# Patient Record
Sex: FEMALE | Race: WHITE | NOT HISPANIC OR LATINO | URBAN - METROPOLITAN AREA
[De-identification: names, ages, dates, MRNs, and addresses within clinical notes are randomized per-mention and may not be internally consistent; named-entity substitution may affect disease eponyms.]

---

## 2018-10-22 ENCOUNTER — NEW PATIENT (OUTPATIENT)
Dept: URBAN - METROPOLITAN AREA CLINIC 44 | Facility: CLINIC | Age: 73
End: 2018-10-22
Payer: MEDICARE

## 2018-10-22 DIAGNOSIS — E11.9 TYPE 2 DIABETES MELLITUS WITHOUT COMPLICATIONS: Primary | ICD-10-CM

## 2018-10-22 DIAGNOSIS — H35.3131 NONEXUDATIVE MACULAR DEGENERATION, EARLY DRY STAGE, BILATERAL: ICD-10-CM

## 2018-10-22 DIAGNOSIS — Z79.4 LONG TERM (CURRENT) USE OF INSULIN: ICD-10-CM

## 2018-10-22 PROCEDURE — 92004 COMPRE OPH EXAM NEW PT 1/>: CPT | Performed by: OPTOMETRIST

## 2018-10-22 PROCEDURE — 92134 CPTRZ OPH DX IMG PST SGM RTA: CPT | Performed by: OPTOMETRIST

## 2018-10-22 ASSESSMENT — KERATOMETRY
OS: 43.00
OD: 43.00

## 2019-11-11 ENCOUNTER — FOLLOW UP ESTABLISHED (OUTPATIENT)
Dept: URBAN - METROPOLITAN AREA CLINIC 44 | Facility: CLINIC | Age: 74
End: 2019-11-11
Payer: MEDICARE

## 2019-11-11 DIAGNOSIS — H02.201 LAGOPHTHALMOS OF RIGHT UPPER LID: ICD-10-CM

## 2019-11-11 DIAGNOSIS — H25.813 COMBINED FORMS OF AGE-RELATED CATARACT, BILATERAL: ICD-10-CM

## 2019-11-11 DIAGNOSIS — H02.205 LAGOPHTHALMOS OF LEFT UPPER LID: ICD-10-CM

## 2019-11-11 PROCEDURE — 92014 COMPRE OPH EXAM EST PT 1/>: CPT | Performed by: OPTOMETRIST

## 2019-11-11 PROCEDURE — 92134 CPTRZ OPH DX IMG PST SGM RTA: CPT | Performed by: OPTOMETRIST

## 2019-11-11 ASSESSMENT — KERATOMETRY
OS: 43.13
OD: 43.00

## 2019-11-11 ASSESSMENT — INTRAOCULAR PRESSURE
OS: 15
OD: 15

## 2019-11-11 ASSESSMENT — VISUAL ACUITY
OS: 20/30
OD: 20/40

## 2020-10-19 ENCOUNTER — HOSPITAL ENCOUNTER (EMERGENCY)
Age: 75
Discharge: ANOTHER HOSPITAL | End: 2020-10-19
Payer: MEDICARE

## 2020-10-19 ENCOUNTER — APPOINTMENT (OUTPATIENT)
Dept: CT IMAGING | Age: 75
End: 2020-10-19
Payer: MEDICARE

## 2020-10-19 VITALS
RESPIRATION RATE: 16 BRPM | HEART RATE: 70 BPM | DIASTOLIC BLOOD PRESSURE: 76 MMHG | HEIGHT: 59 IN | WEIGHT: 162 LBS | TEMPERATURE: 98.2 F | SYSTOLIC BLOOD PRESSURE: 165 MMHG | OXYGEN SATURATION: 94 % | BODY MASS INDEX: 32.66 KG/M2

## 2020-10-19 LAB
ALBUMIN SERPL-MCNC: 3.8 G/DL (ref 3.5–4.6)
ALBUMIN SERPL-MCNC: 3.9 G/DL (ref 3.5–4.6)
ALP BLD-CCNC: 92 U/L (ref 40–130)
ALP BLD-CCNC: 96 U/L (ref 40–130)
ALT SERPL-CCNC: 42 U/L (ref 0–33)
ALT SERPL-CCNC: 46 U/L (ref 0–33)
ANION GAP SERPL CALCULATED.3IONS-SCNC: 10 MEQ/L (ref 9–15)
ANION GAP SERPL CALCULATED.3IONS-SCNC: 9 MEQ/L (ref 9–15)
AST SERPL-CCNC: 42 U/L (ref 0–35)
AST SERPL-CCNC: 59 U/L (ref 0–35)
BASOPHILS ABSOLUTE: 0.1 K/UL (ref 0–0.2)
BASOPHILS RELATIVE PERCENT: 0.8 %
BILIRUB SERPL-MCNC: <0.2 MG/DL (ref 0.2–0.7)
BILIRUB SERPL-MCNC: <0.2 MG/DL (ref 0.2–0.7)
BUN BLDV-MCNC: 14 MG/DL (ref 8–23)
BUN BLDV-MCNC: 15 MG/DL (ref 8–23)
CALCIUM SERPL-MCNC: 9 MG/DL (ref 8.5–9.9)
CALCIUM SERPL-MCNC: 9.4 MG/DL (ref 8.5–9.9)
CHLORIDE BLD-SCNC: 101 MEQ/L (ref 95–107)
CHLORIDE BLD-SCNC: 107 MEQ/L (ref 95–107)
CO2: 25 MEQ/L (ref 20–31)
CO2: 25 MEQ/L (ref 20–31)
CREAT SERPL-MCNC: 0.62 MG/DL (ref 0.5–0.9)
CREAT SERPL-MCNC: 0.63 MG/DL (ref 0.5–0.9)
EOSINOPHILS ABSOLUTE: 0.4 K/UL (ref 0–0.7)
EOSINOPHILS RELATIVE PERCENT: 5.2 %
GFR AFRICAN AMERICAN: >60
GFR AFRICAN AMERICAN: >60
GFR NON-AFRICAN AMERICAN: >60
GFR NON-AFRICAN AMERICAN: >60
GLOBULIN: 3.3 G/DL (ref 2.3–3.5)
GLOBULIN: 4.1 G/DL (ref 2.3–3.5)
GLUCOSE BLD-MCNC: 196 MG/DL (ref 70–99)
GLUCOSE BLD-MCNC: 209 MG/DL (ref 70–99)
HCT VFR BLD CALC: 45.9 % (ref 37–47)
HEMOGLOBIN: 15.3 G/DL (ref 12–16)
LACTIC ACID: 0.9 MMOL/L (ref 0.5–2.2)
LYMPHOCYTES ABSOLUTE: 2.4 K/UL (ref 1–4.8)
LYMPHOCYTES RELATIVE PERCENT: 30.9 %
MCH RBC QN AUTO: 32.1 PG (ref 27–31.3)
MCHC RBC AUTO-ENTMCNC: 33.2 % (ref 33–37)
MCV RBC AUTO: 96.6 FL (ref 82–100)
MONOCYTES ABSOLUTE: 0.7 K/UL (ref 0.2–0.8)
MONOCYTES RELATIVE PERCENT: 8.7 %
NEUTROPHILS ABSOLUTE: 4.3 K/UL (ref 1.4–6.5)
NEUTROPHILS RELATIVE PERCENT: 54.4 %
PDW BLD-RTO: 13 % (ref 11.5–14.5)
PLATELET # BLD: 150 K/UL (ref 130–400)
POC CREATININE WHOLE BLOOD: 0.7
POTASSIUM SERPL-SCNC: 5.2 MEQ/L (ref 3.4–4.9)
POTASSIUM SERPL-SCNC: 6 MEQ/L (ref 3.4–4.9)
RBC # BLD: 4.76 M/UL (ref 4.2–5.4)
REASON FOR REJECTION: NORMAL
REJECTED TEST: NORMAL
SODIUM BLD-SCNC: 136 MEQ/L (ref 135–144)
SODIUM BLD-SCNC: 141 MEQ/L (ref 135–144)
TOTAL PROTEIN: 7.1 G/DL (ref 6.3–8)
TOTAL PROTEIN: 8 G/DL (ref 6.3–8)
WBC # BLD: 7.9 K/UL (ref 4.8–10.8)

## 2020-10-19 PROCEDURE — 70487 CT MAXILLOFACIAL W/DYE: CPT

## 2020-10-19 PROCEDURE — 2580000003 HC RX 258: Performed by: PHYSICIAN ASSISTANT

## 2020-10-19 PROCEDURE — 80053 COMPREHEN METABOLIC PANEL: CPT

## 2020-10-19 PROCEDURE — 36415 COLL VENOUS BLD VENIPUNCTURE: CPT

## 2020-10-19 PROCEDURE — 96365 THER/PROPH/DIAG IV INF INIT: CPT

## 2020-10-19 PROCEDURE — 96376 TX/PRO/DX INJ SAME DRUG ADON: CPT

## 2020-10-19 PROCEDURE — 2500000003 HC RX 250 WO HCPCS: Performed by: PHYSICIAN ASSISTANT

## 2020-10-19 PROCEDURE — 6360000004 HC RX CONTRAST MEDICATION: Performed by: PHYSICIAN ASSISTANT

## 2020-10-19 PROCEDURE — 85025 COMPLETE CBC W/AUTO DIFF WBC: CPT

## 2020-10-19 PROCEDURE — 6360000002 HC RX W HCPCS: Performed by: PHYSICIAN ASSISTANT

## 2020-10-19 PROCEDURE — 99285 EMERGENCY DEPT VISIT HI MDM: CPT

## 2020-10-19 PROCEDURE — 83605 ASSAY OF LACTIC ACID: CPT

## 2020-10-19 PROCEDURE — 87040 BLOOD CULTURE FOR BACTERIA: CPT

## 2020-10-19 PROCEDURE — 96375 TX/PRO/DX INJ NEW DRUG ADDON: CPT

## 2020-10-19 RX ORDER — SODIUM POLYSTYRENE SULFONATE 15 G/60ML
15 SUSPENSION ORAL; RECTAL ONCE
Status: DISCONTINUED | OUTPATIENT
Start: 2020-10-19 | End: 2020-10-19 | Stop reason: HOSPADM

## 2020-10-19 RX ORDER — GABAPENTIN 300 MG/1
300 CAPSULE ORAL 2 TIMES DAILY
COMMUNITY
End: 2021-12-13 | Stop reason: SDUPTHER

## 2020-10-19 RX ORDER — INSULIN ASPART 100 [IU]/ML
18 INJECTION, SOLUTION INTRAVENOUS; SUBCUTANEOUS
COMMUNITY
End: 2021-06-14

## 2020-10-19 RX ORDER — ATORVASTATIN CALCIUM 40 MG/1
40 TABLET, FILM COATED ORAL DAILY
COMMUNITY
End: 2021-01-11 | Stop reason: SDUPTHER

## 2020-10-19 RX ORDER — PRIMIDONE 50 MG/1
50 TABLET ORAL 2 TIMES DAILY
COMMUNITY
End: 2021-02-03 | Stop reason: SDUPTHER

## 2020-10-19 RX ORDER — INSULIN GLARGINE 100 [IU]/ML
15 INJECTION, SOLUTION SUBCUTANEOUS NIGHTLY
COMMUNITY
End: 2021-01-25

## 2020-10-19 RX ORDER — CLINDAMYCIN PHOSPHATE 600 MG/50ML
600 INJECTION INTRAVENOUS ONCE
Status: COMPLETED | OUTPATIENT
Start: 2020-10-19 | End: 2020-10-19

## 2020-10-19 RX ORDER — IBUPROFEN 600 MG/1
600 TABLET ORAL 2 TIMES DAILY PRN
COMMUNITY
End: 2021-03-25 | Stop reason: SDUPTHER

## 2020-10-19 RX ORDER — LISINOPRIL 10 MG/1
10 TABLET ORAL DAILY
COMMUNITY
End: 2021-05-04 | Stop reason: SDUPTHER

## 2020-10-19 RX ORDER — FENTANYL CITRATE 50 UG/ML
50 INJECTION, SOLUTION INTRAMUSCULAR; INTRAVENOUS ONCE
Status: COMPLETED | OUTPATIENT
Start: 2020-10-19 | End: 2020-10-19

## 2020-10-19 RX ORDER — 0.9 % SODIUM CHLORIDE 0.9 %
1000 INTRAVENOUS SOLUTION INTRAVENOUS ONCE
Status: COMPLETED | OUTPATIENT
Start: 2020-10-19 | End: 2020-10-19

## 2020-10-19 RX ADMIN — SODIUM CHLORIDE 1000 ML: 9 INJECTION, SOLUTION INTRAVENOUS at 15:18

## 2020-10-19 RX ADMIN — FENTANYL CITRATE 50 MCG: 50 INJECTION INTRAMUSCULAR; INTRAVENOUS at 18:05

## 2020-10-19 RX ADMIN — IOPAMIDOL 100 ML: 612 INJECTION, SOLUTION INTRAVENOUS at 16:09

## 2020-10-19 RX ADMIN — CLINDAMYCIN IN 5 PERCENT DEXTROSE 600 MG: 12 INJECTION, SOLUTION INTRAVENOUS at 15:18

## 2020-10-19 RX ADMIN — FENTANYL CITRATE 50 MCG: 50 INJECTION INTRAMUSCULAR; INTRAVENOUS at 16:47

## 2020-10-19 ASSESSMENT — ENCOUNTER SYMPTOMS
DIARRHEA: 0
PHOTOPHOBIA: 0
SORE THROAT: 0
BACK PAIN: 0
SHORTNESS OF BREATH: 0
RHINORRHEA: 0
ABDOMINAL PAIN: 0
VOMITING: 0
NAUSEA: 0
COUGH: 0
EYE PAIN: 0

## 2020-10-19 ASSESSMENT — PAIN SCALES - GENERAL
PAINLEVEL_OUTOF10: 6
PAINLEVEL_OUTOF10: 10

## 2020-10-19 ASSESSMENT — PAIN DESCRIPTION - LOCATION
LOCATION: TEETH
LOCATION: JAW
LOCATION: JAW

## 2020-10-19 ASSESSMENT — PAIN DESCRIPTION - ORIENTATION
ORIENTATION: RIGHT
ORIENTATION: RIGHT

## 2020-10-19 ASSESSMENT — PAIN DESCRIPTION - DESCRIPTORS
DESCRIPTORS: PRESSURE
DESCRIPTORS: PRESSURE

## 2020-10-19 ASSESSMENT — PAIN DESCRIPTION - PROGRESSION
CLINICAL_PROGRESSION: GRADUALLY IMPROVING
CLINICAL_PROGRESSION: RESOLVED

## 2020-10-19 ASSESSMENT — PAIN DESCRIPTION - PAIN TYPE
TYPE: ACUTE PAIN

## 2020-10-19 ASSESSMENT — PAIN DESCRIPTION - FREQUENCY: FREQUENCY: CONTINUOUS

## 2020-10-19 ASSESSMENT — PAIN - FUNCTIONAL ASSESSMENT: PAIN_FUNCTIONAL_ASSESSMENT: 0-10

## 2020-10-19 NOTE — ED TRIAGE NOTES
Pt here with complaints of dental pain. She has seen her dentist, who put her on antibiotics. She finished the course of antibiotics with relief. Shortly after, the pain returned. Pt states the infection is in her right lower jaw. Her dentist advised her to come to ER for possible IV antibiotics. Pt was referred to multiple oral surgeons, but no one takes her insurance. She states the pain radiated to her ear, now it radiates across her mouth.

## 2020-10-19 NOTE — ED NOTES
Returned. States she feels ok right now. Call light within reach. Aware we are waiting on results.       Renzo Manzo RN  10/19/20 1455

## 2020-10-19 NOTE — ED NOTES
CT result back. PA Apple made aware. Pt wanting coffee. Waiting to confirm with hope to see if she can have some.      Madhav Mac RN  10/19/20 6245

## 2020-10-19 NOTE — ED NOTES
[facility] here for patient. Report given to squad member. Pt denies any further complaints. States her pain is still a 6/10 but better than it was. No acute distress noted.      Stevie Cortes RN  10/19/20 6053

## 2020-10-19 NOTE — ED PROVIDER NOTES
[address] [facility] ED  EMERGENCY DEPARTMENT ENCOUNTER      Pt Name: Mary Kate Irving  MRN: [id number]  [place] 1945  Date of evaluation: 10/19/2020  Provider: Magi Palacios PA-C      HISTORY OF PRESENT ILLNESS    Mary Kate Irving is a 67 y.o. female who presents to the Emergency Department with dental pain. Patient states that she has been dealing with right lower dental pain and swelling on collection for the last month. Patient recently finished an antibiotic about a week ago she is unsure which one. She was possibly seeing an oral surgeon but has had difficulty getting into 1. She has increased swelling pain and was informed to go to the emergency department due to age diabetes and the dentist did not want to prescribe another antibiotic. She denies any fevers at this time. REVIEW OF SYSTEMS       Review of Systems   Constitutional: Negative for chills, diaphoresis, fatigue and fever. HENT: Positive for dental problem. Negative for congestion, rhinorrhea and sore throat. Eyes: Negative for photophobia and pain. Respiratory: Negative for cough and shortness of breath. Cardiovascular: Negative for chest pain and palpitations. Gastrointestinal: Negative for abdominal pain, diarrhea, nausea and vomiting. Genitourinary: Negative for dysuria and flank pain. Musculoskeletal: Negative for back pain. Skin: Negative for rash. Neurological: Negative for dizziness, light-headedness and headaches. Psychiatric/Behavioral: Negative. All other systems reviewed and are negative.         PAST MEDICAL HISTORY     Past Medical History:   Diagnosis Date    Cancer ([facility] [address]     Breast    Diabetes mellitus ([facility] [address] [facility]     Hyperlipidemia     Hypertension          SURGICAL HISTORY       Past Surgical History:   Procedure Laterality Date    APPENDECTOMY      BREAST LUMPECTOMY Right     CARPAL TUNNEL RELEASE Left     HERNIA REPAIR      Umbilical    HYSTERECTOMY      TONSILLECTOMY None     Forced sexual activity: None   Other Topics Concern    None   Social History Narrative    None       SCREENINGS             PHYSICAL EXAM    (up to 7 for level 4, 8 or more for level 5)     ED Triage Vitals [10/19/20 1350]   BP Temp Temp Source Pulse Resp SpO2 Height Weight   (!) 131/94 98.2 °F (36.8 °C) Oral 80 16 97 % 4' 10.5\" (1.486 m) 162 lb (73.5 kg)       Physical Exam  Vitals signs and nursing note reviewed. Constitutional:       General: She is not in acute distress. Appearance: Normal appearance. She is well-developed. She is not diaphoretic. HENT:      Head: Normocephalic and atraumatic. Comments: Usual swelling noted. Mild redness submandibular. Mouth/Throat:      Lips: Pink. Mouth: Mucous membranes are moist.      Dentition: Abnormal dentition. Dental tenderness, gingival swelling and dental caries present. Pharynx: Oropharynx is clear. Uvula midline. Comments: Erythema to right lower gums without palpable abscess. Tenderness to palpation. Eyes:      General: Lids are normal.      Conjunctiva/sclera: Conjunctivae normal.   Neck:      Musculoskeletal: Normal range of motion and neck supple. Cardiovascular:      Rate and Rhythm: Normal rate and regular rhythm. Pulses: Normal pulses. Heart sounds: Normal heart sounds. Pulmonary:      Effort: Pulmonary effort is normal.      Breath sounds: Normal breath sounds. Abdominal:      General: Bowel sounds are normal.      Palpations: Abdomen is soft. Tenderness: There is no abdominal tenderness. Lymphadenopathy:      Cervical: No cervical adenopathy. Skin:     General: Skin is warm and dry. Capillary Refill: Capillary refill takes less than 2 seconds. Findings: No rash. Neurological:      Mental Status: She is alert and oriented to person, place, and time. Psychiatric:         Thought Content:  Thought content normal.         Judgment: Judgment normal.           All other labs were within normal range or not returned as of this dictation. EMERGENCY DEPARTMENT COURSE and DIFFERENTIALDIAGNOSIS/MDM:   Vitals:    Vitals:    10/19/20 1610 10/19/20 1630 10/19/20 1700 10/19/20 1800   BP: (!) 174/75 (!) 176/70 (!) 178/72 (!) 187/81   Pulse: 72 70 72 71   Resp: 16 16 16 16   Temp:       TempSrc:       SpO2: 98% 95% 95% 94%   Weight:       Height:            Patient presents with dental pain. She is afebrile and hemodynamically stable. Labs are grossly unremarkable at this time. CT results are as follows    CELLULITIS/PHLEGMON IN THE RIGHT SUBMANDIBULAR SOFT TISSUE. NO DRAINABLE ABSCESS FLUID COLLECTION. 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             Patient was given IV clindamycin while in the emergency department IV fluids and medicated for pain. We do not have oral surgery or dental at [facility] so patient will need to be transferred per hospitalist.  Patient was accepted at [facility] by Dr. Treasure Fox, oral surgeon. Pt is stable improved and ready for transfer   PROCEDURES:  Unless otherwise noted below, none     Procedures      FINAL IMPRESSION      1. Odontalgia    2. Cellulitis of face    3. Dental abscess          DISPOSITION/PLAN   DISPOSITION Decision To Transfer 10/19/2020 05:20:03 PM          Apple Hutchison (electronically signed)  Attending Emergency Physician       Catracho Torres PA-C  10/19/20 6392    Attending Supervisory Note/Shared Visit   I have personally performed a face to face diagnostic evaluation on this patient. I have reviewed the mid-levels findings and agree.   History and Exam by me shows dental abscess      Savage Cowart DO  Attending Emergency Physician         Savage Cowart DO  10/19/20 8234

## 2020-10-19 NOTE — ED NOTES
Per Oksana London in lab, CMP hemolyzed she will send phleb to [facility]raw.       Doreen Resendez, RN  10/19/20 1255

## 2020-10-20 LAB
GFR AFRICAN AMERICAN: >60
GFR NON-AFRICAN AMERICAN: >60
PERFORMED ON: NORMAL
POC CREATININE: 0.7 MG/DL (ref 0.6–1.2)
POC SAMPLE TYPE: NORMAL

## 2020-10-24 LAB
BLOOD CULTURE, ROUTINE: NORMAL
CULTURE, BLOOD 2: NORMAL

## 2021-01-04 ENCOUNTER — OFFICE VISIT (OUTPATIENT)
Dept: FAMILY MEDICINE CLINIC | Age: 76
End: 2021-01-04
Payer: MEDICARE

## 2021-01-04 VITALS
RESPIRATION RATE: 14 BRPM | HEART RATE: 86 BPM | TEMPERATURE: 97 F | BODY MASS INDEX: 33.26 KG/M2 | SYSTOLIC BLOOD PRESSURE: 120 MMHG | HEIGHT: 59 IN | OXYGEN SATURATION: 98 % | DIASTOLIC BLOOD PRESSURE: 70 MMHG | WEIGHT: 165 LBS

## 2021-01-04 DIAGNOSIS — E11.9 TYPE 2 DIABETES MELLITUS WITHOUT COMPLICATION, WITH LONG-TERM CURRENT USE OF INSULIN (HCC): ICD-10-CM

## 2021-01-04 DIAGNOSIS — Z79.4 TYPE 2 DIABETES MELLITUS WITHOUT COMPLICATION, WITH LONG-TERM CURRENT USE OF INSULIN (HCC): ICD-10-CM

## 2021-01-04 DIAGNOSIS — Z78.0 POST-MENOPAUSAL: ICD-10-CM

## 2021-01-04 DIAGNOSIS — E78.5 HYPERLIPIDEMIA, UNSPECIFIED HYPERLIPIDEMIA TYPE: ICD-10-CM

## 2021-01-04 DIAGNOSIS — G62.9 NEUROPATHY: ICD-10-CM

## 2021-01-04 DIAGNOSIS — I10 ESSENTIAL HYPERTENSION: Primary | ICD-10-CM

## 2021-01-04 DIAGNOSIS — R10.9 ABDOMINAL PAIN, UNSPECIFIED ABDOMINAL LOCATION: ICD-10-CM

## 2021-01-04 LAB
ALBUMIN SERPL-MCNC: 3.9 G/DL (ref 3.5–4.6)
ALP BLD-CCNC: 103 U/L (ref 40–130)
ALT SERPL-CCNC: 40 U/L (ref 0–33)
ANION GAP SERPL CALCULATED.3IONS-SCNC: 11 MEQ/L (ref 9–15)
AST SERPL-CCNC: 49 U/L (ref 0–35)
BILIRUB SERPL-MCNC: <0.2 MG/DL (ref 0.2–0.7)
BUN BLDV-MCNC: 16 MG/DL (ref 8–23)
CALCIUM SERPL-MCNC: 9.4 MG/DL (ref 8.5–9.9)
CHLORIDE BLD-SCNC: 106 MEQ/L (ref 95–107)
CHOLESTEROL, TOTAL: 147 MG/DL (ref 0–199)
CO2: 26 MEQ/L (ref 20–31)
CREAT SERPL-MCNC: 1.35 MG/DL (ref 0.5–0.9)
GFR AFRICAN AMERICAN: 46
GFR NON-AFRICAN AMERICAN: 38
GLOBULIN: 3.9 G/DL (ref 2.3–3.5)
GLUCOSE BLD-MCNC: 200 MG/DL (ref 70–99)
HBA1C MFR BLD: 9 % (ref 4.8–5.9)
HDLC SERPL-MCNC: 57 MG/DL (ref 40–59)
LDL CHOLESTEROL CALCULATED: 68 MG/DL (ref 0–129)
POTASSIUM SERPL-SCNC: 4.8 MEQ/L (ref 3.4–4.9)
SODIUM BLD-SCNC: 143 MEQ/L (ref 135–144)
TOTAL PROTEIN: 7.8 G/DL (ref 6.3–8)
TRIGL SERPL-MCNC: 112 MG/DL (ref 0–150)

## 2021-01-04 PROCEDURE — 3052F HG A1C>EQUAL 8.0%<EQUAL 9.0%: CPT | Performed by: INTERNAL MEDICINE

## 2021-01-04 PROCEDURE — 99214 OFFICE O/P EST MOD 30 MIN: CPT | Performed by: INTERNAL MEDICINE

## 2021-01-04 RX ORDER — INSULIN DETEMIR 100 [IU]/ML
10 INJECTION, SOLUTION SUBCUTANEOUS NIGHTLY
Qty: 5 PEN | Refills: 3 | Status: SHIPPED | OUTPATIENT
Start: 2021-01-04 | End: 2021-05-12

## 2021-01-04 SDOH — ECONOMIC STABILITY: TRANSPORTATION INSECURITY
IN THE PAST 12 MONTHS, HAS THE LACK OF TRANSPORTATION KEPT YOU FROM MEDICAL APPOINTMENTS OR FROM GETTING MEDICATIONS?: NO

## 2021-01-04 SDOH — ECONOMIC STABILITY: TRANSPORTATION INSECURITY
IN THE PAST 12 MONTHS, HAS LACK OF TRANSPORTATION KEPT YOU FROM MEETINGS, WORK, OR FROM GETTING THINGS NEEDED FOR DAILY LIVING?: NO

## 2021-01-04 ASSESSMENT — ENCOUNTER SYMPTOMS
WHEEZING: 0
COLOR CHANGE: 0
COUGH: 0
SHORTNESS OF BREATH: 0
APNEA: 0
SINUS PAIN: 0
FACIAL SWELLING: 0
CONSTIPATION: 0
EYE PAIN: 0
BACK PAIN: 0
EYE REDNESS: 0
SORE THROAT: 0
ABDOMINAL PAIN: 0
VOICE CHANGE: 0
DIARRHEA: 0
EYE DISCHARGE: 0
NAUSEA: 0
EYE ITCHING: 0
CHEST TIGHTNESS: 0
TROUBLE SWALLOWING: 0
RHINORRHEA: 0
SINUS PRESSURE: 0
VOMITING: 0
BLOOD IN STOOL: 0
ABDOMINAL DISTENTION: 0
PHOTOPHOBIA: 0
RECTAL PAIN: 0

## 2021-01-04 ASSESSMENT — PATIENT HEALTH QUESTIONNAIRE - PHQ9
SUM OF ALL RESPONSES TO PHQ QUESTIONS 1-9: 0
SUM OF ALL RESPONSES TO PHQ9 QUESTIONS 1 & 2: 0
1. LITTLE INTEREST OR PLEASURE IN DOING THINGS: 0

## 2021-01-04 NOTE — PROGRESS NOTES
2021    Ellen Varma (:  1945) is a 67 y.o. female     76-year-old diabetic female with associated neuropathy hypertensive hyperlipidemic female with hx of a resting tremor. The patient reports experiencing midepigastric, nonradiating, intermittent abdominal pain. She denies odontophagia dysphagia but reports intermittent nausea when this occurs. These episodes resolve spontaneously. At present she denies polyuria,  Polydipsia, constitutional, sinus, visual, cardiopulmonary, urologic, additional gastrointestinal, immunologic/hematologic, musculoskeletal, neurologic,dermatologic, or psychiatric complaints. There is no problem list on file for this patient. Review of Systems   Constitutional: Negative for chills, diaphoresis, fatigue and fever. HENT: Negative for congestion, dental problem, drooling, ear discharge, ear pain, facial swelling, hearing loss, mouth sores, nosebleeds, postnasal drip, rhinorrhea, sinus pressure, sinus pain, sneezing, sore throat, tinnitus, trouble swallowing and voice change. Eyes: Negative for photophobia, pain, discharge, redness, itching and visual disturbance. Respiratory: Negative for apnea, cough, chest tightness, shortness of breath and wheezing. Cardiovascular: Negative for chest pain, palpitations and leg swelling. Gastrointestinal: Negative for abdominal distention, abdominal pain, blood in stool, constipation, diarrhea, nausea, rectal pain and vomiting. Endocrine: Negative for cold intolerance, heat intolerance, polydipsia, polyphagia and polyuria. Genitourinary: Negative for decreased urine volume, difficulty urinating, dysuria, flank pain, frequency, genital sores, hematuria and urgency. Musculoskeletal: Negative for arthralgias, back pain, gait problem, joint swelling, myalgias, neck pain and neck stiffness. Skin: Negative for color change, rash and wound.    Allergic/Immunologic: Negative for environmental allergies and food allergies. Neurological: Negative for dizziness, tremors, seizures, syncope, facial asymmetry, speech difficulty, weakness, light-headedness, numbness and headaches. Hematological: Negative for adenopathy. Does not bruise/bleed easily. Psychiatric/Behavioral: Negative for agitation, confusion, decreased concentration, hallucinations, self-injury, sleep disturbance and suicidal ideas. The patient is not nervous/anxious. Prior to Visit Medications    Medication Sig Taking? Authorizing Provider   insulin detemir (LEVEMIR FLEXTOUCH) 100 UNIT/ML injection pen Inject 10 Units into the skin nightly Yes Hudson Caller, MD   insulin aspart (NOVOLOG FLEXPEN) 100 UNIT/ML injection pen Inject 18 Units into the skin 3 times daily (before meals) Yes Historical Provider, MD   insulin glargine (LANTUS) 100 UNIT/ML injection vial Inject 15 Units into the skin nightly Yes Historical Provider, MD   lisinopril (PRINIVIL;ZESTRIL) 10 MG tablet Take 10 mg by mouth daily Yes Historical Provider, MD   primidone (MYSOLINE) 50 MG tablet Take 50 mg by mouth 2 times daily Yes Historical Provider, MD   atorvastatin (LIPITOR) 40 MG tablet Take 40 mg by mouth daily Yes Historical Provider, MD   gabapentin (NEURONTIN) 300 MG capsule Take 300 mg by mouth 2 times daily.  Yes Historical Provider, MD   ibuprofen (ADVIL;MOTRIN) 600 MG tablet Take 600 mg by mouth 2 times daily as needed for Pain Yes Historical Provider, MD        No Known Allergies    Past Medical History:   Diagnosis Date    Cancer ([facility] [address]     Breast    Diabetes mellitus ([facility] [address]     Hyperlipidemia     Hypertension        Past Surgical History:   Procedure Laterality Date    APPENDECTOMY      BREAST LUMPECTOMY Right     CARPAL TUNNEL RELEASE Left     HERNIA REPAIR      Umbilical    HYSTERECTOMY      TONSILLECTOMY         Social History     Socioeconomic History    Marital status:      Spouse name: Not on file    Number of children: Not on Conjunctivae normal.   Neck:      Musculoskeletal: Neck supple. Vascular: No JVD. Trachea: No tracheal deviation. Cardiovascular:      Rate and Rhythm: Normal rate and regular rhythm. Heart sounds: Normal heart sounds. Pulmonary:      Effort: Pulmonary effort is normal. No respiratory distress. Breath sounds: Normal breath sounds. No wheezing or rales. Chest:      Chest wall: No tenderness. Abdominal:      General: Bowel sounds are normal. There is no distension. Palpations: Abdomen is soft. There is no mass. Tenderness: There is no abdominal tenderness. There is no guarding or rebound. Musculoskeletal:         General: No tenderness or deformity. Skin:     General: Skin is warm and dry. Coloration: Skin is not pale. Findings: No erythema or rash. Neurological:      Mental Status: She is alert and oriented to person, place, and time. Motor: No abnormal muscle tone. Psychiatric:         Thought Content: Thought content normal.         Judgment: Judgment normal.         No flowsheet data found.     Lab Results   Component Value Date    CHOL 147 01/04/2021    TRIG 112 01/04/2021    HDL 57 01/04/2021    LDLCALC 68 01/04/2021    GLUCOSE 200 01/04/2021    LABA1C 9.0 01/04/2021       The 10-year ASCVD risk score (Ami Nuno, et al., 2013) is: 21.7%    Values used to calculate the score:      Age: 67 years      Sex: Female      Is Non- : No      Diabetic: No      Tobacco smoker: Yes      Systolic Blood Pressure: 337 mmHg      Is BP treated: No      HDL Cholesterol: 57 mg/dL      Total Cholesterol: 147 mg/dL    Immunization History   Administered Date(s) Administered    Influenza Virus Vaccine 11/07/2006, 11/15/2007    Influenza, Quadv, adjuvanted, 65 yrs +, IM, PF (Fluad) 09/14/2020    Pneumococcal Polysaccharide ([id number]) 09/14/2020    Tdap (Boostrix, Adacel) 10/15/2004       Health Maintenance   Topic Date Due    Hepatitis C screen  1945    Shingles Vaccine (1 of 2) 02/02/1994    DEXA (modify frequency per FRAX score)  02/02/1999    DTaP/Tdap/Td vaccine (2 - Td) 10/15/2014    Annual Wellness Visit (AWV)  10/19/2020    Lipid screen  01/04/2022    Potassium monitoring  01/04/2022    Creatinine monitoring  01/04/2022    Flu vaccine  Completed    Pneumococcal 65+ years Vaccine  Completed    Hepatitis A vaccine  Aged Out    Hepatitis B vaccine  Aged Out    Hib vaccine  Aged Out    Meningococcal (ACWY) vaccine  Aged Out       ASSESSMENT/PLAN:  1. Essential hypertension  2. Type 2 diabetes mellitus without complication, with long-term current use of insulin (HCC)  -     Hemoglobin A1C; Future  -     Comprehensive Metabolic Panel; Future  3. Post-menopausal  -     DEXA BONE DENSITY AXIAL SKELETON; Future  4. Hyperlipidemia, unspecified hyperlipidemia type  -     Lipid Panel; Future  5. Neuropathy  6. Abdominal pain, unspecified abdominal location  -     US ABDOMEN LIMITED RUQ; Future      Return in about 2 months (around 3/4/2021). An electronic signature was used to authenticate this note.     --Jovan Vanessa MD on 1/4/2021 at 10:08 PM

## 2021-01-12 RX ORDER — ATORVASTATIN CALCIUM 40 MG/1
40 TABLET, FILM COATED ORAL DAILY
Qty: 90 TABLET | Refills: 3 | Status: SHIPPED | OUTPATIENT
Start: 2021-01-12 | End: 2022-02-28

## 2021-01-13 ENCOUNTER — TELEPHONE (OUTPATIENT)
Dept: FAMILY MEDICINE CLINIC | Age: 76
End: 2021-01-13

## 2021-01-13 NOTE — TELEPHONE ENCOUNTER
Rima Carcamo is very confused with the insulin ordered for her. Pharmacy calls for clarification of which insulins she is to be taking. We should call Rima Carlos Alberto and clarify with her. There is no novolog at the pharmacy for her right now. If she is to be on that they will need a RX sent.

## 2021-01-14 RX ORDER — INSULIN ASPART 100 [IU]/ML
18 INJECTION, SOLUTION INTRAVENOUS; SUBCUTANEOUS
Qty: 5 PEN | Refills: 3 | Status: SHIPPED | OUTPATIENT
Start: 2021-01-14 | End: 2021-01-14

## 2021-01-14 RX ORDER — INSULIN ASPART 100 [IU]/ML
18 INJECTION, SOLUTION INTRAVENOUS; SUBCUTANEOUS
Qty: 5 PEN | Refills: 3 | Status: SHIPPED | OUTPATIENT
Start: 2021-01-14 | End: 2021-05-08 | Stop reason: SDUPTHER

## 2021-01-25 ENCOUNTER — OFFICE VISIT (OUTPATIENT)
Dept: FAMILY MEDICINE CLINIC | Age: 76
End: 2021-01-25
Payer: MEDICARE

## 2021-01-25 VITALS
OXYGEN SATURATION: 94 % | WEIGHT: 164 LBS | SYSTOLIC BLOOD PRESSURE: 130 MMHG | DIASTOLIC BLOOD PRESSURE: 76 MMHG | HEART RATE: 87 BPM | HEIGHT: 59 IN | BODY MASS INDEX: 33.06 KG/M2 | TEMPERATURE: 97 F | RESPIRATION RATE: 14 BRPM

## 2021-01-25 DIAGNOSIS — N18.31 STAGE 3A CHRONIC KIDNEY DISEASE (HCC): ICD-10-CM

## 2021-01-25 DIAGNOSIS — R25.1 TREMOR: ICD-10-CM

## 2021-01-25 DIAGNOSIS — Z79.4 TYPE 2 DIABETES MELLITUS WITHOUT COMPLICATION, WITH LONG-TERM CURRENT USE OF INSULIN (HCC): Primary | ICD-10-CM

## 2021-01-25 DIAGNOSIS — E11.9 TYPE 2 DIABETES MELLITUS WITHOUT COMPLICATION, WITH LONG-TERM CURRENT USE OF INSULIN (HCC): Primary | ICD-10-CM

## 2021-01-25 DIAGNOSIS — G62.9 NEUROPATHY: ICD-10-CM

## 2021-01-25 DIAGNOSIS — I10 ESSENTIAL HYPERTENSION: ICD-10-CM

## 2021-01-25 DIAGNOSIS — E78.5 HYPERLIPIDEMIA, UNSPECIFIED HYPERLIPIDEMIA TYPE: ICD-10-CM

## 2021-01-25 LAB — HBA1C MFR BLD: 8.9 %

## 2021-01-25 PROCEDURE — 3052F HG A1C>EQUAL 8.0%<EQUAL 9.0%: CPT | Performed by: INTERNAL MEDICINE

## 2021-01-25 PROCEDURE — 99214 OFFICE O/P EST MOD 30 MIN: CPT | Performed by: INTERNAL MEDICINE

## 2021-01-25 PROCEDURE — 83036 HEMOGLOBIN GLYCOSYLATED A1C: CPT | Performed by: INTERNAL MEDICINE

## 2021-01-25 ASSESSMENT — ENCOUNTER SYMPTOMS
DIARRHEA: 0
VOICE CHANGE: 0
ABDOMINAL DISTENTION: 0
VOMITING: 0
SORE THROAT: 0
EYE PAIN: 0
BLOOD IN STOOL: 0
RECTAL PAIN: 0
APNEA: 0
EYE DISCHARGE: 0
CONSTIPATION: 0
CHEST TIGHTNESS: 0
SHORTNESS OF BREATH: 0
SINUS PAIN: 0
EYE REDNESS: 0
FACIAL SWELLING: 0
WHEEZING: 0
RHINORRHEA: 0
COLOR CHANGE: 0
ABDOMINAL PAIN: 0
EYE ITCHING: 0
NAUSEA: 0
BACK PAIN: 0
PHOTOPHOBIA: 0
TROUBLE SWALLOWING: 0
COUGH: 0
SINUS PRESSURE: 0

## 2021-01-25 NOTE — PROGRESS NOTES
intolerance, polydipsia, polyphagia and polyuria. Genitourinary: Negative for decreased urine volume, difficulty urinating, dysuria, flank pain, frequency, genital sores, hematuria and urgency. Musculoskeletal: Negative for arthralgias, back pain, gait problem, joint swelling, myalgias, neck pain and neck stiffness. Skin: Negative for color change, rash and wound. Allergic/Immunologic: Negative for environmental allergies and food allergies. Neurological: Negative for dizziness, tremors, seizures, syncope, facial asymmetry, speech difficulty, weakness, light-headedness, numbness and headaches. Hematological: Negative for adenopathy. Does not bruise/bleed easily. Psychiatric/Behavioral: Negative for agitation, confusion, decreased concentration, hallucinations, self-injury, sleep disturbance and suicidal ideas. The patient is not nervous/anxious. Prior to Visit Medications    Medication Sig Taking? Authorizing Provider   insulin aspart (NOVOLOG FLEXPEN) 100 UNIT/ML injection pen Inject 18 Units into the skin 3 times daily (before meals)  Jarret Dubois MD   atorvastatin (LIPITOR) 40 MG tablet Take 1 tablet by mouth daily  Jarret Dubois MD   insulin detemir (LEVEMIR FLEXTOUCH) 100 UNIT/ML injection pen Inject 10 Units into the skin nightly  Jarret Dubois MD   insulin aspart (NOVOLOG FLEXPEN) 100 UNIT/ML injection pen Inject 18 Units into the skin 3 times daily (before meals)  Historical Provider, MD   lisinopril (PRINIVIL;ZESTRIL) 10 MG tablet Take 10 mg by mouth daily  Historical Provider, MD   primidone (MYSOLINE) 50 MG tablet Take 50 mg by mouth 2 times daily  Historical Provider, MD   gabapentin (NEURONTIN) 300 MG capsule Take 300 mg by mouth 2 times daily.   Historical Provider, MD   ibuprofen (ADVIL;MOTRIN) 600 MG tablet Take 600 mg by mouth 2 times daily as needed for Pain  Historical Provider, MD        No Known Allergies    Past Medical History:   Diagnosis Date    Cancer ([facility] [address] Breast    Diabetes mellitus ([facility] [address]     Hyperlipidemia     Hypertension        Past Surgical History:   Procedure Laterality Date    APPENDECTOMY      BREAST LUMPECTOMY Right     CARPAL TUNNEL RELEASE Left     HERNIA REPAIR      Umbilical    HYSTERECTOMY      TONSILLECTOMY         Social History     Socioeconomic History    Marital status:      Spouse name: Not on file    Number of children: Not on file    Years of education: Not on file    Highest education level: Not on file   Occupational History    Not on file   Social Needs    Financial resource strain: Not hard at all   [place] insecurity     Worry: Never true     Inability: Never true   [employer] needs     Medical: No     Non-medical: No   Tobacco Use    Smoking status: Current Every Day Smoker     Packs/day: 0.50     Types: Cigarettes    Smokeless tobacco: Never Used   Substance and Sexual Activity    Alcohol use: Yes     Comment: Once a month    Drug use: Never    Sexual activity: Not on file   Lifestyle    Physical activity     Days per week: Not on file     Minutes per session: Not on file    Stress: Not on file   Relationships    Social connections     Talks on phone: Not on file     Gets together: Not on file     Attends [employer] service: Not on file     Active member of club or organization: Not on file     Attends meetings of clubs or organizations: Not on file     Relationship status: Not on file    Intimate partner violence     Fear of current or ex partner: Not on file     Emotionally abused: Not on file     Physically abused: Not on file     Forced sexual activity: Not on file   Other Topics Concern    Not on file   Social History Narrative    Not on file        No family history on file.     ADVANCE DIRECTIVE: N, <no information>    Vitals:    01/25/21 1517   BP: 130/76   Pulse: 87   Resp: 14   Temp: 97 °F (36.1 °C)   SpO2: 94%   Weight: 164 lb (74.4 kg)   Height: 4' 11\" (1.499 m)     Estimated body mass American      >60   46.0 (L)    Calcium      8.5 - 9.9 mg/dL   9.4    Total Protein      6.3 - 8.0 g/dL   7.8    Albumin      3.5 - 4.6 g/dL   3.9    Bilirubin      0.2 - 0.7 mg/dL   <0.2    Alk Phos      40 - 130 U/L   103    ALT      0 - 33 U/L   40 (H)    AST      0 - 35 U/L   49 (H)    Globulin      2.3 - 3.5 g/dL   3.9 (H)    Cholesterol, Total      0 - 199 mg/dL  147     Triglycerides      0 - 150 mg/dL  112     HDL Cholesterol      40 - 59 mg/dL  57     LDL Calculated      0 - 129 mg/dL  68     Hemoglobin A1C      % 8.9   9.0 (H)     No flowsheet data found.     Lab Results   Component Value Date    CHOL 147 01/04/2021    TRIG 112 01/04/2021    HDL 57 01/04/2021    LDLCALC 68 01/04/2021    GLUCOSE 200 01/04/2021    LABA1C 8.9 01/25/2021    LABA1C 9.0 01/04/2021       The 10-year ASCVD risk score (Vannesa Millard, et al., 2013) is: 42.6%    Values used to calculate the score:      Age: 67 years      Sex: Female      Is Non- : No      Diabetic: Yes      Tobacco smoker: Yes      Systolic Blood Pressure: 302 mmHg      Is BP treated: No      HDL Cholesterol: 57 mg/dL      Total Cholesterol: 147 mg/dL    Immunization History   Administered Date(s) Administered    Influenza Virus Vaccine 11/07/2006, 11/15/2007    Influenza, Quadv, adjuvanted, 65 yrs +, IM, PF (Fluad) 09/14/2020    Pneumococcal Polysaccharide ([id number]) 09/14/2020    Tdap (Boostrix, Adacel) 10/15/2004       Health Maintenance   Topic Date Due    COVID-19 Vaccine (1 of 2) 02/02/1960    Shingles Vaccine (1 of 2) 02/02/1994    DEXA (modify frequency per FRAX score)  02/02/1999    DTaP/Tdap/Td vaccine (2 - Td) 10/15/2014    Annual Wellness Visit (AWV)  10/19/2020    Lipid screen  01/04/2022    Potassium monitoring  01/04/2022    Creatinine monitoring  01/04/2022    Flu vaccine  Completed    Pneumococcal 65+ years Vaccine  Completed    Hepatitis A vaccine  Aged Out    Hib vaccine  Aged Out    Meningococcal (ACWY) vaccine  Aged Out    Hepatitis C screen  Discontinued       ASSESSMENT/PLAN:  1. Type 2 diabetes mellitus without complication, with long-term current use of insulin (HCC)  -     POCT glycosylated hemoglobin (Hb A1C)  -Continue Levemir 10 units nightly and Novolin 18 units 3 times daily with meals      2. Hyperlipidemia, unspecified hyperlipidemia typecontinue Lipitor 40 mg orally daily      3. Essential hypertensioncontinue lisinopril 10 mg orally daily      4. Tremorcontinue primidone 50 mg twice daily      5. Neuropathycontinue Neurontin 20 mg twice daily      6. CKD stage III: Avoid the use of NSAIDs. Monitor renal function closely. Continue lisinopril 10 mg orally daily. Return in about 2 months (around 3/25/2021). An electronic signature was used to authenticate this note.     --Bella Caraballo MD on 1/25/2021 at 7:32 PM

## 2021-02-03 RX ORDER — PRIMIDONE 50 MG/1
50 TABLET ORAL 2 TIMES DAILY
Qty: 90 TABLET | Refills: 1 | Status: SHIPPED | OUTPATIENT
Start: 2021-02-03 | End: 2021-05-12 | Stop reason: SDUPTHER

## 2021-02-03 NOTE — TELEPHONE ENCOUNTER
Patient requesting medication refill.  Please approve or deny this request.    Rx requested:  Requested Prescriptions     Pending Prescriptions Disp Refills    primidone (MYSOLINE) 50 MG tablet 90 tablet      Sig: Take 1 tablet by mouth 2 times daily         Last Office Visit:   1/25/2021      Next Visit Date:  Future Appointments   Date Time Provider Katia Mcneal   3/25/2021  2:30 PM Jarret Dubois  [place]

## 2021-03-25 ENCOUNTER — OFFICE VISIT (OUTPATIENT)
Dept: FAMILY MEDICINE CLINIC | Age: 76
End: 2021-03-25
Payer: MEDICARE

## 2021-03-25 VITALS
HEART RATE: 84 BPM | DIASTOLIC BLOOD PRESSURE: 70 MMHG | RESPIRATION RATE: 14 BRPM | TEMPERATURE: 97 F | WEIGHT: 167 LBS | BODY MASS INDEX: 33.67 KG/M2 | OXYGEN SATURATION: 96 % | SYSTOLIC BLOOD PRESSURE: 112 MMHG | HEIGHT: 59 IN

## 2021-03-25 DIAGNOSIS — Z00.00 ROUTINE GENERAL MEDICAL EXAMINATION AT A HEALTH CARE FACILITY: Primary | ICD-10-CM

## 2021-03-25 PROCEDURE — G0402 INITIAL PREVENTIVE EXAM: HCPCS | Performed by: INTERNAL MEDICINE

## 2021-03-25 RX ORDER — IBUPROFEN 600 MG/1
600 TABLET ORAL 2 TIMES DAILY PRN
Qty: 120 TABLET | Refills: 3 | Status: SHIPPED | OUTPATIENT
Start: 2021-03-25 | End: 2021-09-29

## 2021-03-25 ASSESSMENT — PATIENT HEALTH QUESTIONNAIRE - PHQ9
SUM OF ALL RESPONSES TO PHQ QUESTIONS 1-9: 0
SUM OF ALL RESPONSES TO PHQ QUESTIONS 1-9: 0
1. LITTLE INTEREST OR PLEASURE IN DOING THINGS: 0
SUM OF ALL RESPONSES TO PHQ9 QUESTIONS 1 & 2: 0
2. FEELING DOWN, DEPRESSED OR HOPELESS: 0

## 2021-03-25 ASSESSMENT — LIFESTYLE VARIABLES
HOW OFTEN DURING THE LAST YEAR HAVE YOU NEEDED AN ALCOHOLIC DRINK FIRST THING IN THE MORNING TO GET YOURSELF GOING AFTER A NIGHT OF HEAVY DRINKING: NEVER
AUDIT TOTAL SCORE: 0
HAVE YOU OR SOMEONE ELSE BEEN INJURED AS A RESULT OF YOUR DRINKING: 0
HOW OFTEN DO YOU HAVE A DRINK CONTAINING ALCOHOL: 1
AUDIT-C TOTAL SCORE: 0
HAS A RELATIVE, FRIEND, DOCTOR, OR ANOTHER HEALTH PROFESSIONAL EXPRESSED CONCERN ABOUT YOUR DRINKING OR SUGGESTED YOU CUT DOWN: 0
HAVE YOU OR SOMEONE ELSE BEEN INJURED AS A RESULT OF YOUR DRINKING: NO
HOW OFTEN DURING THE LAST YEAR HAVE YOU BEEN UNABLE TO REMEMBER WHAT HAPPENED THE NIGHT BEFORE BECAUSE YOU HAD BEEN DRINKING: 0
HOW MANY STANDARD DRINKS CONTAINING ALCOHOL DO YOU HAVE ON A TYPICAL DAY: ONE OR TWO
HOW OFTEN DURING THE LAST YEAR HAVE YOU FOUND THAT YOU WERE NOT ABLE TO STOP DRINKING ONCE YOU HAD STARTED: 0
HOW OFTEN DO YOU HAVE SIX OR MORE DRINKS ON ONE OCCASION: NEVER
HOW OFTEN DO YOU HAVE SIX OR MORE DRINKS ON ONE OCCASION: 0
HAS A RELATIVE, FRIEND, DOCTOR, OR ANOTHER HEALTH PROFESSIONAL EXPRESSED CONCERN ABOUT YOUR DRINKING OR SUGGESTED YOU CUT DOWN: NO
HOW OFTEN DO YOU HAVE A DRINK CONTAINING ALCOHOL: MONTHLY OR LESS
HOW OFTEN DURING THE LAST YEAR HAVE YOU FAILED TO DO WHAT WAS NORMALLY EXPECTED FROM YOU BECAUSE OF DRINKING: NEVER
AUDIT TOTAL SCORE: 1
HOW OFTEN DURING THE LAST YEAR HAVE YOU BEEN UNABLE TO REMEMBER WHAT HAPPENED THE NIGHT BEFORE BECAUSE YOU HAD BEEN DRINKING: NEVER
HOW OFTEN DURING THE LAST YEAR HAVE YOU HAD A FEELING OF GUILT OR REMORSE AFTER DRINKING: 0
HOW OFTEN DURING THE LAST YEAR HAVE YOU FOUND THAT YOU WERE NOT ABLE TO STOP DRINKING ONCE YOU HAD STARTED: NEVER
HOW OFTEN DURING THE LAST YEAR HAVE YOU FAILED TO DO WHAT WAS NORMALLY EXPECTED FROM YOU BECAUSE OF DRINKING: 0
HOW OFTEN DURING THE LAST YEAR HAVE YOU HAD A FEELING OF GUILT OR REMORSE AFTER DRINKING: NEVER

## 2021-03-25 NOTE — PATIENT INSTRUCTIONS
Personalized Preventive Plan for Tony Howell  3/25/2021  Medicare offers a range of preventive health benefits. Some of the tests and screenings are paid in full while other may be subject to a deductible, co-insurance, and/or copay. Some of these benefits include a comprehensive review of your medical history including lifestyle, illnesses that may run in your family, and various assessments and screenings as appropriate. After reviewing your medical record and screening and assessments performed today your provider may have ordered immunizations, labs, imaging, and/or referrals for you. A list of these orders (if applicable) as well as your Preventive Care list are included within your After Visit Summary for your review. Other Preventive Recommendations:    · A preventive eye exam performed by an eye specialist is recommended every 1-2 years to screen for glaucoma; cataracts, macular degeneration, and other eye disorders. · A preventive dental visit is recommended every 6 months. · Try to get at least 150 minutes of exercise per week or 10,000 steps per day on a pedometer . · Order or download the FREE \"Exercise & Physical Activity: Your Everyday Guide\" from [employer]. Call 5-814.138.6302 or search [employer] online. · You need 9373-5510 mg of calcium and 2578-0412 IU of vitamin D per day. It is possible to meet your calcium requirement with diet alone, but a vitamin D supplement is usually necessary to meet this goal.  · When exposed to the sun, use a sunscreen that protects against both UVA and UVB radiation with an SPF of 30 or greater. Reapply every 2 to 3 hours or after sweating, drying off with a towel, or swimming. · Always wear a seat belt when traveling in a car. Always wear a helmet when riding a bicycle or motorcycle. Personalized Preventive Plan for Tony Howell - 3/25/2021  Medicare offers a range of preventive health benefits. Some of the tests and screenings are paid in full while other may be subject to a deductible, co-insurance, and/or copay. Some of these benefits include a comprehensive review of your medical history including lifestyle, illnesses that may run in your family, and various assessments and screenings as appropriate. After reviewing your medical record and screening and assessments performed today your provider may have ordered immunizations, labs, imaging, and/or referrals for you. A list of these orders (if applicable) as well as your Preventive Care list are included within your After Visit Summary for your review. Other Preventive Recommendations:    A preventive eye exam performed by an eye specialist is recommended every 1-2 years to screen for glaucoma; cataracts, macular degeneration, and other eye disorders. A preventive dental visit is recommended every 6 months. Try to get at least 150 minutes of exercise per week or 10,000 steps per day on a pedometer . Order or download the FREE \"Exercise & Physical Activity: Your Everyday Guide\" from [employer]. Call 8-268.618.1634 or search [employer] online. You need 7648-3681 mg of calcium and 1690-5594 IU of vitamin D per day. It is possible to meet your calcium requirement with diet alone, but a vitamin D supplement is usually necessary to meet this goal.  When exposed to the sun, use a sunscreen that protects against both UVA and UVB radiation with an SPF of 30 or greater. Reapply every 2 to 3 hours or after sweating, drying off with a towel, or swimming. Always wear a seat belt when traveling in a car. Always wear a helmet when riding a bicycle or motorcycle. Personalized Preventive Plan for Rose Marie Salvador - 3/25/2021  Medicare offers a range of preventive health benefits. Some of the tests and screenings are paid in full while other may be subject to a deductible, co-insurance, and/or copay.     Some of these benefits include a comprehensive review of your medical history including lifestyle, illnesses that may run in your family, and various assessments and screenings as appropriate. After reviewing your medical record and screening and assessments performed today your provider may have ordered immunizations, labs, imaging, and/or referrals for you. A list of these orders (if applicable) as well as your Preventive Care list are included within your After Visit Summary for your review. Other Preventive Recommendations:    A preventive eye exam performed by an eye specialist is recommended every 1-2 years to screen for glaucoma; cataracts, macular degeneration, and other eye disorders. A preventive dental visit is recommended every 6 months. Try to get at least 150 minutes of exercise per week or 10,000 steps per day on a pedometer . Order or download the FREE \"Exercise & Physical Activity: Your Everyday Guide\" from [employer]. Call 4-815.939.4363 or search [employer] online. You need 1634-8013 mg of calcium and 1426-2382 IU of vitamin D per day. It is possible to meet your calcium requirement with diet alone, but a vitamin D supplement is usually necessary to meet this goal.  When exposed to the sun, use a sunscreen that protects against both UVA and UVB radiation with an SPF of 30 or greater. Reapply every 2 to 3 hours or after sweating, drying off with a towel, or swimming. Always wear a seat belt when traveling in a car. Always wear a helmet when riding a bicycle or motorcycle.

## 2021-03-25 NOTE — PROGRESS NOTES
Medicare Annual Wellness Visit  Are Name: Swati Sensor Date: 3/25/2021   MRN: [id number] Sex: Female   Age: 67 y.o. Ethnicity: Non-/Non    : 1945 Race: Dom Martines is here for Medicare AWV    Screenings for behavioral, psychosocial and functional/safety risks, and cognitive dysfunction are all negative except as indicated below. These results, as well as other patient data from the [address] [facility] [address] form, are documented in Flowsheets linked to this Encounter. No Known Allergies    Prior to Visit Medications    Medication Sig Taking? Authorizing Provider   ibuprofen (ADVIL;MOTRIN) 600 MG tablet Take 1 tablet by mouth 2 times daily as needed for Pain Yes Angelica Castillo MD   primidone (MYSOLINE) 50 MG tablet Take 1 tablet by mouth 2 times daily  Angelica Castillo MD   insulin aspart (NOVOLOG FLEXPEN) 100 UNIT/ML injection pen Inject 18 Units into the skin 3 times daily (before meals)  Angelica Castillo MD   atorvastatin (LIPITOR) 40 MG tablet Take 1 tablet by mouth daily  Angelica Castillo MD   insulin detemir (LEVEMIR FLEXTOUCH) 100 UNIT/ML injection pen Inject 10 Units into the skin nightly  Angelica Castillo MD   insulin aspart (NOVOLOG FLEXPEN) 100 UNIT/ML injection pen Inject 18 Units into the skin 3 times daily (before meals)  Historical Provider, MD   lisinopril (PRINIVIL;ZESTRIL) 10 MG tablet Take 10 mg by mouth daily  Historical Provider, MD   gabapentin (NEURONTIN) 300 MG capsule Take 300 mg by mouth 2 times daily. Historical Provider, MD       Past Medical History:   Diagnosis Date    Cancer ([facility] [address]     Breast    Diabetes mellitus ([facility] [address]     Hyperlipidemia     Hypertension        Past Surgical History:   Procedure Laterality Date    APPENDECTOMY      BREAST LUMPECTOMY Right     CARPAL TUNNEL RELEASE Left     HERNIA REPAIR      Umbilical    HYSTERECTOMY      TONSILLECTOMY         No family history on file.     CareTeam (Including outside providers/suppliers regularly involved in providing care):   Patient Care Team:  Rosalee Hogan MD as PCP - General (Internal Medicine)  Rosalee Hogan MD as PCP - [facility]    Wt Readings from Last 3 Encounters:   03/25/21 167 lb (75.8 kg)   01/25/21 164 lb (74.4 kg)   01/04/21 165 lb (74.8 kg)      No flowsheet data found. Body mass index is 33.73 kg/m². Based upon direct observation of the patient, evaluation of cognition reveals recent and remote memory intact. General Appearance: alert and oriented to person, place and time, well developed and well- nourished, in no acute distress  Skin: warm and dry, no rash or erythema  Head: normocephalic and atraumatic  Eyes: pupils equal, round, and reactive to light, extraocular eye movements intact, conjunctivae normal  ENT: tympanic membrane, external ear and ear canal normal bilaterally, nose without deformity, nasal mucosa and turbinates normal without polyps  Neck: supple and non-tender without mass, no thyromegaly or thyroid nodules, no cervical lymphadenopathy  Pulmonary/Chest: clear to auscultation bilaterally- no wheezes, rales or rhonchi, normal air movement, no respiratory distress  Cardiovascular: normal rate, regular rhythm, normal S1 and S2, no murmurs, rubs, clicks, or gallops, distal pulses intact, no carotid bruits  Abdomen: soft, non-tender, non-distended, normal bowel sounds, no masses or organomegaly  Extremities: no cyanosis, clubbing or edema  Musculoskeletal: normal range of motion, no joint swelling, deformity or tenderness  Neurologic: reflexes normal and symmetric, no cranial nerve deficit, gait, coordination and speech normal    Patient's complete Health Risk Assessment and screening values have been reviewed and are found in Flowsheets. The following problems were reviewed today and where indicated follow up appointments were made and/or referrals ordered.     Positive Risk Factor Screenings with Interventions: Substance History:  Social History     Tobacco History     Smoking Status  Current Every Day Smoker Smoking Frequency  0.5 packs/day for 50 years (25 pk yrs) Smoking Tobacco Type  Cigarettes    Smokeless Tobacco Use  Never Used          Alcohol History     Alcohol Use Status  Yes Comment  Once a month          Drug Use     Drug Use Status  Never          Sexual Activity     Sexually Active  Not Asked               Alcohol Screening: Audit-C Score: 1  Total Score: 1    A score of 8 or more is associated with harmful or hazardous drinking. A score of 13 or more in women, and 15 or more in men, is likely to indicate alcohol dependence. Substance Abuse Interventions:  · none    General Health and ACP:  General  In general, how would you say your health is?: Fair  In the past 7 days, have you experienced any of the following?  New or Increased Pain, New or Increased Fatigue, Loneliness, Social Isolation, Stress or Anger?: None of These  Do you get the social and emotional support that you need?: Yes  Do you have a Living Will?: (!) No  Advance Directives     Power of [address] [place] [address] Will ACP-Advance Directive ACP-Power of     Not on File Not on File Not on File Not on File      General Health Risk Interventions:  · none    Health Habits/Nutrition:  Health Habits/Nutrition  Do you exercise for at least 20 minutes 2-3 times per week?: Yes  Have you lost any weight without trying in the past 3 months?: No  Do you eat only one meal per day?: No  Have you seen the dentist within the past year?: Yes  Body mass index: (!) 33.73  Health Habits/Nutrition Interventions:  · none    Hearing/Vision:  No exam data present  Hearing/Vision  Do you or your family notice any trouble with your hearing that hasn't been managed with hearing aids?: No  Do you have difficulty driving, watching TV, or doing any of your daily activities because of your eyesight?: No  Have you had an eye exam within the past year?: (!) No  Hearing/Vision Interventions:  · none      Personalized Preventive Plan   Current Health Maintenance Status  Immunization History   Administered Date(s) Administered    COVID-19, Papa Munson, PF, 30mcg/0.3mL 02/01/2021, 03/01/2021    Influenza Virus Vaccine 11/07/2006, 11/15/2007    Influenza, Quadv, adjuvanted, 65 yrs +, IM, PF (Fluad) 09/14/2020    Pneumococcal Polysaccharide ([id number]) 09/14/2020    Tdap (Boostrix, Adacel) 10/15/2004        Health Maintenance   Topic Date Due    Shingles Vaccine (1 of 2) Never done    DEXA (modify frequency per FRAX score)  Never done    DTaP/Tdap/Td vaccine (2 - Td) 10/15/2014    Annual Wellness Visit (AWV)  Never done    Lipid screen  01/04/2022    Potassium monitoring  01/04/2022    Creatinine monitoring  01/04/2022    Flu vaccine  Completed    Pneumococcal 65+ years Vaccine  Completed    COVID-19 Vaccine  Completed    Hepatitis A vaccine  Aged Out    Hib vaccine  Aged Out    Meningococcal (ACWY) vaccine  Aged Out    Hepatitis C screen  Discontinued     Recommendations for Preventive Services Due: see orders and patient instructions/AVS.  . Recommended screening schedule for the next 5-10 years is provided to the patient in written form: see Patient Instructions/AVS.    Gerald Diamond was seen today for medicare aw. Diagnoses and all orders for this visit:    Routine general medical examination at a health care facility  -     ibuprofen (ADVIL;MOTRIN) 600 MG tablet; Take 1 tablet by mouth 2 times daily as needed for Pain               Sudeep Giles is a 67 y.o. female being evaluated by a Virtual Visit (phone) encounter to address concerns as mentioned above. A caregiver was present when appropriate. Due to this being a TeleHealth encounter (During [facility] public health emergency), evaluation of the following organ systems was limited: Vitals/Constitutional/EENT/Resp/CV/GI//MS/Neuro/Skin/Heme-Lymph-Imm.   Pursuant to the emergency declaration under the [address] waiver authority and the [employer] Act, this Virtual Visit was conducted with patient's (and/or legal guardian's) consent, to reduce the patient's risk of exposure to COVID-19 and provide necessary medical care. The patient (and/or legal guardian) has also been advised to contact this office for worsening conditions or problems, and seek emergency medical treatment and/or call 911 if deemed necessary. Patient identification was verified at the start of the visit: Yes    Services were provided through phone to substitute for in-person clinic visit. Patient and provider were located at their individual homes. --Jonathon Iyer MD on 3/25/2021 at 2:59 PM    An electronic signature was used to authenticate this note.

## 2021-04-23 PROBLEM — I10 HYPERTENSION, ESSENTIAL: Status: ACTIVE | Noted: 2020-09-09

## 2021-04-23 PROBLEM — E11.9 CONTROLLED TYPE 2 DIABETES MELLITUS WITHOUT COMPLICATION (HCC): Status: ACTIVE | Noted: 2020-09-09

## 2021-04-23 PROBLEM — D32.0 MENINGIOMA, CEREBRAL (HCC): Status: ACTIVE | Noted: 2020-09-09

## 2021-04-26 ENCOUNTER — OFFICE VISIT (OUTPATIENT)
Dept: FAMILY MEDICINE CLINIC | Age: 76
End: 2021-04-26
Payer: MEDICARE

## 2021-04-26 VITALS
HEIGHT: 59 IN | TEMPERATURE: 97 F | HEART RATE: 68 BPM | RESPIRATION RATE: 14 BRPM | DIASTOLIC BLOOD PRESSURE: 80 MMHG | BODY MASS INDEX: 34.68 KG/M2 | SYSTOLIC BLOOD PRESSURE: 136 MMHG | WEIGHT: 172 LBS | OXYGEN SATURATION: 94 %

## 2021-04-26 DIAGNOSIS — Z78.0 POST-MENOPAUSAL: ICD-10-CM

## 2021-04-26 DIAGNOSIS — N18.31 STAGE 3A CHRONIC KIDNEY DISEASE (HCC): ICD-10-CM

## 2021-04-26 DIAGNOSIS — I10 ESSENTIAL HYPERTENSION: ICD-10-CM

## 2021-04-26 DIAGNOSIS — R07.81 RIB PAIN ON RIGHT SIDE: ICD-10-CM

## 2021-04-26 DIAGNOSIS — E11.9 TYPE 2 DIABETES MELLITUS WITHOUT COMPLICATION, WITH LONG-TERM CURRENT USE OF INSULIN (HCC): Primary | ICD-10-CM

## 2021-04-26 DIAGNOSIS — E78.5 HYPERLIPIDEMIA, UNSPECIFIED HYPERLIPIDEMIA TYPE: ICD-10-CM

## 2021-04-26 DIAGNOSIS — Z79.4 TYPE 2 DIABETES MELLITUS WITHOUT COMPLICATION, WITH LONG-TERM CURRENT USE OF INSULIN (HCC): Primary | ICD-10-CM

## 2021-04-26 PROCEDURE — 99213 OFFICE O/P EST LOW 20 MIN: CPT | Performed by: INTERNAL MEDICINE

## 2021-04-26 PROCEDURE — 3052F HG A1C>EQUAL 8.0%<EQUAL 9.0%: CPT | Performed by: INTERNAL MEDICINE

## 2021-04-26 ASSESSMENT — ENCOUNTER SYMPTOMS
EYE ITCHING: 0
VOMITING: 0
EYE PAIN: 0
BLOOD IN STOOL: 0
SHORTNESS OF BREATH: 0
PHOTOPHOBIA: 0
EYE REDNESS: 0
EYE DISCHARGE: 0
CHEST TIGHTNESS: 0
VOICE CHANGE: 0
NAUSEA: 0
ABDOMINAL DISTENTION: 0
ABDOMINAL PAIN: 0
SORE THROAT: 0
RHINORRHEA: 0
SINUS PAIN: 0
BACK PAIN: 0
CONSTIPATION: 0
COUGH: 0
RECTAL PAIN: 0
COLOR CHANGE: 0
APNEA: 0
FACIAL SWELLING: 0
SINUS PRESSURE: 0
TROUBLE SWALLOWING: 0
DIARRHEA: 0
WHEEZING: 0

## 2021-04-26 NOTE — PROGRESS NOTES
2021    Km Benitez (:  1945) is a 67 y.o. female     53-year-old diabetic female with associated neuropathy hypertensive hyperlipidemic female with hx of a resting tremor presents for follow-up visit. The patient reports that the epigastric pain that she previously reported has resolved. She denies odontophagia dysphagia but reports intermittent nausea when this occurs. Right  RIB PAIN 8/10 LOCALIZE To RIGHt RIB:  Type 2 diabetes: The patient states that her blood sugars are improving. Very few readings have been greater than 200. She is compliant with Levemir 10 units nightly and Novolin 20 units 3 times daily with meals she is also compliant with Lipitor 40 mg orally daily    Hypertension: The patient is compliant with lisinopril 10 mg orally daily      Resting tremor: The patient is compliant with primidone 50 mg twice daily     At present she denies polyuria,  Polydipsia, constitutional, sinus, visual, cardiopulmonary, urologic, additional gastrointestinal, immunologic/hematologic, musculoskeletal, neurologic,dermatologic, or psychiatric complaints. Patient Active Problem List   Diagnosis    Hypertension, essential    Malignant neoplasm of female breast ([address]    Controlled type 2 diabetes mellitus without complication (HCC)    Meningioma, cerebral ([address]       Review of Systems   Constitutional: Negative for chills, diaphoresis, fatigue and fever. HENT: Negative for congestion, dental problem, drooling, ear discharge, ear pain, facial swelling, hearing loss, mouth sores, nosebleeds, postnasal drip, rhinorrhea, sinus pressure, sinus pain, sneezing, sore throat, tinnitus, trouble swallowing and voice change. Eyes: Negative for photophobia, pain, discharge, redness, itching and visual disturbance. Respiratory: Negative for apnea, cough, chest tightness, shortness of breath and wheezing.     Cardiovascular: Negative for chest pain, palpitations and leg swelling. Gastrointestinal: Negative for abdominal distention, abdominal pain, blood in stool, constipation, diarrhea, nausea, rectal pain and vomiting. Endocrine: Negative for cold intolerance, heat intolerance, polydipsia, polyphagia and polyuria. Genitourinary: Negative for decreased urine volume, difficulty urinating, dysuria, flank pain, frequency, genital sores, hematuria and urgency. Musculoskeletal: Negative for arthralgias, back pain, gait problem, joint swelling, myalgias, neck pain and neck stiffness. Skin: Negative for color change, rash and wound. Allergic/Immunologic: Negative for environmental allergies and food allergies. Neurological: Negative for dizziness, tremors, seizures, syncope, facial asymmetry, speech difficulty, weakness, light-headedness, numbness and headaches. Hematological: Negative for adenopathy. Does not bruise/bleed easily. Psychiatric/Behavioral: Negative for agitation, confusion, decreased concentration, hallucinations, self-injury, sleep disturbance and suicidal ideas. The patient is not nervous/anxious. Prior to Visit Medications    Medication Sig Taking?  Authorizing Provider   ibuprofen (ADVIL;MOTRIN) 600 MG tablet Take 1 tablet by mouth 2 times daily as needed for Pain  Constantine Smith MD   primidone (MYSOLINE) 50 MG tablet Take 1 tablet by mouth 2 times daily  Constantine Smith MD   insulin aspart (NOVOLOG FLEXPEN) 100 UNIT/ML injection pen Inject 18 Units into the skin 3 times daily (before meals)  Constantine Smith MD   atorvastatin (LIPITOR) 40 MG tablet Take 1 tablet by mouth daily  Constantine Smith MD   insulin detemir (LEVEMIR FLEXTOUCH) 100 UNIT/ML injection pen Inject 10 Units into the skin nightly  Constantine Smith MD   insulin aspart (NOVOLOG FLEXPEN) 100 UNIT/ML injection pen Inject 18 Units into the skin 3 times daily (before meals)  Historical Provider, MD   lisinopril (PRINIVIL;ZESTRIL) 10 MG tablet Take 10 mg by mouth daily  Historical Provider, MD   gabapentin (NEURONTIN) 300 MG capsule Take 300 mg by mouth 2 times daily.   Historical Provider, MD        No Known Allergies    Past Medical History:   Diagnosis Date    Cancer ([facility]     Breast    Diabetes mellitus ([facility]     Hyperlipidemia     Hypertension        Past Surgical History:   Procedure Laterality Date    APPENDECTOMY      BREAST LUMPECTOMY Right     CARPAL TUNNEL RELEASE Left     HERNIA REPAIR      Umbilical    HYSTERECTOMY      TONSILLECTOMY         Social History     Socioeconomic History    Marital status:      Spouse name: Not on file    Number of children: Not on file    Years of education: Not on file    Highest education level: Not on file   Occupational History    Not on file   Social Needs    Financial resource strain: Not hard at all   [employer] insecurity     Worry: Never true     Inability: Never true   [employer] needs     Medical: No     Non-medical: No   Tobacco Use    Smoking status: Current Every Day Smoker     Packs/day: 0.50     Years: 50.00     Pack years: 25.00     Types: Cigarettes    Smokeless tobacco: Never Used   Substance and Sexual Activity    Alcohol use: Yes     Comment: Once a month    Drug use: Never    Sexual activity: Not on file   Lifestyle    Physical activity     Days per week: Not on file     Minutes per session: Not on file    Stress: Not on file   Relationships    Social connections     Talks on phone: Not on file     Gets together: Not on file     Attends Yarsanism service: Not on file     Active member of club or organization: Not on file     Attends meetings of clubs or organizations: Not on file     Relationship status: Not on file    Intimate partner violence     Fear of current or ex partner: Not on file     Emotionally abused: Not on file     Physically abused: Not on file     Forced sexual activity: Not on file   Other Topics Concern    Not on file   Social History Narrative    Not on file        No family history on file. ADVANCE DIRECTIVE: N, <no information>    Vitals:    04/26/21 1437   BP: 136/80   Pulse: 68   Resp: 14   Temp: 97 °F (36.1 °C)   SpO2: 94%   Weight: 172 lb (78 kg)   Height: 4' 11\" (1.499 m)     Estimated body mass index is 34.74 kg/m² as calculated from the following:    Height as of this encounter: 4' 11\" (1.499 m). Weight as of this encounter: 172 lb (78 kg). Physical Exam  Constitutional:       General: She is not in acute distress. Appearance: She is well-developed. HENT:      Head: Normocephalic. Right Ear: External ear normal.      Left Ear: External ear normal.   Eyes:      Conjunctiva/sclera: Conjunctivae normal.   Neck:      Musculoskeletal: Neck supple. Vascular: No JVD. Trachea: No tracheal deviation. Cardiovascular:      Rate and Rhythm: Normal rate and regular rhythm. Heart sounds: Normal heart sounds. Pulmonary:      Effort: Pulmonary effort is normal. No respiratory distress. Breath sounds: Normal breath sounds. No wheezing or rales. Chest:      Chest wall: No tenderness. Abdominal:      General: Bowel sounds are normal. There is no distension. Palpations: Abdomen is soft. There is no mass. Tenderness: There is no abdominal tenderness. There is no guarding or rebound. Musculoskeletal:         General: No tenderness or deformity. Skin:     General: Skin is warm and dry. Coloration: Skin is not pale. Findings: No erythema or rash. Neurological:      Mental Status: She is alert and oriented to person, place, and time. Motor: No abnormal muscle tone. Psychiatric:         Thought Content:  Thought content normal.         Judgment: Judgment normal.       Labs:  Component      Latest Ref Rng & Units 1/25/2021 1/4/2021 1/4/2021 1/4/2021           4:16 PM  4:18 PM  4:18 PM  4:18 PM   Sodium      135 - 144 mEq/L   143    Potassium      3.4 - 4.9 mEq/L   4.8    Chloride      95 - 107 mEq/L   106    CO2      20 - 31 mEq/L   26    Anion Gap      9 - 15 mEq/L   11    Glucose      70 - 99 mg/dL   200 (H)    BUN      8 - 23 mg/dL   16    Creatinine      0.50 - 0.90 mg/dL   1.35 (H)    GFR Non-      >60   38.0 (L)    GFR       >60   46.0 (L)    Calcium      8.5 - 9.9 mg/dL   9.4    Total Protein      6.3 - 8.0 g/dL   7.8    Albumin      3.5 - 4.6 g/dL   3.9    Bilirubin      0.2 - 0.7 mg/dL   <0.2    Alk Phos      40 - 130 U/L   103    ALT      0 - 33 U/L   40 (H)    AST      0 - 35 U/L   49 (H)    Globulin      2.3 - 3.5 g/dL   3.9 (H)    Cholesterol, Total      0 - 199 mg/dL  147     Triglycerides      0 - 150 mg/dL  112     HDL Cholesterol      40 - 59 mg/dL  57     LDL Calculated      0 - 129 mg/dL  68     Hemoglobin A1C      % 8.9   9.0 (H)     No flowsheet data found.     Lab Results   Component Value Date    CHOL 147 01/04/2021    TRIG 112 01/04/2021    HDL 57 01/04/2021    LDLCALC 68 01/04/2021    GLUCOSE 200 01/04/2021    LABA1C 8.9 01/25/2021    LABA1C 9.0 01/04/2021       The 10-year ASCVD risk score (Jen Mckinley et al., 2013) is: 59.9%    Values used to calculate the score:      Age: 67 years      Sex: Female      Is Non- : No      Diabetic: Yes      Tobacco smoker: Yes      Systolic Blood Pressure: 533 mmHg      Is BP treated: Yes      HDL Cholesterol: 57 mg/dL      Total Cholesterol: 147 mg/dL    Immunization History   Administered Date(s) Administered    COVID-19, Pfizer, PF, 30mcg/0.3mL 02/01/2021, 03/01/2021    Influenza Virus Vaccine 11/07/2006, 11/15/2007    Influenza, Quadv, adjuvanted, 65 yrs +, IM, PF (Fluad) 09/14/2020    Pneumococcal Polysaccharide ([id number]) 09/14/2020    Tdap (Boostrix, Adacel) 10/15/2004       Health Maintenance   Topic Date Due    Shingles Vaccine (1 of 2) Never done    DEXA (modify frequency per FRAX score)  Never done    DTaP/Tdap/Td vaccine (2 - Td) 10/15/2014    Lipid screen  01/04/2022    Potassium monitoring 01/04/2022    Creatinine monitoring  01/04/2022    Annual Wellness Visit (AWV)  03/26/2022    Flu vaccine  Completed    Pneumococcal 65+ years Vaccine  Completed    COVID-19 Vaccine  Completed    Hepatitis A vaccine  Aged Out    Hib vaccine  Aged Out    Meningococcal (ACWY) vaccine  Aged Out    Hepatitis C screen  Discontinued       ASSESSMENT/PLAN:      Right Rib Pain-cxr chest         Type 2 diabetes mellitus without complication, with long-term current use of insulin (HCC)  -     POCT glycosylated hemoglobin (Hb A1C)  -Continue Lantus 15 units nightly and Novolin 18 units 3 times daily with meals          Hyperlipidemia, unspecified hyperlipidemia typecontinue Lipitor 40 mg orally daily          Essential hypertensioncontinue lisinopril 10 mg orally daily          Tremorcontinue primidone 50 mg twice daily          Neuropathycontinue Neurontin 20 mg twice daily      CKD stage III: Avoid the use of NSAIDs. Monitor renal function closely. Continue lisinopril 10 mg orally daily. No follow-ups on file. An electronic signature was used to authenticate this note.     --Andrei Irwin MD on 4/26/2021 at 3:22 PM

## 2021-04-27 DIAGNOSIS — S22.49XA CLOSED FRACTURE OF MULTIPLE RIBS, UNSPECIFIED LATERALITY, INITIAL ENCOUNTER: Primary | ICD-10-CM

## 2021-04-27 RX ORDER — DOCUSATE SODIUM 100 MG/1
100 CAPSULE, LIQUID FILLED ORAL 2 TIMES DAILY
Qty: 14 CAPSULE | Refills: 0 | Status: SHIPPED | OUTPATIENT
Start: 2021-04-27

## 2021-04-27 RX ORDER — HYDROCODONE BITARTRATE AND ACETAMINOPHEN 5; 325 MG/1; MG/1
1 TABLET ORAL EVERY 6 HOURS PRN
Qty: 28 TABLET | Refills: 0 | Status: SHIPPED | OUTPATIENT
Start: 2021-04-27 | End: 2021-05-04

## 2021-05-04 ENCOUNTER — TELEPHONE (OUTPATIENT)
Dept: DIABETES SERVICES | Age: 76
End: 2021-05-04

## 2021-05-04 RX ORDER — LISINOPRIL 10 MG/1
10 TABLET ORAL DAILY
Qty: 90 TABLET | Refills: 3 | Status: SHIPPED | OUTPATIENT
Start: 2021-05-04 | End: 2022-08-22 | Stop reason: SDUPTHER

## 2021-05-04 NOTE — PROGRESS NOTES
Called patient today to schedule the diabetes education that has been ordered. Patient would like to wait a couple weeks until she is healed from the 4 broken ribs she stated. However, she did state that she is out of her Lisinopril and asked if we could contact Dr Yves Quintanilla to reorder it for her. Patient uses [address]      Thank you,     Roslyn Raza RN, diabetic educator

## 2021-05-08 ENCOUNTER — TELEPHONE (OUTPATIENT)
Dept: FAMILY MEDICINE CLINIC | Age: 76
End: 2021-05-08

## 2021-05-08 RX ORDER — INSULIN ASPART 100 [IU]/ML
18 INJECTION, SOLUTION INTRAVENOUS; SUBCUTANEOUS
Qty: 5 PEN | Refills: 3 | Status: SHIPPED | OUTPATIENT
Start: 2021-05-08 | End: 2021-07-29

## 2021-05-11 RX ORDER — BLOOD SUGAR DIAGNOSTIC
STRIP MISCELLANEOUS
COMMUNITY
Start: 2021-04-30

## 2021-05-11 RX ORDER — BLOOD-GLUCOSE METER
EACH MISCELLANEOUS
COMMUNITY
Start: 2021-04-30

## 2021-05-11 RX ORDER — BLOOD-GLUCOSE CONTROL, NORMAL
EACH MISCELLANEOUS
COMMUNITY
Start: 2021-04-30

## 2021-05-12 ENCOUNTER — OFFICE VISIT (OUTPATIENT)
Dept: FAMILY MEDICINE CLINIC | Age: 76
End: 2021-05-12
Payer: MEDICARE

## 2021-05-12 VITALS
SYSTOLIC BLOOD PRESSURE: 140 MMHG | HEART RATE: 66 BPM | WEIGHT: 171 LBS | HEIGHT: 59 IN | DIASTOLIC BLOOD PRESSURE: 80 MMHG | RESPIRATION RATE: 14 BRPM | BODY MASS INDEX: 34.47 KG/M2 | OXYGEN SATURATION: 97 % | TEMPERATURE: 97 F

## 2021-05-12 DIAGNOSIS — Z79.4 TYPE 2 DIABETES MELLITUS WITHOUT COMPLICATION, WITH LONG-TERM CURRENT USE OF INSULIN (HCC): Primary | ICD-10-CM

## 2021-05-12 DIAGNOSIS — E11.9 TYPE 2 DIABETES MELLITUS WITHOUT COMPLICATION, WITH LONG-TERM CURRENT USE OF INSULIN (HCC): Primary | ICD-10-CM

## 2021-05-12 DIAGNOSIS — S22.49XA CLOSED FRACTURE OF MULTIPLE RIBS, UNSPECIFIED LATERALITY, INITIAL ENCOUNTER: ICD-10-CM

## 2021-05-12 DIAGNOSIS — E78.5 HYPERLIPIDEMIA, UNSPECIFIED HYPERLIPIDEMIA TYPE: ICD-10-CM

## 2021-05-12 DIAGNOSIS — I10 ESSENTIAL HYPERTENSION: ICD-10-CM

## 2021-05-12 PROCEDURE — 99213 OFFICE O/P EST LOW 20 MIN: CPT | Performed by: INTERNAL MEDICINE

## 2021-05-12 PROCEDURE — 3052F HG A1C>EQUAL 8.0%<EQUAL 9.0%: CPT | Performed by: INTERNAL MEDICINE

## 2021-05-12 RX ORDER — PRIMIDONE 50 MG/1
50 TABLET ORAL 2 TIMES DAILY
Qty: 90 TABLET | Refills: 1 | Status: SHIPPED | OUTPATIENT
Start: 2021-05-12 | End: 2022-01-05

## 2021-05-12 ASSESSMENT — ENCOUNTER SYMPTOMS
COUGH: 0
EYE REDNESS: 0
SINUS PAIN: 0
COLOR CHANGE: 0
EYE ITCHING: 0
SINUS PRESSURE: 0
RHINORRHEA: 0
RECTAL PAIN: 0
VOMITING: 0
NAUSEA: 0
APNEA: 0
CHEST TIGHTNESS: 0
SHORTNESS OF BREATH: 0
EYE PAIN: 0
TROUBLE SWALLOWING: 0
DIARRHEA: 0
VOICE CHANGE: 0
BLOOD IN STOOL: 0
CONSTIPATION: 0
PHOTOPHOBIA: 0
SORE THROAT: 0
BACK PAIN: 0
ABDOMINAL PAIN: 0
ABDOMINAL DISTENTION: 0
FACIAL SWELLING: 0
WHEEZING: 0
EYE DISCHARGE: 0

## 2021-05-12 NOTE — PROGRESS NOTES
2021    Soham Bullock (:  1945) is a 67 y.o. female     50-year-old diabetic female with associated neuropathy hypertensive hyperlipidemic female with hx of a resting tremor presents for follow-up visit. The patient reports that the epigastric pain that she previously reported has resolved. She denies odontophagia dysphagia but reports intermittent nausea when this occurs. Right  RIB PAIN 8/10 LOCALIZE To RIGHt RIB:  Type 2 diabetes: The patient states that her blood sugars are improving. Very few readings have been greater than 200. She is compliant with Levemir 10 units nightly and Novolin 20 units 3 times daily with meals she is also compliant with Lipitor 40 mg orally daily    Hypertension: The patient is compliant with lisinopril 10 mg orally daily      Resting tremor: The patient is compliant with primidone 50 mg twice daily     At present she denies polyuria,  Polydipsia, constitutional, sinus, visual, cardiopulmonary, urologic, additional gastrointestinal, immunologic/hematologic, musculoskeletal, neurologic,dermatologic, or psychiatric complaints. Patient Active Problem List   Diagnosis    Hypertension, essential    Malignant neoplasm of female breast ([address]    Controlled type 2 diabetes mellitus without complication (HCC)    Meningioma, cerebral ([address]       Review of Systems   Constitutional: Negative for chills, diaphoresis, fatigue and fever. HENT: Negative for congestion, dental problem, drooling, ear discharge, ear pain, facial swelling, hearing loss, mouth sores, nosebleeds, postnasal drip, rhinorrhea, sinus pressure, sinus pain, sneezing, sore throat, tinnitus, trouble swallowing and voice change. Eyes: Negative for photophobia, pain, discharge, redness, itching and visual disturbance. Respiratory: Negative for apnea, cough, chest tightness, shortness of breath and wheezing.     Cardiovascular: Negative for chest pain, palpitations and leg directed, three times a day to test blood sugar  Historical Provider, MD   insulin aspart (NOVOLOG FLEXPEN) 100 UNIT/ML injection pen Inject 18 Units into the skin 3 times daily (before meals)  Renny Colbert MD   lisinopril (PRINIVIL;ZESTRIL) 10 MG tablet Take 1 tablet by mouth daily  Renny Colbert MD   docusate sodium (COLACE) 100 MG capsule Take 1 capsule by mouth 2 times daily  Renny Colbert MD   ibuprofen (ADVIL;MOTRIN) 600 MG tablet Take 1 tablet by mouth 2 times daily as needed for Pain  Renny Colbert MD   primidone (MYSOLINE) 50 MG tablet Take 1 tablet by mouth 2 times daily  Renny Colbert MD   atorvastatin (LIPITOR) 40 MG tablet Take 1 tablet by mouth daily  Renny Colbert MD   insulin detemir (LEVEMIR FLEXTOUCH) 100 UNIT/ML injection pen Inject 10 Units into the skin nightly  Renny Colbert MD   insulin aspart (NOVOLOG FLEXPEN) 100 UNIT/ML injection pen Inject 18 Units into the skin 3 times daily (before meals)  Historical Provider, MD   gabapentin (NEURONTIN) 300 MG capsule Take 300 mg by mouth 2 times daily.   Historical Provider, MD        No Known Allergies    Past Medical History:   Diagnosis Date    [facility] ([facility]     Breast    Diabetes mellitus ([facility]     Hyperlipidemia     Hypertension        Past Surgical History:   Procedure Laterality Date    APPENDECTOMY      BREAST LUMPECTOMY Right     CARPAL TUNNEL RELEASE Left     HERNIA REPAIR      Umbilical    HYSTERECTOMY      TONSILLECTOMY         Social History     Socioeconomic History    Marital status:      Spouse name: Not on file    Number of children: Not on file    Years of education: Not on file    Highest education level: Not on file   Occupational History    Not on file   Social Needs    Financial resource strain: Not hard at all   [employer] insecurity     Worry: Never true     Inability: Never true   [employer] needs     Medical: No     Non-medical: No   Tobacco Use    Smoking status: Current Every Day Smoker     Packs/day: 0.50     Years: 50.00     Pack years: 25.00     Types: Cigarettes    Smokeless tobacco: Never Used   Substance and Sexual Activity    Alcohol use: Yes     Comment: Once a month    Drug use: Never    Sexual activity: Not on file   Lifestyle    Physical activity     Days per week: Not on file     Minutes per session: Not on file    Stress: Not on file   Relationships    Social connections     Talks on phone: Not on file     Gets together: Not on file     Attends Mandaeism service: Not on file     Active member of club or organization: Not on file     Attends meetings of clubs or organizations: Not on file     Relationship status: Not on file    Intimate partner violence     Fear of current or ex partner: Not on file     Emotionally abused: Not on file     Physically abused: Not on file     Forced sexual activity: Not on file   Other Topics Concern    Not on file   Social History Narrative    Not on file        No family history on file. ADVANCE DIRECTIVE: N, <no information>    Vitals:    05/12/21 1546   BP: (!) 140/80   Pulse: 66   Resp: 14   Temp: 97 °F (36.1 °C)   SpO2: 97%   Weight: 171 lb (77.6 kg)   Height: 4' 11\" (1.499 m)     Estimated body mass index is 34.54 kg/m² as calculated from the following:    Height as of this encounter: 4' 11\" (1.499 m). Weight as of this encounter: 171 lb (77.6 kg). Physical Exam  Constitutional:       General: She is not in acute distress. Appearance: She is well-developed. HENT:      Head: Normocephalic. Right Ear: External ear normal.      Left Ear: External ear normal.   Eyes:      Conjunctiva/sclera: Conjunctivae normal.   Neck:      Musculoskeletal: Neck supple. Vascular: No JVD. Trachea: No tracheal deviation. Cardiovascular:      Rate and Rhythm: Normal rate and regular rhythm. Heart sounds: Normal heart sounds. Pulmonary:      Effort: Pulmonary effort is normal. No respiratory distress.       Breath sounds: Normal breath sounds. No wheezing or rales. Chest:      Chest wall: No tenderness. Abdominal:      General: Bowel sounds are normal. There is no distension. Palpations: Abdomen is soft. There is no mass. Tenderness: There is no abdominal tenderness. There is no guarding or rebound. Musculoskeletal:         General: No tenderness or deformity. Skin:     General: Skin is warm and dry. Coloration: Skin is not pale. Findings: No erythema or rash. Neurological:      Mental Status: She is alert and oriented to person, place, and time. Motor: No abnormal muscle tone. Psychiatric:         Thought Content: Thought content normal.         Judgment: Judgment normal.       Labs:  Component      Latest Ref Rng & Units 1/25/2021 1/4/2021 1/4/2021 1/4/2021           4:16 PM  4:18 PM  4:18 PM  4:18 PM   Sodium      135 - 144 mEq/L   143    Potassium      3.4 - 4.9 mEq/L   4.8    Chloride      95 - 107 mEq/L   106    CO2      20 - 31 mEq/L   26    Anion Gap      9 - 15 mEq/L   11    Glucose      70 - 99 mg/dL   200 (H)    BUN      8 - 23 mg/dL   16    Creatinine      0.50 - 0.90 mg/dL   1.35 (H)    GFR Non-      >60   38.0 (L)    GFR       >60   46.0 (L)    Calcium      8.5 - 9.9 mg/dL   9.4    Total Protein      6.3 - 8.0 g/dL   7.8    Albumin      3.5 - 4.6 g/dL   3.9    Bilirubin      0.2 - 0.7 mg/dL   <0.2    Alk Phos      40 - 130 U/L   103    ALT      0 - 33 U/L   40 (H)    AST      0 - 35 U/L   49 (H)    Globulin      2.3 - 3.5 g/dL   3.9 (H)    Cholesterol, Total      0 - 199 mg/dL  147     Triglycerides      0 - 150 mg/dL  112     HDL Cholesterol      40 - 59 mg/dL  57     LDL Calculated      0 - 129 mg/dL  68     Hemoglobin A1C      % 8.9   9.0 (H)     No flowsheet data found.     Lab Results   Component Value Date    CHOL 147 01/04/2021    TRIG 112 01/04/2021    HDL 57 01/04/2021    LDLCALC 68 01/04/2021    GLUCOSE 200 01/04/2021    LABA1C 8.9 01/25/2021    LABA1C 9.0 01/04/2021       The 10-year ASCVD risk score (Jen Mckinley et al., 2013) is: 62%    Values used to calculate the score:      Age: 67 years      Sex: Female      Is Non- : No      Diabetic: Yes      Tobacco smoker: Yes      Systolic Blood Pressure: 666 mmHg      Is BP treated: Yes      HDL Cholesterol: 57 mg/dL      Total Cholesterol: 147 mg/dL    Immunization History   Administered Date(s) Administered    COVID-19, Pfizer, PF, 30mcg/0.3mL 02/01/2021, 03/01/2021    Influenza Virus Vaccine 11/07/2006, 11/15/2007    Influenza, Quadv, adjuvanted, 65 yrs +, IM, PF (Fluad) 09/14/2020    Pneumococcal Polysaccharide ([id number]) 09/14/2020    Tdap (Boostrix, Adacel) 10/15/2004       Health Maintenance   Topic Date Due    Shingles Vaccine (1 of 2) Never done    DEXA (modify frequency per FRAX score)  Never done    DTaP/Tdap/Td vaccine (2 - Td) 10/15/2014    Lipid screen  01/04/2022    Potassium monitoring  01/04/2022    Creatinine monitoring  01/04/2022    Annual Wellness Visit (AWV)  03/26/2022    Flu vaccine  Completed    Pneumococcal 65+ years Vaccine  Completed    COVID-19 Vaccine  Completed    Hepatitis A vaccine  Aged Out    Hib vaccine  Aged Out    Meningococcal (ACWY) vaccine  Aged Out    Hepatitis C screen  Discontinued       ASSESSMENT/PLAN:      Right Rib Pain-cxr chest         Type 2 diabetes mellitus without complication, with long-term current use of insulin (HCC)  -     POCT glycosylated hemoglobin (Hb A1C)  -Continue Lantus 15 units nightly and Novolin 18 units 3 times daily with meals          Hyperlipidemia, unspecified hyperlipidemia typecontinue Lipitor 40 mg orally daily          Essential hypertensioncontinue lisinopril 10 mg orally daily          Tremorcontinue primidone 50 mg twice daily          Neuropathycontinue Neurontin 20 mg twice daily      CKD stage III: Avoid the use of NSAIDs. Monitor renal function closely.

## 2021-05-28 ENCOUNTER — TELEPHONE (OUTPATIENT)
Dept: PHARMACY | Facility: CLINIC | Age: 76
End: 2021-05-28

## 2021-05-28 NOTE — TELEPHONE ENCOUNTER
[facility] HEALTH CLINICAL PHARMACY REVIEW: [facility]  Identified care gap per [employer]; fills at [facility]: Statin adherence    Last Visit: 5/12/21    Patient not found in Outcomes MTM     [address] [facility]    Per Insurance Records through May  filled only once):    ATORVASTATIN TAB 40MG last filled on 1/12/21 for 90 day supply. Next refill due: 4/12/21    Per [address] [facility] [address]    last picked up on 5/27/21 for 30 day supply. 240 tabs remaining. Billed through [facility]   rx was written for #90 but pt requested #30    Lab Results   Component Value Date    CHOL 147 01/04/2021    TRIG 112 01/04/2021    HDL 57 01/04/2021    LDLCALC 68 01/04/2021     ALT   Date Value Ref Range Status   01/04/2021 40 (H) 0 - 33 U/L Final     AST   Date Value Ref Range Status   01/04/2021 49 (H) 0 - 35 U/L Final     The 10-year ASCVD risk score (Trent Martino, et al., 2013) is: 62%    Values used to calculate the score:      Age: 67 years      Sex: Female      Is Non- : No      Diabetic: Yes      Tobacco smoker: Yes      Systolic Blood Pressure: 034 mmHg      Is BP treated: Yes      HDL Cholesterol: 57 mg/dL      Total Cholesterol: 147 mg/dL     PLAN  The following are interventions that have been identified:   - Patient eligible for 90 day supply of statin but only requested #30    Attempting to reach patient to review.  Left message asking for return call. Future Appointments   Date Time Provider Katia Mcenal   6/14/2021  2:30 PM Manju Bullock MD [facility]     2nd Attempt Documentation:  2nd attempt to contact this patient regarding the previous message  CLINICAL PHARMACY: [facility]  Patient unavailable at the time of call. Left following message on home TAD: please call back at toll-free [phone] option 7 to retrieve previous message. Letter mailed to patient. Anca Stovall CPhT.    [facility] Clinical Pharmacy  Department, toll free: [phone], option 7

## 2021-06-14 ENCOUNTER — OFFICE VISIT (OUTPATIENT)
Dept: FAMILY MEDICINE CLINIC | Age: 76
End: 2021-06-14
Payer: MEDICARE

## 2021-06-14 VITALS
BODY MASS INDEX: 33.06 KG/M2 | RESPIRATION RATE: 14 BRPM | OXYGEN SATURATION: 95 % | WEIGHT: 164 LBS | HEIGHT: 59 IN | HEART RATE: 75 BPM | SYSTOLIC BLOOD PRESSURE: 118 MMHG | DIASTOLIC BLOOD PRESSURE: 70 MMHG

## 2021-06-14 DIAGNOSIS — D32.0 MENINGIOMA, CEREBRAL (HCC): ICD-10-CM

## 2021-06-14 DIAGNOSIS — Z79.4 TYPE 2 DIABETES MELLITUS WITH HYPERGLYCEMIA, WITH LONG-TERM CURRENT USE OF INSULIN (HCC): ICD-10-CM

## 2021-06-14 DIAGNOSIS — I10 ESSENTIAL HYPERTENSION: ICD-10-CM

## 2021-06-14 DIAGNOSIS — Z79.4 TYPE 2 DIABETES MELLITUS WITHOUT COMPLICATION, WITH LONG-TERM CURRENT USE OF INSULIN (HCC): Primary | ICD-10-CM

## 2021-06-14 DIAGNOSIS — E11.65 TYPE 2 DIABETES MELLITUS WITH HYPERGLYCEMIA, WITH LONG-TERM CURRENT USE OF INSULIN (HCC): ICD-10-CM

## 2021-06-14 DIAGNOSIS — E11.9 TYPE 2 DIABETES MELLITUS WITHOUT COMPLICATION, WITH LONG-TERM CURRENT USE OF INSULIN (HCC): Primary | ICD-10-CM

## 2021-06-14 PROCEDURE — 3052F HG A1C>EQUAL 8.0%<EQUAL 9.0%: CPT | Performed by: INTERNAL MEDICINE

## 2021-06-14 PROCEDURE — 99214 OFFICE O/P EST MOD 30 MIN: CPT | Performed by: INTERNAL MEDICINE

## 2021-06-14 RX ORDER — HUMAN INSULIN 100 [IU]/ML
10 INJECTION, SOLUTION SUBCUTANEOUS EVERY EVENING
Qty: 1 PEN | Refills: 5 | Status: SHIPPED | OUTPATIENT
Start: 2021-06-14 | End: 2021-10-11 | Stop reason: SDUPTHER

## 2021-06-14 ASSESSMENT — ENCOUNTER SYMPTOMS
DIARRHEA: 0
EYE ITCHING: 0
EYE DISCHARGE: 0
VOMITING: 0
FACIAL SWELLING: 0
PHOTOPHOBIA: 0
ABDOMINAL PAIN: 0
VOICE CHANGE: 0
SINUS PAIN: 0
BACK PAIN: 0
COUGH: 0
COLOR CHANGE: 0
SINUS PRESSURE: 0
SORE THROAT: 0
RHINORRHEA: 0
WHEEZING: 0
RECTAL PAIN: 0
TROUBLE SWALLOWING: 0
BLOOD IN STOOL: 0
NAUSEA: 0
SHORTNESS OF BREATH: 0
EYE REDNESS: 0
APNEA: 0
CONSTIPATION: 0
CHEST TIGHTNESS: 0
EYE PAIN: 0
ABDOMINAL DISTENTION: 0

## 2021-06-14 NOTE — PROGRESS NOTES
2021    Clive Weiner (:  1945) is a 67 y.o. female     65-year-old diabetic female with associated neuropathy hypertensive hyperlipidemic female with hx of a resting tremor presents for follow-up visit. The patient reports that the epigastric pain that she previously reported has resolved. She denies odontophagia dysphagia but reports intermittent nausea when this occurs. Type 2 diabetes: The patient states that her blood sugars are improving. Very few readings have been greater than 200. Novolin 20 units 3 times daily with meals she is also compliant with Lipitor 40 mg orally daily. Hypertension: The patient is compliant with lisinopril 10 mg orally daily      Resting tremor: The patient is compliant with primidone 50 mg twice daily               At present she denies polyuria,  Polydipsia, constitutional, sinus, visual, cardiopulmonary, urologic, additional gastrointestinal, immunologic/hematologic, musculoskeletal, neurologic,dermatologic, or psychiatric complaints. Patient Active Problem List   Diagnosis    Hypertension, essential    Malignant neoplasm of female breast ([address]    Controlled type 2 diabetes mellitus without complication ([address]    Meningioma, cerebral ([address]    Type 2 diabetes mellitus with hyperglycemia       Review of Systems   Constitutional: Negative for chills, diaphoresis, fatigue and fever. HENT: Negative for congestion, dental problem, drooling, ear discharge, ear pain, facial swelling, hearing loss, mouth sores, nosebleeds, postnasal drip, rhinorrhea, sinus pressure, sinus pain, sneezing, sore throat, tinnitus, trouble swallowing and voice change. Eyes: Negative for photophobia, pain, discharge, redness, itching and visual disturbance. Respiratory: Negative for apnea, cough, chest tightness, shortness of breath and wheezing. Cardiovascular: Negative for chest pain, palpitations and leg swelling.    Gastrointestinal: Negative for abdominal distention, abdominal pain, blood in stool, constipation, diarrhea, nausea, rectal pain and vomiting. Endocrine: Negative for cold intolerance, heat intolerance, polydipsia, polyphagia and polyuria. Genitourinary: Negative for decreased urine volume, difficulty urinating, dysuria, flank pain, frequency, genital sores, hematuria and urgency. Musculoskeletal: Negative for arthralgias, back pain, gait problem, joint swelling, myalgias, neck pain and neck stiffness. Skin: Negative for color change, rash and wound. Allergic/Immunologic: Negative for environmental allergies and food allergies. Neurological: Negative for dizziness, tremors, seizures, syncope, facial asymmetry, speech difficulty, weakness, light-headedness, numbness and headaches. Hematological: Negative for adenopathy. Does not bruise/bleed easily. Psychiatric/Behavioral: Negative for agitation, confusion, decreased concentration, hallucinations, self-injury, sleep disturbance and suicidal ideas. The patient is not nervous/anxious. Prior to Visit Medications    Medication Sig Taking?  Authorizing Provider   Handicap Placard MISC by Does not apply route Diagnosis: COPD  Duration 6/14/2021-6/14/2023 Yes Maribel Mariano MD   Insulin Regular Human (NOVOLIN R FLEXPEN) 100 UNIT/ML SOPN Inject 10 Units as directed every evening Yes Maribel Mariano MD   primidone (MYSOLINE) 50 MG tablet Take 1 tablet by mouth 2 times daily  Maribel Mariano MD   Alcohol Swabs (ALCOHOL PADS) 70 % PADS Use, as directed, three times a day to test blood sugar  Historical MD Abdelrahman   Blood Glucose Calibration (OT ULTRA/FASTTK CNTRL SOLN) SOLN USE TO CONFIRM ACCURACY OF GLUCOSE METER  Historical Provider, MD   Blood Glucose Monitoring Suppl (ONE TOUCH ULTRA 2) w/Device KIT Use, as directed, three times a day to test blood sugar  Historical Provider, MD   [facility] ULTRA strip Use, as directed, three times a day to test blood sugar Historical Provider, MD CHENEY COMFORT PEN NEEDLES 32G X 4 MM MISC Use to inject insulin 3 times a day as directed  Historical Provider, MD   Lancet Devices (EASY MINI EJECT LANCING DEVICE) MISC Use, as directed, three times a day to test blood sugar  Historical Provider, MD   Easy Comfort Lancets MISC Use, as directed, three times a day to test blood sugar  Historical Provider, MD   insulin aspart (NOVOLOG FLEXPEN) 100 UNIT/ML injection pen Inject 18 Units into the skin 3 times daily (before meals)  Wing Nithya MD   lisinopril (PRINIVIL;ZESTRIL) 10 MG tablet Take 1 tablet by mouth daily  Wing Barriga, MD   docusate sodium (COLACE) 100 MG capsule Take 1 capsule by mouth 2 times daily  Wing Barriga, MD   ibuprofen (ADVIL;MOTRIN) 600 MG tablet Take 1 tablet by mouth 2 times daily as needed for Pain  Wing Barriga, MD   atorvastatin (LIPITOR) 40 MG tablet Take 1 tablet by mouth daily  Wing Barriga, MD   gabapentin (NEURONTIN) 300 MG capsule Take 300 mg by mouth 2 times daily.   Historical Provider, MD        No Known Allergies    Past Medical History:   Diagnosis Date    Cancer [facility]     Breast    Diabetes mellitus ([facility]     Hyperlipidemia     Hypertension        Past Surgical History:   Procedure Laterality Date    APPENDECTOMY      BREAST LUMPECTOMY Right     CARPAL TUNNEL RELEASE Left     HERNIA REPAIR      Umbilical    HYSTERECTOMY      TONSILLECTOMY         Social History     Socioeconomic History    Marital status:      Spouse name: Not on file    Number of children: Not on file    Years of education: Not on file    Highest education level: Not on file   Occupational History    Not on file   Tobacco Use    Smoking status: Current Every Day Smoker     Packs/day: 0.50     Years: 50.00     Pack years: 25.00     Types: Cigarettes    Smokeless tobacco: Never Used   Vaping Use    Vaping Use: Never used   Substance and Sexual Activity    Alcohol use: Yes     Comment: Once a month  Drug use: Never    Sexual activity: Not on file   Other Topics Concern    Not on file   Social History Narrative    Not on file     Social Determinants of Health     Financial Resource Strain: Low Risk     Difficulty of Paying Living Expenses: Not hard at all   Food Insecurity: No Food Insecurity    Worried About Running Out of Food in the Last Year: Never true    [address] in the Last Year: Never true   Transportation Needs: No Transportation Needs    Lack of Transportation (Medical): No    Lack of Transportation (Non-Medical): No   Physical Activity:     Days of Exercise per Week:     Minutes of Exercise per Session:    Stress:     Feeling of Stress :    Social Connections:     Frequency of Communication with Friends and Family:     Frequency of Social Gatherings with Friends and Family:     Attends Hinduism Services:     Active Member of Clubs or Organizations:     Attends Club or Organization Meetings:     Marital Status:    Intimate Partner Violence:     Fear of Current or Ex-Partner:     Emotionally Abused:     Physically Abused:     Sexually Abused:         History reviewed. No pertinent family history. ADVANCE DIRECTIVE: N, <no information>    Vitals:    06/14/21 1429   BP: 118/70   Pulse: 75   Resp: 14   SpO2: 95%   Weight: 164 lb (74.4 kg)   Height: 4' 11\" (1.499 m)     Estimated body mass index is 33.12 kg/m² as calculated from the following:    Height as of this encounter: 4' 11\" (1.499 m). Weight as of this encounter: 164 lb (74.4 kg). Physical Exam  Constitutional:       General: She is not in acute distress. Appearance: She is well-developed. HENT:      Head: Normocephalic. Right Ear: External ear normal.      Left Ear: External ear normal.   Eyes:      Conjunctiva/sclera: Conjunctivae normal.   Neck:      Vascular: No JVD. Trachea: No tracheal deviation. Cardiovascular:      Rate and Rhythm: Normal rate and regular rhythm.       Heart sounds: Value Date    CHOL 147 01/04/2021    TRIG 112 01/04/2021    HDL 57 01/04/2021    LDLCALC 68 01/04/2021    GLUCOSE 200 01/04/2021    LABA1C 8.9 01/25/2021    LABA1C 9.0 01/04/2021       The 10-year ASCVD risk score (Tyler Romberg., et al., 2013) is: 49.6%    Values used to calculate the score:      Age: 67 years      Sex: Female      Is Non- : No      Diabetic: Yes      Tobacco smoker: Yes      Systolic Blood Pressure: 090 mmHg      Is BP treated: Yes      HDL Cholesterol: 57 mg/dL      Total Cholesterol: 147 mg/dL    Immunization History   Administered Date(s) Administered    COVID-19, Pfizer, PF, 30mcg/0.3mL 02/01/2021, 03/01/2021    Influenza Virus Vaccine 11/07/2006, 11/15/2007    Influenza, Quadv, adjuvanted, 65 yrs +, IM, PF (Fluad) 09/14/2020    Pneumococcal Polysaccharide ([id number]) 09/14/2020    Tdap (Boostrix, Adacel) 10/15/2004       Health Maintenance   Topic Date Due    Shingles Vaccine (1 of 2) Never done    DEXA (modify frequency per FRAX score)  Never done    DTaP/Tdap/Td vaccine (2 - Td or Tdap) 10/15/2014    Lipid screen  01/04/2022    Potassium monitoring  01/04/2022    Creatinine monitoring  01/04/2022    Annual Wellness Visit (AWV)  03/26/2022    Flu vaccine  Completed    Pneumococcal 65+ years Vaccine  Completed    COVID-19 Vaccine  Completed    Hepatitis A vaccine  Aged Out    Hib vaccine  Aged Out    Meningococcal (ACWY) vaccine  Aged Out    Hepatitis C screen  Discontinued       ASSESSMENT/PLAN:            Type 2 diabetes mellitus without complication, with long-term current use of insulin (HCC)    -start Novolin N 4 UNITS QHS , Novolin 18 units 3 times daily with meals          Hyperlipidemia, unspecified hyperlipidemia typecontinue Lipitor 40 mg orally daily          Essential hypertensioncontinue lisinopril 10 mg orally daily          Tremorcontinue primidone 50 mg twice daily          Neuropathycontinue Neurontin 20 mg twice daily      CKD

## 2021-07-16 ENCOUNTER — TELEPHONE (OUTPATIENT)
Dept: PHARMACY | Facility: CLINIC | Age: 76
End: 2021-07-16

## 2021-07-16 NOTE — TELEPHONE ENCOUNTER
[facility] CLINICAL PHARMACY REVIEW: ADHERENCE REVIEW  Identified care gap per [employer]; fills at [facility]: ACE/ARB and Statin adherence    Last Visit: 6/14/21    Patient also appears to be prescribed: Atorvastatin 40mg,  Lisinopril 10mg    Patient not found in Outcomes MTM    ASSESSMENT  ACE/ARB ADHERENCE    Per Insurance Records through [employer] (2020 [place] = 0%; YTD [place] = 100%; Potential Fail Date: 9/18/21):   Lisinopril last filled on 5/4/21 for 90 day supply. Next refill due: 8/2/21    Per Reconciled Dispense Report:  lisinopril last filled on 5/4/21 for 90 day supply. Per [facility]:   Lisinopril last picked up on 5/4/21 for 90 day supply. 3 refills remaining. Billed through [employer]     BP Readings from Last 3 Encounters:   06/14/21 118/70   05/12/21 (!) 140/80   04/26/21 136/80     CrCl cannot be calculated (Patient's most recent lab result is older than the maximum 120 days allowed. ). STATIN ADHERENCE    Per Insurance Records through [employer] (2020 [place] = 0%; YTD [place] = 72%; Potential Fail Date: 7/20/21): Atorvastatin last filled on 5/26/21 for 30 day supply. Next refill due: 6/25/21    Per Reconciled Dispense Report:  Atorvastatin last filled on 5/26/21 for 30 day supply. Per [facility]:   Atorvastatin last picked up on 7/12/21 for 90 day supply. 1 refills remaining.  Billed through [facility]   Component Value Date    CHOL 147 01/04/2021    TRIG 112 01/04/2021    HDL 57 01/04/2021    LDLCALC 68 01/04/2021     ALT   Date Value Ref Range Status   01/04/2021 40 (H) 0 - 33 U/L Final     AST   Date Value Ref Range Status   01/04/2021 49 (H) 0 - 35 U/L Final     The 10-year ASCVD risk score (Dionne Dueñas et al., 2013) is: 49.6%    Values used to calculate the score:      Age: 67 years      Sex: Female      Is Non- : No      Diabetic: Yes      Tobacco smoker: Yes      Systolic Blood Pressure: 348 mmHg      Is BP treated: Yes      HDL Cholesterol: 57 mg/dL Total Cholesterol: 147 mg/dL     PLAN  No patient out reach planned at this time.     Patient appears to be adherent and filling a 90ds     Future Appointments   Date Time Provider Katia Elizabet   7/29/2021  3:30 PM Sharon Lindsey MD [address]  Direct: [phone]  Department, toll free [phone], option [phone] in place:  No   Gap Closed?: Yes    Time Spent (min): 10

## 2021-07-29 ENCOUNTER — OFFICE VISIT (OUTPATIENT)
Dept: FAMILY MEDICINE CLINIC | Age: 76
End: 2021-07-29
Payer: MEDICARE

## 2021-07-29 VITALS
HEIGHT: 59 IN | DIASTOLIC BLOOD PRESSURE: 76 MMHG | BODY MASS INDEX: 33.1 KG/M2 | HEART RATE: 88 BPM | TEMPERATURE: 97.8 F | WEIGHT: 164.2 LBS | SYSTOLIC BLOOD PRESSURE: 120 MMHG | OXYGEN SATURATION: 97 %

## 2021-07-29 DIAGNOSIS — N18.31 TYPE 2 DIABETES MELLITUS WITH STAGE 3A CHRONIC KIDNEY DISEASE, WITH LONG-TERM CURRENT USE OF INSULIN (HCC): ICD-10-CM

## 2021-07-29 DIAGNOSIS — E11.9 CONTROLLED TYPE 2 DIABETES MELLITUS WITHOUT COMPLICATION, UNSPECIFIED WHETHER LONG TERM INSULIN USE (HCC): Primary | ICD-10-CM

## 2021-07-29 DIAGNOSIS — I10 ESSENTIAL HYPERTENSION: ICD-10-CM

## 2021-07-29 DIAGNOSIS — N18.31 STAGE 3A CHRONIC KIDNEY DISEASE (HCC): ICD-10-CM

## 2021-07-29 DIAGNOSIS — Z79.4 TYPE 2 DIABETES MELLITUS WITH STAGE 3A CHRONIC KIDNEY DISEASE, WITH LONG-TERM CURRENT USE OF INSULIN (HCC): ICD-10-CM

## 2021-07-29 DIAGNOSIS — Z12.31 SCREENING MAMMOGRAM, ENCOUNTER FOR: ICD-10-CM

## 2021-07-29 DIAGNOSIS — E78.5 HYPERLIPIDEMIA, UNSPECIFIED HYPERLIPIDEMIA TYPE: ICD-10-CM

## 2021-07-29 DIAGNOSIS — E11.22 TYPE 2 DIABETES MELLITUS WITH STAGE 3A CHRONIC KIDNEY DISEASE, WITH LONG-TERM CURRENT USE OF INSULIN (HCC): ICD-10-CM

## 2021-07-29 DIAGNOSIS — D32.0 MENINGIOMA, CEREBRAL (HCC): ICD-10-CM

## 2021-07-29 LAB
ALBUMIN SERPL-MCNC: 4.3 G/DL (ref 3.5–4.6)
ALP BLD-CCNC: 113 U/L (ref 40–130)
ALT SERPL-CCNC: 28 U/L (ref 0–33)
ANION GAP SERPL CALCULATED.3IONS-SCNC: 12 MEQ/L (ref 9–15)
AST SERPL-CCNC: 33 U/L (ref 0–35)
BILIRUB SERPL-MCNC: <0.2 MG/DL (ref 0.2–0.7)
BUN BLDV-MCNC: 11 MG/DL (ref 8–23)
CALCIUM SERPL-MCNC: 10.2 MG/DL (ref 8.5–9.9)
CHLORIDE BLD-SCNC: 102 MEQ/L (ref 95–107)
CO2: 27 MEQ/L (ref 20–31)
CREAT SERPL-MCNC: 0.75 MG/DL (ref 0.5–0.9)
GFR AFRICAN AMERICAN: >60
GFR NON-AFRICAN AMERICAN: >60
GLOBULIN: 3.9 G/DL (ref 2.3–3.5)
GLUCOSE BLD-MCNC: 166 MG/DL (ref 70–99)
HBA1C MFR BLD: 10.3 %
POTASSIUM SERPL-SCNC: 4.3 MEQ/L (ref 3.4–4.9)
SODIUM BLD-SCNC: 141 MEQ/L (ref 135–144)
TOTAL PROTEIN: 8.2 G/DL (ref 6.3–8)

## 2021-07-29 PROCEDURE — 99214 OFFICE O/P EST MOD 30 MIN: CPT | Performed by: INTERNAL MEDICINE

## 2021-07-29 PROCEDURE — 83036 HEMOGLOBIN GLYCOSYLATED A1C: CPT | Performed by: INTERNAL MEDICINE

## 2021-07-29 RX ORDER — GLIPIZIDE 5 MG/1
5 TABLET ORAL 2 TIMES DAILY
Qty: 60 TABLET | Refills: 3 | Status: SHIPPED | OUTPATIENT
Start: 2021-07-29 | End: 2022-04-25

## 2021-07-29 ASSESSMENT — ENCOUNTER SYMPTOMS
EYE REDNESS: 0
APNEA: 0
ABDOMINAL DISTENTION: 0
ABDOMINAL PAIN: 0
PHOTOPHOBIA: 0
SHORTNESS OF BREATH: 0
BLOOD IN STOOL: 0
FACIAL SWELLING: 0
CHEST TIGHTNESS: 0
CONSTIPATION: 0
COUGH: 0
EYE ITCHING: 0
DIARRHEA: 0
VOMITING: 0
EYE DISCHARGE: 0
VOICE CHANGE: 0
BACK PAIN: 0
TROUBLE SWALLOWING: 0
SORE THROAT: 0
WHEEZING: 0
SINUS PRESSURE: 0
NAUSEA: 0
COLOR CHANGE: 0
RECTAL PAIN: 0
EYE PAIN: 0
SINUS PAIN: 0
RHINORRHEA: 0

## 2021-07-29 NOTE — PROGRESS NOTES
2021    Madalyn Crowley (:  1945) is a 67 y.o. female     59-year-old diabetic female with associated neuropathy hypertensive hyperlipidemic female with hx of a resting tremor presents for follow-up visit. The patient reports that the epigastric pain that she previously reported has resolved. She denies odontophagia dysphagia but reports intermittent nausea when this occurs. Type 2 diabetes: The patient states that her blood sugars are improving. Very few readings have been greater than 200. [id number] units 3 times daily with meals. she is also compliant with Lipitor 40 mg orally daily. Hypertension: The patient is compliant with lisinopril 10 mg orally daily        Resting tremor: The patient is compliant with primidone 50 mg twice daily        At present she denies polyuria,  Polydipsia, constitutional, sinus, visual, cardiopulmonary, urologic, additional gastrointestinal, immunologic/hematologic, musculoskeletal, neurologic,dermatologic, or psychiatric complaints. Patient Active Problem List   Diagnosis    Hypertension, essential    Malignant neoplasm of female breast ([address]    Controlled type 2 diabetes mellitus without complication ([address]    Meningioma, cerebral ([address]    Type 2 diabetes mellitus with hyperglycemia       Review of Systems   Constitutional: Negative for chills, diaphoresis, fatigue and fever. HENT: Negative for congestion, dental problem, drooling, ear discharge, ear pain, facial swelling, hearing loss, mouth sores, nosebleeds, postnasal drip, rhinorrhea, sinus pressure, sinus pain, sneezing, sore throat, tinnitus, trouble swallowing and voice change. Eyes: Negative for photophobia, pain, discharge, redness, itching and visual disturbance. Respiratory: Negative for apnea, cough, chest tightness, shortness of breath and wheezing. Cardiovascular: Negative for chest pain, palpitations and leg swelling.    Gastrointestinal: Negative for abdominal distention, abdominal pain, blood in stool, constipation, diarrhea, nausea, rectal pain and vomiting. Endocrine: Negative for cold intolerance, heat intolerance, polydipsia, polyphagia and polyuria. Genitourinary: Negative for decreased urine volume, difficulty urinating, dysuria, flank pain, frequency, genital sores, hematuria and urgency. Musculoskeletal: Negative for arthralgias, back pain, gait problem, joint swelling, myalgias, neck pain and neck stiffness. Skin: Negative for color change, rash and wound. Allergic/Immunologic: Negative for environmental allergies and food allergies. Neurological: Negative for dizziness, tremors, seizures, syncope, facial asymmetry, speech difficulty, weakness, light-headedness, numbness and headaches. Hematological: Negative for adenopathy. Does not bruise/bleed easily. Psychiatric/Behavioral: Negative for agitation, confusion, decreased concentration, hallucinations, self-injury, sleep disturbance and suicidal ideas. The patient is not nervous/anxious. Prior to Visit Medications    Medication Sig Taking?  Authorizing Provider   Handicap Placard MISC by Does not apply route Diagnosis: COPD  Duration 6/14/2021-6/14/2023 Yes Yonny Grover MD   Insulin Regular Human (NOVOLIN R FLEXPEN) 100 UNIT/ML SOPN Inject 10 Units as directed every evening Yes Yonny Grover MD   primidone (MYSOLINE) 50 MG tablet Take 1 tablet by mouth 2 times daily Yes Yonny Grover MD   Alcohol Swabs (ALCOHOL PADS) 70 % PADS Use, as directed, three times a day to test blood sugar Yes Historical Provider, MD   Blood Glucose Calibration (OT ULTRA/FASTTK CNTRL SOLN) SOLN USE TO CONFIRM ACCURACY OF GLUCOSE METER Yes Historical Provider, MD   Blood Glucose Monitoring Suppl (ONE TOUCH ULTRA 2) w/Device KIT Use, as directed, three times a day to test blood sugar Yes Historical Provider, MD   [facility] ULTRA strip Use, as directed, three times a day to test blood sugar Yes Historical Provider, MD   EASY COMFORT PEN NEEDLES 32G X 4 MM MISC Use to inject insulin 3 times a day as directed Yes Historical Provider, MD   Lancet Devices (EASY MINI EJECT LANCING DEVICE) MISC Use, as directed, three times a day to test blood sugar Yes Historical Provider, MD   Easy Comfort Lancets MISC Use, as directed, three times a day to test blood sugar Yes Historical Provider, MD   insulin aspart (NOVOLOG FLEXPEN) 100 UNIT/ML injection pen Inject 18 Units into the skin 3 times daily (before meals) Yes Kendra Hunt MD   lisinopril (PRINIVIL;ZESTRIL) 10 MG tablet Take 1 tablet by mouth daily Yes Kendra Hunt MD   docusate sodium (COLACE) 100 MG capsule Take 1 capsule by mouth 2 times daily Yes Kendra Hunt MD   ibuprofen (ADVIL;MOTRIN) 600 MG tablet Take 1 tablet by mouth 2 times daily as needed for Pain Yes Kendra Hunt MD   atorvastatin (LIPITOR) 40 MG tablet Take 1 tablet by mouth daily Yes Kendra Hunt MD   gabapentin (NEURONTIN) 300 MG capsule Take 300 mg by mouth 2 times daily.  Yes Historical Provider, MD        No Known Allergies    Past Medical History:   Diagnosis Date    Cancer [address]     Breast    Diabetes mellitus ([facility]     Hyperlipidemia     Hypertension        Past Surgical History:   Procedure Laterality Date    APPENDECTOMY      BREAST LUMPECTOMY Right     CARPAL TUNNEL RELEASE Left     HERNIA REPAIR      Umbilical    HYSTERECTOMY      TONSILLECTOMY         Social History     Socioeconomic History    Marital status:      Spouse name: Not on file    Number of children: Not on file    Years of education: Not on file    Highest education level: Not on file   Occupational History    Not on file   Tobacco Use    Smoking status: Current Every Day Smoker     Packs/day: 0.50     Years: 50.00     Pack years: 25.00     Types: Cigarettes    Smokeless tobacco: Never Used   Vaping Use    Vaping Use: Never used   Substance and Sexual Activity    Alcohol use: Yes     Comment: Once a month    Drug use: Never    Sexual activity: Not on file   Other Topics Concern    Not on file   Social History Narrative    Not on file     Social Determinants of Health     Financial Resource Strain: Low Risk     Difficulty of Paying Living Expenses: Not hard at all   Food Insecurity: No Food Insecurity    Worried About Running Out of Food in the Last Year: Never true    [address] in the Last Year: Never true   Transportation Needs: No Transportation Needs    Lack of Transportation (Medical): No    Lack of Transportation (Non-Medical): No   Physical Activity:     Days of Exercise per Week:     Minutes of Exercise per Session:    Stress:     Feeling of Stress :    Social Connections:     Frequency of Communication with Friends and Family:     Frequency of Social Gatherings with Friends and Family:     Attends Confucianism Services:     Active Member of Clubs or Organizations:     Attends Club or Organization Meetings:     Marital Status:    Intimate Partner Violence:     Fear of Current or Ex-Partner:     Emotionally Abused:     Physically Abused:     Sexually Abused:         No family history on file. ADVANCE DIRECTIVE: N, <no information>    Vitals:    07/29/21 1532   BP: 120/76   Site: Left Upper Arm   Position: Sitting   Cuff Size: Large Adult   Pulse: 88   Temp: 97.8 °F (36.6 °C)   TempSrc: Temporal   SpO2: 97%   Weight: 164 lb 3.2 oz (74.5 kg)   Height: 4' 11\" (1.499 m)     Estimated body mass index is 33.16 kg/m² as calculated from the following:    Height as of this encounter: 4' 11\" (1.499 m). Weight as of this encounter: 164 lb 3.2 oz (74.5 kg). Physical Exam  Constitutional:       General: She is not in acute distress. Appearance: She is well-developed. HENT:      Head: Normocephalic. Right Ear: External ear normal.      Left Ear: External ear normal.   Eyes:      Conjunctiva/sclera: Conjunctivae normal.   Neck:      Vascular: No JVD. Calculated      0 - 129 mg/dL  68     Hemoglobin A1C      % 8.9   9.0 (H)     No flowsheet data found.     Lab Results   Component Value Date    CHOL 147 01/04/2021    TRIG 112 01/04/2021    HDL 57 01/04/2021    LDLCALC 68 01/04/2021    GLUCOSE 200 01/04/2021    LABA1C 8.9 01/25/2021    LABA1C 9.0 01/04/2021       The 10-year ASCVD risk score (Gisell De La Cruz, et al., 2013) is: 50.8%    Values used to calculate the score:      Age: 67 years      Sex: Female      Is Non- : No      Diabetic: Yes      Tobacco smoker: Yes      Systolic Blood Pressure: 980 mmHg      Is BP treated: Yes      HDL Cholesterol: 57 mg/dL      Total Cholesterol: 147 mg/dL    Immunization History   Administered Date(s) Administered    COVID-19, Pfizer, PF, 30mcg/0.3mL 02/01/2021, 03/01/2021    Influenza Virus Vaccine 11/07/2006, 11/15/2007    Influenza, Quadv, adjuvanted, 65 yrs +, IM, PF (Fluad) 09/14/2020    Pneumococcal Polysaccharide ([id number]) 09/14/2020    Td (Adult), 2 Lf Tetanus Toxoid, Pf (Td, Absorbed) 10/15/2004    Tdap (Boostrix, Adacel) 10/15/2004       Health Maintenance   Topic Date Due    Shingles Vaccine (1 of 2) Never done    DEXA (modify frequency per FRAX score)  Never done    DTaP/Tdap/Td vaccine (2 - Td or Tdap) 10/15/2014    Flu vaccine (1) 09/01/2021    Low dose CT lung screening  09/22/2021    Lipid screen  01/04/2022    Potassium monitoring  01/04/2022    Creatinine monitoring  01/04/2022    Annual Wellness Visit (AWV)  03/26/2022    Pneumococcal 65+ years Vaccine  Completed    COVID-19 Vaccine  Completed    Hepatitis A vaccine  Aged Out    Hib vaccine  Aged Out    Meningococcal (ACWY) vaccine  Aged Out    Hepatitis C screen  Discontinued       ASSESSMENT/PLAN:            Type 2 diabetes mellitus without complication, with long-term current use of insulin (HCC)    Novolin N 14-15 UNITS QHS   Novolog 15 units 3 times daily with meals   Add glipizide 5 mg twice daily          Hyperlipidemia, unspecified hyperlipidemia typecontinue Lipitor 40 mg orally daily          Essential hypertensioncontinue lisinopril 10 mg orally daily          Tremorcontinue primidone 50 mg twice daily          Neuropathycontinue Neurontin 20 mg twice daily      CKD stage III: Avoid the use of NSAIDs. Monitor renal function closely. Continue lisinopril 10 mg orally daily. No follow-ups on file. An electronic signature was used to authenticate this note.     --Fiorella Beltran MD on 7/29/2021 at 3:42 PM

## 2021-08-28 ENCOUNTER — HOSPITAL ENCOUNTER (OUTPATIENT)
Dept: WOMENS IMAGING | Age: 76
Discharge: HOME | End: 2021-08-30
Payer: MEDICARE

## 2021-08-28 VITALS — BODY MASS INDEX: 33.73 KG/M2 | HEIGHT: 59 IN

## 2021-08-28 DIAGNOSIS — Z12.31 SCREENING MAMMOGRAM, ENCOUNTER FOR: ICD-10-CM

## 2021-08-28 PROCEDURE — 77063 BREAST TOMOSYNTHESIS BI: CPT

## 2021-09-15 ENCOUNTER — TELEPHONE (OUTPATIENT)
Dept: PHARMACY | Facility: CLINIC | Age: 76
End: 2021-09-15

## 2021-09-15 NOTE — TELEPHONE ENCOUNTER
[facility] CLINICAL PHARMACY REVIEW: ADHERENCE REVIEW  Identified care gap per [employer]; fills at [facility]: ACE/ARB and Statin adherence    Last Visit: 7/29/21    Patient also appears to be prescribed: ATORVASTATIN TAB 40MG and  LISINOPRIL   TAB 10MG    Patient not found in Outcomes MTM    ASSESSMENT  ACE/ARB ADHERENCE    Per Insurance Records through [employer] (2020 [facility] [place] = 0%; YTD PDC = 88%; Potential Fail Date: 9/18/21):   LISINOPRIL   TAB 10MG last filled on 5/4/21 for 90 day supply. Next refill due: 8/2/21    Per Reconciled Dispense Report:  LISINOPRIL TAB 10MG last filled on 5/4/21 for 90 day supply. Per [address] [facility] [address]   LISINOPRIL TAB 10MG last picked up on 5/4/21 for 90 day supply. 3 refills remaining. Billed through [facility] $0 copay     BP Readings from Last 3 Encounters:   07/29/21 120/76   06/14/21 118/70   05/12/21 (!) 140/80     CrCl cannot be calculated (Unknown ideal weight.). STATIN ADHERENCE    Per Insurance Records through [employer] (2020 [facility] [place] = 0%; YTD [place] = 72%; Potential Fail Date: 10/18/21):   ATORVASTATIN TAB 40MG last filled on 7/12/21 for 90 day supply. Next refill due: 10/10/21    Per Reconciled Dispense Report:  ATORVASTATIN TAB 40MG last filled on 7/12/21 for 90 day supply. Per [address] [facility] [address]   ATORVASTATIN TAB 40MG last picked up on 7/15/21 for 90 day supply. 1 refills remaining.  Billed through [employer]   Component Value Date    CHOL 147 01/04/2021    TRIG 112 01/04/2021    HDL 57 01/04/2021    LDLCALC 68 01/04/2021     ALT   Date Value Ref Range Status   07/29/2021 28 0 - 33 U/L Final     AST   Date Value Ref Range Status   07/29/2021 33 0 - 35 U/L Final     The 10-year ASCVD risk score (Cristina Mejía, et al., 2013) is: 50.8%    Values used to calculate the score:      Age: 67 years      Sex: Female      Is Non- : No      Diabetic: Yes      Tobacco smoker: Yes      Systolic Blood Pressure: 772 mmHg      Is BP treated: Yes      HDL Cholesterol: 57 mg/dL      Total Cholesterol: 147 mg/dL     PLAN  The following are interventions that have been identified:   - Patient overdue refilling Lisinopril  and active on home medication list.     No patient out reach planned at this time.      I will f/u in 1 week to see if this medication was p/u     Future Appointments   Date Time Provider Katia Mcneal   9/29/2021  4:00 PM Ashlee Lopez MD [address] [place] [address]  // Department, toll free [phone], Option [address] [facility] in place:  No   Gap Closed?: Yes    Time Spent (min): 20

## 2021-09-29 ENCOUNTER — OFFICE VISIT (OUTPATIENT)
Dept: FAMILY MEDICINE CLINIC | Age: 76
End: 2021-09-29
Payer: MEDICARE

## 2021-09-29 VITALS
HEIGHT: 59 IN | OXYGEN SATURATION: 98 % | BODY MASS INDEX: 33.63 KG/M2 | DIASTOLIC BLOOD PRESSURE: 75 MMHG | WEIGHT: 166.8 LBS | TEMPERATURE: 98.2 F | HEART RATE: 76 BPM | SYSTOLIC BLOOD PRESSURE: 119 MMHG

## 2021-09-29 DIAGNOSIS — N18.31 STAGE 3A CHRONIC KIDNEY DISEASE (HCC): ICD-10-CM

## 2021-09-29 DIAGNOSIS — Z79.4 TYPE 2 DIABETES MELLITUS WITHOUT COMPLICATION, WITH LONG-TERM CURRENT USE OF INSULIN (HCC): Primary | ICD-10-CM

## 2021-09-29 DIAGNOSIS — E78.5 HYPERLIPIDEMIA, UNSPECIFIED HYPERLIPIDEMIA TYPE: ICD-10-CM

## 2021-09-29 DIAGNOSIS — M54.50 ACUTE LOW BACK PAIN WITHOUT SCIATICA, UNSPECIFIED BACK PAIN LATERALITY: ICD-10-CM

## 2021-09-29 DIAGNOSIS — I10 ESSENTIAL HYPERTENSION: ICD-10-CM

## 2021-09-29 DIAGNOSIS — E11.9 TYPE 2 DIABETES MELLITUS WITHOUT COMPLICATION, WITH LONG-TERM CURRENT USE OF INSULIN (HCC): Primary | ICD-10-CM

## 2021-09-29 DIAGNOSIS — D32.0 MENINGIOMA, CEREBRAL (HCC): ICD-10-CM

## 2021-09-29 LAB — HBA1C MFR BLD: 9.4 %

## 2021-09-29 PROCEDURE — 99214 OFFICE O/P EST MOD 30 MIN: CPT | Performed by: INTERNAL MEDICINE

## 2021-09-29 RX ORDER — MELOXICAM 15 MG/1
15 TABLET ORAL DAILY
Qty: 30 TABLET | Refills: 3 | Status: SHIPPED | OUTPATIENT
Start: 2021-09-29 | End: 2022-04-25

## 2021-09-29 RX ORDER — INSULIN ASPART 100 [IU]/ML
INJECTION, SOLUTION INTRAVENOUS; SUBCUTANEOUS
COMMUNITY
Start: 2021-09-01 | End: 2021-10-10

## 2021-09-29 ASSESSMENT — ENCOUNTER SYMPTOMS
NAUSEA: 0
CONSTIPATION: 0
SORE THROAT: 0
RECTAL PAIN: 0
TROUBLE SWALLOWING: 0
DIARRHEA: 0
COLOR CHANGE: 0
ABDOMINAL DISTENTION: 0
EYE ITCHING: 0
VOMITING: 0
ABDOMINAL PAIN: 0
SINUS PAIN: 0
VOICE CHANGE: 0
SHORTNESS OF BREATH: 0
SINUS PRESSURE: 0
CHEST TIGHTNESS: 0
APNEA: 0
EYE REDNESS: 0
EYE DISCHARGE: 0
WHEEZING: 0
FACIAL SWELLING: 0
EYE PAIN: 0
COUGH: 0
BACK PAIN: 0
BLOOD IN STOOL: 0
PHOTOPHOBIA: 0
RHINORRHEA: 0

## 2021-09-29 NOTE — PROGRESS NOTES
2021    Negin Dowling (:  1945) is a 67 y.o. female     65-year-old diabetic female with associated neuropathy hypertensive hyperlipidemic female with hx of a resting tremor presents for follow-up visit. The patient reports that the epigastric pain that she previously reported has resolved. She denies odontophagia dysphagia but reports intermittent nausea when this occurs. Type 2 diabetes (A1C=9.4 2021): The patient states that her blood sugars are improving. Very few readings have been greater than 200. Novolog 20 units 3 times daily with meals. Lantus 10 units qhs. She is also compliant with Lipitor 40 mg orally daily. Hypertension: The patient is compliant with lisinopril 10 mg orally daily        Resting tremor: The patient is compliant with primidone 50 mg twice daily      Back pain: The patient reports achy, 7/10, constant back pain that has not responded well to ibuprofen. She is not experiencing numbness or tingling of her lower extremities or lower extremity weakness. At present she denies polyuria,  Polydipsia, constitutional, sinus, visual, cardiopulmonary, urologic, additional gastrointestinal, immunologic/hematologic, musculoskeletal, neurologic,dermatologic, or psychiatric complaints. Patient Active Problem List   Diagnosis    Hypertension, essential    Malignant neoplasm of female breast ([address]    Type 2 diabetes mellitus with chronic kidney disease ([address]    Meningioma, cerebral ([address]    Type 2 diabetes mellitus with hyperglycemia       Review of Systems   Constitutional: Negative for chills, diaphoresis, fatigue and fever. HENT: Negative for congestion, dental problem, drooling, ear discharge, ear pain, facial swelling, hearing loss, mouth sores, nosebleeds, postnasal drip, rhinorrhea, sinus pressure, sinus pain, sneezing, sore throat, tinnitus, trouble swallowing and voice change.     Eyes: Negative for photophobia, pain, discharge, redness, itching and visual disturbance. Respiratory: Negative for apnea, cough, chest tightness, shortness of breath and wheezing. Cardiovascular: Negative for chest pain, palpitations and leg swelling. Gastrointestinal: Negative for abdominal distention, abdominal pain, blood in stool, constipation, diarrhea, nausea, rectal pain and vomiting. Endocrine: Negative for cold intolerance, heat intolerance, polydipsia, polyphagia and polyuria. Genitourinary: Negative for decreased urine volume, difficulty urinating, dysuria, flank pain, frequency, genital sores, hematuria and urgency. Musculoskeletal: Negative for arthralgias, back pain, gait problem, joint swelling, myalgias, neck pain and neck stiffness. Skin: Negative for color change, rash and wound. Allergic/Immunologic: Negative for environmental allergies and food allergies. Neurological: Negative for dizziness, tremors, seizures, syncope, facial asymmetry, speech difficulty, weakness, light-headedness, numbness and headaches. Hematological: Negative for adenopathy. Does not bruise/bleed easily. Psychiatric/Behavioral: Negative for agitation, confusion, decreased concentration, hallucinations, self-injury, sleep disturbance and suicidal ideas. The patient is not nervous/anxious. Prior to Visit Medications    Medication Sig Taking?  Authorizing Provider   NOVOLOG FLEXPEN 100 UNIT/ML injection pen INJECT 18 UNITS SUBCUTANEOUSLY INTO THE SKIN DAILY 3 TIMES A DAY  BEFORE MEALS Yes Historical Provider, MD   meloxicam (MOBIC) 15 MG tablet Take 1 tablet by mouth daily Yes Heidi Lindo MD   glipiZIDE (GLUCOTROL) 5 MG tablet Take 1 tablet by mouth 2 times daily Yes Heidi Lindo MD   Handicap Marie MISC by Does not apply route Diagnosis: COPD  Duration 6/14/2021-6/14/2023 Yes Heidi Lindo MD   Insulin Regular Human (NOVOLIN R FLEXPEN) 100 UNIT/ML SOPN Inject 10 Units as directed every evening Yes Heidi Lindo MD   primidone (MYSOLINE) 50 MG tablet Take 1 tablet by mouth 2 times daily Yes Roseanne West MD   Alcohol Swabs (ALCOHOL PADS) 70 % PADS Use, as directed, three times a day to test blood sugar Yes Historical Provider, MD   Blood Glucose Calibration (OT ULTRA/FASTTK CNTRL SOLN) SOLN USE TO CONFIRM ACCURACY OF GLUCOSE METER Yes Historical Provider, MD   Blood Glucose Monitoring Suppl (ONE TOUCH ULTRA 2) w/Device KIT Use, as directed, three times a day to test blood sugar Yes Historical Provider, MD   Samantha Mais strip Use, as directed, three times a day to test blood sugar Yes Historical Provider, MD CHENEY COMFORT PEN NEEDLES 32G X 4 MM MISC Use to inject insulin 3 times a day as directed Yes Historical Provider, MD   Lancet Devices (EASY MINI EJECT LANCING DEVICE) MISC Use, as directed, three times a day to test blood sugar Yes Historical Provider, MD   Easy Comfort Lancets MISC Use, as directed, three times a day to test blood sugar Yes Historical Provider, MD   lisinopril (PRINIVIL;ZESTRIL) 10 MG tablet Take 1 tablet by mouth daily Yes Roseanne West MD   atorvastatin (LIPITOR) 40 MG tablet Take 1 tablet by mouth daily Yes Roseanne West MD   gabapentin (NEURONTIN) 300 MG capsule Take 300 mg by mouth 2 times daily.  Yes Historical Provider, MD   docusate sodium (COLACE) 100 MG capsule Take 1 capsule by mouth 2 times daily  Patient not taking: Reported on 9/29/2021  Roseanne West MD        No Known Allergies    Past Medical History:   Diagnosis Date    Breast cancer [facility])     right (16-17 yrs ago)    Cancer [facility])     Breast    [facility] mellitus [facility]     History of therapeutic radiation     Hyperlipidemia     Hypertension        Past Surgical History:   Procedure Laterality Date    APPENDECTOMY      BREAST BIOPSY Right     malignant (16-17 yrs ago)    BREAST LUMPECTOMY Right     malignant    CARPAL TUNNEL RELEASE Left     HERNIA REPAIR      Umbilical    HYSTERECTOMY      total but left part of one ovary Adult   Pulse: 76   Temp: 98.2 °F (36.8 °C)   TempSrc: Temporal   SpO2: 98%   Weight: 166 lb 12.8 oz (75.7 kg)   Height: 4' 10.5\" (1.486 m)     Estimated body mass index is 34.27 kg/m² as calculated from the following:    Height as of this encounter: 4' 10.5\" (1.486 m). Weight as of this encounter: 166 lb 12.8 oz (75.7 kg). Physical Exam  Constitutional:       General: She is not in acute distress. Appearance: She is well-developed. HENT:      Head: Normocephalic. Right Ear: External ear normal.      Left Ear: External ear normal.   Eyes:      Conjunctiva/sclera: Conjunctivae normal.   Neck:      Vascular: No JVD. Trachea: No tracheal deviation. Cardiovascular:      Rate and Rhythm: Normal rate and regular rhythm. Heart sounds: Normal heart sounds. Pulmonary:      Effort: Pulmonary effort is normal. No respiratory distress. Breath sounds: Normal breath sounds. No wheezing or rales. Chest:      Chest wall: No tenderness. Abdominal:      General: Bowel sounds are normal. There is no distension. Palpations: Abdomen is soft. There is no mass. Tenderness: There is no abdominal tenderness. There is no guarding or rebound. Musculoskeletal:         General: No tenderness or deformity. Cervical back: Neck supple. Skin:     General: Skin is warm and dry. Coloration: Skin is not pale. Findings: No erythema or rash. Neurological:      Mental Status: She is alert and oriented to person, place, and time. Motor: No abnormal muscle tone. Psychiatric:         Thought Content:  Thought content normal.         Judgment: Judgment normal.       Labs:  Component      Latest Ref Rng & Units 1/25/2021 1/4/2021 1/4/2021 1/4/2021           4:16 PM  4:18 PM  4:18 PM  4:18 PM   Sodium      135 - 144 mEq/L   143    Potassium      3.4 - 4.9 mEq/L   4.8    Chloride      95 - 107 mEq/L   106    CO2      20 - 31 mEq/L   26    Anion Gap      9 - 15 mEq/L   11    Glucose 70 - 99 mg/dL   200 (H)    BUN      8 - 23 mg/dL   16    Creatinine      0.50 - 0.90 mg/dL   1.35 (H)    GFR Non-      >60   38.0 (L)    GFR       >60   46.0 (L)    Calcium      8.5 - 9.9 mg/dL   9.4    Total Protein      6.3 - 8.0 g/dL   7.8    Albumin      3.5 - 4.6 g/dL   3.9    Bilirubin      0.2 - 0.7 mg/dL   <0.2    Alk Phos      40 - 130 U/L   103    ALT      0 - 33 U/L   40 (H)    AST      0 - 35 U/L   49 (H)    Globulin      2.3 - 3.5 g/dL   3.9 (H)    Cholesterol, Total      0 - 199 mg/dL  147     Triglycerides      0 - 150 mg/dL  112     HDL Cholesterol      40 - 59 mg/dL  57     LDL Calculated      0 - 129 mg/dL  68     Hemoglobin A1C      % 8.9   9.0 (H)     No flowsheet data found.     Lab Results   Component Value Date    CHOL 147 01/04/2021    TRIG 112 01/04/2021    HDL 57 01/04/2021    LDLCALC 68 01/04/2021    GLUCOSE 166 07/29/2021    LABA1C 9.4 09/29/2021    LABA1C 10.3 07/29/2021    LABA1C 8.9 01/25/2021       The 10-year ASCVD risk score (Augustus Flores, et al., 2013) is: 50.2%    Values used to calculate the score:      Age: 67 years      Sex: Female      Is Non- : No      Diabetic: Yes      Tobacco smoker: Yes      Systolic Blood Pressure: 937 mmHg      Is BP treated: Yes      HDL Cholesterol: 57 mg/dL      Total Cholesterol: 147 mg/dL    Immunization History   Administered Date(s) Administered    COVID-19, Pfizer, PF, 30mcg/0.3mL 02/01/2021, 03/01/2021    Influenza Virus Vaccine 11/07/2006, 11/15/2007    Influenza, Quadv, adjuvanted, 65 yrs +, IM, PF (Fluad) 09/14/2020    Pneumococcal Polysaccharide ([id number]) 09/14/2020    Td (Adult), 2 Lf Tetanus Toxoid, Pf (Td, Absorbed) 10/15/2004    Tdap (Boostrix, Adacel) 10/15/2004       Health Maintenance   Topic Date Due    Shingles Vaccine (1 of 2) Never done    DEXA (modify frequency per FRAX score)  Never done    DTaP/Tdap/Td vaccine (2 - Td or Tdap) 10/15/2014    Flu vaccine (1)

## 2021-10-10 RX ORDER — INSULIN ASPART 100 [IU]/ML
INJECTION, SOLUTION INTRAVENOUS; SUBCUTANEOUS
Qty: 15 ML | Refills: 0 | Status: SHIPPED | OUTPATIENT
Start: 2021-10-10 | End: 2021-11-17

## 2021-10-11 RX ORDER — HUMAN INSULIN 100 [IU]/ML
10 INJECTION, SOLUTION SUBCUTANEOUS EVERY EVENING
Qty: 1 PEN | Refills: 5 | Status: SHIPPED | OUTPATIENT
Start: 2021-10-11 | End: 2022-04-25

## 2021-10-11 NOTE — TELEPHONE ENCOUNTER
Patient calling for medication refill. Patient states that she is out of her medication today.     LOV  9/29  SCHEDULED 12/29    Patient [phone]

## 2021-10-12 ENCOUNTER — TELEPHONE (OUTPATIENT)
Dept: PHARMACY | Facility: CLINIC | Age: 76
End: 2021-10-12

## 2021-11-17 RX ORDER — INSULIN ASPART 100 [IU]/ML
INJECTION, SOLUTION INTRAVENOUS; SUBCUTANEOUS
Qty: 15 ML | Refills: 0 | Status: SHIPPED | OUTPATIENT
Start: 2021-11-17 | End: 2021-12-23

## 2021-11-18 NOTE — TELEPHONE ENCOUNTER
Joanie Holt [facility] Clinical Staff  Subject: Refill Request     QUESTIONS   Name of Medication? NOVOLOG FLEXPEN 100 UNIT/ML injection pen   Patient-reported dosage and instructions? 3 daily   How many days do you have left? 0   Preferred Pharmacy? [id number] phone number (if available)? [phone]   Additional Information for Provider? Please call in this RX for PT. Pharmacy sent request yesterday.   ---------------------------------------------------------------------------   --------------   Eliecer SALGADO   What is the best way for the office to contact you? OK to leave message on   voicemail   Preferred Call Back Phone Number?  [phone]

## 2021-12-03 ENCOUNTER — TELEPHONE (OUTPATIENT)
Dept: FAMILY MEDICINE CLINIC | Age: 76
End: 2021-12-03

## 2021-12-05 RX ORDER — CYCLOBENZAPRINE HCL 10 MG
10 TABLET ORAL NIGHTLY PRN
Qty: 30 TABLET | Refills: 0 | Status: SHIPPED | OUTPATIENT
Start: 2021-12-05 | End: 2021-12-15

## 2021-12-08 ENCOUNTER — NURSE ONLY (OUTPATIENT)
Dept: FAMILY MEDICINE CLINIC | Age: 76
End: 2021-12-08

## 2021-12-08 DIAGNOSIS — Z11.52 ENCOUNTER FOR SCREENING FOR COVID-19: Primary | ICD-10-CM

## 2021-12-08 DIAGNOSIS — R91.1 LUNG NODULE: Primary | ICD-10-CM

## 2021-12-08 LAB
Lab: NORMAL
PERFORMING INSTRUMENT: NORMAL
QC PASS/FAIL: NORMAL
SARS-COV-2, POC: NORMAL

## 2021-12-08 PROCEDURE — 87426 SARSCOV CORONAVIRUS AG IA: CPT | Performed by: INTERNAL MEDICINE

## 2021-12-08 RX ORDER — AZITHROMYCIN 250 MG/1
250 TABLET, FILM COATED ORAL SEE ADMIN INSTRUCTIONS
Qty: 6 TABLET | Refills: 0 | Status: SHIPPED | OUTPATIENT
Start: 2021-12-08 | End: 2021-12-13

## 2021-12-13 RX ORDER — GABAPENTIN 300 MG/1
300 CAPSULE ORAL 2 TIMES DAILY
Qty: 60 CAPSULE | Refills: 0 | Status: SHIPPED | OUTPATIENT
Start: 2021-12-13 | End: 2022-01-28

## 2021-12-13 NOTE — TELEPHONE ENCOUNTER
Requested Prescriptions     Pending Prescriptions Disp Refills    gabapentin (NEURONTIN) 300 MG capsule 90 capsule      Sig: Take 1 capsule by mouth 2 times daily. Patient requesting medication refill.  Please approve or deny this request.    LOV 9/29  Scheduled 9/29

## 2021-12-14 ENCOUNTER — TELEPHONE (OUTPATIENT)
Dept: PHARMACY | Facility: CLINIC | Age: 76
End: 2021-12-14

## 2021-12-14 NOTE — TELEPHONE ENCOUNTER
[facility] CLINICAL PHARMACY REVIEW: ADHERENCE REVIEW  Identified care gap per [employer]/ARB and Statin adherence    Last Visit: 9/29/21    Patient also appears to be prescribed: glipizide and insulin     Patient not found in Outcomes MTM    ASSESSMENT  ACE/ARB ADHERENCE    Per Insurance Records through 11/13/21 (YTEDDIE Antwon Watt = 77%; Potential Fail Date: 12/17/21):   Lisinopril last filled on 9/15/21 for 90 day supply. Next refill due: 12/14/21    Per Naman Vasquez:   Lisinopril last picked up on 9/15/21 for 90 day supply. 2 refills remaining. Pharmacy staff member will process refill today. Will be ready for pick-up tomorrow. BP Readings from Last 3 Encounters:   09/29/21 119/75   07/29/21 120/76   06/14/21 118/70     CrCl cannot be calculated (Patient's most recent lab result is older than the maximum 120 days allowed. ). DIABETES ADHERENCE    Per Insurance Records through 11/13/21: n/a; prescribed insulin    Lab Results   Component Value Date    LABA1C 9.4 09/29/2021    LABA1C 10.3 07/29/2021    LABA1C 8.9 01/25/2021     NOTE A1c >9%; patient with upcoming OV on 12/29/21    STATIN ADHERENCE    Per Insurance Records through 11/13/21 (YTEDDIE Antwon Watt = 72%; Failed in 2021): Atorvastatin last filled on 11/2/21 for 90 day supply. Next refill due: 1/31/22    Per [facility] Records:  Atorvastatin filled on 10/12/21 was [facility].      Lab Results   Component Value Date    CHOL 147 01/04/2021    TRIG 112 01/04/2021    HDL 57 01/04/2021    LDLCALC 68 01/04/2021     ALT   Date Value Ref Range Status   07/29/2021 28 0 - 33 U/L Final     AST   Date Value Ref Range Status   07/29/2021 33 0 - 35 U/L Final     The 10-year ASCVD risk score (Barbara Roche, et al., 2013) is: 50.2%    Values used to calculate the score:      Age: 67 years      Sex: Female      Is Non- : No      Diabetic: Yes      Tobacco smoker: Yes      Systolic Blood Pressure: 290 mmHg      Is BP treated: Yes      HDL Cholesterol: 57 mg/dL      Total Cholesterol: 147 mg/dL     PLAN  The following are interventions that have been identified:   - Patient overdue refilling lisinopril and active on home medication list.     Reached patient to review. Patient states that she is getting low on her lisinopril and needs a refill. Advised patient that [facility] Vel[facility] [address] will have this ready for pick-up tomorrow. Patient thankful for the call. Will pick it up. Will sign off.      Future Appointments   Date Time Provider Katia Mcneal   [facility]   2021 11:00 AM Joseph Benitez MD [address] [place] [address], PharmD, Les // Department, toll free [phone], option 1       For Pharmacy [id number] [place] [address] Road in place:  No   Recommendation Provided To: Patient/Caregiver: 1 via Telephone and Pharmacy: 1   Intervention Detail: Adherence Monitorin   Gap Closed?: Yes    Intervention Accepted By: Patient/Caregiver: 1 and Pharmacy: 1   Time Spent (min): 15

## 2021-12-23 RX ORDER — INSULIN ASPART 100 [IU]/ML
INJECTION, SOLUTION INTRAVENOUS; SUBCUTANEOUS
Qty: 15 ML | Refills: 0 | Status: SHIPPED | OUTPATIENT
Start: 2021-12-23 | End: 2022-01-25 | Stop reason: SDUPTHER

## 2021-12-30 ENCOUNTER — TELEPHONE (OUTPATIENT)
Dept: FAMILY MEDICINE CLINIC | Age: 76
End: 2021-12-30

## 2021-12-30 NOTE — TELEPHONE ENCOUNTER
Attempted to contact the patient to reschedule a missed three month follow up appt - No answer - LMOVM for patient to call back to schedule

## 2021-12-30 NOTE — TELEPHONE ENCOUNTER
----- Message from Deshawn Espinal sent at 12/30/2021  9:07 AM EST -----  Subject: Medication Problem    QUESTIONS  Name of Medication? Insulin Regular Human (NOVOLIN R FLEXPEN) 100 UNIT/ML   SOPN  Patient-reported dosage and instructions? as prescribed  What question or problem do you have with the medication? Paperwork was   not completed timely & now pt w/be out of insulin by 12/31 - her ins co   advsd her to call pcp & see if ofc has a 100unit flexpen for her to use   since she will not have the refill in time; pt is in [facility] & does not   have the $ to pay [facility]? [address] phone number (if available)? [phone]  Additional Information for Provider?   ---------------------------------------------------------------------------  --------------  CALL BACK INFO  What is the best way for the office to contact you? OK to leave message on   voicemail  Preferred Call Back Phone Number? [phone]  ---------------------------------------------------------------------------  --------------  SCRIPT ANSWERS  Relationship to Patient?  Self

## 2022-01-05 RX ORDER — PRIMIDONE 50 MG/1
TABLET ORAL
Qty: 90 TABLET | Refills: 0 | Status: SHIPPED | OUTPATIENT
Start: 2022-01-05 | End: 2022-04-18

## 2022-01-25 ENCOUNTER — OFFICE VISIT (OUTPATIENT)
Dept: FAMILY MEDICINE CLINIC | Age: 77
End: 2022-01-25
Payer: MEDICARE

## 2022-01-25 ENCOUNTER — TELEPHONE (OUTPATIENT)
Dept: FAMILY MEDICINE CLINIC | Age: 77
End: 2022-01-25

## 2022-01-25 VITALS
RESPIRATION RATE: 14 BRPM | OXYGEN SATURATION: 98 % | HEIGHT: 59 IN | SYSTOLIC BLOOD PRESSURE: 126 MMHG | DIASTOLIC BLOOD PRESSURE: 80 MMHG | WEIGHT: 170 LBS | HEART RATE: 88 BPM | BODY MASS INDEX: 34.27 KG/M2

## 2022-01-25 DIAGNOSIS — D32.0 MENINGIOMA, CEREBRAL (HCC): ICD-10-CM

## 2022-01-25 DIAGNOSIS — E78.5 HYPERLIPIDEMIA, UNSPECIFIED HYPERLIPIDEMIA TYPE: ICD-10-CM

## 2022-01-25 DIAGNOSIS — M54.50 ACUTE LOW BACK PAIN WITHOUT SCIATICA, UNSPECIFIED BACK PAIN LATERALITY: ICD-10-CM

## 2022-01-25 DIAGNOSIS — Z79.4 TYPE 2 DIABETES MELLITUS WITHOUT COMPLICATION, WITH LONG-TERM CURRENT USE OF INSULIN (HCC): Primary | ICD-10-CM

## 2022-01-25 DIAGNOSIS — E11.9 TYPE 2 DIABETES MELLITUS WITHOUT COMPLICATION, WITH LONG-TERM CURRENT USE OF INSULIN (HCC): Primary | ICD-10-CM

## 2022-01-25 DIAGNOSIS — I10 ESSENTIAL HYPERTENSION: ICD-10-CM

## 2022-01-25 DIAGNOSIS — E11.9 TYPE 2 DIABETES MELLITUS WITHOUT COMPLICATION, WITH LONG-TERM CURRENT USE OF INSULIN (HCC): ICD-10-CM

## 2022-01-25 DIAGNOSIS — N18.31 STAGE 3A CHRONIC KIDNEY DISEASE (HCC): ICD-10-CM

## 2022-01-25 DIAGNOSIS — Z79.4 TYPE 2 DIABETES MELLITUS WITHOUT COMPLICATION, WITH LONG-TERM CURRENT USE OF INSULIN (HCC): ICD-10-CM

## 2022-01-25 LAB
ALBUMIN SERPL-MCNC: 3.8 G/DL (ref 3.5–4.6)
ALP BLD-CCNC: 101 U/L (ref 40–130)
ALT SERPL-CCNC: 29 U/L (ref 0–33)
ANION GAP SERPL CALCULATED.3IONS-SCNC: 13 MEQ/L (ref 9–15)
AST SERPL-CCNC: 40 U/L (ref 0–35)
BASOPHILS ABSOLUTE: 0.1 K/UL (ref 0–0.2)
BASOPHILS RELATIVE PERCENT: 0.8 %
BILIRUB SERPL-MCNC: <0.2 MG/DL (ref 0.2–0.7)
BUN BLDV-MCNC: 18 MG/DL (ref 8–23)
CALCIUM SERPL-MCNC: 9.7 MG/DL (ref 8.5–9.9)
CHLORIDE BLD-SCNC: 107 MEQ/L (ref 95–107)
CO2: 25 MEQ/L (ref 20–31)
CREAT SERPL-MCNC: 1.07 MG/DL (ref 0.5–0.9)
EOSINOPHILS ABSOLUTE: 0.5 K/UL (ref 0–0.7)
EOSINOPHILS RELATIVE PERCENT: 6.3 %
GFR AFRICAN AMERICAN: >60
GFR NON-AFRICAN AMERICAN: 49.6
GLOBULIN: 3.9 G/DL (ref 2.3–3.5)
GLUCOSE BLD-MCNC: 31 MG/DL (ref 70–99)
HBA1C MFR BLD: 9.4 %
HCT VFR BLD CALC: 44.6 % (ref 37–47)
HEMOGLOBIN: 14.5 G/DL (ref 12–16)
LYMPHOCYTES ABSOLUTE: 3.6 K/UL (ref 1–4.8)
LYMPHOCYTES RELATIVE PERCENT: 41.8 %
MCH RBC QN AUTO: 31.2 PG (ref 27–31.3)
MCHC RBC AUTO-ENTMCNC: 32.5 % (ref 33–37)
MCV RBC AUTO: 96 FL (ref 82–100)
MONOCYTES ABSOLUTE: 0.6 K/UL (ref 0.2–0.8)
MONOCYTES RELATIVE PERCENT: 7.1 %
NEUTROPHILS ABSOLUTE: 3.8 K/UL (ref 1.4–6.5)
NEUTROPHILS RELATIVE PERCENT: 44 %
PDW BLD-RTO: 13.8 % (ref 11.5–14.5)
PLATELET # BLD: 156 K/UL (ref 130–400)
POTASSIUM SERPL-SCNC: 4.2 MEQ/L (ref 3.4–4.9)
RBC # BLD: 4.64 M/UL (ref 4.2–5.4)
SODIUM BLD-SCNC: 145 MEQ/L (ref 135–144)
TOTAL PROTEIN: 7.7 G/DL (ref 6.3–8)
WBC # BLD: 8.6 K/UL (ref 4.8–10.8)

## 2022-01-25 PROCEDURE — 83036 HEMOGLOBIN GLYCOSYLATED A1C: CPT | Performed by: INTERNAL MEDICINE

## 2022-01-25 PROCEDURE — 99214 OFFICE O/P EST MOD 30 MIN: CPT | Performed by: INTERNAL MEDICINE

## 2022-01-25 RX ORDER — INSULIN ASPART 100 [IU]/ML
INJECTION, SOLUTION INTRAVENOUS; SUBCUTANEOUS
Qty: 15 ML | Refills: 0 | COMMUNITY
Start: 2022-01-25 | End: 2022-04-25 | Stop reason: ALTCHOICE

## 2022-01-25 SDOH — ECONOMIC STABILITY: FOOD INSECURITY: WITHIN THE PAST 12 MONTHS, YOU WORRIED THAT YOUR FOOD WOULD RUN OUT BEFORE YOU GOT MONEY TO BUY MORE.: NEVER TRUE

## 2022-01-25 SDOH — ECONOMIC STABILITY: FOOD INSECURITY: WITHIN THE PAST 12 MONTHS, THE FOOD YOU BOUGHT JUST DIDN'T LAST AND YOU DIDN'T HAVE MONEY TO GET MORE.: NEVER TRUE

## 2022-01-25 ASSESSMENT — ENCOUNTER SYMPTOMS
BACK PAIN: 0
TROUBLE SWALLOWING: 0
DIARRHEA: 0
COLOR CHANGE: 0
ABDOMINAL DISTENTION: 0
EYE REDNESS: 0
NAUSEA: 0
ABDOMINAL PAIN: 0
APNEA: 0
COUGH: 0
WHEEZING: 0
CHEST TIGHTNESS: 0
EYE DISCHARGE: 0
EYE ITCHING: 0
SORE THROAT: 0
EYE PAIN: 0
SINUS PAIN: 0
FACIAL SWELLING: 0
RECTAL PAIN: 0
SHORTNESS OF BREATH: 0
VOICE CHANGE: 0
RHINORRHEA: 0
PHOTOPHOBIA: 0
CONSTIPATION: 0
BLOOD IN STOOL: 0
SINUS PRESSURE: 0
VOMITING: 0

## 2022-01-25 ASSESSMENT — SOCIAL DETERMINANTS OF HEALTH (SDOH): HOW HARD IS IT FOR YOU TO PAY FOR THE VERY BASICS LIKE FOOD, HOUSING, MEDICAL CARE, AND HEATING?: NOT HARD AT ALL

## 2022-01-25 NOTE — TELEPHONE ENCOUNTER
Notified of critical lab value at 6.15pm. Labs drawn at 2:29pm revealed hypoglycemia of 31. Patient was contacted and is doing well, reported no symptoms. Has had a full meal.   Patient advised on symptoms and corrective measures for hypoglycemia and verbalized understanding.

## 2022-01-25 NOTE — PROGRESS NOTES
2022    Sydney Reese (:  1945) is a 67 y.o. female     50-year-old diabetic female with associated neuropathy hypertensive hyperlipidemic female with hx of a resting tremor presents for follow-up visit. The patient reports that the epigastric pain that she previously reported has resolved. She denies odontophagia dysphagia but reports intermittent nausea when this occurs. Type 2 diabetes (A1C=9.4 2021. ):Novolog 20 units 3 times daily with meals. Lantus 10 units qhs. She is also compliant with Lipitor 40 mg orally daily. Blood sugars remain elevated        Hypertension: The patient is compliant with lisinopril 10 mg orally daily        Resting tremor: The patient is compliant with primidone 50 mg twice daily      Back pain: The patient reports achy, 7/10, constant back pain that has not responded well to ibuprofen. She is not experiencing numbness or tingling of her lower extremities or lower extremity weakness. At present she denies polyuria,  Polydipsia, constitutional, sinus, visual, cardiopulmonary, urologic, additional gastrointestinal, immunologic/hematologic, musculoskeletal, neurologic,dermatologic, or psychiatric complaints. Patient Active Problem List   Diagnosis    Hypertension, essential    Malignant neoplasm of female breast ([address]    Type 2 diabetes mellitus with chronic kidney disease ([address]    Meningioma, cerebral ([address]    Type 2 diabetes mellitus with hyperglycemia    Stage 3a chronic kidney disease ([address]       Review of Systems   Constitutional: Negative for chills, diaphoresis, fatigue and fever. HENT: Negative for congestion, dental problem, drooling, ear discharge, ear pain, facial swelling, hearing loss, mouth sores, nosebleeds, postnasal drip, rhinorrhea, sinus pressure, sinus pain, sneezing, sore throat, tinnitus, trouble swallowing and voice change.     Eyes: Negative for photophobia, pain, discharge, redness, itching and visual disturbance. Respiratory: Negative for apnea, cough, chest tightness, shortness of breath and wheezing. Cardiovascular: Negative for chest pain, palpitations and leg swelling. Gastrointestinal: Negative for abdominal distention, abdominal pain, blood in stool, constipation, diarrhea, nausea, rectal pain and vomiting. Endocrine: Negative for cold intolerance, heat intolerance, polydipsia, polyphagia and polyuria. Genitourinary: Negative for decreased urine volume, difficulty urinating, dysuria, flank pain, frequency, genital sores, hematuria and urgency. Musculoskeletal: Negative for arthralgias, back pain, gait problem, joint swelling, myalgias, neck pain and neck stiffness. Skin: Negative for color change, rash and wound. Allergic/Immunologic: Negative for environmental allergies and food allergies. Neurological: Negative for dizziness, tremors, seizures, syncope, facial asymmetry, speech difficulty, weakness, light-headedness, numbness and headaches. Hematological: Negative for adenopathy. Does not bruise/bleed easily. Psychiatric/Behavioral: Negative for agitation, confusion, decreased concentration, hallucinations, self-injury, sleep disturbance and suicidal ideas. The patient is not nervous/anxious. Prior to Visit Medications    Medication Sig Taking? Authorizing Provider   insulin aspart (NOVOLOG FLEXPEN) 100 UNIT/ML injection pen INJECT 20 UNITS SUBCUTANEOUSLY THREE TIMES A DAY BEFORE MEALS Yes Juliane Churchill MD   primidone (MYSOLINE) 50 MG tablet TAKE 1 TABLET BY MOUTH TWO TIMES A DAY  Juliane Churchill MD   gabapentin (NEURONTIN) 300 MG capsule Take 1 capsule by mouth 2 times daily for 30 days. Take 300 mg by mouth 2 times daily.   Juliane Churchill MD   Insulin Regular Human (NOVOLIN R FLEXPEN) 100 UNIT/ML SOPN Inject 10 Units as directed every evening  Juliane Churchill MD   meloxicam (MOBIC) 15 MG tablet Take 1 tablet by mouth daily  Juliane Churchill MD   glipiZIDE ([address]) 5 MG tablet Take 1 tablet by mouth 2 times daily  MD Maxi Gironchavalynsey Placard MISC by Does not apply route Diagnosis: COPD  Duration 6/14/2021-6/14/2023  Christina Jimenez MD   Alcohol Swabs (ALCOHOL PADS) 70 % PADS Use, as directed, three times a day to test blood sugar  Historical Provider, MD   Blood Glucose Calibration (OT ULTRA/FASTTK CNTRL SOLN) SOLN USE TO CONFIRM ACCURACY OF GLUCOSE METER  Historical Provider, MD   Blood Glucose Monitoring Suppl (ONE TOUCH ULTRA 2) w/Device KIT Use, as directed, three times a day to test blood sugar  Historical Provider, MD   [facility] ULTRA strip Use, as directed, three times a day to test blood sugar  Historical Provider, MD   EASY COMFORT PEN NEEDLES 32G X 4 MM MISC Use to inject insulin 3 times a day as directed  Historical Provider, MD   Lancet Devices (EASY MINI EJECT LANCING DEVICE) MISC Use, as directed, three times a day to test blood sugar  Historical Provider, MD   Easy Comfort Lancets MISC Use, as directed, three times a day to test blood sugar  Historical Provider, MD   lisinopril (PRINIVIL;ZESTRIL) 10 MG tablet Take 1 tablet by mouth daily  Christina Jimenez MD   docusate sodium (COLACE) 100 MG capsule Take 1 capsule by mouth 2 times daily  Patient not taking: Reported on 9/29/2021  Christina Jimenez MD   atorvastatin (LIPITOR) 40 MG tablet Take 1 tablet by mouth daily  Christina Jimenez MD        No Known Allergies    Past Medical History:   Diagnosis Date    Breast cancer ([facility]     right (16-17 yrs ago)    Cancer ([facility]     Breast    Diabetes mellitus ([facility]     History of therapeutic radiation     Hyperlipidemia     Hypertension        Past Surgical History:   Procedure Laterality Date    APPENDECTOMY      BREAST BIOPSY Right     malignant (16-17 yrs ago)    BREAST LUMPECTOMY Right     malignant    CARPAL TUNNEL RELEASE Left     HERNIA REPAIR      Umbilical    HYSTERECTOMY      total but left part of one ovary    OVARY REMOVAL      left part of one ovary    TONSILLECTOMY         Social History     Socioeconomic History    Marital status:      Spouse name: Not on file    Number of children: Not on file    Years of education: Not on file    Highest education level: Not on file   Occupational History    Not on file   Tobacco Use    Smoking status: Current Every Day Smoker     Packs/day: 0.50     Years: 50.00     Pack years: 25.00     Types: Cigarettes    Smokeless tobacco: Never Used   Vaping Use    Vaping Use: Never used   Substance and Sexual Activity    Alcohol use: Yes     Comment: Once a month    Drug use: Never    Sexual activity: Not on file   Other Topics Concern    Not on file   Social History Narrative    Not on file     Social Determinants of Health     Financial Resource Strain: Low Risk     Difficulty of Paying Living Expenses: Not hard at all   Food Insecurity: No Food Insecurity    Worried About Running Out of Food in the Last Year: Never true    Terrance of Food in the Last Year: Never true   Transportation Needs:     Lack of Transportation (Medical): Not on file    Lack of Transportation (Non-Medical):  Not on file   Physical Activity:     Days of Exercise per Week: Not on file    Minutes of Exercise per Session: Not on file   Stress:     Feeling of Stress : Not on file   Social Connections:     Frequency of Communication with Friends and Family: Not on file    Frequency of Social Gatherings with Friends and Family: Not on file    Attends Restorationism Services: Not on file    Active Member of Clubs or Organizations: Not on file    Attends Club or Organization Meetings: Not on file    Marital Status: Not on file   Intimate Partner Violence:     Fear of Current or Ex-Partner: Not on file    Emotionally Abused: Not on file    Physically Abused: Not on file    Sexually Abused: Not on file   Housing Stability:     Unable to Pay for Housing in the Last Year: Not on file    Number of [place] in the Last Year: Not on file    Unstable Housing in the Last Year: Not on file        Family History   Problem Relation Age of Onset    Breast Cancer Mother        ADVANCE DIRECTIVE: N, <no information>    Vitals:    01/25/22 1352   BP: 126/80   Pulse: 88   Resp: 14   SpO2: 98%   Weight: 170 lb (77.1 kg)   Height: 4' 11\" (1.499 m)     Estimated body mass index is 34.34 kg/m² as calculated from the following:    Height as of this encounter: 4' 11\" (1.499 m). Weight as of this encounter: 170 lb (77.1 kg). Physical Exam  Constitutional:       General: She is not in acute distress. Appearance: She is well-developed. HENT:      Head: Normocephalic. Right Ear: External ear normal.      Left Ear: External ear normal.   Eyes:      Conjunctiva/sclera: Conjunctivae normal.   Neck:      Vascular: No JVD. Trachea: No tracheal deviation. Cardiovascular:      Rate and Rhythm: Normal rate and regular rhythm. Heart sounds: Normal heart sounds. Pulmonary:      Effort: Pulmonary effort is normal. No respiratory distress. Breath sounds: Normal breath sounds. No wheezing or rales. Chest:      Chest wall: No tenderness. Abdominal:      General: Bowel sounds are normal. There is no distension. Palpations: Abdomen is soft. There is no mass. Tenderness: There is no abdominal tenderness. There is no guarding or rebound. Musculoskeletal:         General: No tenderness or deformity. Cervical back: Neck supple. Skin:     General: Skin is warm and dry. Coloration: Skin is not pale. Findings: No erythema or rash. Neurological:      Mental Status: She is alert and oriented to person, place, and time. Motor: No abnormal muscle tone. Psychiatric:         Thought Content:  Thought content normal.         Judgment: Judgment normal.       Labs:  Component      Latest Ref Rng & Units 1/25/2021 1/4/2021 1/4/2021 1/4/2021           4:16 PM  4:18 PM  4:18 PM  4:18 PM   Sodium      135 - 144 mEq/L   143    Potassium      3.4 - 4.9 mEq/L   4.8    Chloride      95 - 107 mEq/L   106    CO2      20 - 31 mEq/L   26    Anion Gap      9 - 15 mEq/L   11    Glucose      70 - 99 mg/dL   200 (H)    BUN      8 - 23 mg/dL   16    Creatinine      0.50 - 0.90 mg/dL   1.35 (H)    GFR Non-      >60   38.0 (L)    GFR       >60   46.0 (L)    Calcium      8.5 - 9.9 mg/dL   9.4    Total Protein      6.3 - 8.0 g/dL   7.8    Albumin      3.5 - 4.6 g/dL   3.9    Bilirubin      0.2 - 0.7 mg/dL   <0.2    Alk Phos      40 - 130 U/L   103    ALT      0 - 33 U/L   40 (H)    AST      0 - 35 U/L   49 (H)    Globulin      2.3 - 3.5 g/dL   3.9 (H)    Cholesterol, Total      0 - 199 mg/dL  147     Triglycerides      0 - 150 mg/dL  112     HDL Cholesterol      40 - 59 mg/dL  57     LDL Calculated      0 - 129 mg/dL  68     Hemoglobin A1C      % 8.9   9.0 (H)     No flowsheet data found.     Lab Results   Component Value Date    CHOL 147 01/04/2021    TRIG 112 01/04/2021    HDL 57 01/04/2021    LDLCALC 68 01/04/2021    GLUCOSE 31 01/25/2022    LABA1C 9.4 01/25/2022    LABA1C 9.4 09/29/2021    LABA1C 10.3 07/29/2021       The 10-year ASCVD risk score ([address] et al., 2013) is: 54.3%    Values used to calculate the score:      Age: 67 years      Sex: Female      Is Non- : No      Diabetic: Yes      Tobacco smoker: Yes      Systolic Blood Pressure: 380 mmHg      Is BP treated: Yes      HDL Cholesterol: 57 mg/dL      Total Cholesterol: 147 mg/dL    Immunization History   Administered Date(s) Administered    COVID-19, Pfizer Purple top, DILUTE for use, 12+ yrs, 30mcg/0.3mL dose 02/01/2021, 03/01/2021    Influenza Virus Vaccine 11/07/2006, 11/15/2007    Influenza, Quadv, adjuvanted, 65 yrs +, IM, PF (Fluad) 09/14/2020    Pneumococcal Polysaccharide ([id number]) 09/14/2020    Td (Adult), 2 Lf Tetanus Toxoid, Pf (Td, Absorbed) 10/15/2004    Tdap (Boostrix, Adacel) 10/15/2004 Health Maintenance   Topic Date Due    Shingles Vaccine (1 of 2) Never done    DEXA (modify frequency per FRAX score)  Never done    DTaP/Tdap/Td vaccine (2 - Td or Tdap) 10/15/2014    COVID-19 Vaccine (3 - Booster for Pfizer series) 08/01/2021    Flu vaccine (1) 09/01/2021    Low dose CT lung screening  09/22/2021    Lipid screen  01/04/2022    Depression Screen  03/25/2022    Potassium monitoring  01/25/2023    Creatinine monitoring  01/25/2023    Pneumococcal 65+ years Vaccine  Completed    Hepatitis A vaccine  Aged Out    Hib vaccine  Aged Out    Meningococcal (ACWY) vaccine  Aged Out    Hepatitis C screen  Discontinued       ASSESSMENT/PLAN:            Type 2 diabetes mellitus without complication, with long-term current use of insulin (HCC)    Novolin N 14-15 UNITS QHS   Novolog 15 units 3 times daily with meals   Continue glipizide 5 mg twice daily          Hyperlipidemia, unspecified hyperlipidemia typecontinue Lipitor 40 mg orally daily          Essential hypertensioncontinue lisinopril 10 mg orally daily          Back pain: Trial of Mobic. Consider referral to physical therapy          Tremorcontinue primidone 50 mg twice daily          Neuropathycontinue Neurontin 300 mg twice daily        CKD 3 : The patient's most recent renal function ([id number]) was within normal limits. Creatinine in January 2021 was 1.35. Obtain a comprehensive metabolic panel to evaluate the patient's renal function today continue lisinopril 10 mg orally daily. Meningioma: Monitoring. Return in about 3 months (around 4/25/2022). An electronic signature was used to authenticate this note.     --Stefani Rios MD on 1/25/2022 at 8:35 PM

## 2022-01-28 RX ORDER — GABAPENTIN 300 MG/1
CAPSULE ORAL
Qty: 60 CAPSULE | Refills: 0 | Status: SHIPPED | OUTPATIENT
Start: 2022-01-28 | End: 2022-04-18

## 2022-01-28 NOTE — TELEPHONE ENCOUNTER
Patient requesting medication refill.  Please approve or deny this request.    Rx requested:  Requested Prescriptions     Pending Prescriptions Disp Refills    gabapentin (NEURONTIN) 300 MG capsule [Pharmacy Med Name: Gabapentin Oral Capsule 300 MG] 60 capsule 0     Sig: TAKE 1 CAPSULE BY MOUTH TWO TIMES A DAY         Last Office Visit:   1/25/2022      Next Visit Date:  Future Appointments   Date Time Provider Katia Mcneal   4/25/2022 11:15 AM Yunior Chavarria  [facility],[place]

## 2022-01-28 NOTE — TELEPHONE ENCOUNTER
Future Appointments    Encounter Information    Provider Department Appt Notes   4/25/2022 MD BECCA Najera AT [facility] 3 month f/u       Recent Visits    01/25/2022 Type 2 diabetes mellitus without complication, with long-term current use of insulin [facility])   SOJOURN AT [facility] [address], [place]

## 2022-02-24 NOTE — TELEPHONE ENCOUNTER
Rx request   Requested Prescriptions     Pending Prescriptions Disp Refills    atorvastatin (LIPITOR) 40 MG tablet [Pharmacy Med Name: Atorvastatin Calcium Oral Tablet 40 MG] 90 tablet 0     Sig: TAKE 1 TABLET BY MOUTH EVERY DAY     LOV 1/25/2022     Next Visit Date:  Future Appointments   Date Time Provider Katia Mcneal   4/25/2022 11:15 AM Yunior Chavarria  [place], [place]

## 2022-02-28 RX ORDER — ATORVASTATIN CALCIUM 40 MG/1
TABLET, FILM COATED ORAL
Qty: 90 TABLET | Refills: 2 | Status: SHIPPED | OUTPATIENT
Start: 2022-02-28 | End: 2022-02-28 | Stop reason: SDUPTHER

## 2022-02-28 RX ORDER — ATORVASTATIN CALCIUM 40 MG/1
TABLET, FILM COATED ORAL
Qty: 90 TABLET | Refills: 4 | Status: SHIPPED | OUTPATIENT
Start: 2022-02-28 | End: 2022-09-27 | Stop reason: SDUPTHER

## 2022-02-28 RX ORDER — ATORVASTATIN CALCIUM 40 MG/1
TABLET, FILM COATED ORAL
Qty: 90 TABLET | Refills: 0 | Status: SHIPPED | OUTPATIENT
Start: 2022-02-28 | End: 2022-02-28 | Stop reason: SDUPTHER

## 2022-04-15 RX ORDER — PRIMIDONE 50 MG/1
TABLET ORAL
Qty: 90 TABLET | Refills: 0 | OUTPATIENT
Start: 2022-04-15

## 2022-04-15 RX ORDER — GABAPENTIN 300 MG/1
CAPSULE ORAL
Qty: 60 CAPSULE | Refills: 0 | OUTPATIENT
Start: 2022-04-15 | End: 2022-05-15

## 2022-04-15 RX ORDER — LISINOPRIL 10 MG/1
10 TABLET ORAL DAILY
Qty: 90 TABLET | Refills: 3 | OUTPATIENT
Start: 2022-04-15

## 2022-04-15 NOTE — TELEPHONE ENCOUNTER
Patient requesting medication refill.      Rx requested:  Requested Prescriptions     Pending Prescriptions Disp Refills    primidone (MYSOLINE) 50 MG tablet 90 tablet 0    lisinopril (PRINIVIL;ZESTRIL) 10 MG tablet 90 tablet 3     Sig: Take 1 tablet by mouth daily    gabapentin (NEURONTIN) 300 MG capsule 60 capsule 0       Last Office Visit:   1/25/2022        Next Visit Date:  Future Appointments   Date Time Provider Katia Mcneal   4/25/2022 11:15 AM Chanelle Galvez  [place], [place]

## 2022-04-18 RX ORDER — GABAPENTIN 300 MG/1
CAPSULE ORAL
Qty: 60 CAPSULE | Refills: 0 | Status: SHIPPED | OUTPATIENT
Start: 2022-04-18 | End: 2022-06-15

## 2022-04-18 RX ORDER — PRIMIDONE 50 MG/1
TABLET ORAL
Qty: 90 TABLET | Refills: 0 | Status: SHIPPED | OUTPATIENT
Start: 2022-04-18 | End: 2022-06-15

## 2022-04-18 NOTE — TELEPHONE ENCOUNTER
Patient called to check status of refill request for gabapentin. She is out of the medication. Thank you.

## 2022-04-25 ENCOUNTER — OFFICE VISIT (OUTPATIENT)
Dept: FAMILY MEDICINE CLINIC | Age: 77
End: 2022-04-25
Payer: MEDICARE

## 2022-04-25 ENCOUNTER — OFFICE VISIT (OUTPATIENT)
Dept: FAMILY MEDICINE CLINIC | Age: 77
End: 2022-04-25

## 2022-04-25 VITALS
HEART RATE: 76 BPM | DIASTOLIC BLOOD PRESSURE: 78 MMHG | OXYGEN SATURATION: 95 % | SYSTOLIC BLOOD PRESSURE: 122 MMHG | TEMPERATURE: 97.8 F | RESPIRATION RATE: 16 BRPM | HEIGHT: 58 IN | BODY MASS INDEX: 35.48 KG/M2 | WEIGHT: 169 LBS

## 2022-04-25 VITALS
OXYGEN SATURATION: 95 % | RESPIRATION RATE: 16 BRPM | SYSTOLIC BLOOD PRESSURE: 122 MMHG | HEART RATE: 76 BPM | DIASTOLIC BLOOD PRESSURE: 78 MMHG | HEIGHT: 59 IN | TEMPERATURE: 97.8 F | WEIGHT: 169 LBS | BODY MASS INDEX: 34.07 KG/M2

## 2022-04-25 DIAGNOSIS — N18.31 STAGE 3A CHRONIC KIDNEY DISEASE (HCC): ICD-10-CM

## 2022-04-25 DIAGNOSIS — I10 ESSENTIAL HYPERTENSION: ICD-10-CM

## 2022-04-25 DIAGNOSIS — E11.9 TYPE 2 DIABETES MELLITUS WITHOUT COMPLICATION, WITH LONG-TERM CURRENT USE OF INSULIN (HCC): Primary | ICD-10-CM

## 2022-04-25 DIAGNOSIS — Z91.81 AT HIGH RISK FOR FALLS: ICD-10-CM

## 2022-04-25 DIAGNOSIS — E11.22 TYPE 2 DIABETES MELLITUS WITH STAGE 3A CHRONIC KIDNEY DISEASE, WITH LONG-TERM CURRENT USE OF INSULIN (HCC): ICD-10-CM

## 2022-04-25 DIAGNOSIS — N18.31 TYPE 2 DIABETES MELLITUS WITH STAGE 3A CHRONIC KIDNEY DISEASE, WITH LONG-TERM CURRENT USE OF INSULIN (HCC): ICD-10-CM

## 2022-04-25 DIAGNOSIS — Z00.00 MEDICARE ANNUAL WELLNESS VISIT, SUBSEQUENT: ICD-10-CM

## 2022-04-25 DIAGNOSIS — Z78.0 POST-MENOPAUSE: ICD-10-CM

## 2022-04-25 DIAGNOSIS — Z79.4 TYPE 2 DIABETES MELLITUS WITH STAGE 3A CHRONIC KIDNEY DISEASE, WITH LONG-TERM CURRENT USE OF INSULIN (HCC): ICD-10-CM

## 2022-04-25 DIAGNOSIS — Z79.4 TYPE 2 DIABETES MELLITUS WITHOUT COMPLICATION, WITH LONG-TERM CURRENT USE OF INSULIN (HCC): Primary | ICD-10-CM

## 2022-04-25 DIAGNOSIS — Z00.00 INITIAL MEDICARE ANNUAL WELLNESS VISIT: Primary | ICD-10-CM

## 2022-04-25 DIAGNOSIS — E78.5 HYPERLIPIDEMIA, UNSPECIFIED HYPERLIPIDEMIA TYPE: ICD-10-CM

## 2022-04-25 LAB — HBA1C MFR BLD: 9.6 %

## 2022-04-25 PROCEDURE — 99214 OFFICE O/P EST MOD 30 MIN: CPT | Performed by: INTERNAL MEDICINE

## 2022-04-25 PROCEDURE — 3046F HEMOGLOBIN A1C LEVEL >9.0%: CPT | Performed by: INTERNAL MEDICINE

## 2022-04-25 PROCEDURE — 83036 HEMOGLOBIN GLYCOSYLATED A1C: CPT | Performed by: INTERNAL MEDICINE

## 2022-04-25 PROCEDURE — G0439 PPPS, SUBSEQ VISIT: HCPCS | Performed by: INTERNAL MEDICINE

## 2022-04-25 RX ORDER — BLOOD-GLUCOSE TRANSMITTER
EACH MISCELLANEOUS
Qty: 1 EACH | Refills: 5 | Status: SHIPPED | OUTPATIENT
Start: 2022-04-25 | End: 2022-05-09 | Stop reason: SDUPTHER

## 2022-04-25 RX ORDER — BLOOD-GLUCOSE SENSOR
EACH MISCELLANEOUS
Qty: 1 EACH | Refills: 5 | Status: SHIPPED | OUTPATIENT
Start: 2022-04-25 | End: 2022-05-09 | Stop reason: SDUPTHER

## 2022-04-25 RX ORDER — EMPAGLIFLOZIN 10 MG/1
10 TABLET, FILM COATED ORAL DAILY
Qty: 14 TABLET | Refills: 0 | COMMUNITY
Start: 2022-04-25

## 2022-04-25 RX ORDER — INSULIN GLARGINE 100 [IU]/ML
10 INJECTION, SOLUTION SUBCUTANEOUS NIGHTLY
COMMUNITY

## 2022-04-25 RX ORDER — BLOOD-GLUCOSE,RECEIVER,CONT
EACH MISCELLANEOUS
Qty: 1 EACH | Refills: 5 | Status: SHIPPED | OUTPATIENT
Start: 2022-04-25 | End: 2022-05-09 | Stop reason: SDUPTHER

## 2022-04-25 RX ORDER — EMPAGLIFLOZIN 10 MG/1
1 TABLET, FILM COATED ORAL DAILY
Qty: 90 TABLET | Refills: 1 | Status: SHIPPED | OUTPATIENT
Start: 2022-04-25 | End: 2022-07-06

## 2022-04-25 ASSESSMENT — ENCOUNTER SYMPTOMS
SINUS PAIN: 0
SINUS PRESSURE: 0
RECTAL PAIN: 0
SHORTNESS OF BREATH: 0
BACK PAIN: 0
COUGH: 0
CONSTIPATION: 0
SORE THROAT: 0
EYE ITCHING: 0
ABDOMINAL PAIN: 0
EYE REDNESS: 0
TROUBLE SWALLOWING: 0
CHEST TIGHTNESS: 0
VOICE CHANGE: 0
EYE PAIN: 0
RHINORRHEA: 0
WHEEZING: 0
DIARRHEA: 0
COLOR CHANGE: 0
FACIAL SWELLING: 0
PHOTOPHOBIA: 0
VOMITING: 0
APNEA: 0
BLOOD IN STOOL: 0
NAUSEA: 0
ABDOMINAL DISTENTION: 0
EYE DISCHARGE: 0

## 2022-04-25 ASSESSMENT — PATIENT HEALTH QUESTIONNAIRE - PHQ9
SUM OF ALL RESPONSES TO PHQ QUESTIONS 1-9: 1
SUM OF ALL RESPONSES TO PHQ QUESTIONS 1-9: 1
SUM OF ALL RESPONSES TO PHQ9 QUESTIONS 1 & 2: 1
2. FEELING DOWN, DEPRESSED OR HOPELESS: 1
SUM OF ALL RESPONSES TO PHQ QUESTIONS 1-9: 1
1. LITTLE INTEREST OR PLEASURE IN DOING THINGS: 0
SUM OF ALL RESPONSES TO PHQ QUESTIONS 1-9: 1

## 2022-04-25 NOTE — PROGRESS NOTES
Medicare Annual Wellness Visit    Deborah Michaels is here for Medicare AWV (Here for Medicare AWV)    Assessment & Plan    Recommendations for Preventive Services Due: see orders and patient instructions/AVS.  Recommended screening schedule for the next 5-10 years is provided to the patient in written form: see Patient Instructions/AVS.     No follow-ups on file. Subjective       Patient's complete Health Risk Assessment and screening values have been reviewed and are found in Flowsheets. The following problems were reviewed today and where indicated follow up appointments were made and/or referrals ordered.     Positive Risk Factor Screenings with Interventions:    Fall Risk:  Do you feel unsteady or are you worried about falling? : (!) yes (States sometimes she feels  unsteady)  2 or more falls in past year?: no  Fall with injury in past year?: (!) yes (Yesica Sonam over dog leash)     Fall Risk Interventions:    · Home safety tips provided      Tobacco Use:     Tobacco Use: High Risk    Smoking Tobacco Use: Current Every Day Smoker    Smokeless Tobacco Use: Never Used     E-Cigarettes/Vaping Use     Questions Responses    E-Cigarette/Vaping Use Never User    Start Date     Passive Exposure     Quit Date     Counseling Given     Comments         Substance Use - Tobacco Interventions:  tobacco cessation tips and resources provided         General Health and ACP:  General  In general, how would you say your health is?: Fair  In the past 7 days, have you experienced any of the following: New or Increased Pain, New or Increased Fatigue, Loneliness, Social Isolation, Stress or Anger?: No  Do you get the social and emotional support that you need?: Yes  Do you have a Living Will?: (!) No    Advance Directives     Power of  Living Will ACP-Advance Directive ACP-Power of     Not on File Not on File Not on File Not on File      General Health Risk Interventions:  · No Living Will: Advance Care Planning addressed with patient today    Health Habits/Nutrition:     Physical Activity: Inactive    Days of Exercise per Week: 0 days    Minutes of Exercise per Session: 0 min     Have you lost any weight without trying in the past 3 months?: No  Body mass index: (!) 35.32  Have you seen the dentist within the past year?: Yes    Health Habits/Nutrition Interventions:  · Inadequate physical activity:  educational materials provided to promote increased physical activity             Objective   Vitals:    04/25/22 1040 04/25/22 1105   BP: (!) 134/90 122/78   Site: Right Upper Arm    Position: Sitting    Cuff Size: Large Adult    Pulse: 76    Resp: 16    Temp: 97.8 °F (36.6 °C)    TempSrc: Oral    SpO2: 95%    Weight: 169 lb (76.7 kg)    Height: 4' 10\" (1.473 m)       Body mass index is 35.32 kg/m². No Known Allergies  Prior to Visit Medications    Medication Sig Taking?  Authorizing Provider   insulin glargine (LANTUS) 100 UNIT/ML injection vial Inject 10 Units into the skin nightly Yes Historical Provider, MD   gabapentin (NEURONTIN) 300 MG capsule TAKE 1 CAPSULE BY MOUTH TWO TIMES A DAY Yes Sharon Lindsey MD   primidone (MYSOLINE) 50 MG tablet TAKE 1 TABLET BY MOUTH TWO TIMES A DAY Yes Sharon Lindsey MD   atorvastatin (LIPITOR) 40 MG tablet TAKE 1 TABLET BY MOUTH EVERY DAY Yes Sharon Lindsey MD   Insulin Regular Human (NOVOLIN R FLEXPEN) 100 UNIT/ML SOPN Inject 10 Units as directed every evening  Patient taking differently: Inject 18 Units as directed every evening  Yes Sharon Lindsey MD   lisinopril (PRINIVIL;ZESTRIL) 10 MG tablet Take 1 tablet by mouth daily Yes Sharon Lindsey MD   meloxicam (MOBIC) 15 MG tablet Take 1 tablet by mouth daily  Patient not taking: Reported on 4/25/2022  Sharon Lindsey MD   glipiZIDE (GLUCOTROL) 5 MG tablet Take 1 tablet by mouth 2 times daily  Patient not taking: Reported on 4/25/2022  Sharon Lindsey MD   Handicap Placard MISC by Does not apply route Diagnosis: COPD  Duration 6/14/2021-6/14/2023  Avi Way MD   Alcohol Swabs (ALCOHOL PADS) 70 % PADS Use, as directed, three times a day to test blood sugar  Historical Provider, MD   Blood Glucose Calibration (OT ULTRA/FASTTK CNTRL SOLN) SOLN USE TO CONFIRM ACCURACY OF GLUCOSE METER  Historical Provider, MD   Blood Glucose Monitoring Suppl (ONE TOUCH ULTRA 2) w/Device KIT Use, as directed, three times a day to test blood sugar  Historical Provider, MD   [facility] ULTRA strip Use, as directed, three times a day to test blood sugar  Historical Provider, MD   EASY COMFORT PEN NEEDLES 32G X 4 MM MISC Use to inject insulin 3 times a day as directed  Historical Provider, MD   Lancet Devices (EASY MINI EJECT LANCING DEVICE) MISC Use, as directed, three times a day to test blood sugar  Historical Provider, MD   Easy Comfort Lancets MISC Use, as directed, three times a day to test blood sugar  Historical Provider, MD   docusate sodium (COLACE) 100 MG capsule Take 1 capsule by mouth 2 times daily  Patient not taking: Reported on 9/29/2021  Avi Way MD       [facility] (Including outside providers/suppliers regularly involved in providing care):   Patient Care Team:  Avi Way MD as PCP - General (Internal Medicine)  Avi Way MD as PCP - [facility] Provider    Reviewed and updated this visit:  Tobacco  Allergies  Meds  Med Hx  Surg Hx  Soc Hx  Fam Hx              I, Sallie Schmidt LPN, 8/30/8128, performed the documented evaluation under the direct supervision of the attending physician. This encounter was performed under my, Reza Hardy, direct supervision, 4/25/2022.

## 2022-04-25 NOTE — PROGRESS NOTES
2022    Ken Henson (:  1945) is a 65 y.o. female     39-year-old diabetic female with associated neuropathy hypertensive hyperlipidemic female with hx of a resting tremor presents for follow-up visit. Type 2 diabetes (A1C=9.6 2022. ):Novolog 20 units 3 times daily with meals. Lantus 10 units qhs. She is also compliant with Lipitor 40 mg orally daily. The patient has been checking her blood sugars 4 times daily. Hypertension: The patient is compliant with lisinopril 10 mg orally daily        Resting tremor: The patient is compliant with primidone 50 mg twice daily          Back pain: The patient previously reported achy, 7/10, constant back pain that has not responded well to ibuprofen. Her back pain is controlled at this time. At present she denies polyuria,  Polydipsia, constitutional, sinus, visual, cardiopulmonary, urologic, additional gastrointestinal, immunologic/hematologic, musculoskeletal, neurologic,dermatologic, or psychiatric complaints. Patient Active Problem List   Diagnosis    Hypertension, essential    Malignant neoplasm of female breast ([address]    Type 2 diabetes mellitus with chronic kidney disease ([address]    Meningioma, cerebral ([address]    Type 2 diabetes mellitus with hyperglycemia    Stage 3a chronic kidney disease ([address]       Review of Systems   Constitutional: Negative for chills, diaphoresis, fatigue and fever. HENT: Negative for congestion, dental problem, drooling, ear discharge, ear pain, facial swelling, hearing loss, mouth sores, nosebleeds, postnasal drip, rhinorrhea, sinus pressure, sinus pain, sneezing, sore throat, tinnitus, trouble swallowing and voice change. Eyes: Negative for photophobia, pain, discharge, redness, itching and visual disturbance. Respiratory: Negative for apnea, cough, chest tightness, shortness of breath and wheezing. Cardiovascular: Negative for chest pain, palpitations and leg swelling. Gastrointestinal: Negative for abdominal distention, abdominal pain, blood in stool, constipation, diarrhea, nausea, rectal pain and vomiting. Endocrine: Negative for cold intolerance, heat intolerance, polydipsia, polyphagia and polyuria. Genitourinary: Negative for decreased urine volume, difficulty urinating, dysuria, flank pain, frequency, genital sores, hematuria and urgency. Musculoskeletal: Negative for arthralgias, back pain, gait problem, joint swelling, myalgias, neck pain and neck stiffness. Skin: Negative for color change, rash and wound. Allergic/Immunologic: Negative for environmental allergies and food allergies. Neurological: Negative for dizziness, tremors, seizures, syncope, facial asymmetry, speech difficulty, weakness, light-headedness, numbness and headaches. Hematological: Negative for adenopathy. Does not bruise/bleed easily. Psychiatric/Behavioral: Negative for agitation, confusion, decreased concentration, hallucinations, self-injury, sleep disturbance and suicidal ideas. The patient is not nervous/anxious. Prior to Visit Medications    Medication Sig Taking?  Authorizing Provider   insulin glargine (LANTUS) 100 UNIT/ML injection vial Inject 10 Units into the skin nightly Yes Historical Provider, MD   Continuous Blood Gluc Transmit (DEXCOM G6 TRANSMITTER) MISC Use daily to assess blood sugars Yes Wilton Thomas MD   Continuous Blood Gluc  (DEXCOM G6 ) MICHAEL Use daily to assess blood sugars Yes Wilton Thomas MD   Continuous Blood Gluc Sensor (DEXCOM G6 SENSOR) MISC Use daily to assess blood sugars Yes Wilton Thomas MD   empagliflozin (JARDIANCE) 10 MG tablet Take 1 tablet by mouth daily Yes Wilton Thomas MD   empagliflozin (JARDIANCE) 10 MG tablet Take 1 tablet by mouth daily Yes Wilton Thomas MD   gabapentin (NEURONTIN) 300 MG capsule TAKE 1 CAPSULE BY MOUTH TWO TIMES A DAY Yes Wilton Thomas MD   primidone (MYSOLINE) 50 MG tablet TAKE 1 TABLET BY MOUTH TWO TIMES A DAY Yes Jose Luis Zavala MD   atorvastatin (LIPITOR) 40 MG tablet TAKE 1 TABLET BY MOUTH EVERY DAY Yes Jose Luis Zavala MD   lisinopril (PRINIVIL;ZESTRIL) 10 MG tablet Take 1 tablet by mouth daily Yes Jose Luis Zavala MD   Handicap Placard MISC by Does not apply route Diagnosis: COPD  Duration 6/14/2021-6/14/2023  Jose Luis Zavala MD   Alcohol Swabs (ALCOHOL PADS) 70 % PADS Use, as directed, three times a day to test blood sugar  Historical Provider, MD   Blood Glucose Calibration (OT ULTRA/FASTTK CNTRL SOLN) SOLN USE TO CONFIRM ACCURACY OF GLUCOSE METER  Historical Provider, MD   Blood Glucose Monitoring Suppl (ONE TOUCH ULTRA 2) w/Device KIT Use, as directed, three times a day to test blood sugar  Historical Provider, MD   [facility] ULTRA strip Use, as directed, three times a day to test blood sugar  Historical Provider, MD CHENEY COMFORT PEN NEEDLES 32G X 4 MM MISC Use to inject insulin 3 times a day as directed  Historical Provider, MD   Lancet Devices (EASY MINI EJECT LANCING DEVICE) MISC Use, as directed, three times a day to test blood sugar  Historical Provider, MD   Easy Comfort Lancets MISC Use, as directed, three times a day to test blood sugar  Historical Provider, MD   docusate sodium (COLACE) 100 MG capsule Take 1 capsule by mouth 2 times daily  Patient not taking: Reported on 9/29/2021  Jose Luis Zavala MD        No Known Allergies    Past Medical History:   Diagnosis Date    [facility] [address]     right (16-17 yrs ago)    Cancer [facility])     Breast    Diabetes mellitus ([facility]     History of therapeutic radiation     Hyperlipidemia     Hypertension        Past Surgical History:   Procedure Laterality Date    APPENDECTOMY      BREAST BIOPSY Right     malignant (16-17 yrs ago)    BREAST LUMPECTOMY Right     malignant    CARPAL TUNNEL RELEASE Left     HERNIA REPAIR      Umbilical    HYSTERECTOMY      total but left part of one ovary    OVARY REMOVAL      left part of one ovary    TONSILLECTOMY         Social History     Socioeconomic History    Marital status:      Spouse name: Not on file    Number of children: Not on file    Years of education: Not on file    Highest education level: Not on file   Occupational History    Not on file   Tobacco Use    Smoking status: Current Every Day Smoker     Packs/day: 0.50     Years: 50.00     Pack years: 25.00     Types: Cigarettes    Smokeless tobacco: Never Used   Vaping Use    Vaping Use: Never used   Substance and Sexual Activity    Alcohol use: Yes     Comment: Once a month    Drug use: Never    Sexual activity: Yes     Partners: Male   Other Topics Concern    Not on file   Social History Narrative    Not on file     Social Determinants of Health     Financial Resource Strain: Low Risk     Difficulty of Paying Living Expenses: Not hard at all   Food Insecurity: No Food Insecurity    Worried About Running Out of Food in the Last Year: Never true    Terrance of Food in the Last Year: Never true   Transportation Needs:     Lack of Transportation (Medical): Not on file    Lack of Transportation (Non-Medical):  Not on file   Physical Activity: Inactive    Days of Exercise per Week: 0 days    Minutes of Exercise per Session: 0 min   Stress:     Feeling of Stress : Not on file   Social Connections:     Frequency of Communication with Friends and Family: Not on file    Frequency of Social Gatherings with Friends and Family: Not on file    Attends Latter day Services: Not on file    Active Member of Clubs or Organizations: Not on file    Attends Club or Organization Meetings: Not on file    Marital Status: Not on file   Intimate Partner Violence:     Fear of Current or Ex-Partner: Not on file    Emotionally Abused: Not on file    Physically Abused: Not on file    Sexually Abused: Not on file   Housing Stability:     Unable to Pay for Housing in the Last Year: Not on file    Number of [facility] in the Last Year: Not on file    Unstable Housing in the Last Year: Not on file        Family History   Problem Relation Age of Onset    Breast Cancer Mother     Heart Attack Father     Diabetes Brother        ADVANCE DIRECTIVE: N, <no information>    Vitals:    04/25/22 1055   BP: 122/78   Site: Right Upper Arm   Position: Sitting   Pulse: 76   Resp: 16   Temp: 97.8 °F (36.6 °C)   TempSrc: Oral   SpO2: 95%   Weight: 169 lb (76.7 kg)   Height: 4' 11\" (1.499 m)     Estimated body mass index is 34.13 kg/m² as calculated from the following:    Height as of this encounter: 4' 11\" (1.499 m). Weight as of this encounter: 169 lb (76.7 kg). Physical Exam  Constitutional:       General: She is not in acute distress. Appearance: She is well-developed. HENT:      Head: Normocephalic. Right Ear: External ear normal.      Left Ear: External ear normal.   Eyes:      Conjunctiva/sclera: Conjunctivae normal.   Neck:      Vascular: No JVD. Trachea: No tracheal deviation. Cardiovascular:      Rate and Rhythm: Normal rate and regular rhythm. Heart sounds: Normal heart sounds. Pulmonary:      Effort: Pulmonary effort is normal. No respiratory distress. Breath sounds: Normal breath sounds. No wheezing or rales. Chest:      Chest wall: No tenderness. Abdominal:      General: Bowel sounds are normal. There is no distension. Palpations: Abdomen is soft. There is no mass. Tenderness: There is no abdominal tenderness. There is no guarding or rebound. Musculoskeletal:         General: No tenderness or deformity. Cervical back: Neck supple. Skin:     General: Skin is warm and dry. Coloration: Skin is not pale. Findings: No erythema or rash. Neurological:      Mental Status: She is alert and oriented to person, place, and time. Motor: No abnormal muscle tone. Psychiatric:         Thought Content:  Thought content normal.         Judgment: Judgment normal. Labs:  Component      Latest Ref Rng & Units 1/25/2021 1/4/2021 1/4/2021 1/4/2021           4:16 PM  4:18 PM  4:18 PM  4:18 PM   Sodium      135 - 144 mEq/L   143    Potassium      3.4 - 4.9 mEq/L   4.8    Chloride      95 - 107 mEq/L   106    CO2      20 - 31 mEq/L   26    Anion Gap      9 - 15 mEq/L   11    Glucose      70 - 99 mg/dL   200 (H)    BUN      8 - 23 mg/dL   16    Creatinine      0.50 - 0.90 mg/dL   1.35 (H)    GFR Non-      >60   38.0 (L)    GFR       >60   46.0 (L)    Calcium      8.5 - 9.9 mg/dL   9.4    Total Protein      6.3 - 8.0 g/dL   7.8    Albumin      3.5 - 4.6 g/dL   3.9    Bilirubin      0.2 - 0.7 mg/dL   <0.2    Alk Phos      40 - 130 U/L   103    ALT      0 - 33 U/L   40 (H)    AST      0 - 35 U/L   49 (H)    Globulin      2.3 - 3.5 g/dL   3.9 (H)    Cholesterol, Total      0 - 199 mg/dL  147     Triglycerides      0 - 150 mg/dL  112     HDL Cholesterol      40 - 59 mg/dL  57     LDL Calculated      0 - 129 mg/dL  68     Hemoglobin A1C      % 8.9   9.0 (H)     No flowsheet data found.     Lab Results   Component Value Date    CHOL 147 01/04/2021    TRIG 112 01/04/2021    HDL 57 01/04/2021    LDLCALC 68 01/04/2021    GLUCOSE 31 01/25/2022    LABA1C 9.6 04/25/2022    LABA1C 9.4 01/25/2022    LABA1C 9.4 09/29/2021       The 10-year ASCVD risk score (Michelle Orantes, et al., 2013) is: 55.8%    Values used to calculate the score:      Age: 65 years      Sex: Female      Is Non- : No      Diabetic: Yes      Tobacco smoker: Yes      Systolic Blood Pressure: 156 mmHg      Is BP treated: Yes      HDL Cholesterol: 57 mg/dL      Total Cholesterol: 147 mg/dL    Immunization History   Administered Date(s) Administered    COVID-19, Pfizer Purple top, DILUTE for use, 12+ yrs, 30mcg/0.3mL dose 02/01/2021, 03/01/2021, 11/02/2021    Influenza Virus Vaccine 11/07/2006, 11/15/2007    Influenza, Quadv, adjuvanted, 65 yrs +, IM, PF (Fluad) 09/14/2020  Pneumococcal Polysaccharide ([id number]) 09/14/2020    Td (Adult), 2 Lf Tetanus Toxoid, Pf (Td, Absorbed) 10/15/2004    Tdap (Boostrix, Adacel) 10/15/2004       Health Maintenance   Topic Date Due    Shingles Vaccine (1 of 2) Never done    Low dose CT lung screening  Never done    DEXA (modify frequency per FRAX score)  Never done    DTaP/Tdap/Td vaccine (2 - Td or Tdap) 10/15/2014    Pneumococcal 65+ years Vaccine (2 - PCV) 09/14/2021    Lipids  01/04/2022    Depression Screen  03/25/2022    Annual Wellness Visit (AWV)  03/26/2022    Flu vaccine (Season Ended) 09/01/2022    Potassium  01/25/2023    Creatinine  01/25/2023    COVID-19 Vaccine  Completed    Hepatitis A vaccine  Aged Out    Hib vaccine  Aged Out    Meningococcal (ACWY) vaccine  Aged Out    Hepatitis C screen  Discontinued       ASSESSMENT/PLAN:      Type 2 diabetes mellitus without complication, with long-term current use of insulin (HCC)    Novolin N 14-15 UNITS QHS   Novolog 15 units 3 times daily with meals   Continue glipizide 5 mg twice daily  We will order Dexcom  Add Jardiance 10 mg orally daily        Hyperlipidemia, unspecified hyperlipidemia type-continue Lipitor 40 mg orally daily          Essential hypertension-continue lisinopril 10 mg orally daily          Back pain: Trial of Mobic. Consider referral to physical therapy          Tremor-continue primidone 50 mg twice daily          Neuropathy-continue Neurontin 300 mg twice daily        CKD 3 : The patient's most recent renal function (July-2021) was within normal limits. Creatinine in January 2021 was 1.35. Obtain a comprehensive metabolic panel to evaluate the patient's renal function today continue lisinopril 10 mg orally daily. Initiate Jardiance. Meningioma: Monitoring. No follow-ups on file. An electronic signature was used to authenticate this note.     --Ashlee Lopez MD on 4/25/2022 at 12:17 PM    On the basis of positive falls risk screening, assessment and plan is as follows: patient will follow up in 90 day(s) for further evaluation.

## 2022-04-25 NOTE — PATIENT INSTRUCTIONS
Personalized Preventive Plan for Michael Morel - 4/25/2022  Medicare offers a range of preventive health benefits. Some of the tests and screenings are paid in full while other may be subject to a deductible, co-insurance, and/or copay. Some of these benefits include a comprehensive review of your medical history including lifestyle, illnesses that may run in your family, and various assessments and screenings as appropriate. After reviewing your medical record and screening and assessments performed today your provider may have ordered immunizations, labs, imaging, and/or referrals for you. A list of these orders (if applicable) as well as your Preventive Care list are included within your After Visit Summary for your review. Other Preventive Recommendations:    · A preventive eye exam performed by an eye specialist is recommended every 1-2 years to screen for glaucoma; cataracts, macular degeneration, and other eye disorders. · A preventive dental visit is recommended every 6 months. · Try to get at least 150 minutes of exercise per week or 10,000 steps per day on a pedometer . · Order or download the FREE \"Exercise & Physical Activity: Your Everyday Guide\" from [employer]. Call 1-126.598.4157 or search [employer] online. · You need 6852-4866 mg of calcium and 9627-0058 IU of vitamin D per day. It is possible to meet your calcium requirement with diet alone, but a vitamin D supplement is usually necessary to meet this goal.  · When exposed to the sun, use a sunscreen that protects against both UVA and UVB radiation with an SPF of 30 or greater. Reapply every 2 to 3 hours or after sweating, drying off with a towel, or swimming. · Always wear a seat belt when traveling in a car. Always wear a helmet when riding a bicycle or motorcycle.

## 2022-04-25 NOTE — PROGRESS NOTES
2022    Jeffrey Cuello (:  1945) is a 65 y.o. female     69-year-old diabetic female with associated neuropathy hypertensive hyperlipidemic female with hx of a resting tremor presents for follow-up visit. The patient reports that the epigastric pain that she previously reported has resolved. She denies odontophagia dysphagia but reports intermittent nausea when this occurs. Type 2 diabetes (A1C=9.4 2021. ):Novolog 20 units 3 times daily with meals. Lantus 10 units qhs. She is also compliant with Lipitor 40 mg orally daily. Blood sugars remain elevated        Hypertension: The patient is compliant with lisinopril 10 mg orally daily        Resting tremor: The patient is compliant with primidone 50 mg twice daily      Back pain: The patient reports achy, 7/10, constant back pain that has not responded well to ibuprofen. She is not experiencing numbness or tingling of her lower extremities or lower extremity weakness. At present she denies polyuria,  Polydipsia, constitutional, sinus, visual, cardiopulmonary, urologic, additional gastrointestinal, immunologic/hematologic, musculoskeletal, neurologic,dermatologic, or psychiatric complaints. Patient Active Problem List   Diagnosis    Hypertension, essential    Malignant neoplasm of female breast ([address]    Type 2 diabetes mellitus with chronic kidney disease ([address]    Meningioma, cerebral ([address]    Type 2 diabetes mellitus with hyperglycemia    Stage 3a chronic kidney disease ([address]       Review of Systems   Constitutional: Negative for chills, diaphoresis, fatigue and fever. HENT: Negative for congestion, dental problem, drooling, ear discharge, ear pain, facial swelling, hearing loss, mouth sores, nosebleeds, postnasal drip, rhinorrhea, sinus pressure, sinus pain, sneezing, sore throat, tinnitus, trouble swallowing and voice change.     Eyes: Negative for photophobia, pain, discharge, redness, itching and visual disturbance. Respiratory: Negative for apnea, cough, chest tightness, shortness of breath and wheezing. Cardiovascular: Negative for chest pain, palpitations and leg swelling. Gastrointestinal: Negative for abdominal distention, abdominal pain, blood in stool, constipation, diarrhea, nausea, rectal pain and vomiting. Endocrine: Negative for cold intolerance, heat intolerance, polydipsia, polyphagia and polyuria. Genitourinary: Negative for decreased urine volume, difficulty urinating, dysuria, flank pain, frequency, genital sores, hematuria and urgency. Musculoskeletal: Negative for arthralgias, back pain, gait problem, joint swelling, myalgias, neck pain and neck stiffness. Skin: Negative for color change, rash and wound. Allergic/Immunologic: Negative for environmental allergies and food allergies. Neurological: Negative for dizziness, tremors, seizures, syncope, facial asymmetry, speech difficulty, weakness, light-headedness, numbness and headaches. Hematological: Negative for adenopathy. Does not bruise/bleed easily. Psychiatric/Behavioral: Negative for agitation, confusion, decreased concentration, hallucinations, self-injury, sleep disturbance and suicidal ideas. The patient is not nervous/anxious. Prior to Visit Medications    Medication Sig Taking?  Authorizing Provider   gabapentin (NEURONTIN) 300 MG capsule TAKE 1 CAPSULE BY MOUTH TWO TIMES A DAY  Cruzito Paul MD   primidone (MYSOLINE) 50 MG tablet TAKE 1 TABLET BY MOUTH TWO TIMES A DAY  Cruzito Paul MD   atorvastatin (LIPITOR) 40 MG tablet TAKE 1 TABLET BY MOUTH EVERY DAY  Cruzito Paul MD   insulin aspart (NOVOLOG FLEXPEN) 100 UNIT/ML injection pen INJECT 20 UNITS SUBCUTANEOUSLY THREE TIMES A DAY BEFORE MEALS  Cruzito Paul MD   Insulin Regular Human (NOVOLIN R FLEXPEN) 100 UNIT/ML SOPN Inject 10 Units as directed every evening  Cruzito Paul MD   meloxicam (MOBIC) 15 MG tablet Take 1 tablet by mouth daily Jose Bueno MD   glipiZIDE (GLUCOTROL) 5 MG tablet Take 1 tablet by mouth 2 times daily  Jose Bueno MD   Handicap Placard MISC by Does not apply route Diagnosis: COPD  Duration 6/14/2021-6/14/2023  Jose Bueno MD   Alcohol Swabs (ALCOHOL PADS) 70 % PADS Use, as directed, three times a day to test blood sugar  Historical Provider, MD   Blood Glucose Calibration (OT ULTRA/FASTTK CNTRL SOLN) SOLN USE TO CONFIRM ACCURACY OF GLUCOSE METER  Historical Provider, MD   Blood Glucose Monitoring Suppl (ONE TOUCH ULTRA 2) w/Device KIT Use, as directed, three times a day to test blood sugar  Historical Provider, MD   [facility] ULTRA strip Use, as directed, three times a day to test blood sugar  Historical Provider, MD CHENEY COMFORT PEN NEEDLES 32G X 4 MM MISC Use to inject insulin 3 times a day as directed  Historical Provider, MD   Lancet Devices (EASY MINI EJECT LANCING DEVICE) MISC Use, as directed, three times a day to test blood sugar  Historical Provider, MD   Easy Comfort Lancets MISC Use, as directed, three times a day to test blood sugar  Historical Provider, MD   lisinopril (PRINIVIL;ZESTRIL) 10 MG tablet Take 1 tablet by mouth daily  Jose Bueno MD   docusate sodium (COLACE) 100 MG capsule Take 1 capsule by mouth 2 times daily  Patient not taking: Reported on 9/29/2021  Jose Bueno MD        No Known Allergies    Past Medical History:   Diagnosis Date    Breast cancer ([address]     right (16-17 yrs ago)    Cancer [facility])     Breast    Diabetes mellitus ([address]     History of therapeutic radiation     Hyperlipidemia     Hypertension        Past Surgical History:   Procedure Laterality Date    APPENDECTOMY      BREAST BIOPSY Right     malignant (16-17 yrs ago)    BREAST LUMPECTOMY Right     malignant    CARPAL TUNNEL RELEASE Left     HERNIA REPAIR      Umbilical    HYSTERECTOMY      total but left part of one ovary    OVARY REMOVAL      left part of one ovary    TONSILLECTOMY Social History     Socioeconomic History    Marital status:      Spouse name: Not on file    Number of children: Not on file    Years of education: Not on file    Highest education level: Not on file   Occupational History    Not on file   Tobacco Use    Smoking status: Current Every Day Smoker     Packs/day: 0.50     Years: 50.00     Pack years: 25.00     Types: Cigarettes    Smokeless tobacco: Never Used   Vaping Use    Vaping Use: Never used   Substance and Sexual Activity    Alcohol use: Yes     Comment: Once a month    Drug use: Never    Sexual activity: Not on file   Other Topics Concern    Not on file   Social History Narrative    Not on file     Social Determinants of Health     Financial Resource Strain: Low Risk     Difficulty of Paying Living Expenses: Not hard at all   Food Insecurity: No Food Insecurity    Worried About Running Out of Food in the Last Year: Never true    Terrance of Food in the Last Year: Never true   Transportation Needs:     Lack of Transportation (Medical): Not on file    Lack of Transportation (Non-Medical):  Not on file   Physical Activity:     Days of Exercise per Week: Not on file    Minutes of Exercise per Session: Not on file   Stress:     Feeling of Stress : Not on file   Social Connections:     Frequency of Communication with Friends and Family: Not on file    Frequency of Social Gatherings with Friends and Family: Not on file    Attends Bahai Services: Not on file    Active Member of Clubs or Organizations: Not on file    Attends Club or Organization Meetings: Not on file    Marital Status: Not on file   Intimate Partner Violence:     Fear of Current or Ex-Partner: Not on file    Emotionally Abused: Not on file    Physically Abused: Not on file    Sexually Abused: Not on file   Housing Stability:     Unable to Pay for Housing in the Last Year: Not on file    Number of Places Lived in the Last Year: Not on file    Unstable Housing in the Last Year: Not on file        Family History   Problem Relation Age of Onset    Breast Cancer Mother        ADVANCE DIRECTIVE: N, <no information>    There were no vitals filed for this visit. Estimated body mass index is 34.34 kg/m² as calculated from the following:    Height as of 1/25/22: 4' 11\" (1.499 m). Weight as of 1/25/22: 170 lb (77.1 kg). Physical Exam  Constitutional:       General: She is not in acute distress. Appearance: She is well-developed. HENT:      Head: Normocephalic. Right Ear: External ear normal.      Left Ear: External ear normal.   Eyes:      Conjunctiva/sclera: Conjunctivae normal.   Neck:      Vascular: No JVD. Trachea: No tracheal deviation. Cardiovascular:      Rate and Rhythm: Normal rate and regular rhythm. Heart sounds: Normal heart sounds. Pulmonary:      Effort: Pulmonary effort is normal. No respiratory distress. Breath sounds: Normal breath sounds. No wheezing or rales. Chest:      Chest wall: No tenderness. Abdominal:      General: Bowel sounds are normal. There is no distension. Palpations: Abdomen is soft. There is no mass. Tenderness: There is no abdominal tenderness. There is no guarding or rebound. Musculoskeletal:         General: No tenderness or deformity. Cervical back: Neck supple. Skin:     General: Skin is warm and dry. Coloration: Skin is not pale. Findings: No erythema or rash. Neurological:      Mental Status: She is alert and oriented to person, place, and time. Motor: No abnormal muscle tone. Psychiatric:         Thought Content:  Thought content normal.         Judgment: Judgment normal.       Labs:  Component      Latest Ref Rng & Units 1/25/2021 1/4/2021 1/4/2021 1/4/2021           4:16 PM  4:18 PM  4:18 PM  4:18 PM   Sodium      135 - 144 mEq/L   143    Potassium      3.4 - 4.9 mEq/L   4.8    Chloride      95 - 107 mEq/L   106    CO2      20 - 31 mEq/L   26    Anion Gap      9 - 15 mEq/L   11    Glucose      70 - 99 mg/dL   200 (H)    BUN      8 - 23 mg/dL   16    Creatinine      0.50 - 0.90 mg/dL   1.35 (H)    GFR Non-      >60   38.0 (L)    GFR       >60   46.0 (L)    Calcium      8.5 - 9.9 mg/dL   9.4    Total Protein      6.3 - 8.0 g/dL   7.8    Albumin      3.5 - 4.6 g/dL   3.9    Bilirubin      0.2 - 0.7 mg/dL   <0.2    Alk Phos      40 - 130 U/L   103    ALT      0 - 33 U/L   40 (H)    AST      0 - 35 U/L   49 (H)    Globulin      2.3 - 3.5 g/dL   3.9 (H)    Cholesterol, Total      0 - 199 mg/dL  147     Triglycerides      0 - 150 mg/dL  112     HDL Cholesterol      40 - 59 mg/dL  57     LDL Calculated      0 - 129 mg/dL  68     Hemoglobin A1C      % 8.9   9.0 (H)     No flowsheet data found.     Lab Results   Component Value Date    CHOL 147 01/04/2021    TRIG 112 01/04/2021    HDL 57 01/04/2021    LDLCALC 68 01/04/2021    GLUCOSE 31 01/25/2022    LABA1C 9.4 01/25/2022    LABA1C 9.4 09/29/2021    LABA1C 10.3 07/29/2021       The 10-year ASCVD risk score (Cassandra Salazar et al., 2013) is: 58.1%    Values used to calculate the score:      Age: 65 years      Sex: Female      Is Non- : No      Diabetic: Yes      Tobacco smoker: Yes      Systolic Blood Pressure: 017 mmHg      Is BP treated: Yes      HDL Cholesterol: 57 mg/dL      Total Cholesterol: 147 mg/dL    Immunization History   Administered Date(s) Administered    COVID-19, Pfizer Purple top, DILUTE for use, 12+ yrs, 30mcg/0.3mL dose 02/01/2021, 03/01/2021    Influenza Virus Vaccine 11/07/2006, 11/15/2007    Influenza, Quadv, adjuvanted, 65 yrs +, IM, PF (Fluad) 09/14/2020    Pneumococcal Polysaccharide ([id number]) 09/14/2020    Td (Adult), 2 Lf Tetanus Toxoid, Pf (Td, Absorbed) 10/15/2004    Tdap (Boostrix, Adacel) 10/15/2004       Health Maintenance   Topic Date Due    Shingles Vaccine (1 of 2) Never done    DEXA (modify frequency per FRAX score)  Never done    DTaP/Tdap/Td vaccine (2 - Td or Tdap) 10/15/2014    COVID-19 Vaccine (3 - Booster for Pfizer series) 08/01/2021    Pneumococcal 65+ years Vaccine (2 - PCV) 09/14/2021    Low dose CT lung screening  09/22/2021    Lipids  01/04/2022    Depression Screen  03/25/2022    Annual Wellness Visit (AWV)  03/26/2022    Flu vaccine (Season Ended) 09/01/2022    Potassium  01/25/2023    Creatinine  01/25/2023    Hepatitis A vaccine  Aged Out    Hib vaccine  Aged Out    Meningococcal (ACWY) vaccine  Aged Out    Hepatitis C screen  Discontinued       ASSESSMENT/PLAN:      Type 2 diabetes mellitus without complication, with long-term current use of insulin (HCC)    Novolin N 14-15 UNITS QHS   Novolog 15 units 3 times daily with meals   Continue glipizide 5 mg twice daily          Hyperlipidemia, unspecified hyperlipidemia type-continue Lipitor 40 mg orally daily          Essential hypertension-continue lisinopril 10 mg orally daily          Back pain: Trial of Mobic. Consider referral to physical therapy          Tremor-continue primidone 50 mg twice daily          Neuropathy-continue Neurontin 300 mg twice daily        CKD 3 : The patient's most recent renal function (July-2021) was within normal limits. Creatinine in January 2021 was 1.35. Obtain a comprehensive metabolic panel to evaluate the patient's renal function today continue lisinopril 10 mg orally daily. Meningioma: Monitoring. No follow-ups on file. An electronic signature was used to authenticate this note.     --Yessy Antunez MD on 4/25/2022 at 9:46 AM

## 2022-04-26 RX ORDER — INSULIN ASPART 100 [IU]/ML
INJECTION, SOLUTION INTRAVENOUS; SUBCUTANEOUS
Qty: 15 ML | Refills: 0 | Status: SHIPPED | OUTPATIENT
Start: 2022-04-26

## 2022-04-26 RX ORDER — HUMAN INSULIN 100 [IU]/ML
10 INJECTION, SOLUTION SUBCUTANEOUS EVERY EVENING
Qty: 1 PEN | Refills: 5 | Status: SHIPPED | OUTPATIENT
Start: 2022-04-26

## 2022-04-26 NOTE — TELEPHONE ENCOUNTER
Rx requested:  Requested Prescriptions     Pending Prescriptions Disp Refills    NOVOLOG FLEXPEN 100 UNIT/ML injection pen [Pharmacy Med Name: NovoLOG FlexPen Subcutaneous Solution Pen-injector 100 UNIT/ML] 15 mL 0     Sig: inject 18 units subcutaneously three times a day before meals         Last Office Visit:   4/25/2022      Next Visit Date:  Future Appointments   Date Time Provider Katia Mcneal   7/25/2022 10:00 AM Cara Reddy  [place], [place]

## 2022-05-02 ENCOUNTER — TELEPHONE (OUTPATIENT)
Dept: FAMILY MEDICINE CLINIC | Age: 77
End: 2022-05-02

## 2022-05-02 DIAGNOSIS — E11.9 TYPE 2 DIABETES MELLITUS WITHOUT COMPLICATION, WITH LONG-TERM CURRENT USE OF INSULIN (HCC): ICD-10-CM

## 2022-05-02 DIAGNOSIS — Z79.4 TYPE 2 DIABETES MELLITUS WITHOUT COMPLICATION, WITH LONG-TERM CURRENT USE OF INSULIN (HCC): ICD-10-CM

## 2022-05-02 DIAGNOSIS — E78.5 HYPERLIPIDEMIA, UNSPECIFIED HYPERLIPIDEMIA TYPE: ICD-10-CM

## 2022-05-02 LAB
ALBUMIN SERPL-MCNC: 3.9 G/DL (ref 3.5–4.6)
ALP BLD-CCNC: 123 U/L (ref 40–130)
ALT SERPL-CCNC: 66 U/L (ref 0–33)
ANION GAP SERPL CALCULATED.3IONS-SCNC: 15 MEQ/L (ref 9–15)
AST SERPL-CCNC: 92 U/L (ref 0–35)
BASOPHILS ABSOLUTE: 0 K/UL (ref 0–0.2)
BASOPHILS RELATIVE PERCENT: 0.8 %
BILIRUB SERPL-MCNC: 0.3 MG/DL (ref 0.2–0.7)
BUN BLDV-MCNC: 13 MG/DL (ref 8–23)
CALCIUM SERPL-MCNC: 9.1 MG/DL (ref 8.5–9.9)
CHLORIDE BLD-SCNC: 103 MEQ/L (ref 95–107)
CHOLESTEROL, TOTAL: 136 MG/DL (ref 0–199)
CO2: 21 MEQ/L (ref 20–31)
CREAT SERPL-MCNC: 0.78 MG/DL (ref 0.5–0.9)
EOSINOPHILS ABSOLUTE: 0.3 K/UL (ref 0–0.7)
EOSINOPHILS RELATIVE PERCENT: 5.3 %
GFR AFRICAN AMERICAN: >60
GFR NON-AFRICAN AMERICAN: >60
GLOBULIN: 3.9 G/DL (ref 2.3–3.5)
GLUCOSE BLD-MCNC: 304 MG/DL (ref 70–99)
HCT VFR BLD CALC: 44.3 % (ref 37–47)
HDLC SERPL-MCNC: 46 MG/DL (ref 40–59)
HEMOGLOBIN: 14.7 G/DL (ref 12–16)
LDL CHOLESTEROL CALCULATED: 60 MG/DL (ref 0–129)
LYMPHOCYTES ABSOLUTE: 2.2 K/UL (ref 1–4.8)
LYMPHOCYTES RELATIVE PERCENT: 38.6 %
MCH RBC QN AUTO: 32 PG (ref 27–31.3)
MCHC RBC AUTO-ENTMCNC: 33.3 % (ref 33–37)
MCV RBC AUTO: 96.1 FL (ref 82–100)
MONOCYTES ABSOLUTE: 0.5 K/UL (ref 0.2–0.8)
MONOCYTES RELATIVE PERCENT: 8.5 %
NEUTROPHILS ABSOLUTE: 2.7 K/UL (ref 1.4–6.5)
NEUTROPHILS RELATIVE PERCENT: 46.8 %
PDW BLD-RTO: 13.6 % (ref 11.5–14.5)
PLATELET # BLD: 139 K/UL (ref 130–400)
POTASSIUM SERPL-SCNC: 4.7 MEQ/L (ref 3.4–4.9)
RBC # BLD: 4.61 M/UL (ref 4.2–5.4)
SODIUM BLD-SCNC: 139 MEQ/L (ref 135–144)
TOTAL PROTEIN: 7.8 G/DL (ref 6.3–8)
TRIGL SERPL-MCNC: 149 MG/DL (ref 0–150)
WBC # BLD: 5.7 K/UL (ref 4.8–10.8)

## 2022-05-02 NOTE — TELEPHONE ENCOUNTER
Pt came in requesting her Novolog insulin and flexpen medication. Pt stated she always picks it up at the office. In the chart it shows the Rx has been sent to [facility]. Spoke with MA who stated pt would have to call the assistance company and let them know to deliver to office. Informed pt what to do.

## 2022-05-03 NOTE — TELEPHONE ENCOUNTER
We received the paperwork , she needs to re-certify for patient assistance it looks like.  Dr. Gabe Horan has the paperwork

## 2022-05-04 DIAGNOSIS — R79.89 ELEVATED LFTS: Primary | ICD-10-CM

## 2022-05-04 NOTE — TELEPHONE ENCOUNTER
Patient would like to stop in the office and fill out her required parts of these pages before they get faxed back.

## 2022-05-05 ENCOUNTER — TELEPHONE (OUTPATIENT)
Dept: FAMILY MEDICINE CLINIC | Age: 77
End: 2022-05-05

## 2022-05-05 NOTE — TELEPHONE ENCOUNTER
Pharmacy called and stated the need a Dx code for the 3 scripts related to meter, strips, lancents. It has to go through Part B for medicaid so they need the code to run.  Please advise

## 2022-05-09 DIAGNOSIS — E11.9 TYPE 2 DIABETES MELLITUS WITHOUT COMPLICATION, WITH LONG-TERM CURRENT USE OF INSULIN (HCC): ICD-10-CM

## 2022-05-09 DIAGNOSIS — Z79.4 TYPE 2 DIABETES MELLITUS WITH STAGE 3A CHRONIC KIDNEY DISEASE, WITH LONG-TERM CURRENT USE OF INSULIN (HCC): Primary | ICD-10-CM

## 2022-05-09 DIAGNOSIS — N18.31 TYPE 2 DIABETES MELLITUS WITH STAGE 3A CHRONIC KIDNEY DISEASE, WITH LONG-TERM CURRENT USE OF INSULIN (HCC): Primary | ICD-10-CM

## 2022-05-09 DIAGNOSIS — Z79.4 TYPE 2 DIABETES MELLITUS WITH HYPERGLYCEMIA, WITH LONG-TERM CURRENT USE OF INSULIN (HCC): ICD-10-CM

## 2022-05-09 DIAGNOSIS — E11.65 TYPE 2 DIABETES MELLITUS WITH HYPERGLYCEMIA, WITH LONG-TERM CURRENT USE OF INSULIN (HCC): ICD-10-CM

## 2022-05-09 DIAGNOSIS — E11.22 TYPE 2 DIABETES MELLITUS WITH STAGE 3A CHRONIC KIDNEY DISEASE, WITH LONG-TERM CURRENT USE OF INSULIN (HCC): Primary | ICD-10-CM

## 2022-05-09 DIAGNOSIS — Z79.4 TYPE 2 DIABETES MELLITUS WITHOUT COMPLICATION, WITH LONG-TERM CURRENT USE OF INSULIN (HCC): ICD-10-CM

## 2022-05-09 RX ORDER — BLOOD-GLUCOSE TRANSMITTER
EACH MISCELLANEOUS
Qty: 1 EACH | Refills: 5 | Status: SHIPPED | OUTPATIENT
Start: 2022-05-09

## 2022-05-09 RX ORDER — BLOOD-GLUCOSE SENSOR
EACH MISCELLANEOUS
Qty: 1 EACH | Refills: 5 | Status: SHIPPED | OUTPATIENT
Start: 2022-05-09

## 2022-05-09 RX ORDER — BLOOD-GLUCOSE,RECEIVER,CONT
EACH MISCELLANEOUS
Qty: 1 EACH | Refills: 5 | Status: SHIPPED | OUTPATIENT
Start: 2022-05-09 | End: 2022-06-08

## 2022-05-13 ENCOUNTER — HOSPITAL ENCOUNTER (OUTPATIENT)
Dept: ULTRASOUND IMAGING | Age: 77
Discharge: HOME | End: 2022-05-15
Payer: MEDICARE

## 2022-05-13 DIAGNOSIS — R79.89 ELEVATED LFTS: ICD-10-CM

## 2022-05-13 PROCEDURE — 76705 ECHO EXAM OF ABDOMEN: CPT

## 2022-05-15 DIAGNOSIS — R79.89 ELEVATED LFTS: Primary | ICD-10-CM

## 2022-05-18 ENCOUNTER — OFFICE VISIT (OUTPATIENT)
Dept: GASTROENTEROLOGY | Age: 77
End: 2022-05-18
Payer: MEDICARE

## 2022-05-18 VITALS
BODY MASS INDEX: 33.93 KG/M2 | DIASTOLIC BLOOD PRESSURE: 80 MMHG | OXYGEN SATURATION: 98 % | HEART RATE: 68 BPM | WEIGHT: 168 LBS | RESPIRATION RATE: 12 BRPM | SYSTOLIC BLOOD PRESSURE: 124 MMHG

## 2022-05-18 DIAGNOSIS — Z11.59 ENCOUNTER FOR SCREENING FOR OTHER VIRAL DISEASES: ICD-10-CM

## 2022-05-18 DIAGNOSIS — R79.89 ABNORMAL LFTS: ICD-10-CM

## 2022-05-18 DIAGNOSIS — K76.0 FATTY LIVER: ICD-10-CM

## 2022-05-18 DIAGNOSIS — K76.0 FATTY LIVER: Primary | ICD-10-CM

## 2022-05-18 LAB
INR BLD: 1.1
PROTHROMBIN TIME: 13.7 SEC (ref 12.3–14.9)

## 2022-05-18 PROCEDURE — 99204 OFFICE O/P NEW MOD 45 MIN: CPT | Performed by: INTERNAL MEDICINE

## 2022-05-18 RX ORDER — CYCLOBENZAPRINE HCL 10 MG
TABLET ORAL
COMMUNITY
Start: 2022-04-15

## 2022-05-18 ASSESSMENT — ENCOUNTER SYMPTOMS
CHEST TIGHTNESS: 0
WHEEZING: 0
ABDOMINAL DISTENTION: 0
EYE PAIN: 0
VOICE CHANGE: 0
RECTAL PAIN: 0
NAUSEA: 0
SHORTNESS OF BREATH: 0
ABDOMINAL PAIN: 0
VOMITING: 0
EYE REDNESS: 0
TROUBLE SWALLOWING: 0
COLOR CHANGE: 0
BLOOD IN STOOL: 0
CONSTIPATION: 0
DIARRHEA: 0
PHOTOPHOBIA: 0

## 2022-05-18 NOTE — PROGRESS NOTES
Subjective:      Patient ID: Chapito Cervantes is a 65 y.o. female who presents today for:  Chief Complaint   Patient presents with    Elevated Hepatic Enzymes       HPI  This is a very pleasant 78-year-old who came in today for further evaluation management. She mentioned that she had an ultrasound and was told that she may have cirrhosis subsequently she mentioned that she has not been drinking alcohol. Patient does have history of diabetes mellitus, dyslipidemia, hypertension and the current BMI is 34. She had previous breast lumpectomy and radiation. Denies history of jaundice, easy bruising admission related to liver condition. Denies any hematemesis melena or hematochezia. .  Given the the radiological evidence of cirrhosis patient referred to us for the evaluation. Noted ultrasound showed hepatomegaly. Reviewed lab work showed proceed transaminases on serial testing.   Past Medical History:   Diagnosis Date    Breast cancer ([address]     right (16-17 yrs ago)    [facility]     Breast    Diabetes mellitus ([facility]     History of therapeutic radiation     Hyperlipidemia     Hypertension      Past Surgical History:   Procedure Laterality Date    APPENDECTOMY      BREAST BIOPSY Right     malignant (16-17 yrs ago)    BREAST LUMPECTOMY Right     malignant    CARPAL TUNNEL RELEASE Left     HERNIA REPAIR      Umbilical    HYSTERECTOMY      total but left part of one ovary    OVARY REMOVAL      left part of one ovary    TONSILLECTOMY       Social History     Socioeconomic History    Marital status:      Spouse name: Not on file    Number of children: Not on file    Years of education: Not on file    Highest education level: Not on file   Occupational History    Not on file   Tobacco Use    Smoking status: Current Every Day Smoker     Packs/day: 0.50     Years: 50.00     Pack years: 25.00     Types: Cigarettes    Smokeless tobacco: Never Used   Vaping Use    Vaping Use: Never used Substance and Sexual Activity    Alcohol use: Yes     Comment: Once a month    Drug use: Never    Sexual activity: Yes     Partners: Male   Other Topics Concern    Not on file   Social History Narrative    Not on file     Social Determinants of Health     Financial Resource Strain: Low Risk     Difficulty of Paying Living Expenses: Not hard at all   Food Insecurity: No Food Insecurity    Worried About Running Out of Food in the Last Year: Never true    [address] in the Last Year: Never true   Transportation Needs:     Lack of Transportation (Medical): Not on file    Lack of Transportation (Non-Medical): Not on file   Physical Activity: Inactive    Days of Exercise per Week: 0 days    Minutes of Exercise per Session: 0 min   Stress:     Feeling of Stress : Not on file   Social Connections:     Frequency of Communication with Friends and Family: Not on file    Frequency of Social Gatherings with Friends and Family: Not on file    Attends Adventism Services: Not on file    Active Member of [address] [place] [address] or Organizations: Not on file    Attends Club or Organization Meetings: Not on file    Marital Status: Not on file   Intimate Partner Violence:     Fear of Current or Ex-Partner: Not on file    Emotionally Abused: Not on file    Physically Abused: Not on file    Sexually Abused: Not on file   Housing Stability:     Unable to Pay for Housing in the Last Year: Not on file    Number of [place] in the Last Year: Not on file    Unstable Housing in the Last Year: Not on file     Family History   Problem Relation Age of Onset    Breast Cancer Mother     Heart Attack Father     Diabetes Brother      No Known Allergies      Review of Systems   Constitutional: Negative for appetite change, chills, fatigue, fever and unexpected weight change. HENT: Negative for nosebleeds, tinnitus, trouble swallowing and voice change. Eyes: Negative for photophobia, pain and redness.    Respiratory: Negative for chest tightness, shortness of breath and wheezing. Cardiovascular: Negative for chest pain, palpitations and leg swelling. Gastrointestinal: Negative for abdominal distention, abdominal pain, blood in stool, constipation, diarrhea, nausea, rectal pain and vomiting. Endocrine: Negative for polydipsia, polyphagia and polyuria. Genitourinary: Negative for difficulty urinating and hematuria. Skin: Negative for color change, pallor and rash. Neurological: Negative for dizziness, speech difficulty and headaches. Psychiatric/Behavioral: Negative for confusion and suicidal ideas. Objective:   /80 (Site: Left Upper Arm, Position: Sitting, Cuff Size: Small Adult)   Pulse 68   Resp 12   Wt 168 lb (76.2 kg)   SpO2 98%   BMI 33.93 kg/m²     Physical Exam  Constitutional:       General: She is not in acute distress. Appearance: She is well-developed. HENT:      Head: Normocephalic and atraumatic. Eyes:      Conjunctiva/sclera: Conjunctivae normal.      Pupils: Pupils are equal, round, and reactive to light. Cardiovascular:      Rate and Rhythm: Normal rate and regular rhythm. Heart sounds: Normal heart sounds. Pulmonary:      Effort: Pulmonary effort is normal. No respiratory distress. Breath sounds: Normal breath sounds. No wheezing or rales. Abdominal:      General: Bowel sounds are normal. There is no distension. Palpations: Abdomen is soft. Abdomen is not rigid. There is no hepatomegaly, splenomegaly or mass. Tenderness: There is no abdominal tenderness. There is no guarding or rebound. Musculoskeletal:         General: No tenderness or deformity. Normal range of motion. Cervical back: Neck supple. Skin:     Coloration: Skin is not pale. Findings: No erythema or rash. Neurological:      Mental Status: She is alert and oriented to person, place, and time.          Laboratory, Pathology, Radiology reviewed in detail with relevantimportant investigations summarized below:  Lab Results   Component Value Date    WBC 5.7 05/02/2022    WBC 8.6 01/25/2022    WBC 7.9 10/19/2020    HGB 14.7 05/02/2022    HGB 14.5 01/25/2022    HGB 15.3 10/19/2020    HCT 44.3 05/02/2022    HCT 44.6 01/25/2022    HCT 45.9 10/19/2020    MCV 96.1 05/02/2022    MCV 96.0 01/25/2022    MCV 96.6 10/19/2020     05/02/2022     01/25/2022     10/19/2020    . Lab Results   Component Value Date    ALT 66 05/02/2022    ALT 29 01/25/2022    ALT 28 07/29/2021    AST 92 05/02/2022    AST 40 01/25/2022    AST 33 07/29/2021    ALKPHOS 123 05/02/2022    ALKPHOS 101 01/25/2022    ALKPHOS 113 07/29/2021    BILITOT 0.3 05/02/2022    BILITOT <0.2 01/25/2022    BILITOT <0.2 07/29/2021       US ABDOMEN LIMITED Specify organ? LIVER    Result Date: 5/13/2022  Indication: Elevated LFTs. EXAM: Right upper quadrant abdominal ultrasound. FINDINGS: The liver shows diffuse coarse echotexture which could be seen with liver cirrhosis. Please correlate clinically. No hepatic focal lesions are seen. No intra or extrahepatic biliary dilatation. Common bile duct measures 4 mm. No gallstones or cholecystitis. Gallbladder wall measures 2 mm. Limited visualization of the pancreatic tail due to bowel gas. Otherwise, visualized portions of the pancreas are unremarkable. Trace amount of free peritoneal fluid is seen adjacent to the liver. No right renal stones or hydronephrosis. A 4 cm right renal cyst is seen. 1. Coarse echotexture of the liver which may suggest liver cirrhosis. Please correlate clinically. No hepatic focal lesions are seen. 2. Trace amount of free peritoneal fluid adjacent to the liver. 3. A 4 cm right renal cyst.    No results found for: IRON, TIBC, FERRITIN  No results found for: INR  No components found for: ACUTEHEPATITISSCREEN  No components found for: CELIACPANEL  No components found for: STOOLCULTURE, C.DIFF, STOOLOVAPARASITE, STOOLLEUCOCYTE        Assessment:      1. Fatty Liver / presumed CORDOVA:  Noted persistent liver transaminases elevation with reversal of AST ALT ratio noted in January 2021. No etoh use reported  Pursue with etiology work up to assess for any associated viral, autoimmune or Other metabolic etiologies   Patient does have components of metabolic syndrome maternal diabetes diabetes, hypertension dyslipidemia. Patient would likely [place]  2- Chronic liver disease staging:  No clinical  data suggestive of advanced liver disease   Ultrasound noted and suggestive of steatosis/? Cirrhosis. Of note borderline thrombocytopenia and high Apri score that may suggest advanced disease. We will proceed with FibroScan for correlation   3- Presumed [place] in the context of metabolic syndrome  Discussed with the patient natural history of fatty liver/[place] and clinical significance. Discussed modality of treatment and weight loss program.   Continue control of associated medical conditions term of diabetes mellitus, dyslipidemia, hypertension. 4-Associated medical conditions include but not limited to history of breast CA s/p lumpectomy and radiation, diabetes mellitus, hypertension, dyslipidemia. ... Return in about 6 weeks (around 6/29/2022) for Post procedure results discussion, further management.       Roosevelt Fierro MD

## 2022-05-19 LAB
FERRITIN: 165 NG/ML (ref 13–150)
HAV AB SERPL IA-ACNC: NEGATIVE
HAV IGM SER IA-ACNC: NONREACTIVE
HBV SURFACE AB TITR SER: <3.5 MIU/ML
HEPATITIS B CORE TOTAL ANTIBODY: NEGATIVE
HEPATITIS B SURFACE ANTIGEN INTERPRETATION: NORMAL
HEPATITIS C ANTIBODY: NONREACTIVE
IRON SATURATION: 35 % (ref 20–55)
IRON: 124 UG/DL (ref 37–145)
TOTAL IRON BINDING CAPACITY: 353 UG/DL (ref 250–450)
UNSATURATED IRON BINDING CAPACITY: 229 UG/DL (ref 112–347)

## 2022-05-20 ENCOUNTER — TELEPHONE (OUTPATIENT)
Dept: FAMILY MEDICINE CLINIC | Age: 77
End: 2022-05-20

## 2022-05-20 LAB
IGA: 525 MG/DL (ref 68–408)
IGG: 1568 MG/DL (ref 768–1632)
IGM: 100 MG/DL (ref 35–263)

## 2022-05-20 NOTE — TELEPHONE ENCOUNTER
PT  dropped off patient forms to be completed. Scanned to [employer].  PT can be reached at [phone]

## 2022-05-21 LAB — LIVER-KIDNEY MICROSOME-1 AB IGG: 0.4 U (ref 0–24.9)

## 2022-05-22 LAB
ANA PATTERN: ABNORMAL
ANA TITER: ABNORMAL
ANTINUCLEAR AB INTERPRETIVE COMMENT: ABNORMAL
ANTINUCLEAR ANTIBODY, HEP-2, IGG: DETECTED

## 2022-05-24 ENCOUNTER — ANCILLARY PROCEDURE (OUTPATIENT)
Dept: ENDOSCOPY | Age: 77
End: 2022-05-24
Payer: MEDICARE

## 2022-05-24 DIAGNOSIS — K76.0 FATTY LIVER: ICD-10-CM

## 2022-05-24 PROCEDURE — 91200 LIVER ELASTOGRAPHY: CPT

## 2022-05-24 PROCEDURE — 91200 LIVER ELASTOGRAPHY: CPT | Performed by: INTERNAL MEDICINE

## 2022-05-26 LAB — F-ACTIN AB IGA: 39.8 UNITS (ref 0–24.9)

## 2022-06-05 LAB
ALPHA-1 ANTITRYPSIN PHENOTYPE: NORMAL
ALPHA-1 ANTITRYPSIN: 144 MG/DL (ref 90–200)

## 2022-06-07 ENCOUNTER — TELEPHONE (OUTPATIENT)
Dept: FAMILY MEDICINE CLINIC | Age: 77
End: 2022-06-07

## 2022-06-07 ENCOUNTER — NURSE ONLY (OUTPATIENT)
Dept: FAMILY MEDICINE CLINIC | Age: 77
End: 2022-06-07

## 2022-06-07 VITALS — WEIGHT: 169 LBS | BODY MASS INDEX: 34.13 KG/M2

## 2022-06-10 ENCOUNTER — TELEMEDICINE (OUTPATIENT)
Dept: FAMILY MEDICINE CLINIC | Age: 77
End: 2022-06-10
Payer: MEDICARE

## 2022-06-10 DIAGNOSIS — E11.22 TYPE 2 DIABETES MELLITUS WITH STAGE 3A CHRONIC KIDNEY DISEASE, WITH LONG-TERM CURRENT USE OF INSULIN (HCC): Primary | ICD-10-CM

## 2022-06-10 DIAGNOSIS — E66.09 CLASS 1 OBESITY DUE TO EXCESS CALORIES WITH SERIOUS COMORBIDITY AND BODY MASS INDEX (BMI) OF 34.0 TO 34.9 IN ADULT: ICD-10-CM

## 2022-06-10 DIAGNOSIS — N18.31 TYPE 2 DIABETES MELLITUS WITH STAGE 3A CHRONIC KIDNEY DISEASE, WITH LONG-TERM CURRENT USE OF INSULIN (HCC): Primary | ICD-10-CM

## 2022-06-10 DIAGNOSIS — Z79.4 TYPE 2 DIABETES MELLITUS WITH STAGE 3A CHRONIC KIDNEY DISEASE, WITH LONG-TERM CURRENT USE OF INSULIN (HCC): Primary | ICD-10-CM

## 2022-06-10 PROCEDURE — 99442 PR PHYS/QHP TELEPHONE EVALUATION 11-20 MIN: CPT | Performed by: INTERNAL MEDICINE

## 2022-06-10 RX ORDER — PHENTERMINE HYDROCHLORIDE 37.5 MG/1
37.5 TABLET ORAL
Qty: 30 TABLET | Refills: 0 | Status: SHIPPED | OUTPATIENT
Start: 2022-06-10 | End: 2022-07-10

## 2022-06-10 NOTE — PROGRESS NOTES
6/10/2022    Sivan Andrews (:  1945) is a 65 y.o. female     48-year-old diabetic female with associated neuropathy hypertensive hyperlipidemic female with hx of a resting tremor presents for follow-up visit. Obesity: The patient's present body mass index is 34. 13. She is interested in achieving weight loss goals and would like pharmacologic assistance in achieving those goals. Type 2 diabetes (A1C=9.6 2022. ):Novolog 20 units 3 times daily with meals. Lantus 10 units qhs. She is also compliant with Lipitor 40 mg orally daily. The patient has been checking her blood sugars 4 times daily. Hypertension: The patient is compliant with lisinopril 10 mg orally daily        Resting tremor: The patient is compliant with primidone 50 mg twice daily          Back pain: The patient previously reported achy, 7/10, constant back pain that has not responded well to ibuprofen. Her back pain is controlled at this time. At present she denies polyuria,  Polydipsia, constitutional, sinus, visual, cardiopulmonary, urologic, additional gastrointestinal, immunologic/hematologic, musculoskeletal, neurologic,dermatologic, or psychiatric complaints. Patient Active Problem List   Diagnosis    Hypertension, essential    Malignant neoplasm of female breast ([address]    Type 2 diabetes mellitus with chronic kidney disease ([address]    Meningioma, cerebral ([address]    Type 2 diabetes mellitus with hyperglycemia    Stage 3a chronic kidney disease ([address]       Review of Systems   Constitutional: Negative for chills, diaphoresis, fatigue and fever. HENT: Negative for congestion, dental problem, drooling, ear discharge, ear pain, facial swelling, hearing loss, mouth sores, nosebleeds, postnasal drip, rhinorrhea, sinus pressure, sinus pain, sneezing, sore throat, tinnitus, trouble swallowing and voice change.     Eyes: Negative for photophobia, pain, discharge, redness, itching and visual disturbance. Respiratory: Negative for apnea, cough, chest tightness, shortness of breath and wheezing. Cardiovascular: Negative for chest pain, palpitations and leg swelling. Gastrointestinal: Negative for abdominal distention, abdominal pain, blood in stool, constipation, diarrhea, nausea, rectal pain and vomiting. Endocrine: Negative for cold intolerance, heat intolerance, polydipsia, polyphagia and polyuria. Genitourinary: Negative for decreased urine volume, difficulty urinating, dysuria, flank pain, frequency, genital sores, hematuria and urgency. Musculoskeletal: Negative for arthralgias, back pain, gait problem, joint swelling, myalgias, neck pain and neck stiffness. Skin: Negative for color change, rash and wound. Allergic/Immunologic: Negative for environmental allergies and food allergies. Neurological: Negative for dizziness, tremors, seizures, syncope, facial asymmetry, speech difficulty, weakness, light-headedness, numbness and headaches. Hematological: Negative for adenopathy. Does not bruise/bleed easily. Psychiatric/Behavioral: Negative for agitation, confusion, decreased concentration, hallucinations, self-injury, sleep disturbance and suicidal ideas. The patient is not nervous/anxious. Prior to Visit Medications    Medication Sig Taking?  Authorizing Provider   cyclobenzaprine (FLEXERIL) 10 MG tablet TAKE 1 TABLET BY MOUTH NIGHTLY AS NEEDED FOR MUSCLE SPASMS  Historical Provider, MD   Continuous Blood Gluc Sensor (DEXCOM G6 SENSOR) MISC Use daily to assess blood sugars  Heidi Lindo MD   Continuous Blood Gluc Transmit (DEXCOM G6 TRANSMITTER) MISC Use daily to assess blood sugars  Heidi Lindo MD   NOVOLOG FLEXPEN 100 UNIT/ML injection pen inject 18 units subcutaneously three times a day before meals  Heidi Lindo MD   Insulin Regular Human (NOVOLIN R FLEXPEN) 100 UNIT/ML SOPN Inject 10 Units as directed every evening  Heidi Lindo MD   insulin aspart (NOVOLOG FLEXPEN) 100 UNIT/ML injection pen INJECT 18 UNITS SUBCUTANEOUSLY THREE TIMES A DAY BEFORE MEALS  Yessy Antunez MD   insulin glargine (LANTUS) 100 UNIT/ML injection vial Inject 10 Units into the skin nightly  Historical Provider, MD   empagliflozin (JARDIANCE) 10 MG tablet Take 1 tablet by mouth daily  Yessy Antunez MD   empagliflozin (JARDIANCE) 10 MG tablet Take 1 tablet by mouth daily  Yessy Antunez MD   gabapentin (NEURONTIN) 300 MG capsule TAKE 1 CAPSULE BY MOUTH TWO TIMES A DAY  Yessy Antunez MD   primidone (MYSOLINE) 50 MG tablet TAKE 1 TABLET BY MOUTH TWO TIMES A DAY  Yessy Antunez MD   atorvastatin (LIPITOR) 40 MG tablet TAKE 1 TABLET BY MOUTH EVERY DAY  Yessy Antunez MD   [address] [facility] by Does not apply route Diagnosis: COPD  Duration 6/14/2021-6/14/2023  Yessy Antunez MD   Alcohol Swabs (ALCOHOL PADS) 70 % PADS Use, as directed, three times a day to test blood sugar  Historical Provider, MD   Blood Glucose Calibration (OT ULTRA/FASTTK CNTRL SOLN) SOLN USE TO CONFIRM ACCURACY OF GLUCOSE METER  Historical Provider, MD   Blood Glucose Monitoring Suppl (ONE TOUCH ULTRA 2) w/Device KIT Use, as directed, three times a day to test blood sugar  Historical Provider, MD [place] strip Use, as directed, three times a day to test blood sugar  Historical Provider, MD CHENEY COMFORT PEN NEEDLES 32G X 4 MM MISC Use to inject insulin 3 times a day as directed  Historical Provider, MD   Lancet Devices (EASY MINI EJECT LANCING DEVICE) MISC Use, as directed, three times a day to test blood sugar  Historical Provider, MD   Easy Comfort Lancets MISC Use, as directed, three times a day to test blood sugar  Historical Provider, MD   lisinopril (PRINIVIL;ZESTRIL) 10 MG tablet Take 1 tablet by mouth daily  Yessy Antunez MD   docusate sodium (COLACE) 100 MG capsule Take 1 capsule by mouth 2 times daily  Yessy Antunez MD        No Known Allergies    Past Medical History: Diagnosis Date    Breast cancer ([facility]     right (16-17 yrs ago)    Cancer ([facility]     Breast    Diabetes mellitus ([facility]     History of therapeutic radiation     Hyperlipidemia     Hypertension        Past Surgical History:   Procedure Laterality Date    APPENDECTOMY      BREAST BIOPSY Right     malignant (16-17 yrs ago)    BREAST LUMPECTOMY Right     malignant    CARPAL TUNNEL RELEASE Left     HERNIA REPAIR      Umbilical    HYSTERECTOMY (CERVIX STATUS UNKNOWN)      total but left part of one ovary    OVARY REMOVAL      left part of one ovary    TONSILLECTOMY         Social History     Socioeconomic History    Marital status:      Spouse name: Not on file    Number of children: Not on file    Years of education: Not on file    Highest education level: Not on file   Occupational History    Not on file   Tobacco Use    Smoking status: Current Every Day Smoker     Packs/day: 0.50     Years: 50.00     Pack years: 25.00     Types: Cigarettes    Smokeless tobacco: Never Used   Vaping Use    Vaping Use: Never used   Substance and Sexual Activity    Alcohol use: Yes     Comment: Once a month    Drug use: Never    Sexual activity: Yes     Partners: Male   Other Topics Concern    Not on file   Social History Narrative    Not on file     Social Determinants of Health     Financial Resource Strain: Low Risk     Difficulty of Paying Living Expenses: Not hard at all   Food Insecurity: No Food Insecurity    Worried About Running Out of Food in the Last Year: Never true    Terrance of Food in the Last Year: Never true   Transportation Needs:     Lack of Transportation (Medical): Not on file    Lack of Transportation (Non-Medical):  Not on file   Physical Activity: Inactive    Days of Exercise per Week: 0 days    Minutes of Exercise per Session: 0 min   Stress:     Feeling of Stress : Not on file   Social Connections:     Frequency of Communication with Friends and Family: Not on file    Frequency of Social Gatherings with Friends and Family: Not on file    Attends Yarsanism Services: Not on file    Active Member of Clubs or Organizations: Not on file    Attends Club or Organization Meetings: Not on file    Marital Status: Not on file   Intimate Partner Violence:     Fear of Current or Ex-Partner: Not on file    Emotionally Abused: Not on file    Physically Abused: Not on file    Sexually Abused: Not on file   Housing Stability:     Unable to Pay for Housing in the Last Year: Not on file    Number of [place] in the Last Year: Not on file    Unstable Housing in the Last Year: Not on file        Family History   Problem Relation Age of Onset    Breast Cancer Mother     Heart Attack Father     Diabetes Brother        ADVANCE DIRECTIVE: N, <no information>    There were no vitals filed for this visit. Estimated body mass index is 34.13 kg/m² as calculated from the following:    Height as of 4/25/22: 4' 11\" (1.499 m). Weight as of 6/7/22: 169 lb (76.7 kg). Physical Exam  Constitutional:       General: She is not in acute distress. Appearance: She is well-developed. HENT:      Head: Normocephalic. Right Ear: External ear normal.      Left Ear: External ear normal.   Eyes:      Conjunctiva/sclera: Conjunctivae normal.   Neck:      Vascular: No JVD. Trachea: No tracheal deviation. Cardiovascular:      Rate and Rhythm: Normal rate and regular rhythm. Heart sounds: Normal heart sounds. Pulmonary:      Effort: Pulmonary effort is normal. No respiratory distress. Breath sounds: Normal breath sounds. No wheezing or rales. Chest:      Chest wall: No tenderness. Abdominal:      General: Bowel sounds are normal. There is no distension. Palpations: Abdomen is soft. There is no mass. Tenderness: There is no abdominal tenderness. There is no guarding or rebound. Musculoskeletal:         General: No tenderness or deformity.       Cervical back: Neck supple. Skin:     General: Skin is warm and dry. Coloration: Skin is not pale. Findings: No erythema or rash. Neurological:      Mental Status: She is alert and oriented to person, place, and time. Motor: No abnormal muscle tone. Psychiatric:         Thought Content: Thought content normal.         Judgment: Judgment normal.       Labs:  Component      Latest Ref Rng & Units 1/25/2021 1/4/2021 1/4/2021 1/4/2021           4:16 PM  4:18 PM  4:18 PM  4:18 PM   Sodium      135 - 144 mEq/L   143    Potassium      3.4 - 4.9 mEq/L   4.8    Chloride      95 - 107 mEq/L   106    CO2      20 - 31 mEq/L   26    Anion Gap      9 - 15 mEq/L   11    Glucose      70 - 99 mg/dL   200 (H)    BUN      8 - 23 mg/dL   16    Creatinine      0.50 - 0.90 mg/dL   1.35 (H)    GFR Non-      >60   38.0 (L)    GFR       >60   46.0 (L)    Calcium      8.5 - 9.9 mg/dL   9.4    Total Protein      6.3 - 8.0 g/dL   7.8    Albumin      3.5 - 4.6 g/dL   3.9    Bilirubin      0.2 - 0.7 mg/dL   <0.2    Alk Phos      40 - 130 U/L   103    ALT      0 - 33 U/L   40 (H)    AST      0 - 35 U/L   49 (H)    Globulin      2.3 - 3.5 g/dL   3.9 (H)    Cholesterol, Total      0 - 199 mg/dL  147     Triglycerides      0 - 150 mg/dL  112     HDL Cholesterol      40 - 59 mg/dL  57     LDL Calculated      0 - 129 mg/dL  68     Hemoglobin A1C      % 8.9   9.0 (H)     No flowsheet data found.     Lab Results   Component Value Date    CHOL 136 05/02/2022    CHOL 147 01/04/2021    TRIG 149 05/02/2022    TRIG 112 01/04/2021    HDL 46 05/02/2022    HDL 57 01/04/2021    LDLCALC 60 05/02/2022    LDLCALC 68 01/04/2021    GLUCOSE 304 05/02/2022    LABA1C 9.6 04/25/2022    LABA1C 9.4 01/25/2022    LABA1C 9.4 09/29/2021       The 10-year ASCVD risk score (Alec Mace et al., 2013) is: 55.9%    Values used to calculate the score:      Age: 65 years      Sex: Female      Is Non- : No      Diabetic: Yes Tobacco smoker: Yes      Systolic Blood Pressure: 394 mmHg      Is BP treated: Yes      HDL Cholesterol: 46 mg/dL      Total Cholesterol: 136 mg/dL    Immunization History   Administered Date(s) Administered    COVID-19, Pfizer Purple top, DILUTE for use, 12+ yrs, 30mcg/0.3mL dose 02/01/2021, 03/01/2021, 11/02/2021    Influenza Virus Vaccine 11/07/2006, 11/15/2007    Influenza, Quadv, adjuvanted, 65 yrs +, IM, PF (Fluad) 09/14/2020    Pneumococcal Polysaccharide ([id number]) 09/14/2020    Td (Adult), 2 Lf Tetanus Toxoid, Pf (Td, Absorbed) 10/15/2004    Tdap (Boostrix, Adacel) 10/15/2004       Health Maintenance   Topic Date Due    Shingles vaccine (1 of 2) Never done    Low dose CT lung screening  Never done    DEXA (modify frequency per FRAX score)  Never done    DTaP/Tdap/Td vaccine (2 - Td or Tdap) 10/15/2014    Pneumococcal 65+ years Vaccine (2 - PCV) 09/14/2021    Flu vaccine (Season Ended) 09/01/2022    Depression Screen  04/25/2023    Annual Wellness Visit (AWV)  04/26/2023    Lipids  05/02/2023    COVID-19 Vaccine  Completed    Hepatitis A vaccine  Aged Out    Hib vaccine  Aged Out    Meningococcal (ACWY) vaccine  Aged Out    Hepatitis C screen  Discontinued       ASSESSMENT/PLAN:  Obesity: We will initiate a trial of Adipex and encourage the patient to meticulously monitor her caloric intake and begin active participation in aerobic exercise 5 to 7 days/week. Type 2 diabetes mellitus without complication, with long-term current use of insulin (HCC)    Novolin N 14-15 UNITS QHS   Novolog 15 units 3 times daily with meals   Continue glipizide 5 mg twice daily  We will order Dexcom  Add Jardiance 10 mg orally daily        Hyperlipidemia, unspecified hyperlipidemia typecontinue Lipitor 40 mg orally daily          Essential hypertensioncontinue lisinopril 10 mg orally daily          Back pain: Trial of Mobic.   Consider referral to physical therapy          Tremorcontinue primidone 50 mg twice daily          Neuropathycontinue Neurontin 300 mg twice daily        CKD 3 : The patient's most recent renal function ([id number]) was within normal limits. Creatinine in January 2021 was 1.35. Obtain a comprehensive metabolic panel to evaluate the patient's renal function today continue lisinopril 10 mg orally daily. Initiate Jardiance. Meningioma: Monitoring. No follow-ups on file. An electronic signature was used to authenticate this note. TELEHEALTH EVALUATION -- Audio/Visual (During ITOFV-22 public health emergency)    -   Jihan Franco is a 65 y.o. female being evaluated by a Virtual Visit (video visit) encounter to address concerns as mentioned above. A caregiver was present when appropriate. Due to this being a TeleHealth encounter (During UBQNL-15 public health emergency), evaluation of the following organ systems was limited: Vitals/Constitutional/EENT/Resp/CV/GI//MS/Neuro/Skin/Heme-Lymph-Imm. Pursuant to the emergency declaration under the [address] [place] [address] and the [employer] and [employer] Act, this Virtual Visit was conducted with patient's (and/or legal guardian's) consent, to reduce the patient's risk of exposure to COVID-19 and provide necessary medical care. The patient (and/or legal guardian) has also been advised to contact this office for worsening conditions or problems, and seek emergency medical treatment and/or call 911 if deemed necessary. Services were provided through a video synchronous discussion virtually to substitute for in-person clinic visit. Type of encounter was _x_ telephone encounter __ MyChart ___Facetime    Patient was located at their home. Provider was located at their ___ home or        ____ office. Jihan Franco is a 65 y.o. female evaluated via telephone on 6/10/2022 for No chief complaint on file. Jolene Gonzalez Documentation:  I communicated with the patient and/or health care decision maker about diabetes and obesity. Details of this discussion including any medical advice provided:please refer to note above    Total Time: minutes: 11-20 minutes    Shante Brian was evaluated through a synchronous (real-time) audio encounter. Patient identification was verified at the start of the visit. She (or guardian if applicable) is aware that this is a billable service, which includes applicable co-pays. This visit was conducted with the patient's (and/or legal guardian's) verbal consent. She has not had a related appointment within my department in the past 7 days or scheduled within the next 24 hours. The patient was located at Home: [address] [place] [address]. The provider was located at [facility] (Appt Dept): [address] [place] [address],  [address] [place] [address]. Note: not billable if this call serves to triage the patient into an appointment for the relevant concern    Soheila Carr MD      --Soheila Carr MD on 6/10/2022 at 8:20 AM    An electronic signature was used to authenticate this note. --Soheila Carr MD on 6/10/2022 at 8:08 AM    On the basis of positive falls risk screening, assessment and plan is as follows: patient will follow up in 90 day(s) for further evaluation.

## 2022-06-15 RX ORDER — PRIMIDONE 50 MG/1
TABLET ORAL
Qty: 90 TABLET | Refills: 0 | Status: SHIPPED | OUTPATIENT
Start: 2022-06-15 | End: 2022-09-27 | Stop reason: SDUPTHER

## 2022-06-15 RX ORDER — GABAPENTIN 300 MG/1
CAPSULE ORAL
Qty: 60 CAPSULE | Refills: 0 | Status: SHIPPED | OUTPATIENT
Start: 2022-06-15 | End: 2022-09-27 | Stop reason: SDUPTHER

## 2022-06-15 NOTE — TELEPHONE ENCOUNTER
Rx requested:  Requested Prescriptions     Pending Prescriptions Disp Refills    gabapentin (NEURONTIN) 300 MG capsule [Pharmacy Med Name: Gabapentin Oral Capsule 300 MG] 60 capsule 0     Sig: TAKE 1 CAPSULE BY MOUTH TWO TIMES A DAY    primidone (MYSOLINE) 50 MG tablet [Pharmacy Med Name: Primidone Oral Tablet 50 MG] 90 tablet 0     Sig: TAKE 1 TABLET BY MOUTH TWO TIMES A DAY         Last Office Visit:   6/10/2022      Next Visit Date:  Future Appointments   Date Time Provider Katia Mcneal   7/7/2022 11:00 AM Calvin Copeland   7/25/2022 10:00 AM Yonny Grover  [place], [place] [address]

## 2022-06-17 ENCOUNTER — TELEPHONE (OUTPATIENT)
Dept: FAMILY MEDICINE CLINIC | Age: 77
End: 2022-06-17

## 2022-06-17 NOTE — TELEPHONE ENCOUNTER
*1st attempt 6/17/22 to reschedule the appointment on 7/25/22 due to the provider being out of the office - No answer - LMOVM for patient to call the office to reschedule      *2nd attempt 6/21/22 to reschedule appointment - no answer - lmovm

## 2022-07-07 ENCOUNTER — OFFICE VISIT (OUTPATIENT)
Dept: GASTROENTEROLOGY | Age: 77
End: 2022-07-07
Payer: MEDICARE

## 2022-07-07 VITALS
BODY MASS INDEX: 32.11 KG/M2 | HEART RATE: 97 BPM | DIASTOLIC BLOOD PRESSURE: 70 MMHG | WEIGHT: 159 LBS | SYSTOLIC BLOOD PRESSURE: 124 MMHG | OXYGEN SATURATION: 93 %

## 2022-07-07 DIAGNOSIS — K76.9 CHRONIC LIVER DISEASE: Primary | ICD-10-CM

## 2022-07-07 DIAGNOSIS — K75.4 AUTOIMMUNE HEPATITIS (HCC): ICD-10-CM

## 2022-07-07 DIAGNOSIS — K75.81 NASH (NONALCOHOLIC STEATOHEPATITIS): ICD-10-CM

## 2022-07-07 PROCEDURE — 99214 OFFICE O/P EST MOD 30 MIN: CPT | Performed by: NURSE PRACTITIONER

## 2022-07-07 PROCEDURE — 1123F ACP DISCUSS/DSCN MKR DOCD: CPT | Performed by: NURSE PRACTITIONER

## 2022-07-07 ASSESSMENT — ENCOUNTER SYMPTOMS
RECTAL PAIN: 0
BLOOD IN STOOL: 0
TROUBLE SWALLOWING: 0
NAUSEA: 0
EYE REDNESS: 0
WHEEZING: 0
EYE PAIN: 0
COLOR CHANGE: 0
ANAL BLEEDING: 0
VOMITING: 0
VOICE CHANGE: 0
DIARRHEA: 0
CHEST TIGHTNESS: 0
SHORTNESS OF BREATH: 0
ABDOMINAL PAIN: 0
CONSTIPATION: 0
PHOTOPHOBIA: 0
ABDOMINAL DISTENTION: 0

## 2022-07-07 NOTE — PROGRESS NOTES
Subjective:      Patient ID: Varghese Fuentes is a 65 y.o. female who presents today for:  Chief Complaint   Patient presents with    Follow-up       HPI   Patient came in as follow-up to abnormal LFTs and further evaluation of liver disease. Had ultrasound which noted questionable cirrhosis with follow-up FibroScan notable for advanced fibrosis/early cirrhosis, with correlating high Apri score. No history of EtOH, had blood work to rule out any associated viral, autoimmune or metabolic etiology. Noted positive CYNDI and smooth muscle antibody, normal IgG , otherwise normal blood work. Patient has associated medical conditions including diabetes, dyslipidemia, hypertension and class II obesity with BMI of 32. Otherwise no liver related hospitalizations, memory loss or confusion, pruritus, easy bruising, abdominal swelling, nausea or vomiting, melena, or hematochezia. Background  This is a very pleasant 76-year-old who came in today for further evaluation management. She mentioned that she had an ultrasound and was told that she may have cirrhosis subsequently she mentioned that she has not been drinking alcohol. Patient does have history of diabetes mellitus, dyslipidemia, hypertension and the current BMI is 34. She had previous breast lumpectomy and radiation. Denies history of jaundice, easy bruising admission related to liver condition. Denies any hematemesis melena or hematochezia. .  Given the the radiological evidence of cirrhosis patient referred to us for the evaluation. Noted ultrasound showed hepatomegaly. Reviewed lab work showed proceed transaminases on serial testing.   Past Medical History:   Diagnosis Date    Breast cancer ([address]     right (16-17 yrs ago)    Cancer ([address]     Breast    Diabetes mellitus [facility]     History of therapeutic radiation     Hyperlipidemia     Hypertension      Past Surgical History:   Procedure Laterality Date    APPENDECTOMY      BREAST BIOPSY Right malignant (16-17 yrs ago)    BREAST LUMPECTOMY Right     malignant    CARPAL TUNNEL RELEASE Left     HERNIA REPAIR      Umbilical    HYSTERECTOMY (CERVIX STATUS UNKNOWN)      total but left part of one ovary    OVARY REMOVAL      left part of one ovary    TONSILLECTOMY       Social History     Socioeconomic History    Marital status:      Spouse name: Not on file    Number of children: Not on file    Years of education: Not on file    Highest education level: Not on file   Occupational History    Not on file   Tobacco Use    Smoking status: Current Every Day Smoker     Packs/day: 0.50     Years: 50.00     Pack years: 25.00     Types: Cigarettes    Smokeless tobacco: Never Used   Vaping Use    Vaping Use: Never used   Substance and Sexual Activity    Alcohol use: Yes     Comment: Once a month    Drug use: Never    Sexual activity: Yes     Partners: Male   Other Topics Concern    Not on file   Social History Narrative    Not on file     Social Determinants of Health     Financial Resource Strain: Low Risk     Difficulty of Paying Living Expenses: Not hard at all   Food Insecurity: No Food Insecurity    Worried About Running Out of Food in the Last Year: Never true    Terrance of Food in the Last Year: Never true   Transportation Needs:     Lack of Transportation (Medical): Not on file    Lack of Transportation (Non-Medical):  Not on file   Physical Activity: Inactive    Days of Exercise per Week: 0 days    Minutes of Exercise per Session: 0 min   Stress:     Feeling of Stress : Not on file   Social Connections:     Frequency of Communication with Friends and Family: Not on file    Frequency of Social Gatherings with Friends and Family: Not on file    Attends Latter day Services: Not on file    Active Member of Clubs or Organizations: Not on file    Attends Club or Organization Meetings: Not on file    Marital Status: Not on file   Intimate Partner Violence:     Fear of Current or Ex-Partner: Not on file    Emotionally Abused: Not on file    Physically Abused: Not on file    Sexually Abused: Not on file   Housing Stability:     Unable to Pay for Housing in the Last Year: Not on file    Number of Places Lived in the Last Year: Not on file    Unstable Housing in the Last Year: Not on file     Family History   Problem Relation Age of Onset    Breast Cancer Mother     Heart Attack Father     Diabetes Brother      No Known Allergies      Review of Systems   Constitutional: Negative for appetite change, chills, fever and unexpected weight change. HENT: Negative for nosebleeds, tinnitus, trouble swallowing and voice change. Eyes: Negative for photophobia, pain and redness. Respiratory: Negative for chest tightness, shortness of breath and wheezing. Cardiovascular: Negative for chest pain, palpitations and leg swelling. Gastrointestinal: Negative for abdominal distention, abdominal pain, anal bleeding, blood in stool, constipation, diarrhea, nausea, rectal pain and vomiting. Endocrine: Negative for polydipsia, polyphagia and polyuria. Genitourinary: Negative for difficulty urinating and hematuria. Skin: Negative for color change, pallor and rash. Neurological: Negative for dizziness, speech difficulty and headaches. Psychiatric/Behavioral: Negative for confusion and suicidal ideas. Objective:   /70 (Site: Right Upper Arm, Position: Sitting, Cuff Size: Small Adult)   Pulse 97   Wt 159 lb (72.1 kg)   SpO2 93%   BMI 32.11 kg/m²     Physical Exam  Vitals reviewed. Constitutional:       General: She is not in acute distress. Appearance: Normal appearance. She is well-developed and well-groomed. HENT:      Head: Normocephalic and atraumatic. Nose: Nose normal.   Eyes:      General: No scleral icterus. Extraocular Movements: Extraocular movements intact.       Conjunctiva/sclera: Conjunctivae normal.      Pupils: Pupils are equal, round, and reactive to light. Cardiovascular:      Rate and Rhythm: Normal rate and regular rhythm. Pulses: Normal pulses. Heart sounds: Normal heart sounds. Pulmonary:      Effort: Pulmonary effort is normal. No respiratory distress. Breath sounds: Normal breath sounds. No wheezing or rales. Abdominal:      General: Abdomen is flat. Bowel sounds are normal. There is no distension. Palpations: Abdomen is soft. There is no hepatomegaly, splenomegaly or mass. Tenderness: There is no abdominal tenderness. There is no guarding or rebound. Musculoskeletal:         General: No tenderness or deformity. Normal range of motion. Cervical back: Neck supple. Right lower leg: No edema. Left lower leg: No edema. Skin:     General: Skin is warm and dry. Capillary Refill: Capillary refill takes less than 2 seconds. Coloration: Skin is not jaundiced. Findings: No erythema or rash. Neurological:      General: No focal deficit present. Mental Status: She is alert and oriented to person, place, and time.    Psychiatric:         Mood and Affect: Mood normal.         Behavior: Behavior normal.         Laboratory, Pathology, Radiology reviewed in detail with relevantimportant investigations summarized below:  Lab Results   Component Value Date/Time    WBC 5.7 05/02/2022 11:23 AM    WBC 8.6 01/25/2022 02:29 PM    WBC 7.9 10/19/2020 02:45 PM    HGB 14.7 05/02/2022 11:23 AM    HGB 14.5 01/25/2022 02:29 PM    HGB 15.3 10/19/2020 02:45 PM    HCT 44.3 05/02/2022 11:23 AM    HCT 44.6 01/25/2022 02:29 PM    HCT 45.9 10/19/2020 02:45 PM    MCV 96.1 05/02/2022 11:23 AM    MCV 96.0 01/25/2022 02:29 PM    MCV 96.6 10/19/2020 02:45 PM     05/02/2022 11:23 AM     01/25/2022 02:29 PM     10/19/2020 02:45 PM    .  Lab Results   Component Value Date/Time    ALT 66 05/02/2022 11:23 AM    ALT 29 01/25/2022 02:29 PM    ALT 28 07/29/2021 04:12 PM    AST 92 05/02/2022 11:23 AM AST 40 01/25/2022 02:29 PM    AST 33 07/29/2021 04:12 PM    ALKPHOS 123 05/02/2022 11:23 AM    ALKPHOS 101 01/25/2022 02:29 PM    NTTIOHF 934 07/29/2021 04:12 PM    BILITOT 0.3 05/02/2022 11:23 AM    BILITOT <0.2 01/25/2022 02:29 PM    BILITOT <0.2 07/29/2021 04:12 PM       No results found. Lab Results   Component Value Date/Time    IRON 124 05/18/2022 03:38 PM    TIBC 353 05/18/2022 03:38 PM    FERRITIN 165 05/18/2022 03:38 PM     Lab Results   Component Value Date/Time    INR 1.1 05/18/2022 03:38 PM     No components found for: ACUTEHEPATITISSCREEN  No components found for: CELIACPANEL  No components found for: STOOLCULTURE, C.DIFF, STOOLOVAPARASITE, STOOLLEUCOCYTE        Assessment:       Diagnosis Orders   1. Chronic liver disease  EGD    Comprehensive Metabolic Panel    Ambulatory referral to Interventional Radiology    IR BIOPSY LIVER PERCUTANEOUS   2. CORDOVA (nonalcoholic steatohepatitis)  Ambulatory referral to Interventional Radiology   3. Autoimmune hepatitis [facility])  Ambulatory referral to Interventional Radiology    IR BIOPSY LIVER PERCUTANEOUS         Plan:      1. Autoimmune hepatitis with component of Cordova  Patient with abnormal liver enzymes 2-3 times normal with radiological evidence of questionable cirrhosis and trace ascites with correlating high Apri score. Findings are suggestive of burned-out autoimmune hepatitis. Recommend proceeding with liver biopsy for definitive diagnosis and staging however liver biopsy may not change the management, she is not an ideal candidate for immunosuppressive therapy given her history of breast CA and comorbid conditions. The risk of immunosuppressive therapy seem to outweigh the benefit,  pending liver biopsy would consider budesonide if there is absence of cirrhosis. Likely an underlying component of Charissa Jeovanny as well, discussed with the patient natural history of fatty liver/Cordova and clinical significance.   Discussed modality of treatment and weight loss program.  Patient was initiated on Adipex and is lost 10 pounds since her last office visit. Continue control of associated medical conditions term of diabetes mellitus, dyslipidemia, hypertension. High risk  for immunosuppressive therapy given her hx of breast CA, would recommend proceeding with liver bx for definitive dx and staging purposes, pt is agreeable to proceed. -referral to IR for liver biopsy    2- Chronic liver disease staging:  No clinical  data suggestive of advanced liver disease   Ultrasound noted and suggestive of steatosis/? Cirrhosis. Of note borderline thrombocytopenia and high Apri score more suggestive of advanced disease. FibroScan noted advanced fibrosis/cirrhosis    3-Associated medical conditions include but not limited to history of breast CA s/p lumpectomy and radiation, diabetes mellitus, hypertension, dyslipidemia. ... Return in about 4 weeks (around 8/4/2022), or if symptoms worsen or fail to improve.       Meena López, APRN - CNP

## 2022-07-09 ENCOUNTER — TELEMEDICINE (OUTPATIENT)
Dept: FAMILY MEDICINE CLINIC | Age: 77
End: 2022-07-09
Payer: MEDICARE

## 2022-07-09 DIAGNOSIS — E66.09 CLASS 1 OBESITY DUE TO EXCESS CALORIES WITH SERIOUS COMORBIDITY AND BODY MASS INDEX (BMI) OF 34.0 TO 34.9 IN ADULT: ICD-10-CM

## 2022-07-09 DIAGNOSIS — E11.65 TYPE 2 DIABETES MELLITUS WITH HYPERGLYCEMIA, WITH LONG-TERM CURRENT USE OF INSULIN (HCC): Primary | ICD-10-CM

## 2022-07-09 DIAGNOSIS — Z79.4 TYPE 2 DIABETES MELLITUS WITH HYPERGLYCEMIA, WITH LONG-TERM CURRENT USE OF INSULIN (HCC): Primary | ICD-10-CM

## 2022-07-09 PROCEDURE — 1123F ACP DISCUSS/DSCN MKR DOCD: CPT | Performed by: INTERNAL MEDICINE

## 2022-07-09 PROCEDURE — 3046F HEMOGLOBIN A1C LEVEL >9.0%: CPT | Performed by: INTERNAL MEDICINE

## 2022-07-09 PROCEDURE — 99213 OFFICE O/P EST LOW 20 MIN: CPT | Performed by: INTERNAL MEDICINE

## 2022-07-09 RX ORDER — PHENTERMINE HYDROCHLORIDE 37.5 MG/1
37.5 TABLET ORAL
Qty: 30 TABLET | Refills: 1 | Status: SHIPPED | OUTPATIENT
Start: 2022-07-11 | End: 2022-08-11

## 2022-07-10 NOTE — PROGRESS NOTES
2022    Rafaela Pressley (:  1945) is a 65 y.o. female     58-year-old diabetic female with associated neuropathy hypertensive hyperlipidemic female with hx of a resting tremor presents for follow-up visit. Obesity: The patient's present body mass index is 32.11. She is monitoring her caloric intake meticulously and she is compliant with Adipex. Type 2 diabetes (A1C=9.6 2022. ):Novolog 20 units 3 times daily with meals. Lantus 10 units qhs. She is also compliant with Lipitor 40 mg orally daily. The patient has been checking her blood sugars 4 times daily. Hypertension: The patient is compliant with lisinopril 10 mg orally daily        Resting tremor: The patient is compliant with primidone 50 mg twice daily          Back pain: The patient previously reported achy, 7/10, constant back pain that has not responded well to ibuprofen. Her back pain is controlled at this time. At present she denies polyuria,  Polydipsia, constitutional, sinus, visual, cardiopulmonary, urologic, additional gastrointestinal, immunologic/hematologic, musculoskeletal, neurologic,dermatologic, or psychiatric complaints. Patient Active Problem List   Diagnosis    Hypertension, essential    Malignant neoplasm of female breast ([address]    Type 2 diabetes mellitus with chronic kidney disease ([address]    Meningioma, cerebral ([address]    Type 2 diabetes mellitus with hyperglycemia    Stage 3a chronic kidney disease ([address]    CORDOVA (nonalcoholic steatohepatitis)       Review of Systems   Constitutional: Negative for chills, diaphoresis, fatigue and fever. HENT: Negative for congestion, dental problem, drooling, ear discharge, ear pain, facial swelling, hearing loss, mouth sores, nosebleeds, postnasal drip, rhinorrhea, sinus pressure, sinus pain, sneezing, sore throat, tinnitus, trouble swallowing and voice change.     Eyes: Negative for photophobia, pain, discharge, redness, itching and visual disturbance. Respiratory: Negative for apnea, cough, chest tightness, shortness of breath and wheezing. Cardiovascular: Negative for chest pain, palpitations and leg swelling. Gastrointestinal: Negative for abdominal distention, abdominal pain, blood in stool, constipation, diarrhea, nausea, rectal pain and vomiting. Endocrine: Negative for cold intolerance, heat intolerance, polydipsia, polyphagia and polyuria. Genitourinary: Negative for decreased urine volume, difficulty urinating, dysuria, flank pain, frequency, genital sores, hematuria and urgency. Musculoskeletal: Negative for arthralgias, back pain, gait problem, joint swelling, myalgias, neck pain and neck stiffness. Skin: Negative for color change, rash and wound. Allergic/Immunologic: Negative for environmental allergies and food allergies. Neurological: Negative for dizziness, tremors, seizures, syncope, facial asymmetry, speech difficulty, weakness, light-headedness, numbness and headaches. Hematological: Negative for adenopathy. Does not bruise/bleed easily. Psychiatric/Behavioral: Negative for agitation, confusion, decreased concentration, hallucinations, self-injury, sleep disturbance and suicidal ideas. The patient is not nervous/anxious. Prior to Visit Medications    Medication Sig Taking? Authorizing Provider   gabapentin (NEURONTIN) 300 MG capsule TAKE 1 CAPSULE BY MOUTH TWO TIMES A DAY  Mireille Nesbitt MD   primidone (MYSOLINE) 50 MG tablet TAKE 1 TABLET BY MOUTH TWO TIMES A DAY  Mireille Nesbitt MD   phentermine (ADIPEX-P) 37.5 MG tablet Take 1 tablet by mouth every morning (before breakfast) for 30 days.   Mireille Nesbitt MD   cyclobenzaprine (FLEXERIL) 10 MG tablet TAKE 1 TABLET BY MOUTH NIGHTLY AS NEEDED FOR MUSCLE SPASMS  Historical Provider, MD   Continuous Blood Gluc Sensor (DEXCOM G6 SENSOR) MISC Use daily to assess blood sugars  Mireille Nesbitt MD   Continuous Blood Gluc Transmit (DEXCOM G6 TRANSMITTER) MISC Use daily to assess blood sugars  Shivani Conrad MD   NOVOLOG FLEXPEN 100 UNIT/ML injection pen inject 18 units subcutaneously three times a day before meals  Shivani Conrad MD   Insulin Regular Human (NOVOLIN R FLEXPEN) 100 UNIT/ML SOPN Inject 10 Units as directed every evening  Shivani Conrad MD   insulin aspart (NOVOLOG FLEXPEN) 100 UNIT/ML injection pen INJECT 18 UNITS SUBCUTANEOUSLY THREE TIMES A DAY BEFORE MEALS  Shivani Conrad MD   insulin glargine (LANTUS) 100 UNIT/ML injection vial Inject 10 Units into the skin nightly  Historical Provider, MD   empagliflozin (JARDIANCE) 10 MG tablet Take 1 tablet by mouth daily  Shivani Conrad MD   atorvastatin (LIPITOR) 40 MG tablet TAKE 1 TABLET BY MOUTH EVERY DAY  Shivani Conrad MD   Handicap Placard MISC by Does not apply route Diagnosis: COPD  Duration 6/14/2021-6/14/2023  Shivani Conrad MD   Alcohol Swabs (ALCOHOL PADS) 70 % PADS Use, as directed, three times a day to test blood sugar  Historical Provider, MD   Blood Glucose Calibration (OT ULTRA/FASTTK CNTRL SOLN) SOLN USE TO CONFIRM ACCURACY OF GLUCOSE METER  Historical Provider, MD   Blood Glucose Monitoring Suppl (ONE TOUCH ULTRA 2) w/Device KIT Use, as directed, three times a day to test blood sugar  Historical Provider, MD   [facility] ULTRA strip Use, as directed, three times a day to test blood sugar  Historical Provider, MD CHENEY COMFORT PEN NEEDLES 32G X 4 MM MISC Use to inject insulin 3 times a day as directed  Historical Provider, MD   Lancet Devices (EASY MINI EJECT LANCING DEVICE) MISC Use, as directed, three times a day to test blood sugar  Historical Provider, MD   Easy Comfort Lancets MISC Use, as directed, three times a day to test blood sugar  Historical Provider, MD   lisinopril (PRINIVIL;ZESTRIL) 10 MG tablet Take 1 tablet by mouth daily  Shivani Conrad MD   docusate sodium (COLACE) 100 MG capsule Take 1 capsule by mouth 2 times daily  Shivani Conrad MD General: No tenderness or deformity. Cervical back: Neck supple. Skin:     General: Skin is warm and dry. Coloration: Skin is not pale. Findings: No erythema or rash. Neurological:      Mental Status: She is alert and oriented to person, place, and time. Motor: No abnormal muscle tone. Psychiatric:         Thought Content: Thought content normal.         Judgment: Judgment normal.       Labs:  Component      Latest Ref Rng & Units 1/25/2021 1/4/2021 1/4/2021 1/4/2021           4:16 PM  4:18 PM  4:18 PM  4:18 PM   Sodium      135 - 144 mEq/L   143    Potassium      3.4 - 4.9 mEq/L   4.8    Chloride      95 - 107 mEq/L   106    CO2      20 - 31 mEq/L   26    Anion Gap      9 - 15 mEq/L   11    Glucose      70 - 99 mg/dL   200 (H)    BUN      8 - 23 mg/dL   16    Creatinine      0.50 - 0.90 mg/dL   1.35 (H)    GFR Non-      >60   38.0 (L)    GFR       >60   46.0 (L)    Calcium      8.5 - 9.9 mg/dL   9.4    Total Protein      6.3 - 8.0 g/dL   7.8    Albumin      3.5 - 4.6 g/dL   3.9    Bilirubin      0.2 - 0.7 mg/dL   <0.2    Alk Phos      40 - 130 U/L   103    ALT      0 - 33 U/L   40 (H)    AST      0 - 35 U/L   49 (H)    Globulin      2.3 - 3.5 g/dL   3.9 (H)    Cholesterol, Total      0 - 199 mg/dL  147     Triglycerides      0 - 150 mg/dL  112     HDL Cholesterol      40 - 59 mg/dL  57     LDL Calculated      0 - 129 mg/dL  68     Hemoglobin A1C      % 8.9   9.0 (H)     No flowsheet data found.     Lab Results   Component Value Date/Time    CHOL 136 05/02/2022 11:23 AM    CHOL 147 01/04/2021 04:18 PM    TRIG 149 05/02/2022 11:23 AM    TRIG 112 01/04/2021 04:18 PM    HDL 46 05/02/2022 11:23 AM    HDL 57 01/04/2021 04:18 PM    LDLCALC 60 05/02/2022 11:23 AM    LDLCALC 68 01/04/2021 04:18 PM    GLUCOSE 304 05/02/2022 11:23 AM    LABA1C 9.6 04/25/2022 11:37 AM    LABA1C 9.4 01/25/2022 02:14 PM    LABA1C 9.4 09/29/2021 04:17 PM       The 10-year ASCVD risk score (Juan Huff, et al., 2013) is: 55.9%    Values used to calculate the score:      Age: 65 years      Sex: Female      Is Non- : No      Diabetic: Yes      Tobacco smoker: Yes      Systolic Blood Pressure: 372 mmHg      Is BP treated: Yes      HDL Cholesterol: 46 mg/dL      Total Cholesterol: 136 mg/dL    Immunization History   Administered Date(s) Administered    COVID-19, PFIZER PURPLE top, DILUTE for use, (age 15 y+), 30mcg/0.3mL 02/01/2021, 03/01/2021, 11/02/2021    Influenza Virus Vaccine 11/07/2006, 11/15/2007    Influenza, Quadv, adjuvanted, 65 yrs +, IM, PF (Fluad) 09/14/2020    Pneumococcal Polysaccharide ([id number]) 09/14/2020    Td (Adult), 2 Lf Tetanus Toxoid, Pf (Td, Absorbed) 10/15/2004    Tdap (Boostrix, Adacel) 10/15/2004       Health Maintenance   Topic Date Due    Hepatitis A vaccine (1 of 2 - Risk 2-dose series) Never done    Shingles vaccine (1 of 2) Never done    Low dose CT lung screening  Never done    DEXA (modify frequency per FRAX score)  Never done    DTaP/Tdap/Td vaccine (2 - Td or Tdap) 10/15/2014    Pneumococcal 65+ years Vaccine (2 - PCV) 09/14/2021    Flu vaccine (1) 09/01/2022    Depression Screen  04/25/2023    Annual Wellness Visit (AWV)  04/26/2023    Lipids  05/02/2023    COVID-19 Vaccine  Completed    Hib vaccine  Aged Out    Meningococcal (ACWY) vaccine  Aged Out    Hepatitis C screen  Discontinued       ASSESSMENT/PLAN:  Obesity: Continue Adipex and encourage the patient to continue to meticulously monitor her caloric intake and begin active participation in aerobic exercise 5 to 7 days/week.           Type 2 diabetes mellitus without complication, with long-term current use of insulin (HCC)    Novolin N 14-15 UNITS QHS   Novolog 15 units 3 times daily with meals   Continue glipizide 5 mg twice daily  We will order Dexcom  Add Jardiance 10 mg orally daily        Hyperlipidemia, unspecified hyperlipidemia type-continue Lipitor 40 mg orally daily          Essential hypertension-continue lisinopril 10 mg orally daily          Back pain: Trial of Mobic. Consider referral to physical therapy          Tremor-continue primidone 50 mg twice daily          Neuropathy-continue Neurontin 300 mg twice daily        CKD 3 : The patient's most recent renal function (July-2021) was within normal limits. Creatinine in January 2021 was 1.35. Obtain a comprehensive metabolic panel to evaluate the patient's renal function today continue lisinopril 10 mg orally daily. Initiate Jardiance. Meningioma: Monitoring. No follow-ups on file. An electronic signature was used to authenticate this note. TELEHEALTH EVALUATION -- Audio/Visual ([facility])    -   Tiago Mancera is a 65 y.o. female being evaluated by a Virtual Visit (video visit) encounter to address concerns as mentioned above. A caregiver was present when appropriate. Due to this being a TeleHealth encounter (During AOFS-17 public health emergency), evaluation of the following organ systems was limited: Vitals/Constitutional/EENT/Resp/CV/GI//MS/Neuro/Skin/Heme-Lymph-Imm. Pursuant to the emergency declaration under the [address] [place] [address], [address] [place] [address] authority and the [employer] Act, this Virtual Visit was conducted with patient's (and/or legal guardian's) consent, to reduce the patient's risk of exposure to COVID-19 and provide necessary medical care. The patient (and/or legal guardian) has also been advised to contact this office for worsening conditions or problems, and seek emergency medical treatment and/or call 911 if deemed necessary. Services were provided through a video synchronous discussion virtually to substitute for in-person clinic visit. Type of encounter was _x_ telephone encounter __ MyChart ___Facetime    Patient was located at their home.

## 2022-07-11 ENCOUNTER — TELEPHONE (OUTPATIENT)
Dept: FAMILY MEDICINE CLINIC | Age: 77
End: 2022-07-11

## 2022-07-11 NOTE — TELEPHONE ENCOUNTER
*1st attempt 7/11/2022 to reschedule the appointment on 7/28/2022 due to the provider being out of the office - no answer - lmovm    *2nd attempt 7/13/22 to reschedule the appointment - no answer - lmovm    *3rd attempt 7/15/22 to reschedule the appointment - no - lmovm that this was the third attempt to reschedule and the appointment was being cancelled, please call the office to schedule

## 2022-07-22 ENCOUNTER — TELEPHONE (OUTPATIENT)
Dept: INTERVENTIONAL RADIOLOGY/VASCULAR | Age: 77
End: 2022-07-22

## 2022-07-22 ENCOUNTER — INITIAL CONSULT (OUTPATIENT)
Dept: INTERVENTIONAL RADIOLOGY/VASCULAR | Age: 77
End: 2022-07-22
Payer: MEDICARE

## 2022-07-22 VITALS
BODY MASS INDEX: 32.11 KG/M2 | DIASTOLIC BLOOD PRESSURE: 58 MMHG | SYSTOLIC BLOOD PRESSURE: 91 MMHG | HEART RATE: 67 BPM | WEIGHT: 159 LBS

## 2022-07-22 DIAGNOSIS — R79.89 ELEVATED LFTS: Primary | ICD-10-CM

## 2022-07-22 DIAGNOSIS — K75.81 NASH (NONALCOHOLIC STEATOHEPATITIS): ICD-10-CM

## 2022-07-22 PROCEDURE — 99204 OFFICE O/P NEW MOD 45 MIN: CPT | Performed by: NURSE PRACTITIONER

## 2022-07-22 ASSESSMENT — ENCOUNTER SYMPTOMS
ABDOMINAL PAIN: 0
BACK PAIN: 1
EYES NEGATIVE: 1
GASTROINTESTINAL NEGATIVE: 1
ABDOMINAL DISTENTION: 0
DIARRHEA: 0
COLOR CHANGE: 0
SORE THROAT: 0
COUGH: 0
RESPIRATORY NEGATIVE: 1
VOMITING: 0
TROUBLE SWALLOWING: 0
WHEEZING: 0
SHORTNESS OF BREATH: 0
NAUSEA: 0

## 2022-07-22 NOTE — PROGRESS NOTES
VASCULAR MEDICINE AND INTERVENTIONAL RADIOLOGY DEPARTMENT:         Joe Harris, a female of 65 y.o. came to the office 7/22/2022. Chief Complaint   Patient presents with    Surgical Consult     Liver bx        HPI:Catherine Inman referred by [facility] for consultation and evaluation to have procedure percutaneous CT Guided needle random biopsy of Liver for diagnosis and staging for CORDOVA and elevated LFTs of unknown etiology. H/O DM, HTN, breast cancer. She reports occasional fatigue and chronic lower back pain. Denies chest pain. Denies dyspnea.      Family History   Problem Relation Age of Onset    Breast Cancer Mother     Heart Attack Father     Diabetes Brother        Past Surgical History:   Procedure Laterality Date    APPENDECTOMY      BREAST BIOPSY Right     malignant (16-17 yrs ago)    BREAST LUMPECTOMY Right     malignant    CARPAL TUNNEL RELEASE Left     HERNIA REPAIR      Umbilical    HYSTERECTOMY (CERVIX STATUS UNKNOWN)      total but left part of one ovary    OVARY REMOVAL      left part of one ovary    TONSILLECTOMY          Past Medical History:   Diagnosis Date    Breast cancer ([facility] [address]     right (16-17 yrs ago)    Cancer ([facility] [address]     Breast    Diabetes mellitus ([facility] [address]     History of therapeutic radiation     Hyperlipidemia     Hypertension        Social History     Socioeconomic History    Marital status:    Tobacco Use    Smoking status: Every Day     Packs/day: 0.50     Years: 50.00     Pack years: 25.00     Types: Cigarettes    Smokeless tobacco: Never   Vaping Use    Vaping Use: Never used   Substance and Sexual Activity    Alcohol use: Yes     Comment: Once a month    Drug use: Never    Sexual activity: Yes     Partners: Male     Social Determinants of Health     Financial Resource Strain: Low Risk     Difficulty of Paying Living Expenses: Not hard at all   Food Insecurity: No Food Insecurity    Worried About Running Out of Food in the Last Year: Never true congestion. Cardiovascular:      Rate and Rhythm: Normal rate. Heart sounds: Normal heart sounds. Pulmonary:      Effort: Pulmonary effort is normal.   Abdominal:      General: Bowel sounds are normal.      Palpations: Abdomen is soft. Musculoskeletal:         General: Normal range of motion. Cervical back: Normal range of motion. Right lower leg: No edema. Left lower leg: No edema. Skin:     General: Skin is warm and dry. Neurological:      Mental Status: She is alert and oriented to person, place, and time. Psychiatric:         Mood and Affect: Mood normal.         Behavior: Behavior normal.     Lab Results   Component Value Date/Time    INR 1.1 05/18/2022 03:38 PM      Component Ref Range & Units 5/2/22 1123 1/25/22 1429 10/19/20 1445   WBC 4.8 - 10.8 K/uL 5.7  8.6  7.9    RBC 4.20 - 5.40 M/uL 4.61  4.64  4.76    Hemoglobin 12.0 - 16.0 g/dL 14.7  14.5  15.3    Hematocrit 37.0 - 47.0 % 44.3  44.6  45.9    MCV 82.0 - 100.0 fL 96.1  96.0  96.6    MCH 27.0 - 31.3 pg 32.0 High   31.2  32.1 High     MCHC 33.0 - 37.0 % 33.3  32.5 Low   33.2    RDW 11.5 - 14.5 % 13.6  13.8  13.0    Platelets 835 - 028 K/uL 139  156  150        Component Ref Range & Units 5/2/22 1123 1/25/22 1429 7/29/21 1612 1/4/21 1618 10/19/20 1650 10/19/20 1527 10/19/20 1525   Sodium 135 - 144 mEq/L 139  145 High   141  143  141  136     Potassium 3.4 - 4.9 mEq/L 4.7  4.2  4.3  4.8  5.2 High   6.0 High Panic      Chloride 95 - 107 mEq/L 103  107  102  106  107  101     CO2 20 - 31 mEq/L 21  25  27  26  25  25     Anion Gap 9 - 15 mEq/L 15  13  12  11  9  10     Glucose 70 - 99 mg/dL 304 High   31 Low Panic   166 High   200 High   196 High   209 High      BUN 8 - 23 mg/dL 13  18  11  16  14  15     Creatinine 0.50 - 0.90 mg/dL 0.78  1.07 High   0.75  1.35 High   0.62  0.63     GFR Non- >60 >60.0  49.6 Low  CM  >60.0 CM  38.0 Low  CM  >60.0 CM  >60.0 CM  >60 CM    Comment: >60 mL/min/1.73m2 EGFR, calc.  for ages 25 and older using the   MDRD formula (not corrected for weight), is valid for stable   renal function. GFR  >60 >60.0  >60.0 CM  >60.0 CM  46.0 Low  CM  >60.0 CM  >60.0 CM  >60 CM    Comment: >60 mL/min/1.73m2 EGFR, calc. for ages 25 and older using the   MDRD formula (not corrected for weight), is valid for stable   renal function. Calcium 8.5 - 9.9 mg/dL 9.1  9.7  10.2 High   9.4  9.0  9.4     Total Protein 6.3 - 8.0 g/dL 7.8  7.7  8.2 High   7.8  7.1  8.0     Albumin 3.5 - 4.6 g/dL 3.9  3.8  4.3  3.9  3.8  3.9     Total Bilirubin 0.2 - 0.7 mg/dL 0.3  <0.2  <0.2  <0.2  <0.2  <0.2     Alkaline Phosphatase 40 - 130 U/L 123  101  113  103  96  92     ALT 0 - 33 U/L 66 High   29  28  40 High   42 High   46 High      AST 0 - 35 U/L 92 High   40 High   33  49 High   42 High   59 High      Globulin 2.3 - 3.5 g/dL 3.9 High   3.9 High   3.9 High   3.9 High   3.3  4.1 High      POC Creatinine        0.7       ASSESSMENT AND PLAN:  Chart review as noted of referring HCP last OV. Entire Medication list reviewed for pre operative examination. Above labs reviewed. Diagnosis Orders   1. Elevated LFTs  CT NEEDLE BIOPSY LIVER PERCUTANEOUS    CT GUIDED NEEDLE PLACEMENT    Protime-INR    CBC      2. CORDOVA (nonalcoholic steatohepatitis)  CT NEEDLE BIOPSY LIVER PERCUTANEOUS    CT GUIDED NEEDLE PLACEMENT    Protime-INR    CBC             Plan:     Orders Placed This Encounter   Procedures    CT NEEDLE BIOPSY LIVER PERCUTANEOUS     Standing Status:   Future     Standing Expiration Date:   7/22/2023     Order Specific Question:   Reason for exam:     Answer:   random    CT GUIDED NEEDLE PLACEMENT     Standing Status:   Future     Standing Expiration Date:   7/22/2023    Protime-INR     Standing Status:   Future     Standing Expiration Date:   7/22/2023     Order Specific Question:   Daily Coumadin Dose? Answer:   . .................     CBC     Standing Status:   Future     Standing Expiration Date: 7/22/2023      No orders of the defined types were placed in this encounter. --  Percutaneous CT-guided needle random biopsy of liver with conscious sedation. Procedure, preparation for, and risks including infection, bleeding, pain at site discussed with patient. There is a possibility of inconclusive results as well that was discussed with the patient, and with any procedure there is a risk of unforseen complications and/or reactions that could potentially cause severe adverse event . Patient wishes to proceed. --  Follow-up with hepatology for pathology results post biopsy. No further IR follow-up care required after biopsy. --  INR  --  CBC  --  Instruction sheet provided for patient regarding procedure pre-operative instructions. --  Hold/resume medications as directed. Vitamin E.     --  Follow up with referring Dr. Pedro Layton CNP for pathology results. No further follow up care required in IR after biopsy completed. Thank You Dr. Pedro Layton CNP for referral of your patient to our practice for care. Total time spent for this encounter:  35  minutes.     MYRON Storm - CNP

## 2022-07-29 ENCOUNTER — PRE-PROCEDURE TELEPHONE (OUTPATIENT)
Dept: CT IMAGING | Age: 77
End: 2022-07-29

## 2022-07-29 DIAGNOSIS — K75.81 NASH (NONALCOHOLIC STEATOHEPATITIS): ICD-10-CM

## 2022-07-29 DIAGNOSIS — R79.89 ELEVATED LFTS: ICD-10-CM

## 2022-07-29 LAB
HCT VFR BLD CALC: 45.7 % (ref 37–47)
HEMOGLOBIN: 14.8 G/DL (ref 12–16)
INR BLD: 1
MCH RBC QN AUTO: 31.4 PG (ref 27–31.3)
MCHC RBC AUTO-ENTMCNC: 32.5 % (ref 33–37)
MCV RBC AUTO: 96.7 FL (ref 82–100)
PDW BLD-RTO: 14.2 % (ref 11.5–14.5)
PLATELET # BLD: 150 K/UL (ref 130–400)
PROTHROMBIN TIME: 12.9 SEC (ref 12.3–14.9)
RBC # BLD: 4.73 M/UL (ref 4.2–5.4)
WBC # BLD: 6.9 K/UL (ref 4.8–10.8)

## 2022-07-29 NOTE — FLOWSHEET NOTE
Attempted to call patient, but left message due to no answer. On voicemail, reminded patient to make sure she gets her orders labs (CBC, PT/INR) drawn either today or tomorrow at the outpatient lab. Outpatient labs hours today, 630AM-530PM or Saturday 7AM-12PM. Also notified patient that labs would need to be drawn and resulted prior to her procedure that is scheduled on Monday August 1 at [facility]. If patient were to have any questions or concerns about her procedure or outpatient lab orders, the phone number to [facility] ([phone]) was also provided.

## 2022-08-01 ENCOUNTER — HOSPITAL ENCOUNTER (OUTPATIENT)
Dept: CT IMAGING | Age: 77
Discharge: HOME | End: 2022-08-03
Payer: MEDICARE

## 2022-08-01 VITALS
RESPIRATION RATE: 17 BRPM | HEART RATE: 74 BPM | SYSTOLIC BLOOD PRESSURE: 122 MMHG | OXYGEN SATURATION: 92 % | DIASTOLIC BLOOD PRESSURE: 95 MMHG

## 2022-08-01 DIAGNOSIS — R79.89 ELEVATED LFTS: ICD-10-CM

## 2022-08-01 DIAGNOSIS — K75.81 NASH (NONALCOHOLIC STEATOHEPATITIS): ICD-10-CM

## 2022-08-01 LAB
GLUCOSE BLD-MCNC: 171 MG/DL (ref 70–99)
PERFORMED ON: ABNORMAL

## 2022-08-01 PROCEDURE — 77012 CT SCAN FOR NEEDLE BIOPSY: CPT

## 2022-08-01 PROCEDURE — 77012 CT SCAN FOR NEEDLE BIOPSY: CPT | Performed by: RADIOLOGY

## 2022-08-01 PROCEDURE — 47000 NEEDLE BIOPSY OF LIVER PERQ: CPT | Performed by: RADIOLOGY

## 2022-08-01 PROCEDURE — 99152 MOD SED SAME PHYS/QHP 5/>YRS: CPT | Performed by: RADIOLOGY

## 2022-08-01 PROCEDURE — 88307 TISSUE EXAM BY PATHOLOGIST: CPT

## 2022-08-01 PROCEDURE — 47000 NEEDLE BIOPSY OF LIVER PERQ: CPT

## 2022-08-01 PROCEDURE — 6360000002 HC RX W HCPCS: Performed by: RADIOLOGY

## 2022-08-01 PROCEDURE — 2580000003 HC RX 258: Performed by: RADIOLOGY

## 2022-08-01 PROCEDURE — 88313 SPECIAL STAINS GROUP 2: CPT

## 2022-08-01 PROCEDURE — 6370000000 HC RX 637 (ALT 250 FOR IP): Performed by: RADIOLOGY

## 2022-08-01 PROCEDURE — 2500000003 HC RX 250 WO HCPCS: Performed by: RADIOLOGY

## 2022-08-01 RX ORDER — LIDOCAINE HYDROCHLORIDE 20 MG/ML
INJECTION, SOLUTION INFILTRATION; PERINEURAL
Status: COMPLETED | OUTPATIENT
Start: 2022-08-01 | End: 2022-08-01

## 2022-08-01 RX ORDER — MIDAZOLAM HYDROCHLORIDE 2 MG/2ML
INJECTION, SOLUTION INTRAMUSCULAR; INTRAVENOUS
Status: COMPLETED | OUTPATIENT
Start: 2022-08-01 | End: 2022-08-01

## 2022-08-01 RX ORDER — 0.9 % SODIUM CHLORIDE 0.9 %
INTRAVENOUS SOLUTION INTRAVENOUS CONTINUOUS PRN
Status: COMPLETED | OUTPATIENT
Start: 2022-08-01 | End: 2022-08-01

## 2022-08-01 RX ORDER — BACITRACIN, NEOMYCIN, POLYMYXIN B 400; 3.5; 5 [USP'U]/G; MG/G; [USP'U]/G
OINTMENT TOPICAL
Status: COMPLETED | OUTPATIENT
Start: 2022-08-01 | End: 2022-08-01

## 2022-08-01 RX ORDER — FENTANYL CITRATE 50 UG/ML
INJECTION, SOLUTION INTRAMUSCULAR; INTRAVENOUS
Status: COMPLETED | OUTPATIENT
Start: 2022-08-01 | End: 2022-08-01

## 2022-08-01 RX ADMIN — BACITRACIN ZINC, NEOMYCIN SULFATE, POLYMYXIN B SULFATE 1 EACH: 3.5; 5000; 4 OINTMENT TOPICAL at 09:18

## 2022-08-01 RX ADMIN — FENTANYL CITRATE 50 MCG: 50 INJECTION, SOLUTION INTRAMUSCULAR; INTRAVENOUS at 09:10

## 2022-08-01 RX ADMIN — SODIUM CHLORIDE 250 ML: 9 INJECTION, SOLUTION INTRAVENOUS at 09:03

## 2022-08-01 RX ADMIN — MIDAZOLAM HYDROCHLORIDE 0.5 MG: 1 INJECTION, SOLUTION INTRAMUSCULAR; INTRAVENOUS at 09:10

## 2022-08-01 RX ADMIN — GELATIN ABSORBABLE SPONGE 12-7 MM 1 EACH: 12-7 MISC at 09:17

## 2022-08-01 RX ADMIN — LIDOCAINE HYDROCHLORIDE 10 ML: 20 INJECTION, SOLUTION INFILTRATION; PERINEURAL at 09:12

## 2022-08-01 ASSESSMENT — ENCOUNTER SYMPTOMS
GASTROINTESTINAL NEGATIVE: 1
WHEEZING: 0
BACK PAIN: 1
NAUSEA: 0
COUGH: 0
SHORTNESS OF BREATH: 0
SORE THROAT: 0
ABDOMINAL PAIN: 0
COLOR CHANGE: 0
RESPIRATORY NEGATIVE: 1
ABDOMINAL DISTENTION: 0
TROUBLE SWALLOWING: 0
DIARRHEA: 0
VOMITING: 0
EYES NEGATIVE: 1

## 2022-08-01 NOTE — DISCHARGE INSTRUCTIONS
BIOPSY DISCHARGE INSTRUCTIONS    ACTIVITY:   Rest today. Expect to be sore for 1 to 2 days. No heavy bending, lifting , stretching, pushing or pulling for at least 24 hours. Do not lift anything over 10 pounds for the next 24 - 48 hours. Do not drive today. BATHING:   May shower, bathe, or swim after 24 hours. Pat the site dry. May remove large band aid after 24 hours. DIET:   May resume your normal diet. MEDICATION:  * Resume home medication unless otherwise instructed by your physician. * (If Applicable) If taking any blood thinners or Aspirin, hold for 24 hours after biopsy. * May take mild pain reliever, as needed, such as Acetaminophen or Ibuprofen. COMPLICATIONS RELATED TO BIOPSY:   - Dizziness, weakness, fatigue  - Bruising/firmness/ and/or pain at the site. - Extreme pain after leaving the hospital.   - Large or heavy bleeding at biopsy site. IF THESE SYMPTOMS OCCUR AND BECOME SEVERE, REPORT TO THE EMERGENCY ROOM FOR FURTHER EVALUATION. For the next 48 hours watch site for redness, drainage, swelling , fever (temp above 101 degrees F), or chills. If these signs of infection occur contact DR. Piotr Sosa at [phone].

## 2022-08-01 NOTE — PRE SEDATION
Sedation Pre-Procedure Note    Patient Name: Viraj Ashraf   YOB: 1945  Room/Bed: Room/bed info not found  Medical Record Number: [id number]  Date: 8/1/2022   Time: 8:47 AM       Indication:  Liver biopsy with CT guidance    Consent: I have discussed with the patient and/or the patient representative the indication, alternatives, and the possible risks and/or complications of the planned procedure and the anesthesia methods. The patient and/or patient representative appear to understand and agree to proceed. Vital Signs:   Vitals:    08/01/22 0823   BP: 116/63   Pulse: 77   Resp: 16   SpO2: 94%       Past Medical History:   has a past medical history of Breast cancer ([facility] [address] Cancer ([facility] [address] Diabetes mellitus ([facility] [address] History of therapeutic radiation, Hyperlipidemia, and Hypertension. Past Surgical History:   has a past surgical history that includes Appendectomy; hernia repair; Tonsillectomy; Carpal tunnel release (Left); Breast biopsy (Right); Hysterectomy; Breast lumpectomy (Right); and Ovary removal.    Medications:   Scheduled Meds:   Continuous Infusions:   PRN Meds:   Home Meds:   Prior to Admission medications    Medication Sig Start Date End Date Taking? Authorizing Provider   phentermine (ADIPEX-P) 37.5 MG tablet Take 1 tablet by mouth every morning (before breakfast) for 31 days.  7/11/22 8/11/22  Alex Gross MD   gabapentin (NEURONTIN) 300 MG capsule TAKE 1 CAPSULE BY MOUTH TWO TIMES A DAY 6/15/22 7/15/22  Alex Gross MD   primidone (MYSOLINE) 50 MG tablet TAKE 1 TABLET BY MOUTH TWO TIMES A DAY 6/15/22   Alex Gross MD   cyclobenzaprine (FLEXERIL) 10 MG tablet TAKE 1 TABLET BY MOUTH NIGHTLY AS NEEDED FOR MUSCLE SPASMS  Patient not taking: Reported on 8/1/2022 4/15/22   Historical Provider, MD   Continuous Blood Gluc Sensor (DEXCOM G6 SENSOR) MISC Use daily to assess blood sugars 5/9/22   Alex Gross MD   Continuous Blood Gluc Transmit (DEXCOM G6 TRANSMITTER) MISC Use daily to assess blood sugars 5/9/22   Miriam Cleveland MD   NOVOLOG FLEXPEN 100 UNIT/ML injection pen inject 18 units subcutaneously three times a day before meals  Patient not taking: Reported on 8/1/2022 4/26/22   Miriam Cleveland MD   Insulin Regular Human (NOVOLIN R FLEXPEN) 100 UNIT/ML SOPN Inject 10 Units as directed every evening  Patient not taking: Reported on 8/1/2022 4/26/22   Miriam Cleveland MD   insulin aspart (NOVOLOG FLEXPEN) 100 UNIT/ML injection pen INJECT 18 UNITS SUBCUTANEOUSLY THREE TIMES A DAY BEFORE MEALS 4/26/22   Miriam Cleveland MD   insulin glargine (LANTUS) 100 UNIT/ML injection vial Inject 10 Units into the skin nightly    Historical Provider, MD   empagliflozin (JARDIANCE) 10 MG tablet Take 1 tablet by mouth daily  Patient not taking: Reported on 8/1/2022 4/25/22   Miriam Cleveland MD   atorvastatin (LIPITOR) 40 MG tablet TAKE 1 TABLET BY MOUTH EVERY DAY 2/28/22   Miriam Cleveland MD   Handicap Placard MISC by Does not apply route Diagnosis: COPD  Duration 6/14/2021-6/14/2023 6/14/21   Miriam Cleveland MD   Alcohol Swabs (ALCOHOL PADS) 70 % PADS Use, as directed, three times a day to test blood sugar 4/30/21   Historical Provider, MD   Blood Glucose Calibration (OT ULTRA/FASTTK CNTRL SOLN) SOLN USE TO CONFIRM ACCURACY OF GLUCOSE METER 4/30/21   Historical Provider, MD   Blood Glucose Monitoring Suppl (ONE TOUCH ULTRA 2) w/Device KIT Use, as directed, three times a day to test blood sugar 4/30/21   Historical ProviderMD Leo strip Use, as directed, three times a day to test blood sugar 4/30/21   Historical ProviderMD CHENEY COMFORT PEN NEEDLES 32G X 4 MM MISC Use to inject insulin 3 times a day as directed 4/30/21   Historical Provider, MD   Lancet Devices (EASY MINI EJECT LANCING DEVICE) MISC Use, as directed, three times a day to test blood sugar 4/30/21   Historical Provider, MD   Easy Comfort Lancets MISC Use, as directed, three times a day to test blood sugar 4/30/21 Historical Provider, MD   lisinopril (PRINIVIL;ZESTRIL) 10 MG tablet Take 1 tablet by mouth daily 5/4/21   Meagan Reid MD   docusate sodium (COLACE) 100 MG capsule Take 1 capsule by mouth 2 times daily  Patient not taking: Reported on 8/1/2022 4/27/21   Meagan Reid MD     Coumadin Use Last 7 Days:  no  Antiplatelet drug therapy use last 7 days: no  Other anticoagulant use last 7 days: no  Additional Medication Information:  none      Pre-Sedation Documentation and Exam:   I have personally completed a history, physical exam & review of systems for this patient (see notes).     Mallampati Airway Assessment:  normal    Prior History of Anesthesia Complications:   none    ASA Classification:  Class 1 - A normal healthy patient    Sedation/ Anesthesia Plan:   intravenous sedation    Medications Planned:   midazolam (Versed) intravenously and fentanyl intravenously    Patient is an appropriate candidate for plan of sedation: yes    Electronically signed by Bri De La O MD on 8/1/2022 at 8:47 AM

## 2022-08-01 NOTE — FLOWSHEET NOTE
Pt arrived to CT holding. Pt changed into gown. Pt hooked up to vitals sign machine. VSS. Medical history, allergies, and home medications reviewed with patient. Consents reviewed with patient and signed. Pt resting in cart in stable condition. Iv established. Tele strip obtained. Electronically signed by sAhley Byers RN on 8/1/2022 at 8:36 AM    Pt states only medication taken this am was her blood pressure medication. NPO since midnight confirmed. Electronically signed by Ashley Byers RN on 8/1/2022 at 8:37 AM    Glucose checked and is 171. Dr. Minnie Claire in to see patient and answer any and all questions. Electronically signed by Ashley Byers RN on 8/1/2022 at 8:43 AM      Pt arrived back to ct holding. Pt reports 0/10 pain to biopsy site. Pt able to move all extremities without complication. Pts VSS. Pt is to l;ay on R side for two hrs per Dr. Minnie Claire prior to DC. Pt provided WO Funding tray and coffee.  at bedside. Electronically signed by Ashley Byres RN on 8/1/2022 at 9:37 AM    Biopsy site checked and is clean, dry, intact. Electronically signed by Ashley Byers RN on 8/1/2022 at 9:48 AM    Iv removed. Pt dressing into street clothes at this time. Discharge instructions provided and reviewed with patient. Pt has no questions or concerns at this time. Electronically signed by Ashley Byers RN on 8/1/2022 at 11:01 AM    Pt taken to discharge exit via wheelchair. Pts  to drive her home.  Electronically signed by Ashley Byers RN on 8/1/2022 at 11:30 AM

## 2022-08-01 NOTE — OR NURSING
SEDATION ADMINISTERED    0851 Patient positioned supine on CT table. Vitals Monitor applied. VSS. CT scans done. [address] [facility] completed for cat scan guided needle random liver biopsy with conscious sedation. Versed 0.5 mg IV and Fentanyl 50 mcg IV sedation administered. 5665 Dr Andry Marlow reviewed scans, R ribcage site marked, prepped with Chloraprep and draped with sterile drape and towels. 7453 Skin numbed with Lidocaine 2% by Dr Andry Marlow. Deeper numbing administered to same site with spinal needle. 4955 [employer] Disposable core biopsy instrument kit 18 g x 10 cm used to obtain a total of 2 samples. Samples placed into formalin. Verbal and tactile reassurance provided throughout. 5240 Gelfoam inserted, Introducer withdrawn, pressure held then neosporin and bandaid applied. Patient tolerated well. Specimen taken to lab. Patient taken to CT Holding Room for Recovery.     Total Sedation Given:  Versed:     0.5 mg       Fentanyl:    50 mcg

## 2022-08-01 NOTE — H&P
VASCULAR MEDICINE AND INTERVENTIONAL RADIOLOGY DEPARTMENT:       Note updated from clinic visit by Tulio SANCHEZ. No changes. Griselda Profit, a female of 65 y.o. came to the office 7/22/2022. Chief Complaint   Patient presents with    Surgical Consult     Liver bx        HPI:Catherine Inman referred by [facility] for consultation and evaluation to have procedure percutaneous CT Guided needle random biopsy of Liver for diagnosis and staging for CORDOVA and elevated LFTs of unknown etiology. H/O DM, HTN, breast cancer. She reports occasional fatigue and chronic lower back pain. Denies chest pain. Denies dyspnea.      Family History   Problem Relation Age of Onset    Breast Cancer Mother     Heart Attack Father     Diabetes Brother        Past Surgical History:   Procedure Laterality Date    APPENDECTOMY      BREAST BIOPSY Right     malignant (16-17 yrs ago)    BREAST LUMPECTOMY Right     malignant    CARPAL TUNNEL RELEASE Left     HERNIA REPAIR      Umbilical    HYSTERECTOMY (CERVIX STATUS UNKNOWN)      total but left part of one ovary    OVARY REMOVAL      left part of one ovary    TONSILLECTOMY          Past Medical History:   Diagnosis Date    Breast cancer ([facility] [address]     right (16-17 yrs ago)    Cancer ([facility] [address]     Breast    Diabetes mellitus ([facility] [address]     History of therapeutic radiation     Hyperlipidemia     Hypertension        Social History     Socioeconomic History    Marital status:    Tobacco Use    Smoking status: Every Day     Packs/day: 0.50     Years: 50.00     Pack years: 25.00     Types: Cigarettes    Smokeless tobacco: Never   Vaping Use    Vaping Use: Never used   Substance and Sexual Activity    Alcohol use: Yes     Comment: Once a month    Drug use: Never    Sexual activity: Yes     Partners: Male     Social Determinants of Health     Financial Resource Strain: Low Risk     Difficulty of Paying Living Expenses: Not hard at all   Food Insecurity: No Food Insecurity Worried About Running Out of Food in the Last Year: Never true    Ran Out of Food in the Last Year: Never true   Physical Activity: Inactive    Days of Exercise per Week: 0 days    Minutes of Exercise per Session: 0 min       No Known Allergies    Current Outpatient Medications on File Prior to Visit   Medication Sig Dispense Refill    phentermine (ADIPEX-P) 37.5 MG tablet Take 1 tablet by mouth every morning (before breakfast) for 31 days.  30 tablet 1    gabapentin (NEURONTIN) 300 MG capsule TAKE 1 CAPSULE BY MOUTH TWO TIMES A DAY 60 capsule 0    primidone (MYSOLINE) 50 MG tablet TAKE 1 TABLET BY MOUTH TWO TIMES A DAY 90 tablet 0    cyclobenzaprine (FLEXERIL) 10 MG tablet TAKE 1 TABLET BY MOUTH NIGHTLY AS NEEDED FOR MUSCLE SPASMS      Continuous Blood Gluc Sensor (DEXCOM G6 SENSOR) MISC Use daily to assess blood sugars 1 each 5    Continuous Blood Gluc Transmit (DEXCOM G6 TRANSMITTER) MISC Use daily to assess blood sugars 1 each 5    NOVOLOG FLEXPEN 100 UNIT/ML injection pen inject 18 units subcutaneously three times a day before meals 15 mL 0    Insulin Regular Human (NOVOLIN R FLEXPEN) 100 UNIT/ML SOPN Inject 10 Units as directed every evening 1 pen 5    insulin aspart (NOVOLOG FLEXPEN) 100 UNIT/ML injection pen INJECT 18 UNITS SUBCUTANEOUSLY THREE TIMES A DAY BEFORE MEALS 15 mL 0    insulin glargine (LANTUS) 100 UNIT/ML injection vial Inject 10 Units into the skin nightly      empagliflozin (JARDIANCE) 10 MG tablet Take 1 tablet by mouth daily 14 tablet 0    atorvastatin (LIPITOR) 40 MG tablet TAKE 1 TABLET BY MOUTH EVERY DAY 90 tablet 4    Handicap Placard MISC by Does not apply route Diagnosis: COPD  Duration 6/14/2021-6/14/2023 1 each 0    Alcohol Swabs (ALCOHOL PADS) 70 % PADS Use, as directed, three times a day to test blood sugar      Blood Glucose Calibration (OT ULTRA/FASTTK CNTRL SOLN) SOLN USE TO CONFIRM ACCURACY OF GLUCOSE METER      Blood Glucose Monitoring Suppl (ONE TOUCH ULTRA 2) w/Device KIT Use, as directed, three times a day to test blood sugar      ONETOUCH ULTRA strip Use, as directed, three times a day to test blood sugar      EASY COMFORT PEN NEEDLES 32G X 4 MM MISC Use to inject insulin 3 times a day as directed      Lancet Devices (EASY MINI EJECT LANCING DEVICE) MISC Use, as directed, three times a day to test blood sugar      Easy Comfort Lancets MISC Use, as directed, three times a day to test blood sugar      lisinopril (PRINIVIL;ZESTRIL) 10 MG tablet Take 1 tablet by mouth daily 90 tablet 3    docusate sodium (COLACE) 100 MG capsule Take 1 capsule by mouth 2 times daily 14 capsule 0     No current facility-administered medications on file prior to visit. Review of Systems   Constitutional:  Positive for fatigue. Negative for chills and fever. HENT: Negative. Negative for congestion, ear pain, sore throat and trouble swallowing. Eyes: Negative. Negative for visual disturbance. Respiratory: Negative. Negative for cough, shortness of breath and wheezing. Cardiovascular: Negative. Negative for chest pain, palpitations and leg swelling. Gastrointestinal: Negative. Negative for abdominal distention, abdominal pain, diarrhea, nausea and vomiting. Endocrine: Negative. Genitourinary: Negative. Negative for difficulty urinating, dysuria and hematuria. Musculoskeletal:  Positive for back pain (chronic lower back). Negative for gait problem, neck pain and neck stiffness. Skin: Negative. Negative for color change, rash and wound. Neurological:  Negative for dizziness, weakness, light-headedness, numbness and headaches. Hematological: Negative. Does not bruise/bleed easily. Psychiatric/Behavioral: Negative. The patient is not nervous/anxious. All other systems reviewed and are negative. OBJECTIVE:  Wt 159 lb (72.1 kg)   BMI 32.11 kg/m²     Physical Exam  Constitutional:       General: She is not in acute distress. Appearance: Normal appearance.  She is not ill-appearing. HENT:      Head: Normocephalic. Nose: No congestion. Cardiovascular:      Rate and Rhythm: Normal rate. Heart sounds: Normal heart sounds. Pulmonary:      Effort: Pulmonary effort is normal.   Abdominal:      General: Bowel sounds are normal.      Palpations: Abdomen is soft. Musculoskeletal:         General: Normal range of motion. Cervical back: Normal range of motion. Right lower leg: No edema. Left lower leg: No edema. Skin:     General: Skin is warm and dry. Neurological:      Mental Status: She is alert and oriented to person, place, and time.    Psychiatric:         Mood and Affect: Mood normal.         Behavior: Behavior normal.     Lab Results   Component Value Date/Time    INR 1.1 05/18/2022 03:38 PM      Component Ref Range & Units 5/2/22 1123 1/25/22 1429 10/19/20 1445   WBC 4.8 - 10.8 K/uL 5.7  8.6  7.9    RBC 4.20 - 5.40 M/uL 4.61  4.64  4.76    Hemoglobin 12.0 - 16.0 g/dL 14.7  14.5  15.3    Hematocrit 37.0 - 47.0 % 44.3  44.6  45.9    MCV 82.0 - 100.0 fL 96.1  96.0  96.6    MCH 27.0 - 31.3 pg 32.0 High   31.2  32.1 High     MCHC 33.0 - 37.0 % 33.3  32.5 Low   33.2    RDW 11.5 - 14.5 % 13.6  13.8  13.0    Platelets 610 - 605 K/uL 139  156  150        Component Ref Range & Units 5/2/22 1123 1/25/22 1429 7/29/21 1612 1/4/21 1618 10/19/20 1650 10/19/20 1527 10/19/20 1525   Sodium 135 - 144 mEq/L 139  145 High   141  143  141  136     Potassium 3.4 - 4.9 mEq/L 4.7  4.2  4.3  4.8  5.2 High   6.0 High Panic      Chloride 95 - 107 mEq/L 103  107  102  106  107  101     CO2 20 - 31 mEq/L 21  25  27  26  25  25     Anion Gap 9 - 15 mEq/L 15  13  12  11  9  10     Glucose 70 - 99 mg/dL 304 High   31 Low Panic   166 High   200 High   196 High   209 High      BUN 8 - 23 mg/dL 13  18  11  16  14  15     Creatinine 0.50 - 0.90 mg/dL 0.78  1.07 High   0.75  1.35 High   0.62  0.63     GFR Non- >60 >60.0  49.6 Low  CM  >60.0 CM  38.0 Low  CM  >60.0 CM >60.0 CM  >60 CM    Comment: >60 mL/min/1.73m2 EGFR, calc. for ages 25 and older using the   MDRD formula (not corrected for weight), is valid for stable   renal function. GFR  >60 >60.0  >60.0 CM  >60.0 CM  46.0 Low  CM  >60.0 CM  >60.0 CM  >60 CM    Comment: >60 mL/min/1.73m2 EGFR, calc. for ages 25 and older using the   MDRD formula (not corrected for weight), is valid for stable   renal function. Calcium 8.5 - 9.9 mg/dL 9.1  9.7  10.2 High   9.4  9.0  9.4     Total Protein 6.3 - 8.0 g/dL 7.8  7.7  8.2 High   7.8  7.1  8.0     Albumin 3.5 - 4.6 g/dL 3.9  3.8  4.3  3.9  3.8  3.9     Total Bilirubin 0.2 - 0.7 mg/dL 0.3  <0.2  <0.2  <0.2  <0.2  <0.2     Alkaline Phosphatase 40 - 130 U/L 123  101  113  103  96  92     ALT 0 - 33 U/L 66 High   29  28  40 High   42 High   46 High      AST 0 - 35 U/L 92 High   40 High   33  49 High   42 High   59 High      Globulin 2.3 - 3.5 g/dL 3.9 High   3.9 High   3.9 High   3.9 High   3.3  4.1 High      POC Creatinine        0.7       ASSESSMENT AND PLAN:  Chart review as noted of referring HCP last OV. Entire Medication list reviewed for pre operative examination. Above labs reviewed. Diagnosis Orders   1. Elevated LFTs  CT NEEDLE BIOPSY LIVER PERCUTANEOUS    CT GUIDED NEEDLE PLACEMENT    Protime-INR    CBC      2. CORDOVA (nonalcoholic steatohepatitis)  CT NEEDLE BIOPSY LIVER PERCUTANEOUS    CT GUIDED NEEDLE PLACEMENT    Protime-INR    CBC             Plan:     Orders Placed This Encounter   Procedures    CT NEEDLE BIOPSY LIVER PERCUTANEOUS     Standing Status:   Future     Standing Expiration Date:   7/22/2023     Order Specific Question:   Reason for exam:     Answer:   random    CT GUIDED NEEDLE PLACEMENT     Standing Status:   Future     Standing Expiration Date:   7/22/2023    Protime-INR     Standing Status:   Future     Standing Expiration Date:   7/22/2023     Order Specific Question:   Daily Coumadin Dose? Answer:   . .................     CBC Standing Status:   Future     Standing Expiration Date:   7/22/2023      No orders of the defined types were placed in this encounter. --  Percutaneous CT-guided needle random biopsy of liver with conscious sedation. Procedure, preparation for, and risks including infection, bleeding, pain at site discussed with patient. There is a possibility of inconclusive results as well that was discussed with the patient, and with any procedure there is a risk of unforseen complications and/or reactions that could potentially cause severe adverse event . Patient wishes to proceed. --  Follow-up with hepatology for pathology results post biopsy. No further IR follow-up care required after biopsy. --  INR  --  CBC  --  Instruction sheet provided for patient regarding procedure pre-operative instructions. --  Hold/resume medications as directed. Vitamin E.     --  Follow up with referring Dr. Columba Moraes CNP for pathology results. No further follow up care required in IR after biopsy completed. Thank You Dr. Columba Moraes CNP for referral of your patient to our practice for care. Total time spent for this encounter:  35  minutes.     MYRON Dowell - JOHNNA

## 2022-08-02 ENCOUNTER — TELEPHONE (OUTPATIENT)
Dept: FAMILY MEDICINE CLINIC | Age: 77
End: 2022-08-02

## 2022-08-18 ENCOUNTER — OFFICE VISIT (OUTPATIENT)
Dept: GASTROENTEROLOGY | Age: 77
End: 2022-08-18
Payer: MEDICARE

## 2022-08-18 VITALS
HEART RATE: 90 BPM | OXYGEN SATURATION: 92 % | SYSTOLIC BLOOD PRESSURE: 116 MMHG | BODY MASS INDEX: 30.9 KG/M2 | DIASTOLIC BLOOD PRESSURE: 70 MMHG | WEIGHT: 153 LBS

## 2022-08-18 DIAGNOSIS — R74.8 ABNORMAL LIVER ENZYMES: Primary | ICD-10-CM

## 2022-08-18 PROCEDURE — 99213 OFFICE O/P EST LOW 20 MIN: CPT | Performed by: NURSE PRACTITIONER

## 2022-08-18 PROCEDURE — 1123F ACP DISCUSS/DSCN MKR DOCD: CPT | Performed by: NURSE PRACTITIONER

## 2022-08-18 ASSESSMENT — ENCOUNTER SYMPTOMS
BLOOD IN STOOL: 0
DIARRHEA: 0
EYE PAIN: 0
RECTAL PAIN: 0
NAUSEA: 0
VOICE CHANGE: 0
VOMITING: 0
ABDOMINAL DISTENTION: 0
PHOTOPHOBIA: 0
WHEEZING: 0
COLOR CHANGE: 0
ANAL BLEEDING: 0
CHEST TIGHTNESS: 0
ABDOMINAL PAIN: 0
CONSTIPATION: 0
TROUBLE SWALLOWING: 0
EYE REDNESS: 0
SHORTNESS OF BREATH: 0

## 2022-08-18 NOTE — PROGRESS NOTES
Denies history of jaundice, easy bruising admission related to liver condition. Denies any hematemesis melena or hematochezia. .  Given the the radiological evidence of cirrhosis patient referred to us for the evaluation. Noted ultrasound showed hepatomegaly. Reviewed lab work showed proceed transaminases on serial testing.     Past Medical History:   Diagnosis Date    Breast cancer ([facility] [address]     right (16-17 yrs ago)    Cancer ([facility] [address]     Breast    Diabetes mellitus ([facility] [address]     History of therapeutic radiation     Hyperlipidemia     Hypertension      Past Surgical History:   Procedure Laterality Date    APPENDECTOMY      BREAST BIOPSY Right     malignant (16-17 yrs ago)    BREAST LUMPECTOMY Right     malignant    CARPAL TUNNEL RELEASE Left     CT NEEDLE BIOPSY LIVER PERCUTANEOUS Right 08/01/2022    Performed by Dr. Dean Patton - diagnostics sent    CT NEEDLE BIOPSY LIVER PERCUTANEOUS  8/1/2022    CT NEEDLE BIOPSY LIVER PERCUTANEOUS 8/1/2022 [facility] CT SCAN    HERNIA REPAIR      Umbilical    HYSTERECTOMY (CERVIX STATUS UNKNOWN)      total but left part of one ovary    OVARY REMOVAL      left part of one ovary    TONSILLECTOMY       Social History     Socioeconomic History    Marital status:      Spouse name: Not on file    Number of children: Not on file    Years of education: Not on file    Highest education level: Not on file   Occupational History    Not on file   Tobacco Use    Smoking status: Every Day     Packs/day: 0.50     Years: 50.00     Pack years: 25.00     Types: Cigarettes    Smokeless tobacco: Never   Vaping Use    Vaping Use: Never used   Substance and Sexual Activity    Alcohol use: Yes     Comment: Once a month    Drug use: Never    Sexual activity: Yes     Partners: Male   Other Topics Concern    Not on file   Social History Narrative    Not on file     Social Determinants of Health     Financial Resource Strain: Low Risk     Difficulty of Paying Living Expenses: Not hard at all   Food Insecurity: No Food Insecurity    Worried About Running Out of Food in the Last Year: Never true    Ran Out of Food in the Last Year: Never true   Transportation Needs: Not on file   Physical Activity: Inactive    Days of Exercise per Week: 0 days    Minutes of Exercise per Session: 0 min   Stress: Not on file   Social Connections: Not on file   Intimate Partner Violence: Not on file   Housing Stability: Not on file     Family History   Problem Relation Age of Onset    Breast Cancer Mother     Heart Attack Father     Diabetes Brother      No Known Allergies      Review of Systems   Constitutional:  Negative for appetite change, chills, fever and unexpected weight change. HENT:  Negative for nosebleeds, tinnitus, trouble swallowing and voice change. Eyes:  Negative for photophobia, pain and redness. Respiratory:  Negative for chest tightness, shortness of breath and wheezing. Cardiovascular:  Negative for chest pain, palpitations and leg swelling. Gastrointestinal:  Negative for abdominal distention, abdominal pain, anal bleeding, blood in stool, constipation, diarrhea, nausea, rectal pain and vomiting. Endocrine: Negative for polydipsia, polyphagia and polyuria. Genitourinary:  Negative for difficulty urinating and hematuria. Skin:  Negative for color change, pallor and rash. Neurological:  Negative for dizziness, speech difficulty and headaches. Psychiatric/Behavioral:  Negative for confusion and suicidal ideas. Objective:   /70 (Site: Right Upper Arm, Position: Sitting, Cuff Size: Small Adult)   Pulse 90   Wt 153 lb (69.4 kg)   SpO2 92%   BMI 30.90 kg/m²     Physical Exam  Vitals reviewed. Constitutional:       General: She is not in acute distress. Appearance: Normal appearance. She is well-developed and well-groomed. HENT:      Head: Normocephalic and atraumatic. Nose: Nose normal.   Eyes:      General: No scleral icterus. Extraocular Movements: Extraocular movements intact. Conjunctiva/sclera: Conjunctivae normal.      Pupils: Pupils are equal, round, and reactive to light. Cardiovascular:      Rate and Rhythm: Normal rate and regular rhythm. Pulses: Normal pulses. Heart sounds: Normal heart sounds. Pulmonary:      Effort: Pulmonary effort is normal. No respiratory distress. Breath sounds: Normal breath sounds. No wheezing or rales. Abdominal:      General: Abdomen is flat. Bowel sounds are normal. There is no distension. Palpations: Abdomen is soft. There is no hepatomegaly, splenomegaly or mass. Tenderness: There is no abdominal tenderness. There is no guarding or rebound. Musculoskeletal:         General: No tenderness or deformity. Normal range of motion. Cervical back: Neck supple. Right lower leg: No edema. Left lower leg: No edema. Skin:     General: Skin is warm and dry. Capillary Refill: Capillary refill takes less than 2 seconds. Coloration: Skin is not jaundiced. Findings: No erythema or rash. Neurological:      General: No focal deficit present. Mental Status: She is alert and oriented to person, place, and time.    Psychiatric:         Mood and Affect: Mood normal.         Behavior: Behavior normal.       Laboratory, Pathology, Radiology reviewed in detail with relevantimportant investigations summarized below:  Lab Results   Component Value Date/Time    WBC 6.9 07/29/2022 10:58 AM    WBC 5.7 05/02/2022 11:23 AM    WBC 8.6 01/25/2022 02:29 PM    WBC 7.9 10/19/2020 02:45 PM    HGB 14.8 07/29/2022 10:58 AM    HGB 14.7 05/02/2022 11:23 AM    HGB 14.5 01/25/2022 02:29 PM    HGB 15.3 10/19/2020 02:45 PM    HCT 45.7 07/29/2022 10:58 AM    HCT 44.3 05/02/2022 11:23 AM    HCT 44.6 01/25/2022 02:29 PM    HCT 45.9 10/19/2020 02:45 PM    MCV 96.7 07/29/2022 10:58 AM    MCV 96.1 05/02/2022 11:23 AM    MCV 96.0 01/25/2022 02:29 PM    MCV 96.6 10/19/2020 02:45 PM     07/29/2022 10:58 AM     05/02/2022 11:23 AM     01/25/2022 02:29 PM     10/19/2020 02:45 PM    .  Lab Results   Component Value Date/Time    ALT 66 05/02/2022 11:23 AM    ALT 29 01/25/2022 02:29 PM    ALT 28 07/29/2021 04:12 PM    AST 92 05/02/2022 11:23 AM    AST 40 01/25/2022 02:29 PM    AST 33 07/29/2021 04:12 PM    ALKPHOS 123 05/02/2022 11:23 AM    ALKPHOS 101 01/25/2022 02:29 PM    XMVZQHU 362 07/29/2021 04:12 PM    BILITOT 0.3 05/02/2022 11:23 AM    BILITOT <0.2 01/25/2022 02:29 PM    BILITOT <0.2 07/29/2021 04:12 PM       CT GUIDED NEEDLE PLACEMENT    Result Date: 8/1/2022  1. Successful core biopsy of right liver lobe parenchyma. 2.Gelfoam torpedoes were then injected after biopsy for immediate hemostasis. HISTORY: Vicente Cabezas is a Female of 65 years age. DIAGNOSIS:  R79.89 Elevated LFTs ICD10 COMPARISON: None available. CT Dose-Length Product (estimate related to radiation exposure from this exam):  418.15  mGy*cm. PROCEDURE: Following the discussion of the procedure, alternatives, risks versus benefits, informed consent was obtained from the patient. Specifically, risks of after-biopsy pain at the site, rare possibility of excessive hemorrhage, infection, injury to the adjacent organs were discussed and the patient verbalized understanding. Pre-procedure evaluation confirmed that the patient was an appropriate candidate for conscious sedation. Adequate sedation was maintained during the entire procedure. Vital signs, pulse oximetry, and response to verbal commands were monitored and recorded by the nurse throughout the procedure and the recovery period. Medical information was entered in the medical record including the medications and dosages used. The patient returned to baseline neurologic and physiologic status prior to leaving the department. No immediate sedation related complications were noted. Medication for conscious sedation was administered via IV route. 45 minutes of conscious sedation was provided. Following universal protocol, patient and site verification was performed with a \"timeout\" prior to the procedure. The patient was placed on the CT table in supine  position and the right lateral abdomen area was prepped and draped in usual sterile fashion. Using the usual sterile conditions, lidocaine and CT guidance, the right liver lobe parenchyma was accessed using an 18-guage coaxial biopsy needle system. After confirmation of appropriate localization of the needle, total of 2 samples were obtained and sent for pathological analysis. Gelfoam torpedoes were then injected through coaxial needle after biopsy for immediate hemostasis. The coaxial needle system was removed and hemostasis was achieved by direct digital compression. The patient tolerated the procedure well. No immediate complications identified. The patient left the CT suite in right side down lateral position to the recovery room in stable condition. Total local anesthetic used: lidocaine, approximately 8 mL. Estimated blood loss:  Negligible. The patient was observed in the recovery room for two hours after the procedure and discharged in stable condition. CT NEEDLE BIOPSY LIVER PERCUTANEOUS    Result Date: 8/1/2022  1. Successful core biopsy of right liver lobe parenchyma. 2.Gelfoam torpedoes were then injected after biopsy for immediate hemostasis. HISTORY: Daniel Perkins is a Female of 65 years age. DIAGNOSIS:  R79.89 Elevated LFTs ICD10 COMPARISON: None available. CT Dose-Length Product (estimate related to radiation exposure from this exam):  418.15  mGy*cm. PROCEDURE: Following the discussion of the procedure, alternatives, risks versus benefits, informed consent was obtained from the patient. Specifically, risks of after-biopsy pain at the site, rare possibility of excessive hemorrhage, infection, injury to the adjacent organs were discussed and the patient verbalized understanding.  Pre-procedure evaluation confirmed that the patient was an appropriate candidate for conscious sedation. Adequate sedation was maintained during the entire procedure. Vital signs, pulse oximetry, and response to verbal commands were monitored and recorded by the nurse throughout the procedure and the recovery period. Medical information was entered in the medical record including the medications and dosages used. The patient returned to baseline neurologic and physiologic status prior to leaving the department. No immediate sedation related complications were noted. Medication for conscious sedation was administered via IV route. 45 minutes of conscious sedation was provided. Following universal protocol, patient and site verification was performed with a \"timeout\" prior to the procedure. The patient was placed on the CT table in supine  position and the right lateral abdomen area was prepped and draped in usual sterile fashion. Using the usual sterile conditions, lidocaine and CT guidance, the right liver lobe parenchyma was accessed using an 18-guage coaxial biopsy needle system. After confirmation of appropriate localization of the needle, total of 2 samples were obtained and sent for pathological analysis. Gelfoam torpedoes were then injected through coaxial needle after biopsy for immediate hemostasis. The coaxial needle system was removed and hemostasis was achieved by direct digital compression. The patient tolerated the procedure well. No immediate complications identified. The patient left the CT suite in right side down lateral position to the recovery room in stable condition. Total local anesthetic used: lidocaine, approximately 8 mL. Estimated blood loss:  Negligible. The patient was observed in the recovery room for two hours after the procedure and discharged in stable condition.      Lab Results   Component Value Date/Time    IRON 124 05/18/2022 03:38 PM    TIBC 353 05/18/2022 03:38 PM    FERRITIN 165 05/18/2022 03:38 PM     Lab Results Component Value Date/Time    INR 1.0 07/29/2022 10:58 AM    INR 1.1 05/18/2022 03:38 PM     No components found for: ACUTEHEPATITISSCREEN  No components found for: CELIACPANEL  No components found for: STOOLCULTURE, C.DIFF, STOOLOVAPARASITE, STOOLLEUCOCYTE    Findings:       Median kPa:  12.3   IQR/med (%):  15       Controlled Attenuation Paramater (CAP)   Median (dB/m):  295   IQR (%):  20       Interpretation:   Appropriate reading, Based on LSM of 12.3 Kpa, suspicion for advanced    fibrosis. Fibrosis stage III-IV ( F3-4)   Based on CAP of 295 db/M, moderate to severe steatosis (> 33%) - S2-3. Clinical, lab and radiological correlations indicated        Pito Montiel MD   [facility]     LIVER BIOPSY  Liver, biopsy (H&E A1 and A2, Trichrome, Iron, PAS-D, PAS):  -Chronic hepatitis injury pattern with mild activity and no increased fibrosis  Overall, the findings are that of a chronic hepatitis injury pattern with mild activity and no increased fibrosis. The differential diagnosis primarily includes a mild form of autoimmune hepatitis (AIH) and atorvastatin-related injury. While typical changes of AIH including plasma cell rich inflammation and significant lobular injury are absent, the findings may be compatible with AIH in the appropriate clinical context. Statin-related liver injury can show a variety of different histolomorphologies including hepatitic injury in a subset of cases. Of note, statin related liver injury can be associated with autoantibodies and some patients improve with immunosuppressive therapy [ref]. Ultimately, careful clinical correlation and follow up over time will be required to distinguish AIH and atorvastatin-related injury. Viral hepatitis is less likely in the setting of negative laboratory testing. Per outside surgical pathology report there is clinical concern for CORDOVA; no histologic evidence to suggest CORDOVA is seen. Assessment:       Diagnosis Orders   1. 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 Cirrhosis and correlating high apri score underwent liver bx for confirmatory purposes  Liver bx: shows no advanced liver disease, findings may be compatible with AIH in the appropriate clinical context. Statin-related liver injury can show a variety of different histolomorphologies including hepatitic injury in a subset of cases. Of note, statin related liver injury can be associated with autoantibodies. Would expect liver enzymes to be much higher in the context of DILI, however given the bx results will hold statin and monitor clinical response. Given the abscence of fibrosis would not inititate treatment for AIH, she is not an ideal candidate for immunosuppressive therapy given her history of breast CA and comorbid conditions. The risk of immunosuppressive therapy seem to outweigh the benefit  Likely an underlying component of [facility] as well, discussed with the patient natural history of fatty liver/Blair and clinical significance. Continue control of associated medical conditions term of diabetes mellitus, dyslipidemia, hypertension.   -avoid statins  -obtain serial liver enzymes  -further recommendations pending clinical course, would consider budesonide for AIH, not a candidate for immunosuppression given her hx of breast CA and comorbid conditions    2- Chronic liver disease staging:  No clinical  data suggestive of advanced liver disease  Ultrasound noted and suggestive of steatosis/? Cirrhosis.     FibroScan noted advanced fibrosis/cirrhosis  Liver biopsy noted Chronic hepatitis injury pattern with mild activity and no increased fibrosis    3-Associated medical conditions include but not limited to history of breast CA s/p lumpectomy and radiation, diabetes mellitus, hypertension, dyslipidemia. ... Return in about 3 months (around 11/18/2022), or if symptoms worsen or fail to improve.       Pascual Mckinney, APRN - CNP

## 2022-08-22 RX ORDER — LISINOPRIL 10 MG/1
10 TABLET ORAL DAILY
Qty: 90 TABLET | Refills: 5 | Status: SHIPPED | OUTPATIENT
Start: 2022-08-22 | End: 2022-09-27 | Stop reason: SDUPTHER

## 2022-08-24 DIAGNOSIS — R74.8 ABNORMAL LIVER ENZYMES: ICD-10-CM

## 2022-08-24 LAB
ALBUMIN SERPL-MCNC: 3.7 G/DL (ref 3.5–4.6)
ALP BLD-CCNC: 115 U/L (ref 40–130)
ALT SERPL-CCNC: 38 U/L (ref 0–33)
ANION GAP SERPL CALCULATED.3IONS-SCNC: 12 MEQ/L (ref 9–15)
AST SERPL-CCNC: 49 U/L (ref 0–35)
BILIRUB SERPL-MCNC: <0.2 MG/DL (ref 0.2–0.7)
BUN BLDV-MCNC: 12 MG/DL (ref 8–23)
CALCIUM SERPL-MCNC: 9.2 MG/DL (ref 8.5–9.9)
CHLORIDE BLD-SCNC: 105 MEQ/L (ref 95–107)
CO2: 22 MEQ/L (ref 20–31)
CREAT SERPL-MCNC: 0.76 MG/DL (ref 0.5–0.9)
GFR AFRICAN AMERICAN: >60
GFR NON-AFRICAN AMERICAN: >60
GLOBULIN: 3.9 G/DL (ref 2.3–3.5)
GLUCOSE BLD-MCNC: 252 MG/DL (ref 70–99)
POTASSIUM SERPL-SCNC: 4.2 MEQ/L (ref 3.4–4.9)
SODIUM BLD-SCNC: 139 MEQ/L (ref 135–144)
TOTAL PROTEIN: 7.6 G/DL (ref 6.3–8)

## 2022-08-25 ENCOUNTER — TELEPHONE (OUTPATIENT)
Dept: PHARMACY | Facility: CLINIC | Age: 77
End: 2022-08-25

## 2022-08-25 NOTE — TELEPHONE ENCOUNTER
Provider Department Center   2022 10:45 AM Gala Peters, [address] [facility] // Department, toll free [phone], Option 1    For Pharmacy Admin Tracking Only    CPA in place:  No  Intervention Detail: Adherence Monitorin  Gap Closed?: No   Time Spent (min): 10

## 2022-08-31 ENCOUNTER — TELEPHONE (OUTPATIENT)
Dept: GASTROENTEROLOGY | Age: 77
End: 2022-08-31

## 2022-08-31 DIAGNOSIS — R74.8 ABNORMAL LIVER ENZYMES: Primary | ICD-10-CM

## 2022-08-31 NOTE — TELEPHONE ENCOUNTER
Patient is requesting a better explanation as to why EGD was ordered and scheduled. Patient states she was unaware that the procedure was scheduled.  I explained

## 2022-08-31 NOTE — TELEPHONE ENCOUNTER
EGD was canceled this was part of cirrhosis workup, liver bx showed no cirrhosis, pt was notified she will keep follow up in November  Candi

## 2022-09-27 ENCOUNTER — TELEPHONE (OUTPATIENT)
Dept: FAMILY MEDICINE CLINIC | Age: 77
End: 2022-09-27

## 2022-09-27 DIAGNOSIS — E11.9 TYPE 2 DIABETES MELLITUS WITHOUT COMPLICATION, WITH LONG-TERM CURRENT USE OF INSULIN (HCC): ICD-10-CM

## 2022-09-27 DIAGNOSIS — Z79.4 TYPE 2 DIABETES MELLITUS WITHOUT COMPLICATION, WITH LONG-TERM CURRENT USE OF INSULIN (HCC): ICD-10-CM

## 2022-09-27 RX ORDER — LISINOPRIL 10 MG/1
10 TABLET ORAL DAILY
Qty: 90 TABLET | Refills: 1 | Status: SHIPPED | OUTPATIENT
Start: 2022-09-27

## 2022-09-27 RX ORDER — ATORVASTATIN CALCIUM 40 MG/1
TABLET, FILM COATED ORAL
Qty: 90 TABLET | Refills: 1 | Status: SHIPPED | OUTPATIENT
Start: 2022-09-27 | End: 2022-09-27 | Stop reason: SDUPTHER

## 2022-09-27 RX ORDER — GABAPENTIN 300 MG/1
CAPSULE ORAL
Qty: 60 CAPSULE | Refills: 0 | Status: SHIPPED | OUTPATIENT
Start: 2022-09-27 | End: 2022-09-27

## 2022-09-27 RX ORDER — ATORVASTATIN CALCIUM 40 MG/1
TABLET, FILM COATED ORAL
Qty: 90 TABLET | Refills: 1 | Status: SHIPPED | OUTPATIENT
Start: 2022-09-27

## 2022-09-27 RX ORDER — PRIMIDONE 50 MG/1
TABLET ORAL
Qty: 90 TABLET | Refills: 1 | Status: SHIPPED | OUTPATIENT
Start: 2022-09-27

## 2022-09-27 NOTE — TELEPHONE ENCOUNTER
Patient states she has been waiting on a call informing her that the box of Lantus she usually gets is ready to be picked up. Please advise. Thank you.

## 2022-09-28 ENCOUNTER — TELEPHONE (OUTPATIENT)
Dept: FAMILY MEDICINE CLINIC | Age: 77
End: 2022-09-28

## 2022-09-28 NOTE — TELEPHONE ENCOUNTER
I called the patient to let her know I called [employer] and they will fax a form to us to have Dr. Jaleel Cabezas fill out for her to continue the patient assistance for her insulin.  [employer] phone [phone] and fax [phone]

## 2022-10-05 ENCOUNTER — TELEPHONE (OUTPATIENT)
Dept: FAMILY MEDICINE CLINIC | Age: 77
End: 2022-10-05

## 2022-10-28 DIAGNOSIS — Z79.4 TYPE 2 DIABETES MELLITUS WITHOUT COMPLICATION, WITH LONG-TERM CURRENT USE OF INSULIN (HCC): ICD-10-CM

## 2022-10-28 DIAGNOSIS — E11.9 TYPE 2 DIABETES MELLITUS WITHOUT COMPLICATION, WITH LONG-TERM CURRENT USE OF INSULIN (HCC): ICD-10-CM

## 2022-11-14 DIAGNOSIS — R74.8 ABNORMAL LIVER ENZYMES: ICD-10-CM

## 2022-11-14 DIAGNOSIS — K76.9 CHRONIC LIVER DISEASE: ICD-10-CM

## 2022-11-14 LAB
ALBUMIN SERPL-MCNC: 3.8 G/DL (ref 3.5–4.6)
ALP BLD-CCNC: 110 U/L (ref 40–130)
ALT SERPL-CCNC: 32 U/L (ref 0–33)
ANION GAP SERPL CALCULATED.3IONS-SCNC: 12 MEQ/L (ref 9–15)
AST SERPL-CCNC: 47 U/L (ref 0–35)
BILIRUB SERPL-MCNC: <0.2 MG/DL (ref 0.2–0.7)
BILIRUBIN DIRECT: <0.2 MG/DL (ref 0–0.4)
BILIRUBIN, INDIRECT: ABNORMAL MG/DL (ref 0–0.6)
BUN BLDV-MCNC: 28 MG/DL (ref 8–23)
CALCIUM SERPL-MCNC: 9.1 MG/DL (ref 8.5–9.9)
CHLORIDE BLD-SCNC: 103 MEQ/L (ref 95–107)
CO2: 23 MEQ/L (ref 20–31)
CREAT SERPL-MCNC: 1.05 MG/DL (ref 0.5–0.9)
GFR SERPL CREATININE-BSD FRML MDRD: 54.1 ML/MIN/{1.73_M2}
GLOBULIN: 3.9 G/DL (ref 2.3–3.5)
GLUCOSE BLD-MCNC: 278 MG/DL (ref 70–99)
POTASSIUM SERPL-SCNC: 5.3 MEQ/L (ref 3.4–4.9)
SODIUM BLD-SCNC: 138 MEQ/L (ref 135–144)
TOTAL PROTEIN: 7.7 G/DL (ref 6.3–8)

## 2022-11-21 ENCOUNTER — OFFICE VISIT (OUTPATIENT)
Dept: GASTROENTEROLOGY | Age: 77
End: 2022-11-21
Payer: MEDICARE

## 2022-11-21 VITALS
OXYGEN SATURATION: 98 % | BODY MASS INDEX: 30.9 KG/M2 | DIASTOLIC BLOOD PRESSURE: 70 MMHG | WEIGHT: 153 LBS | HEART RATE: 88 BPM | SYSTOLIC BLOOD PRESSURE: 118 MMHG

## 2022-11-21 DIAGNOSIS — K75.81 NASH (NONALCOHOLIC STEATOHEPATITIS): Primary | ICD-10-CM

## 2022-11-21 PROCEDURE — 1123F ACP DISCUSS/DSCN MKR DOCD: CPT | Performed by: NURSE PRACTITIONER

## 2022-11-21 PROCEDURE — 3074F SYST BP LT 130 MM HG: CPT | Performed by: NURSE PRACTITIONER

## 2022-11-21 PROCEDURE — 99213 OFFICE O/P EST LOW 20 MIN: CPT | Performed by: NURSE PRACTITIONER

## 2022-11-21 PROCEDURE — 3078F DIAST BP <80 MM HG: CPT | Performed by: NURSE PRACTITIONER

## 2022-11-21 ASSESSMENT — ENCOUNTER SYMPTOMS
VOMITING: 0
CONSTIPATION: 0
SHORTNESS OF BREATH: 0
EYE REDNESS: 0
DIARRHEA: 0
VOICE CHANGE: 0
WHEEZING: 0
TROUBLE SWALLOWING: 0
COLOR CHANGE: 0
EYE PAIN: 0
PHOTOPHOBIA: 0
RECTAL PAIN: 0
ABDOMINAL DISTENTION: 0
ANAL BLEEDING: 0
CHEST TIGHTNESS: 0
NAUSEA: 0
ABDOMINAL PAIN: 0
BLOOD IN STOOL: 0

## 2022-11-21 NOTE — PROGRESS NOTES
Subjective:      Patient ID: Emi Mccullough is a 65 y.o. female who presents today for:  Chief Complaint   Patient presents with    Follow Up After Procedure       HPI  Patient came in as follow-up to abnormal liver enzymes. His recall had elevated liver enzymes 2-3 times normal for greater than 2 years with  blood work to rule out any associated viral, autoimmune or metabolic etiology. Noted positive CYNDI and smooth muscle antibody, normal IgG , otherwise normal blood work. Patient has associated medical conditions including diabetes, dyslipidemia, hypertension and class II obesity with BMI of 32. Underwent liver biopsy which was notable for questionable autoimmune hepatitis versus drug-induced liver injury, no advanced fibrosis. Liver enzymes were out of proportion as to what would be expected with drug-induced liver injury however patient did hold Lipitor. Most recent liver enzymes are improved. Otherwise no new complaints or concerns, no liver related hospitalizations, memory loss or confusion, jaundice, pruritus, fatigue, abdominal swelling, lower extremity edema, nausea or vomiting, hematemesis, melena, or hematochezia. OV 8/20/22  Patient came in as follow-up to abnormal LFTs and further evaluation of liver disease. Had FibroScan which showed fibrosis/early cirrhosis with positive CYNDI and smooth muscle antibody and normal IgG, with mild elevation of transaminases raising concern for burned-out autoimmune hepatitis. Underwent liver biopsy which was notable for no advanced fibrosis, questionable autoimmune hepatitis versus drug-induced liver injury. Patient has no recent liver related hospitalizations, memory loss or confusion, jaundice, pruritus, fatigue, abdominal swelling, lower extremity edema, nausea or vomiting, hematemesis, melena, or hematochezia. OV 7/8/22  Patient came in as follow-up to abnormal LFTs and further evaluation of liver disease.   Had ultrasound which noted questionable cirrhosis with follow-up FibroScan notable for advanced fibrosis/early cirrhosis, with correlating high Apri score. No history of EtOH, had blood work to rule out any associated viral, autoimmune or metabolic etiology. Noted positive CYNDI and smooth muscle antibody, normal IgG , otherwise normal blood work. Patient has associated medical conditions including diabetes, dyslipidemia, hypertension and class II obesity with BMI of 32. Otherwise no liver related hospitalizations, memory loss or confusion, pruritus, easy bruising, abdominal swelling, nausea or vomiting, melena, or hematochezia. Background  This is a very pleasant 76-year-old who came in today for further evaluation management. She mentioned that she had an ultrasound and was told that she may have cirrhosis subsequently she mentioned that she has not been drinking alcohol. Patient does have history of diabetes mellitus, dyslipidemia, hypertension and the current BMI is 34. She had previous breast lumpectomy and radiation. Denies history of jaundice, easy bruising admission related to liver condition. Denies any hematemesis melena or hematochezia. .  Given the the radiological evidence of cirrhosis patient referred to us for the evaluation. Noted ultrasound showed hepatomegaly. Reviewed lab work showed proceed transaminases on serial testing.        Past Medical History:   Diagnosis Date    Breast cancer ([address]     right (16-17 yrs ago)    Cancer ([address]     Breast    Diabetes mellitus ([address]     History of therapeutic radiation     Hyperlipidemia     Hypertension      Past Surgical History:   Procedure Laterality Date    APPENDECTOMY      BREAST BIOPSY Right     malignant (16-17 yrs ago)    BREAST LUMPECTOMY Right     malignant    CARPAL TUNNEL RELEASE Left     CT NEEDLE BIOPSY LIVER PERCUTANEOUS Right 08/01/2022    Performed by Dr. Sepulveda Patient - diagnostics sent    CT NEEDLE BIOPSY LIVER PERCUTANEOUS  8/1/2022    CT NEEDLE BIOPSY LIVER PERCUTANEOUS 8/1/2022 [facility] CT SCAN    HERNIA REPAIR      Umbilical    HYSTERECTOMY (CERVIX STATUS UNKNOWN)      total but left part of one ovary    OVARY REMOVAL      left part of one ovary    TONSILLECTOMY       Social History     Socioeconomic History    Marital status:      Spouse name: Not on file    Number of children: Not on file    Years of education: Not on file    Highest education level: Not on file   Occupational History    Not on file   Tobacco Use    Smoking status: Every Day     Packs/day: 0.50     Years: 50.00     Pack years: 25.00     Types: Cigarettes    Smokeless tobacco: Never   Vaping Use    Vaping Use: Never used   Substance and Sexual Activity    Alcohol use: Yes     Comment: Once a month    Drug use: Never    Sexual activity: Yes     Partners: Male   Other Topics Concern    Not on file   Social History Narrative    Not on file     Social Determinants of Health     Financial Resource Strain: Low Risk     Difficulty of Paying Living Expenses: Not hard at all   Food Insecurity: No Food Insecurity    Worried About Running Out of Food in the Last Year: Never true    Ran Out of Food in the Last Year: Never true   Transportation Needs: Not on file   Physical Activity: Inactive    Days of Exercise per Week: 0 days    Minutes of Exercise per Session: 0 min   Stress: Not on file   Social Connections: Not on file   Intimate Partner Violence: Not on file   Housing Stability: Not on file     Family History   Problem Relation Age of Onset    Breast Cancer Mother     Heart Attack Father     Diabetes Brother      No Known Allergies      Review of Systems   Constitutional:  Negative for appetite change, chills, fever and unexpected weight change. HENT:  Negative for nosebleeds, tinnitus, trouble swallowing and voice change. Eyes:  Negative for photophobia, pain and redness. Respiratory:  Negative for chest tightness, shortness of breath and wheezing.     Cardiovascular:  Negative for chest pain, palpitations and leg swelling. Gastrointestinal:  Negative for abdominal distention, abdominal pain, anal bleeding, blood in stool, constipation, diarrhea, nausea, rectal pain and vomiting. Endocrine: Negative for polydipsia, polyphagia and polyuria. Genitourinary:  Negative for difficulty urinating and hematuria. Skin:  Negative for color change, pallor and rash. Neurological:  Negative for dizziness, speech difficulty and headaches. Psychiatric/Behavioral:  Negative for confusion and suicidal ideas. Objective:   /70 (Site: Right Upper Arm, Position: Sitting, Cuff Size: Small Adult)   Pulse 88   Wt 153 lb (69.4 kg)   SpO2 98%   BMI 30.90 kg/m²     Physical Exam  Vitals reviewed. Constitutional:       General: She is not in acute distress. Appearance: Normal appearance. She is well-developed and well-groomed. HENT:      Head: Normocephalic and atraumatic. Nose: Nose normal.   Eyes:      General: No scleral icterus. Extraocular Movements: Extraocular movements intact. Conjunctiva/sclera: Conjunctivae normal.      Pupils: Pupils are equal, round, and reactive to light. Cardiovascular:      Rate and Rhythm: Normal rate and regular rhythm. Pulses: Normal pulses. Heart sounds: Normal heart sounds. Pulmonary:      Effort: Pulmonary effort is normal. No respiratory distress. Breath sounds: Normal breath sounds. No wheezing or rales. Abdominal:      General: Abdomen is flat. Bowel sounds are normal. There is no distension. Palpations: Abdomen is soft. There is no hepatomegaly, splenomegaly or mass. Tenderness: There is no abdominal tenderness. There is no guarding or rebound. Musculoskeletal:         General: No tenderness or deformity. Normal range of motion. Cervical back: Neck supple. Right lower leg: No edema. Left lower leg: No edema. Skin:     General: Skin is warm and dry.       Capillary Refill: Capillary refill takes less than 2 seconds. Coloration: Skin is not jaundiced. Findings: No erythema or rash. Neurological:      General: No focal deficit present. Mental Status: She is alert and oriented to person, place, and time. Psychiatric:         Mood and Affect: Mood normal.         Behavior: Behavior normal.       Laboratory, Pathology, Radiology reviewed in detail with relevantimportant investigations summarized below:  Lab Results   Component Value Date/Time    WBC 6.9 07/29/2022 10:58 AM    WBC 5.7 05/02/2022 11:23 AM    WBC 8.6 01/25/2022 02:29 PM    WBC 7.9 10/19/2020 02:45 PM    HGB 14.8 07/29/2022 10:58 AM    HGB 14.7 05/02/2022 11:23 AM    HGB 14.5 01/25/2022 02:29 PM    HGB 15.3 10/19/2020 02:45 PM    HCT 45.7 07/29/2022 10:58 AM    HCT 44.3 05/02/2022 11:23 AM    HCT 44.6 01/25/2022 02:29 PM    HCT 45.9 10/19/2020 02:45 PM    MCV 96.7 07/29/2022 10:58 AM    MCV 96.1 05/02/2022 11:23 AM    MCV 96.0 01/25/2022 02:29 PM    MCV 96.6 10/19/2020 02:45 PM     07/29/2022 10:58 AM     05/02/2022 11:23 AM     01/25/2022 02:29 PM     10/19/2020 02:45 PM    .  Lab Results   Component Value Date/Time    ALT 32 11/14/2022 11:50 AM    ALT 38 08/24/2022 03:44 PM    ALT 66 05/02/2022 11:23 AM    AST 47 11/14/2022 11:50 AM    AST 49 08/24/2022 03:44 PM    AST 92 05/02/2022 11:23 AM    ALKPHOS 110 11/14/2022 11:50 AM    ALKPHOS 115 08/24/2022 03:44 PM    ALKPHOS 123 05/02/2022 11:23 AM    BILITOT <0.2 11/14/2022 11:50 AM    BILITOT <0.2 08/24/2022 03:44 PM    BILITOT 0.3 05/02/2022 11:23 AM       No results found.   Lab Results   Component Value Date/Time    IRON 124 05/18/2022 03:38 PM    TIBC 353 05/18/2022 03:38 PM    FERRITIN 165 05/18/2022 03:38 PM     Lab Results   Component Value Date/Time    INR 1.0 07/29/2022 10:58 AM    INR 1.1 05/18/2022 03:38 PM     No components found for: ACUTEHEPATITISSCREEN  No components found for: CELIACPANEL  No components found for: STOOLCULTURE, C.DIFF, STOOLOVAPARASITE, STOOLLEUCOCYTE    Findings:       Median kPa:  12.3   IQR/med (%):  15       Controlled Attenuation Paramater (CAP)   Median (dB/m):  295   IQR (%):  20       Interpretation:   Appropriate reading, Based on LSM of 12.3 Kpa, suspicion for advanced    fibrosis. Fibrosis stage III-IV ( F3-4)   Based on CAP of 295 db/M, moderate to severe steatosis (> 33%) - S2-3. Clinical, lab and radiological correlations indicated        Hernan Prajapati MD   [facility]      LIVER BIOPSY  Liver, biopsy (H&E A1 and A2, Trichrome, Iron, PAS-D, PAS):  -Chronic hepatitis injury pattern with mild activity and no increased fibrosis  Overall, the findings are that of a chronic hepatitis injury pattern with mild activity and no increased fibrosis. The differential diagnosis primarily includes a mild form of autoimmune hepatitis (AIH) and atorvastatin-related injury. While typical changes of AIH including plasma cell rich inflammation and significant lobular injury are absent, the findings may be compatible with AIH in the appropriate clinical context. Statin-related liver injury can show a variety of different histolomorphologies including hepatitic injury in a subset of cases. Of note, statin related liver injury can be associated with autoantibodies and some patients improve with immunosuppressive therapy [ref]. Ultimately, careful clinical correlation and follow up over time will be required to distinguish AIH and atorvastatin-related injury. Viral hepatitis is less likely in the setting of negative laboratory testing. Per outside surgical pathology report there is clinical concern for CORDOVA; no histologic evidence to suggest CORDOVA is seen. Assessment:       Diagnosis Orders   1. CORDOVA (nonalcoholic steatohepatitis)  Hepatic Function Panel            Plan:      1.   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 Cirrhosis and correlating high apri score underwent liver bx for confirmatory purposes  Liver bx: shows no advanced liver disease, findings may be compatible with AIH in the appropriate clinical context. Statin-related liver injury can show a variety of different histolomorphologies including hepatitic injury in a subset of cases. Of note, statin related liver injury can be associated with autoantibodies. Given the chronic elevation of liver enzymes, would expect liver enzymes to be much higher in the context of DILI, also noted that patient had been on statin for over 1 year with flat pattern of liver enzyme elevation. Given the abscence of fibrosis would not inititate treatment for AIH, she is not an ideal candidate for immunosuppressive therapy given her history of breast CA and comorbid conditions. The risk of immunosuppressive therapy seem to outweigh the benefit  Likely an underlying component of Christopher Holden as well, discussed with the patient natural history of fatty liver/Blair and clinical significance. Continue control of associated medical conditions term of diabetes mellitus, dyslipidemia, hypertension. -ok to resume statins  -obtain serial liver enzymes 1 month after resuming  -further recommendations pending clinical course, would consider budesonide for AIH, not a candidate for immunosuppression given her hx of breast CA and comorbid conditions     2- Chronic liver disease staging:  No clinical  data suggestive of advanced liver disease  Ultrasound noted and suggestive of steatosis/? Cirrhosis. FibroScan noted advanced fibrosis/cirrhosis  Liver biopsy noted Chronic hepatitis injury pattern with mild activity and no increased fibrosis     3-Associated medical conditions include but not limited to history of breast CA s/p lumpectomy and radiation, diabetes mellitus, hypertension, dyslipidemia. ...       Return in about 3 months (around 2/21/2023), or if symptoms worsen or fail to improve.       Cassandra Chase, APRN - CNP

## 2022-11-26 RX ORDER — GABAPENTIN 300 MG/1
CAPSULE ORAL
Qty: 60 CAPSULE | Refills: 0 | Status: SHIPPED | OUTPATIENT
Start: 2022-11-26 | End: 2022-11-26

## 2022-12-30 RX ORDER — GABAPENTIN 600 MG/1
TABLET ORAL
Qty: 60 TABLET | Refills: 0 | Status: SHIPPED | OUTPATIENT
Start: 2022-12-30 | End: 2023-02-09 | Stop reason: SDUPTHER

## 2023-01-10 NOTE — PROGRESS NOTES
2023    Truong Davis (:  1945) is a 65 y.o. female     67-year-old diabetic female with associated neuropathy hypertensive hyperlipidemic female with hx of a resting tremor presents for follow-up visit. Obesity: The patient's present body mass index is 34.13      Behavioral changes: The patient reports that she has been increasingly agitated. She has a history of a meningioma and voices concern as to whether the meningioma could have increased in size and may be causing behavioral changes. Type 2 diabetes (A1C=7.9 2023. ):Novolog 18 units 3 times daily with meals. Jardiance 10 mg orally daily she is also compliant with Lipitor 40 mg orally daily. The patient has been checking her blood sugars 4 times daily. Hypertension: The patient is compliant with lisinopril 10 mg orally daily        Dysphagia: The patient reports intermittent dysphagia to solid foods. She would like this evaluated further. Resting tremor: The patient is compliant with primidone 50 mg twice daily          Back pain: The patient previously reported achy, 7/10, constant back pain that has not responded well to ibuprofen. Her back pain is controlled at this time. At present she denies polyuria,  Polydipsia, constitutional, sinus, visual, cardiopulmonary, urologic, additional gastrointestinal, immunologic/hematologic, musculoskeletal, neurologic,dermatologic, or psychiatric complaints.         Patient Active Problem List   Diagnosis    Hypertension, essential    Malignant neoplasm of female breast ([address]    Type 2 diabetes mellitus with chronic kidney disease ([address]    Meningioma, cerebral (HCC)    Type 2 diabetes mellitus with hyperglycemia    Stage 3a chronic kidney disease (HCC)    CORDOVA (nonalcoholic steatohepatitis)    Elevated LFTs    Abnormal liver enzymes    Chronic hepatitis, unspecified ([address]       Review of Systems   Constitutional: Negative for chills, diaphoresis, fatigue and fever.   HENT: Negative for congestion, dental problem, drooling, ear discharge, ear pain, facial swelling, hearing loss, mouth sores, nosebleeds, postnasal drip, rhinorrhea, sinus pressure, sinus pain, sneezing, sore throat, tinnitus, trouble swallowing and voice change. Eyes: Negative for photophobia, pain, discharge, redness, itching and visual disturbance. Respiratory: Negative for apnea, cough, chest tightness, shortness of breath and wheezing. Cardiovascular: Negative for chest pain, palpitations and leg swelling. Gastrointestinal: Negative for abdominal distention, abdominal pain, blood in stool, constipation, diarrhea, nausea, rectal pain and vomiting. Endocrine: Negative for cold intolerance, heat intolerance, polydipsia, polyphagia and polyuria. Genitourinary: Negative for decreased urine volume, difficulty urinating, dysuria, flank pain, frequency, genital sores, hematuria and urgency. Musculoskeletal: Negative for arthralgias, back pain, gait problem, joint swelling, myalgias, neck pain and neck stiffness. Skin: Negative for color change, rash and wound. Allergic/Immunologic: Negative for environmental allergies and food allergies. Neurological: Negative for dizziness, tremors, seizures, syncope, facial asymmetry, speech difficulty, weakness, light-headedness, numbness and headaches. Hematological: Negative for adenopathy. Does not bruise/bleed easily. Psychiatric/Behavioral: Negative for agitation, confusion, decreased concentration, hallucinations, self-injury, sleep disturbance and suicidal ideas. The patient is not nervous/anxious. Prior to Visit Medications    Medication Sig Taking?  Authorizing Provider   glucose (GLUTOSE) 40 % GEL Take 37.5 mLs by mouth See Admin Instructions Yes Hai Holden MD   lisinopril (PRINIVIL;ZESTRIL) 10 MG tablet Take 1 tablet by mouth daily Yes Hai Holden MD   atorvastatin (LIPITOR) 40 MG tablet TAKE 1 TABLET BY MOUTH EVERY DAY Yes Shanon Lyons MD   primidone (MYSOLINE) 50 MG tablet TAKE 1 TABLET BY MOUTH TWO TIMES A DAY Yes Shanon Lyons MD   cyclobenzaprine (FLEXERIL) 10 MG tablet  Yes Historical Provider, MD   NOVOLOG FLEXPEN 100 UNIT/ML injection pen inject 18 units subcutaneously three times a day before meals Yes Shanon Lyons MD   insulin aspart (NOVOLOG FLEXPEN) 100 UNIT/ML injection pen INJECT 18 UNITS SUBCUTANEOUSLY THREE TIMES A DAY BEFORE MEALS Yes Shanon Lyons MD   insulin glargine (LANTUS) 100 UNIT/ML injection vial Inject 10 Units into the skin nightly Yes Historical Provider, MD   empagliflozin (JARDIANCE) 10 MG tablet Take 1 tablet by mouth daily Yes Shanon Lyons MD   Handicap Placard MISC by Does not apply route Diagnosis: COPD  Duration 6/14/2021-6/14/2023 Yes Shanon Lyons MD   Alcohol Swabs (ALCOHOL PADS) 70 % PADS Use, as directed, three times a day to test blood sugar Yes Historical Provider, MD   Blood Glucose Calibration (OT ULTRA/FASTTK CNTRL SOLN) SOLN USE TO CONFIRM ACCURACY OF GLUCOSE METER Yes Historical Provider, MD   Blood Glucose Monitoring Suppl (ONE TOUCH ULTRA 2) w/Device KIT Use, as directed, three times a day to test blood sugar Yes Historical Provider, MD   [facility] ULTRA strip Use, as directed, three times a day to test blood sugar Yes Historical Provider, MD   EASY COMFORT PEN NEEDLES 32G X 4 MM MISC Use to inject insulin 3 times a day as directed Yes Historical Provider, MD   Lancet Devices (EASY MINI EJECT LANCING DEVICE) MISC Use, as directed, three times a day to test blood sugar Yes Historical Provider, MD   Easy Comfort Lancets MISC Use, as directed, three times a day to test blood sugar Yes Historical Provider, MD   gabapentin (NEURONTIN) 600 MG tablet Take 1 tablet orally bid prn back pain.   Shanon Lyons MD        No Known Allergies    Past Medical History:   Diagnosis Date    Breast cancer [facility])     right (16-17 yrs ago)    Cancer [facility])     Breast    Diabetes mellitus ([facility] [address]     History of therapeutic radiation     Hyperlipidemia     Hypertension        Past Surgical History:   Procedure Laterality Date    APPENDECTOMY      BREAST BIOPSY Right     malignant (16-17 yrs ago)    BREAST LUMPECTOMY Right     malignant    CARPAL TUNNEL RELEASE Left     CT NEEDLE BIOPSY LIVER PERCUTANEOUS Right 08/01/2022    Performed by Dr. Fiorella Roblero - diagnostics sent    CT NEEDLE BIOPSY LIVER PERCUTANEOUS  8/1/2022    CT NEEDLE BIOPSY LIVER PERCUTANEOUS 8/1/2022 [facility] CT SCAN    HERNIA REPAIR      Umbilical    HYSTERECTOMY (CERVIX STATUS UNKNOWN)      total but left part of one ovary    OVARY REMOVAL      left part of one ovary    TONSILLECTOMY         Social History     Socioeconomic History    Marital status:      Spouse name: Not on file    Number of children: Not on file    Years of education: Not on file    Highest education level: Not on file   Occupational History    Not on file   Tobacco Use    Smoking status: Every Day     Packs/day: 0.50     Years: 50.00     Pack years: 25.00     Types: Cigarettes    Smokeless tobacco: Never   Vaping Use    Vaping Use: Never used   Substance and Sexual Activity    Alcohol use: Yes     Comment: Once a month    Drug use: Never    Sexual activity: Yes     Partners: Male   Other Topics Concern    Not on file   Social History Narrative    Not on file     Social Determinants of Health     Financial Resource Strain: Low Risk     Difficulty of Paying Living Expenses: Not hard at all   Food Insecurity: No Food Insecurity    Worried About Running Out of Food in the Last Year: Never true    Ran Out of Food in the Last Year: Never true   Transportation Needs: Not on file   Physical Activity: Inactive    Days of Exercise per Week: 0 days    Minutes of Exercise per Session: 0 min   Stress: Not on file   Social Connections: Not on file   Intimate Partner Violence: Not on file   Housing Stability: Not on file        Family History   Problem Relation Age of Onset Breast Cancer Mother     Heart Attack Father     Diabetes Brother        ADVANCE DIRECTIVE: N, <no information>    Vitals:    01/11/23 1137   BP: 115/63   Pulse: 77   Resp: 14   SpO2: 95%   Weight: 169 lb (76.7 kg)   Height: 4' 11\" (1.499 m)     Estimated body mass index is 34.13 kg/m² as calculated from the following:    Height as of this encounter: 4' 11\" (1.499 m). Weight as of this encounter: 169 lb (76.7 kg). Physical Exam  Constitutional:       General: She is not in acute distress. Appearance: She is well-developed. HENT:      Head: Normocephalic. Right Ear: External ear normal.      Left Ear: External ear normal.   Eyes:      Conjunctiva/sclera: Conjunctivae normal.   Neck:      Vascular: No JVD. Trachea: No tracheal deviation. Cardiovascular:      Rate and Rhythm: Normal rate and regular rhythm. Heart sounds: Normal heart sounds. Pulmonary:      Effort: Pulmonary effort is normal. No respiratory distress. Breath sounds: Normal breath sounds. No wheezing or rales. Chest:      Chest wall: No tenderness. Abdominal:      General: Bowel sounds are normal. There is no distension. Palpations: Abdomen is soft. There is no mass. Tenderness: There is no abdominal tenderness. There is no guarding or rebound. Musculoskeletal:         General: No tenderness or deformity. Cervical back: Neck supple. Skin:     General: Skin is warm and dry. Coloration: Skin is not pale. Findings: No erythema or rash. Neurological:      Mental Status: She is alert and oriented to person, place, and time. Motor: No abnormal muscle tone. Psychiatric:         Thought Content:  Thought content normal.         Judgment: Judgment normal.       Labs:  Component      Latest Ref Rng & Units 1/25/2021 1/4/2021 1/4/2021 1/4/2021           4:16 PM  4:18 PM  4:18 PM  4:18 PM   Sodium      135 - 144 mEq/L   143    Potassium      3.4 - 4.9 mEq/L   4.8    Chloride      95 - 107 mEq/L   106    CO2      20 - 31 mEq/L   26    Anion Gap      9 - 15 mEq/L   11    Glucose      70 - 99 mg/dL   200 (H)    BUN      8 - 23 mg/dL   16    Creatinine      0.50 - 0.90 mg/dL   1.35 (H)    GFR Non-      >60   38.0 (L)    GFR       >60   46.0 (L)    Calcium      8.5 - 9.9 mg/dL   9.4    Total Protein      6.3 - 8.0 g/dL   7.8    Albumin      3.5 - 4.6 g/dL   3.9    Bilirubin      0.2 - 0.7 mg/dL   <0.2    Alk Phos      40 - 130 U/L   103    ALT      0 - 33 U/L   40 (H)    AST      0 - 35 U/L   49 (H)    Globulin      2.3 - 3.5 g/dL   3.9 (H)    Cholesterol, Total      0 - 199 mg/dL  147     Triglycerides      0 - 150 mg/dL  112     HDL Cholesterol      40 - 59 mg/dL  57     LDL Calculated      0 - 129 mg/dL  68     Hemoglobin A1C      % 8.9   9.0 (H)     No flowsheet data found.     Lab Results   Component Value Date/Time    CHOL 136 05/02/2022 11:23 AM    CHOL 147 01/04/2021 04:18 PM    TRIG 149 05/02/2022 11:23 AM    TRIG 112 01/04/2021 04:18 PM    HDL 46 05/02/2022 11:23 AM    HDL 57 01/04/2021 04:18 PM    LDLCALC 60 05/02/2022 11:23 AM    LDLCALC 68 01/04/2021 04:18 PM    GLUCOSE 278 11/14/2022 11:50 AM    LABA1C 7.9 01/11/2023 11:56 AM    LABA1C 9.6 04/25/2022 11:37 AM    LABA1C 9.4 01/25/2022 02:14 PM       The 10-year ASCVD risk score (Frederick DK, et al., 2019) is: 50.5%    Values used to calculate the score:      Age: 65 years      Sex: Female      Is Non- : No      Diabetic: Yes      Tobacco smoker: Yes      Systolic Blood Pressure: 707 mmHg      Is BP treated: Yes      HDL Cholesterol: 46 mg/dL      Total Cholesterol: 136 mg/dL    Immunization History   Administered Date(s) Administered    COVID-19, PFIZER PURPLE top, DILUTE for use, (age 15 y+), 30mcg/0.3mL 02/01/2021, 03/01/2021, 11/02/2021    Influenza Virus Vaccine 11/07/2006, 11/15/2007    Influenza, FLUAD, (age 72 y+), Adjuvanted, 0.5mL 09/14/2020    Pneumococcal Polysaccharide ([id number]) 09/14/2020    Td (Adult), 2 Lf Tetanus Toxoid, Pf (Td, Absorbed) 10/15/2004    Tdap (Boostrix, Adacel) 10/15/2004       Health Maintenance   Topic Date Due    Shingles vaccine (1 of 2) Never done    Low dose CT lung screening  Never done    DEXA (modify frequency per FRAX score)  Never done    Pneumococcal 65+ years Vaccine (2 - PCV) 09/14/2021    COVID-19 Vaccine (4 - Booster for Pfizer series) 12/28/2021    DTaP/Tdap/Td vaccine (2 - Td or Tdap) 02/01/2023 (Originally 10/15/2014)    Hepatitis A vaccine (1 of 2 - Risk 2-dose series) 07/11/2023 (Originally 2/2/1946)    Hepatitis B vaccine (1 of 3 - Risk 3-dose series) 01/11/2024 (Originally 2/2/1964)    Depression Screen  04/25/2023    Annual Wellness Visit (AWV)  04/26/2023    Lipids  05/02/2023    Flu vaccine  Completed    Hib vaccine  Aged Out    Meningococcal (ACWY) vaccine  Aged Out    Hepatitis C screen  Discontinued       ASSESSMENT/PLAN:      Meningioma: MRI of the brain        Dysphagia: Referral to GI for possible upper endoscopy        Obesity: Monitoring. Type 2 diabetes mellitus without complication, with long-term current use of insulin (HCC)    Novolin N 14-15 UNITS QHS   Novolog 15 units 3 times daily with meals   We will order Dexcom  Jardiance 10 mg orally daily        Hyperlipidemia, unspecified hyperlipidemia type-continue Lipitor 40 mg orally daily          Essential hypertension-continue lisinopril 10 mg orally daily          Back pain: Trial of Mobic. Consider referral to physical therapy          Tremor-continue primidone 50 mg twice daily          Neuropathy-continue Neurontin 300 mg twice daily        CKD 3 : The patient's most recent renal function (July-2021) was within normal limits. Creatinine in January 2021 was 1.35. Obtain a comprehensive metabolic panel to evaluate the patient's renal function today continue lisinopril 10 mg orally daily. Initiate Jardiance.         Return in about 3 months (around 4/11/2023). An electronic signature was used to authenticate this note. Dianelys García is a 65 y.o. female evaluated via telephone on 1/11/2023 for Diabetes    Aida Mchugh MD      On the basis of positive falls risk screening, assessment and plan is as follows: patient will follow up in 90 day(s) for further evaluation.

## 2023-01-11 ENCOUNTER — HOSPITAL ENCOUNTER (EMERGENCY)
Age: 78
Discharge: HOME | End: 2023-01-12
Payer: MEDICARE

## 2023-01-11 ENCOUNTER — OFFICE VISIT (OUTPATIENT)
Dept: FAMILY MEDICINE CLINIC | Age: 78
End: 2023-01-11

## 2023-01-11 VITALS
BODY MASS INDEX: 34.07 KG/M2 | SYSTOLIC BLOOD PRESSURE: 115 MMHG | HEIGHT: 59 IN | HEART RATE: 77 BPM | WEIGHT: 169 LBS | DIASTOLIC BLOOD PRESSURE: 63 MMHG | RESPIRATION RATE: 14 BRPM | OXYGEN SATURATION: 95 %

## 2023-01-11 DIAGNOSIS — Z71.1 FEARED COMPLAINT WITHOUT DIAGNOSIS: Primary | ICD-10-CM

## 2023-01-11 DIAGNOSIS — K73.9 CHRONIC HEPATITIS, UNSPECIFIED (HCC): ICD-10-CM

## 2023-01-11 DIAGNOSIS — E11.22 TYPE 2 DIABETES MELLITUS WITH STAGE 3A CHRONIC KIDNEY DISEASE, WITH LONG-TERM CURRENT USE OF INSULIN (HCC): ICD-10-CM

## 2023-01-11 DIAGNOSIS — Z79.4 TYPE 2 DIABETES MELLITUS WITH STAGE 3A CHRONIC KIDNEY DISEASE, WITH LONG-TERM CURRENT USE OF INSULIN (HCC): ICD-10-CM

## 2023-01-11 DIAGNOSIS — R13.10 DYSPHAGIA, UNSPECIFIED TYPE: ICD-10-CM

## 2023-01-11 DIAGNOSIS — N18.31 TYPE 2 DIABETES MELLITUS WITH STAGE 3A CHRONIC KIDNEY DISEASE, WITH LONG-TERM CURRENT USE OF INSULIN (HCC): Primary | ICD-10-CM

## 2023-01-11 DIAGNOSIS — N18.31 TYPE 2 DIABETES MELLITUS WITH STAGE 3A CHRONIC KIDNEY DISEASE, WITH LONG-TERM CURRENT USE OF INSULIN (HCC): ICD-10-CM

## 2023-01-11 DIAGNOSIS — D32.9 BENIGN NEOPLASM OF MENINGES, UNSPECIFIED (HCC): ICD-10-CM

## 2023-01-11 DIAGNOSIS — Z79.4 TYPE 2 DIABETES MELLITUS WITH STAGE 3A CHRONIC KIDNEY DISEASE, WITH LONG-TERM CURRENT USE OF INSULIN (HCC): Primary | ICD-10-CM

## 2023-01-11 DIAGNOSIS — E11.22 TYPE 2 DIABETES MELLITUS WITH STAGE 3A CHRONIC KIDNEY DISEASE, WITH LONG-TERM CURRENT USE OF INSULIN (HCC): Primary | ICD-10-CM

## 2023-01-11 LAB
ANION GAP SERPL CALCULATED.3IONS-SCNC: 14 MEQ/L (ref 9–15)
BASOPHILS ABSOLUTE: 0.1 K/UL (ref 0–0.2)
BASOPHILS RELATIVE PERCENT: 0.7 %
BUN BLDV-MCNC: 36 MG/DL (ref 8–23)
CALCIUM SERPL-MCNC: 9.2 MG/DL (ref 8.5–9.9)
CHLORIDE BLD-SCNC: 104 MEQ/L (ref 95–107)
CO2: 21 MEQ/L (ref 20–31)
CREAT SERPL-MCNC: 1.56 MG/DL (ref 0.5–0.9)
EOSINOPHILS ABSOLUTE: 0.5 K/UL (ref 0–0.7)
EOSINOPHILS RELATIVE PERCENT: 6.9 %
GFR SERPL CREATININE-BSD FRML MDRD: 33.6 ML/MIN/{1.73_M2}
GLUCOSE BLD-MCNC: 187 MG/DL (ref 70–99)
HBA1C MFR BLD: 7.9 %
HCT VFR BLD CALC: 50.2 % (ref 37–47)
HEMOGLOBIN: 15.5 G/DL (ref 12–16)
LYMPHOCYTES ABSOLUTE: 2.9 K/UL (ref 1–4.8)
LYMPHOCYTES RELATIVE PERCENT: 38.9 %
MCH RBC QN AUTO: 32 PG (ref 27–31.3)
MCHC RBC AUTO-ENTMCNC: 30.8 % (ref 33–37)
MCV RBC AUTO: 103.7 FL (ref 79.4–94.8)
MONOCYTES ABSOLUTE: 0.6 K/UL (ref 0.2–0.8)
MONOCYTES RELATIVE PERCENT: 8 %
NEUTROPHILS ABSOLUTE: 3.4 K/UL (ref 1.4–6.5)
NEUTROPHILS RELATIVE PERCENT: 45.5 %
PDW BLD-RTO: 15.8 % (ref 11.5–14.5)
PLATELET # BLD: 152 K/UL (ref 130–400)
POTASSIUM SERPL-SCNC: 6.2 MEQ/L (ref 3.4–4.9)
RBC # BLD: 4.84 M/UL (ref 4.2–5.4)
REASON FOR REJECTION: NORMAL
REJECTED TEST: NORMAL
SODIUM BLD-SCNC: 139 MEQ/L (ref 135–144)
WBC # BLD: 7.6 K/UL (ref 4.8–10.8)

## 2023-01-11 PROCEDURE — 99284 EMERGENCY DEPT VISIT MOD MDM: CPT

## 2023-01-11 PROCEDURE — 36415 COLL VENOUS BLD VENIPUNCTURE: CPT

## 2023-01-11 PROCEDURE — 85025 COMPLETE CBC W/AUTO DIFF WBC: CPT

## 2023-01-11 PROCEDURE — 80053 COMPREHEN METABOLIC PANEL: CPT

## 2023-01-11 PROCEDURE — 93005 ELECTROCARDIOGRAM TRACING: CPT

## 2023-01-11 RX ORDER — SODIUM POLYSTYRENE SULFONATE 15 G/60ML
SUSPENSION ORAL; RECTAL
Qty: 120 ML | Refills: 0 | Status: SHIPPED | OUTPATIENT
Start: 2023-01-11

## 2023-01-11 RX ORDER — AMLODIPINE BESYLATE 5 MG/1
5 TABLET ORAL DAILY
Qty: 30 TABLET | Refills: 3 | Status: SHIPPED | OUTPATIENT
Start: 2023-01-11

## 2023-01-11 RX ORDER — NICOTINE POLACRILEX 4 MG
15 LOZENGE BUCCAL SEE ADMIN INSTRUCTIONS
Qty: 45 G | Refills: 1 | Status: SHIPPED | OUTPATIENT
Start: 2023-01-11

## 2023-01-11 RX ORDER — SODIUM POLYSTYRENE SULFONATE 15 G/60ML
SUSPENSION ORAL; RECTAL
Qty: 120 ML | Refills: 0 | Status: SHIPPED | OUTPATIENT
Start: 2023-01-11 | End: 2023-01-11

## 2023-01-11 RX ORDER — AMLODIPINE BESYLATE 5 MG/1
5 TABLET ORAL DAILY
Qty: 30 TABLET | Refills: 3 | Status: SHIPPED | OUTPATIENT
Start: 2023-01-11 | End: 2023-01-11

## 2023-01-11 ASSESSMENT — ENCOUNTER SYMPTOMS
ABDOMINAL PAIN: 0
EYE ITCHING: 0
SHORTNESS OF BREATH: 0
DIARRHEA: 0
ALLERGIC/IMMUNOLOGIC NEGATIVE: 1
COUGH: 0
VOMITING: 0
CONSTIPATION: 0
NAUSEA: 0
EYE DISCHARGE: 0
EYE PAIN: 0

## 2023-01-11 ASSESSMENT — PATIENT HEALTH QUESTIONNAIRE - PHQ9
2. FEELING DOWN, DEPRESSED OR HOPELESS: 0
SUM OF ALL RESPONSES TO PHQ QUESTIONS 1-9: 0
1. LITTLE INTEREST OR PLEASURE IN DOING THINGS: 0
SUM OF ALL RESPONSES TO PHQ9 QUESTIONS 1 & 2: 0

## 2023-01-11 ASSESSMENT — PAIN - FUNCTIONAL ASSESSMENT: PAIN_FUNCTIONAL_ASSESSMENT: NONE - DENIES PAIN

## 2023-01-12 ENCOUNTER — OFFICE VISIT (OUTPATIENT)
Dept: GASTROENTEROLOGY | Age: 78
End: 2023-01-12
Payer: MEDICARE

## 2023-01-12 ENCOUNTER — PREP FOR PROCEDURE (OUTPATIENT)
Dept: GASTROENTEROLOGY | Age: 78
End: 2023-01-12

## 2023-01-12 VITALS
RESPIRATION RATE: 16 BRPM | WEIGHT: 168 LBS | SYSTOLIC BLOOD PRESSURE: 146 MMHG | HEART RATE: 62 BPM | BODY MASS INDEX: 33.87 KG/M2 | TEMPERATURE: 98 F | OXYGEN SATURATION: 93 % | DIASTOLIC BLOOD PRESSURE: 84 MMHG | HEIGHT: 59 IN

## 2023-01-12 VITALS
SYSTOLIC BLOOD PRESSURE: 138 MMHG | WEIGHT: 172 LBS | OXYGEN SATURATION: 99 % | BODY MASS INDEX: 34.68 KG/M2 | HEART RATE: 71 BPM | DIASTOLIC BLOOD PRESSURE: 82 MMHG | HEIGHT: 59 IN

## 2023-01-12 DIAGNOSIS — R13.14 PHARYNGOESOPHAGEAL DYSPHAGIA: ICD-10-CM

## 2023-01-12 DIAGNOSIS — T17.308A CHOKING, INITIAL ENCOUNTER: Primary | ICD-10-CM

## 2023-01-12 LAB
ALBUMIN SERPL-MCNC: 3.7 G/DL (ref 3.5–4.6)
ALP BLD-CCNC: 107 U/L (ref 40–130)
ALT SERPL-CCNC: 25 U/L (ref 0–33)
ANION GAP SERPL CALCULATED.3IONS-SCNC: 12 MEQ/L (ref 9–15)
AST SERPL-CCNC: 23 U/L (ref 0–35)
BILIRUB SERPL-MCNC: <0.2 MG/DL (ref 0.2–0.7)
BUN BLDV-MCNC: 36 MG/DL (ref 8–23)
CALCIUM SERPL-MCNC: 9.1 MG/DL (ref 8.5–9.9)
CHLORIDE BLD-SCNC: 104 MEQ/L (ref 95–107)
CO2: 22 MEQ/L (ref 20–31)
CREAT SERPL-MCNC: 1.35 MG/DL (ref 0.5–0.9)
GFR SERPL CREATININE-BSD FRML MDRD: 40 ML/MIN/{1.73_M2}
GLOBULIN: 3.6 G/DL (ref 2.3–3.5)
GLUCOSE BLD-MCNC: 238 MG/DL (ref 70–99)
POTASSIUM SERPL-SCNC: 5.3 MEQ/L (ref 3.4–4.9)
SODIUM BLD-SCNC: 138 MEQ/L (ref 135–144)
TOTAL PROTEIN: 7.3 G/DL (ref 6.3–8)

## 2023-01-12 PROCEDURE — G8484 FLU IMMUNIZE NO ADMIN: HCPCS | Performed by: INTERNAL MEDICINE

## 2023-01-12 PROCEDURE — G8417 CALC BMI ABV UP PARAM F/U: HCPCS | Performed by: INTERNAL MEDICINE

## 2023-01-12 PROCEDURE — 3078F DIAST BP <80 MM HG: CPT | Performed by: INTERNAL MEDICINE

## 2023-01-12 PROCEDURE — 4004F PT TOBACCO SCREEN RCVD TLK: CPT | Performed by: INTERNAL MEDICINE

## 2023-01-12 PROCEDURE — 3074F SYST BP LT 130 MM HG: CPT | Performed by: INTERNAL MEDICINE

## 2023-01-12 PROCEDURE — 1090F PRES/ABSN URINE INCON ASSESS: CPT | Performed by: INTERNAL MEDICINE

## 2023-01-12 PROCEDURE — G8400 PT W/DXA NO RESULTS DOC: HCPCS | Performed by: INTERNAL MEDICINE

## 2023-01-12 PROCEDURE — 99214 OFFICE O/P EST MOD 30 MIN: CPT | Performed by: INTERNAL MEDICINE

## 2023-01-12 PROCEDURE — G8427 DOCREV CUR MEDS BY ELIG CLIN: HCPCS | Performed by: INTERNAL MEDICINE

## 2023-01-12 PROCEDURE — 1123F ACP DISCUSS/DSCN MKR DOCD: CPT | Performed by: INTERNAL MEDICINE

## 2023-01-12 NOTE — ED NOTES
Patient has no pain or other complaints at this time, she reported a lump on the left side of head that has been there for years per patient     Yuki Schneider, PELON  01/11/23 6321

## 2023-01-12 NOTE — ED PROVIDER NOTES
[facility] ED  eMERGENCY dEPARTMENT eNCOUnter      Pt Name: Christiano Perez  MRN: [id number]  [place] 1945  Date of evaluation: 1/11/2023  Provider: LAURA Troncoso        HISTORY OF PRESENT ILLNESS    Christiano Perez is a 65 y.o. female per chart review has a PMHx of type II DM, hyperlipidemia, hypertension, breast CA, stage III CKD presents to ED for evaluation of abnormal labs. Patient reports that she was at Dr. Flaco Manley (PCP) office today for annual visit and labs and was contacted following visit due to elevated potassium. Patient reports that Dr. Gabe Pedroza did prescribe Kayexalate, however none of the pharmacies had the medications so she was directed to come to the emergency department for evaluation. Patient denies chest pain, shortness of breath, N/V/D, fever, chills. REVIEW OF SYSTEMS       Review of Systems   Constitutional:  Negative for activity change, appetite change, chills, fatigue and fever. HENT:  Negative for congestion. Eyes:  Negative for pain, discharge and itching. Respiratory:  Negative for cough and shortness of breath. Cardiovascular:  Negative for chest pain, palpitations and leg swelling. Gastrointestinal:  Negative for abdominal pain, constipation, diarrhea, nausea and vomiting. Endocrine: Negative for polydipsia, polyphagia and polyuria. Genitourinary:  Negative for difficulty urinating and dysuria. Musculoskeletal:  Negative for arthralgias and myalgias. Skin:  Negative for rash and wound. Allergic/Immunologic: Negative. Neurological:  Negative for light-headedness and headaches. Hematological:  Negative for adenopathy. Does not bruise/bleed easily. Psychiatric/Behavioral: Negative. Except as noted above the remainder of the review of systems was reviewed and negative.        PAST MEDICAL HISTORY     Past Medical History:   Diagnosis Date    Breast cancer ([facility] [address]     right (16-17 yrs ago)    Cancer ([address]     Breast Diabetes mellitus ([facility] [address]     History of therapeutic radiation     Hyperlipidemia     Hypertension          SURGICAL HISTORY       Past Surgical History:   Procedure Laterality Date    APPENDECTOMY      BREAST BIOPSY Right     malignant (16-17 yrs ago)    BREAST LUMPECTOMY Right     malignant    CARPAL TUNNEL RELEASE Left     CT NEEDLE BIOPSY LIVER PERCUTANEOUS Right 08/01/2022    Performed by Dr. Carli Preston - diagnostics sent    CT NEEDLE BIOPSY LIVER PERCUTANEOUS  8/1/2022    CT NEEDLE BIOPSY LIVER PERCUTANEOUS 8/1/2022 [facility] CT SCAN    HERNIA REPAIR      Umbilical    HYSTERECTOMY (CERVIX STATUS UNKNOWN)      total but left part of one ovary    OVARY REMOVAL      left part of one ovary    TONSILLECTOMY           CURRENT MEDICATIONS       Previous Medications    ALCOHOL SWABS (ALCOHOL PADS) 70 % PADS    Use, as directed, three times a day to test blood sugar    AMLODIPINE (NORVASC) 5 MG TABLET    Take 1 tablet by mouth daily    ATORVASTATIN (LIPITOR) 40 MG TABLET    TAKE 1 TABLET BY MOUTH EVERY DAY    BLOOD GLUCOSE CALIBRATION (OT ULTRA/FASTTK CNTRL SOLN) SOLN    USE TO CONFIRM ACCURACY OF GLUCOSE METER    BLOOD GLUCOSE MONITORING SUPPL (ONE TOUCH ULTRA 2) W/DEVICE KIT    Use, as directed, three times a day to test blood sugar    CYCLOBENZAPRINE (FLEXERIL) 10 MG TABLET        EASY COMFORT LANCETS MISC    Use, as directed, three times a day to test blood sugar    EASY COMFORT PEN NEEDLES 32G X 4 MM MISC    Use to inject insulin 3 times a day as directed    EMPAGLIFLOZIN (JARDIANCE) 10 MG TABLET    Take 1 tablet by mouth daily    GABAPENTIN (NEURONTIN) 600 MG TABLET    Take 1 tablet orally bid prn back pain.     GLUCOSE (GLUTOSE) 40 % GEL    Take 37.5 mLs by mouth See Admin Instructions    HANDICAP PLACARD MISC    by Does not apply route Diagnosis: COPD  Duration 6/14/2021-6/14/2023    INSULIN ASPART (NOVOLOG FLEXPEN) 100 UNIT/ML INJECTION PEN    INJECT 18 UNITS SUBCUTANEOUSLY THREE TIMES A DAY BEFORE MEALS    INSULIN GLARGINE (LANTUS) 100 UNIT/ML INJECTION VIAL    Inject 10 Units into the skin nightly    LANCET DEVICES (EASY MINI EJECT LANCING DEVICE) MISC    Use, as directed, three times a day to test blood sugar    NOVOLOG FLEXPEN 100 UNIT/ML INJECTION PEN    inject 18 units subcutaneously three times a day before meals    ONETOUCH ULTRA STRIP    Use, as directed, three times a day to test blood sugar    PRIMIDONE (MYSOLINE) 50 MG TABLET    TAKE 1 TABLET BY MOUTH TWO TIMES A DAY    SODIUM POLYSTYRENE SULFONATE (SPS) 30 GM/120ML SUSP    Take 30 cc orally once daily for 3 days. ALLERGIES     Patient has no known allergies.     FAMILY HISTORY       Family History   Problem Relation Age of Onset    Breast Cancer Mother     Heart Attack Father     Diabetes Brother           SOCIAL HISTORY       Social History     Socioeconomic History    Marital status:      Spouse name: None    Number of children: None    Years of education: None    Highest education level: None   Tobacco Use    Smoking status: Every Day     Packs/day: 0.50     Years: 50.00     Pack years: 25.00     Types: Cigarettes    Smokeless tobacco: Never   Vaping Use    Vaping Use: Never used   Substance and Sexual Activity    Alcohol use: Yes     Comment: Once a month    Drug use: Never    Sexual activity: Yes     Partners: Male     Social Determinants of Health     Financial Resource Strain: Low Risk     Difficulty of Paying Living Expenses: Not hard at all   Food Insecurity: No Food Insecurity    Worried About [facility] in the Last Year: Never true    Ran Out of Food in the Last Year: Never true   Physical Activity: Inactive    Days of Exercise per Week: 0 days    Minutes of Exercise per Session: 0 min         PHYSICAL EXAM        ED Triage Vitals [01/11/23 2150]   BP Temp Temp Source Heart Rate Resp SpO2 Height Weight   (!) 157/108 98.1 °F (36.7 °C) Oral 73 16 95 % 4' 11\" (1.499 m) 168 lb (76.2 kg)       Physical Exam  Vitals and nursing note reviewed. Constitutional:       General: She is not in acute distress. Appearance: Normal appearance. She is normal weight. She is not ill-appearing or toxic-appearing. HENT:      Head: Normocephalic and atraumatic. Right Ear: External ear normal.      Left Ear: External ear normal.      Nose: Nose normal.      Mouth/Throat:      Mouth: Mucous membranes are moist.      Pharynx: Oropharynx is clear. Eyes:      Extraocular Movements: Extraocular movements intact. Pupils: Pupils are equal, round, and reactive to light. Cardiovascular:      Rate and Rhythm: Normal rate and regular rhythm. Pulses: Normal pulses. Heart sounds: Normal heart sounds. Pulmonary:      Effort: Pulmonary effort is normal.      Breath sounds: Normal breath sounds. Abdominal:      General: Abdomen is flat. Bowel sounds are normal. There is no distension. Palpations: Abdomen is soft. Musculoskeletal:         General: Normal range of motion. Cervical back: Normal range of motion and neck supple. Skin:     General: Skin is warm and dry. Capillary Refill: Capillary refill takes less than 2 seconds. Neurological:      General: No focal deficit present. Mental Status: She is alert and oriented to person, place, and time. Mental status is at baseline.    Psychiatric:         Mood and Affect: Mood normal.         LABS:  Labs Reviewed   CBC WITH AUTO DIFFERENTIAL - Abnormal; Notable for the following components:       Result Value    Hematocrit 50.2 (*)     .7 (*)     MCH 32.0 (*)     MCHC 30.8 (*)     RDW 15.8 (*)     All other components within normal limits   COMPREHENSIVE METABOLIC PANEL - Abnormal; Notable for the following components:    Potassium 5.3 (*)     Glucose 238 (*)     BUN 36 (*)     Creatinine 1.35 (*)     Est, Glom Filt Rate 40.0 (*)     Globulin 3.6 (*)     All other components within normal limits    Narrative:     redraw/ AC   SPECIMEN REJECTION         MDM:   Vitals: Vitals:    01/11/23 2200 01/11/23 2300 01/11/23 2330 01/12/23 0019   BP: 94/69 119/78 (!) 97/58 (!) 146/84   Pulse: 75 66 71 62   Resp: 17 16 17 16   Temp:    98 °F (36.7 °C)   TempSrc:       SpO2: 95% 94% 92% 93%   Weight:       Height:           50-year-old female presents to ED for evaluation of abnormal labs. Patient is afebrile, hemodynamically stable, nontoxic. EKG shows NSR with 1st degree AV block with HR 69, normal axis, normal intervals, no ST changes. No previous EKGs on file for comparison. Labs remarkable for potassium 5.3. Suspect that previous potassium was hemolyzed. I do not feel that it is necessary to treat potassium with Kayexalate at this time. Patient given standard anticipatory guidance, return to ED warning signs, strict follow-up guidelines with PCP. Patient verbalized understanding of education, instruction. Patient is agreeable to plan. Patient discharged home in stable condition with . CRITICAL CARE TIME   Total CriticalCare time was 0 minutes, excluding separately reportable procedures. There was a high probability of clinically significant/life threatening deterioration in the patient's condition which required my urgent intervention. PROCEDURES:  Unlessotherwise noted below, none      Procedures      FINAL IMPRESSION      1.  Feared complaint without diagnosis          DISPOSITION/PLAN   DISPOSITION Decision To Discharge 01/12/2023 12:26:29 AM          LAURA Mcdaniel (electronically signed)  Attending Emergency Physician         LAURA Mcdaniel  01/12/23 3429

## 2023-01-12 NOTE — PROGRESS NOTES
Gastroenterology Clinic Visit    Evans Zepeda  [id number]  Chief Complaint   Patient presents with    Follow-up     HPI: 65 y.o. female referred to the GI clinic with difficulty swallowing solids. Reports symptoms over the last few months. She reports having symptoms mostly with dinner, occasionally with lunch but never with the breakfast.  She feels the food sticking in the posterior pharynx/upper esophagus just at the initiation of the swallowing mechanism. Her most common symptom is sensation of choking with significant fear that she may choke and required a Heimlich maneuver. She denies any significant weight loss. She denies any hematemesis. She does smoke 1 pack of cigarettes a day. She denies any reflux symptoms, is not on any PPI therapy  She additionally reports issues with her dentures, they are ill fitting and on many occasions float in her mouth. She denies any weight loss, is struggling with central obesity  Other past medical history as noted below, of importance history of diabetes, history of meningioma, MRI ordered by primary care provider. Patient previously seen in liver clinic on 11/21/2022, details summarized below:  Patient came in as follow-up to abnormal liver enzymes. His recall had elevated liver enzymes 2-3 times normal for greater than 2 years with  blood work to rule out any associated viral, autoimmune or metabolic etiology. Noted positive CYNDI and smooth muscle antibody, normal IgG , otherwise normal blood work. Patient has associated medical conditions including diabetes, dyslipidemia, hypertension and class II obesity with BMI of 32. Underwent liver biopsy which was notable for questionable autoimmune hepatitis versus drug-induced liver injury, no advanced fibrosis. Liver enzymes were out of proportion as to what would be expected with drug-induced liver injury however patient did hold Lipitor. Most recent liver enzymes are improved.   Otherwise no new complaints or concerns, no liver related hospitalizations, memory loss or confusion, jaundice, pruritus, fatigue, abdominal swelling, lower extremity edema, nausea or vomiting, hematemesis, melena, or hematochezia. OV 8/20/22  Patient came in as follow-up to abnormal LFTs and further evaluation of liver disease. Had FibroScan which showed fibrosis/early cirrhosis with positive CYNDI and smooth muscle antibody and normal IgG, with mild elevation of transaminases raising concern for burned-out autoimmune hepatitis. Underwent liver biopsy which was notable for no advanced fibrosis, questionable autoimmune hepatitis versus drug-induced liver injury. Patient has no recent liver related hospitalizations, memory loss or confusion, jaundice, pruritus, fatigue, abdominal swelling, lower extremity edema, nausea or vomiting, hematemesis, melena, or hematochezia. OV 7/8/22  Patient came in as follow-up to abnormal LFTs and further evaluation of liver disease. Had ultrasound which noted questionable cirrhosis with follow-up FibroScan notable for advanced fibrosis/early cirrhosis, with correlating high Apri score. No history of EtOH, had blood work to rule out any associated viral, autoimmune or metabolic etiology. Noted positive CYNDI and smooth muscle antibody, normal IgG , otherwise normal blood work. Patient has associated medical conditions including diabetes, dyslipidemia, hypertension and class II obesity with BMI of 32. Otherwise no liver related hospitalizations, memory loss or confusion, pruritus, easy bruising, abdominal swelling, nausea or vomiting, melena, or hematochezia. Background  This is a very pleasant 78-year-old who came in today for further evaluation management. She mentioned that she had an ultrasound and was told that she may have cirrhosis subsequently she mentioned that she has not been drinking alcohol.   Patient does have history of diabetes mellitus, dyslipidemia, hypertension and the current BMI is 29.  She had previous breast lumpectomy and radiation. Denies history of jaundice, easy bruising admission related to liver condition. Denies any hematemesis melena or hematochezia. .  Given the the radiological evidence of cirrhosis patient referred to us for the evaluation. Noted ultrasound showed hepatomegaly. Reviewed lab work showed proceed transaminases on serial testing    Previous GI work up/Endoscopic investigations:   None noted    Review of Systems   All other systems reviewed and are negative. Past Medical History:   Diagnosis Date    Breast cancer ([address]     right (16-17 yrs ago)    Cancer ([address]     Breast    Diabetes mellitus ([facility] [address]     History of therapeutic radiation     Hyperlipidemia     Hypertension      Past Surgical History:   Procedure Laterality Date    APPENDECTOMY      BREAST BIOPSY Right     malignant (16-17 yrs ago)    BREAST LUMPECTOMY Right     malignant    CARPAL TUNNEL RELEASE Left     CT NEEDLE BIOPSY LIVER PERCUTANEOUS Right 08/01/2022    Performed by Dr. Salo Hernandez - diagnostics sent    CT NEEDLE BIOPSY LIVER PERCUTANEOUS  8/1/2022    CT NEEDLE BIOPSY LIVER PERCUTANEOUS 8/1/2022 [facility] CT SCAN    HERNIA REPAIR      Umbilical    HYSTERECTOMY (CERVIX STATUS UNKNOWN)      total but left part of one ovary    OVARY REMOVAL      left part of one ovary    TONSILLECTOMY       Current Outpatient Medications on File Prior to Visit   Medication Sig Dispense Refill    glucose (GLUTOSE) 40 % GEL Take 37.5 mLs by mouth See Admin Instructions 45 g 1    amLODIPine (NORVASC) 5 MG tablet Take 1 tablet by mouth daily 30 tablet 3    sodium polystyrene sulfonate (SPS) 30 GM/120ML SUSP Take 30 cc orally once daily for 3 days.  120 mL 0    atorvastatin (LIPITOR) 40 MG tablet TAKE 1 TABLET BY MOUTH EVERY DAY 90 tablet 1    primidone (MYSOLINE) 50 MG tablet TAKE 1 TABLET BY MOUTH TWO TIMES A DAY 90 tablet 1    cyclobenzaprine (FLEXERIL) 10 MG tablet       NOVOLOG FLEXPEN 100 UNIT/ML injection pen inject 18 units subcutaneously three times a day before meals 15 mL 0    insulin aspart (NOVOLOG FLEXPEN) 100 UNIT/ML injection pen INJECT 18 UNITS SUBCUTANEOUSLY THREE TIMES A DAY BEFORE MEALS 15 mL 0    insulin glargine (LANTUS) 100 UNIT/ML injection vial Inject 10 Units into the skin nightly      empagliflozin (JARDIANCE) 10 MG tablet Take 1 tablet by mouth daily 14 tablet 0    Handicap Placard MISC by Does not apply route Diagnosis: COPD  Duration 6/14/2021-6/14/2023 1 each 0    Alcohol Swabs (ALCOHOL PADS) 70 % PADS Use, as directed, three times a day to test blood sugar      Blood Glucose Calibration (OT ULTRA/FASTTK CNTRL SOLN) SOLN USE TO CONFIRM ACCURACY OF GLUCOSE METER      Blood Glucose Monitoring Suppl (ONE TOUCH ULTRA 2) w/Device KIT Use, as directed, three times a day to test blood sugar      ONETOUCH ULTRA strip Use, as directed, three times a day to test blood sugar      EASY COMFORT PEN NEEDLES 32G X 4 MM MISC Use to inject insulin 3 times a day as directed      Lancet Devices (EASY MINI EJECT LANCING DEVICE) MISC Use, as directed, three times a day to test blood sugar      Easy Comfort Lancets MISC Use, as directed, three times a day to test blood sugar      gabapentin (NEURONTIN) 600 MG tablet Take 1 tablet orally bid prn back pain. 60 tablet 0     No current facility-administered medications on file prior to visit.      Family History   Problem Relation Age of Onset    Breast Cancer Mother     Heart Attack Father     Diabetes Brother     Colon Cancer Neg Hx      Social History     Socioeconomic History    Marital status:    Tobacco Use    Smoking status: Every Day     Packs/day: 0.50     Years: 50.00     Pack years: 25.00     Types: Cigarettes    Smokeless tobacco: Never   Vaping Use    Vaping Use: Never used   Substance and Sexual Activity    Alcohol use: Yes     Comment: Once a month    Drug use: Never    Sexual activity: Yes     Partners: Male     Social Determinants of Health     Financial Resource Strain: Low Risk     Difficulty of Paying Living Expenses: Not hard at all   Food Insecurity: No Food Insecurity    Worried About [address] [place] [address] in the Last Year: Never true    Ran Out of Food in the Last Year: Never true   Physical Activity: Inactive    Days of Exercise per Week: 0 days    Minutes of Exercise per Session: 0 min       Blood pressure 138/82, pulse 71, height 4' 11\" (1.499 m), weight 172 lb (78 kg), SpO2 99 %. Physical Exam  Constitutional:       General: She is not in acute distress. Appearance: Normal appearance. She is well-developed. Eyes:      General: No scleral icterus. Cardiovascular:      Rate and Rhythm: Normal rate and regular rhythm. Pulmonary:      Effort: Pulmonary effort is normal.      Breath sounds: Normal breath sounds. Abdominal:      General: Bowel sounds are normal. There is no distension. Palpations: Abdomen is soft. There is no mass. Tenderness: There is no abdominal tenderness. There is no guarding or rebound. Musculoskeletal:         General: Normal range of motion. Lymphadenopathy:      Cervical: No cervical adenopathy. Neurological:      Mental Status: She is alert and oriented to person, place, and time. Psychiatric:         Behavior: Behavior normal.         Thought Content:  Thought content normal.         Judgment: Judgment normal.     Laboratory, Pathology, Radiology reviewed indetail with relevant important investigations summarized below:  Lab Results   Component Value Date    WBC 7.6 01/11/2023    HGB 15.5 01/11/2023    HCT 50.2 (H) 01/11/2023    .7 (H) 01/11/2023     01/11/2023     Lab Results   Component Value Date    IRON 124 05/18/2022    TIBC 353 05/18/2022    FERRITIN 165 (H) 05/18/2022     No results found for: Verdia Cheers   No results found for: FOLATE  Lab Results   Component Value Date    LABALBU 3.7 01/11/2023      Lab Results   Component Value Date    ALT 25 01/11/2023    AST 23 01/11/2023    ALKPHOS 107 01/11/2023    BILITOT <0.2 01/11/2023     CT-guided liver biopsy: 8/1/2022  LIVER BIOPSY  Liver, biopsy (H&E A1 and A2, Trichrome, Iron, PAS-D, PAS):  -Chronic hepatitis injury pattern with mild activity and no increased fibrosis  Overall, the findings are that of a chronic hepatitis injury pattern with mild activity and no increased fibrosis. The differential diagnosis primarily includes a mild form of autoimmune hepatitis (AIH) and atorvastatin-related injury. While typical changes of AIH including plasma cell rich inflammation and significant lobular injury are absent, the findings may be compatible with AIH in the appropriate clinical context. Statin-related liver injury can show a variety of different histolomorphologies including hepatitic injury in a subset of cases. Of note, statin related liver injury can be associated with autoantibodies and some patients improve with immunosuppressive therapy [ref]. Ultimately, careful clinical correlation and follow up over time will be required to distinguish AIH and atorvastatin-related injury. Viral hepatitis is less likely in the setting of negative laboratory testing. Per outside surgical pathology report there is clinical concern for CORDOVA; no histologic evidence to suggest CORDOVA is seen. FibroScan: 5/24/2022:  Fibrosis stage III-IV ( F3-4), moderate to severe steatosis (> 33%) - S2-3    Ultrasound abdomen: 5/13/2022: Coarse echotexture of the liver which may suggest liver cirrhosis. No focal hepatic lesions. Trace amount of free peritoneal fluid adjacent to the liver. 4 cm right renal cyst    Assessment and Plan:  65 y.o. female with symptoms of dysphagia to solids, episodes of choking specially with evening meals suggesting component of oropharyngeal dysphagia rather than a structural abnormality. Longstanding history of smoking necessitates excluding upper esophageal stricture/neoplasia as the etiology for symptoms.   However clinical history suggests oropharyngeal/swallowing mechanism discordance. -Importance of avoiding distractions during meals, especially evening meals emphasized. Advised to eat slowly, avoid talking, phone calls, TV. Patient may tolerate thickened fluids more so than thin liquids  -Recommend consultation with her dentist for better fitting dentures  -Modified barium swallow to evaluate swallowing function further  -EGD to exclude esophageal stricture/structural abnormality/neoplasia  -Importance of smoking cessation emphasized, antireflux measures, will defer starting any H2 blocker or PPI therapy given no reflux symptoms  -Continued follow-up with hepatology for advanced fibrosis    Return in 4-6 weeks (around 2/16/2023), following modified barium swallow evaluation    Aryan Bernard MD   Staff Gastroenterologist  [facility]    Please note this report has been partially produced using speech recognition software and may cause or contain errors related to thatsystem including grammar, punctuation and spelling as well as words and phrases that may seem inappropriate. If there are questions or concerns please feel free to contact me to clarify.

## 2023-01-13 LAB
EKG ATRIAL RATE: 69 BPM
EKG P AXIS: 60 DEGREES
EKG P-R INTERVAL: 244 MS
EKG Q-T INTERVAL: 374 MS
EKG QRS DURATION: 76 MS
EKG QTC CALCULATION (BAZETT): 400 MS
EKG R AXIS: 42 DEGREES
EKG T AXIS: 88 DEGREES
EKG VENTRICULAR RATE: 69 BPM

## 2023-01-13 PROCEDURE — 93010 ELECTROCARDIOGRAM REPORT: CPT | Performed by: INTERNAL MEDICINE

## 2023-01-16 ENCOUNTER — HOSPITAL ENCOUNTER (OUTPATIENT)
Dept: MRI IMAGING | Age: 78
Discharge: HOME | End: 2023-01-18
Payer: MEDICARE

## 2023-01-16 DIAGNOSIS — N18.31 TYPE 2 DIABETES MELLITUS WITH STAGE 3A CHRONIC KIDNEY DISEASE, WITH LONG-TERM CURRENT USE OF INSULIN (HCC): ICD-10-CM

## 2023-01-16 DIAGNOSIS — E11.22 TYPE 2 DIABETES MELLITUS WITH STAGE 3A CHRONIC KIDNEY DISEASE, WITH LONG-TERM CURRENT USE OF INSULIN (HCC): ICD-10-CM

## 2023-01-16 DIAGNOSIS — Z79.4 TYPE 2 DIABETES MELLITUS WITH STAGE 3A CHRONIC KIDNEY DISEASE, WITH LONG-TERM CURRENT USE OF INSULIN (HCC): ICD-10-CM

## 2023-01-16 PROCEDURE — 70553 MRI BRAIN STEM W/O & W/DYE: CPT

## 2023-01-16 PROCEDURE — A9577 INJ MULTIHANCE: HCPCS | Performed by: INTERNAL MEDICINE

## 2023-01-16 PROCEDURE — 6360000004 HC RX CONTRAST MEDICATION: Performed by: INTERNAL MEDICINE

## 2023-01-16 RX ADMIN — GADOBENATE DIMEGLUMINE 20 ML: 529 INJECTION, SOLUTION INTRAVENOUS at 14:37

## 2023-01-31 DIAGNOSIS — E11.9 TYPE 2 DIABETES MELLITUS WITHOUT COMPLICATION, WITH LONG-TERM CURRENT USE OF INSULIN (HCC): ICD-10-CM

## 2023-01-31 DIAGNOSIS — Z79.4 TYPE 2 DIABETES MELLITUS WITHOUT COMPLICATION, WITH LONG-TERM CURRENT USE OF INSULIN (HCC): ICD-10-CM

## 2023-02-07 ENCOUNTER — TELEPHONE (OUTPATIENT)
Dept: FAMILY MEDICINE CLINIC | Age: 78
End: 2023-02-07

## 2023-02-07 NOTE — TELEPHONE ENCOUNTER
The [facility] patient assistance program has denied assistance due to the patient having a private drug coverage. She may apply again in one year.

## 2023-03-13 RX ORDER — GABAPENTIN 600 MG/1
600 TABLET ORAL 2 TIMES DAILY PRN
Qty: 60 TABLET | Refills: 0 | Status: SHIPPED | OUTPATIENT
Start: 2023-03-13 | End: 2023-04-12

## 2023-03-13 NOTE — TELEPHONE ENCOUNTER
Future Appointments    Encounter Information    Provider Department Appt Notes   3/29/2023 Hai Holden, [address] Primary [facility] Specialty Care Follow up     Past Visits    Date Provider Specialty Visit Type Primary Dx   03/06/2023 Lorene Augustine MD Endoscopy Surgery    01/12/2023 Lorene Augustine MD Gastroenterology Office Visit Choking, initial encounter   01/11/2023 Hai Holden MD Family Medicine Office Visit Type 2 diabetes mellitus with stage 3a chronic kidney disease, with long-term current use of insulin ([facility] [address]

## 2023-05-02 ENCOUNTER — TELEMEDICINE (OUTPATIENT)
Dept: FAMILY MEDICINE CLINIC | Age: 78
End: 2023-05-02
Payer: MEDICARE

## 2023-05-02 ENCOUNTER — TELEPHONE (OUTPATIENT)
Dept: FAMILY MEDICINE CLINIC | Age: 78
End: 2023-05-02

## 2023-05-02 DIAGNOSIS — Z00.00 INITIAL MEDICARE ANNUAL WELLNESS VISIT: Primary | ICD-10-CM

## 2023-05-02 PROCEDURE — 1123F ACP DISCUSS/DSCN MKR DOCD: CPT | Performed by: NURSE PRACTITIONER

## 2023-05-02 PROCEDURE — G0439 PPPS, SUBSEQ VISIT: HCPCS | Performed by: NURSE PRACTITIONER

## 2023-05-02 RX ORDER — GABAPENTIN 600 MG/1
600 TABLET ORAL 2 TIMES DAILY PRN
Qty: 60 TABLET | Refills: 3 | Status: SHIPPED | OUTPATIENT
Start: 2023-05-02 | End: 2023-06-01

## 2023-05-02 ASSESSMENT — PATIENT HEALTH QUESTIONNAIRE - PHQ9
SUM OF ALL RESPONSES TO PHQ QUESTIONS 1-9: 0
SUM OF ALL RESPONSES TO PHQ9 QUESTIONS 1 & 2: 0
SUM OF ALL RESPONSES TO PHQ QUESTIONS 1-9: 0
1. LITTLE INTEREST OR PLEASURE IN DOING THINGS: 0
2. FEELING DOWN, DEPRESSED OR HOPELESS: 0

## 2023-05-02 ASSESSMENT — LIFESTYLE VARIABLES
HOW MANY STANDARD DRINKS CONTAINING ALCOHOL DO YOU HAVE ON A TYPICAL DAY: PATIENT DOES NOT DRINK
HOW OFTEN DO YOU HAVE A DRINK CONTAINING ALCOHOL: NEVER

## 2023-05-02 NOTE — PROGRESS NOTES
Medicare Annual Wellness Visit    Jackeline Inman is here for Medicare AWV    Assessment & Plan   Medicare annual wellness visit, subsequent       Catherine France Cera is a 77 y.o.male being evaluated by a Virtual Visit (phone) encounter to address concerns as mentioned above. A caregiver was present when appropriate. Patient identification was verified, and a caregiver was present when appropriate. The patient was located at home in a state where the provider was licensed to provide care. Due to this being a TeleHealth encounter evaluation of the following organ systems was limited: Vitals/ Constitutional/ EENT/ Resp/ CV/GI/ / MS/ Neuro/ Skin/ Heme-Lymph-Imm. Patient identification was verified at the start of the visit: Yes     Services were provided through phone to substitute for in-person clinic visit. Patient was located at her individual home & provider at the office. No follow-ups on file. Subjective       Patient's complete Health Risk Assessment and screening values have been reviewed and are found in Flowsheets. The following problems were reviewed today and where indicated follow up appointments were made and/or referrals ordered. Positive Risk Factor Screenings with Interventions:    Fall Risk:  Do you feel unsteady or are you worried about falling? : no  2 or more falls in past year?: no  Fall with injury in past year?: (!) yes (1 fall that resulted in abrasions)     Interventions:    Home safety tips provided               Weight and Activity:  Physical Activity: Inactive    Days of Exercise per Week: 0 days    Minutes of Exercise per Session: 0 min     On average, how many days per week do you engage in moderate to strenuous exercise (like a brisk walk)?: 0 days  Have you lost any weight without trying in the past 3 months?: No  There is no height or weight on file to calculate BMI.      Inactivity Interventions:  Pt active around home & outside the home

## 2023-05-02 NOTE — TELEPHONE ENCOUNTER
Pt is requesting medication refill. Please approve or deny this request.    Rx requested:  Requested Prescriptions     Pending Prescriptions Disp Refills    gabapentin (NEURONTIN) 600 MG tablet 60 tablet 3     Sig: Take 1 tablet by mouth 2 times daily as needed (Back pain) for up to 30 days. Last Office Visit:   1/11/2023      Next Visit Date:  No future appointments.

## 2023-05-09 RX ORDER — INSULIN ASPART 100 [IU]/ML
INJECTION, SOLUTION INTRAVENOUS; SUBCUTANEOUS
Qty: 15 ML | Refills: 0 | Status: SHIPPED | OUTPATIENT
Start: 2023-05-09

## 2023-05-09 NOTE — TELEPHONE ENCOUNTER
patient requesting medication refill. Please approve or deny this request.    Patient using last pen today needs by 5/10  Rx requested:  Requested Prescriptions     Pending Prescriptions Disp Refills    insulin aspart (NOVOLOG FLEXPEN) 100 UNIT/ML injection pen 15 mL 0         Last Office Visit:   1/11/2023      Next Visit Date:  No future appointments.

## 2023-05-12 ENCOUNTER — TELEPHONE (OUTPATIENT)
Dept: FAMILY MEDICINE CLINIC | Age: 78
End: 2023-05-12

## 2023-05-19 ENCOUNTER — TELEPHONE (OUTPATIENT)
Dept: FAMILY MEDICINE CLINIC | Age: 78
End: 2023-05-19

## 2023-05-19 RX ORDER — BLOOD SUGAR DIAGNOSTIC
STRIP MISCELLANEOUS
Qty: 100 EACH | Refills: 1 | Status: SHIPPED | OUTPATIENT
Start: 2023-05-19 | End: 2023-05-19 | Stop reason: SDUPTHER

## 2023-05-19 RX ORDER — BLOOD SUGAR DIAGNOSTIC
STRIP MISCELLANEOUS
Qty: 100 EACH | Refills: 3 | Status: SHIPPED | OUTPATIENT
Start: 2023-05-19

## 2023-05-19 NOTE — TELEPHONE ENCOUNTER
Rx requested:  Requested Prescriptions     Pending Prescriptions Disp Refills    ONETOUCH ULTRA strip 100 each 1     Sig: Use, as directed, three times a day to test blood sugar         Last Office Visit:   1/11/2023      Next Visit Date:  No future appointments.

## 2023-05-22 RX ORDER — GLUCOSAMINE HCL/CHONDROITIN SU 500-400 MG
CAPSULE ORAL
Qty: 100 STRIP | Refills: 5 | Status: SHIPPED | OUTPATIENT
Start: 2023-05-22

## 2023-06-21 RX ORDER — INSULIN ASPART 100 [IU]/ML
INJECTION, SOLUTION INTRAVENOUS; SUBCUTANEOUS
Qty: 15 ML | Refills: 0 | Status: SHIPPED | OUTPATIENT
Start: 2023-06-21

## 2023-06-21 NOTE — TELEPHONE ENCOUNTER
Please approve or deny request. Thank you! Rx requested:  Requested Prescriptions     Pending Prescriptions Disp Refills    NOVOLOG FLEXPEN 100 UNIT/ML injection pen [Pharmacy Med Name: NovoLOG FlexPen Subcutaneous Solution Pen-injector 100 UNIT/ML] 15 mL 0     Sig: inject 18 units subcutaneously three times a day before meals         Last Office Visit:   1/11/2023      Next Visit Date:  No future appointments.

## 2023-07-24 ENCOUNTER — HOSPITAL ENCOUNTER (OUTPATIENT)
Dept: WOMENS IMAGING | Age: 78
Discharge: HOME | End: 2023-07-26
Payer: MEDICARE

## 2023-07-24 VITALS — BODY MASS INDEX: 35.95 KG/M2 | HEIGHT: 58 IN

## 2023-07-24 DIAGNOSIS — Z12.31 SCREENING MAMMOGRAM, ENCOUNTER FOR: ICD-10-CM

## 2023-07-24 PROCEDURE — 77063 BREAST TOMOSYNTHESIS BI: CPT

## 2023-07-31 RX ORDER — INSULIN ASPART 100 [IU]/ML
INJECTION, SOLUTION INTRAVENOUS; SUBCUTANEOUS
Qty: 15 ML | Refills: 0 | Status: SHIPPED | OUTPATIENT
Start: 2023-07-31

## 2023-11-15 ENCOUNTER — APPOINTMENT (OUTPATIENT)
Dept: CARDIOLOGY | Facility: CLINIC | Age: 78
End: 2023-11-15

## 2023-12-04 ENCOUNTER — HOSPITAL ENCOUNTER (OUTPATIENT)
Dept: CARDIOLOGY | Facility: CLINIC | Age: 78
Discharge: HOME | End: 2023-12-04
Payer: MEDICARE

## 2023-12-04 VITALS
SYSTOLIC BLOOD PRESSURE: 195 MMHG | HEIGHT: 59 IN | DIASTOLIC BLOOD PRESSURE: 80 MMHG | BODY MASS INDEX: 32.66 KG/M2 | WEIGHT: 162 LBS

## 2023-12-04 DIAGNOSIS — R53.83 OTHER FATIGUE: ICD-10-CM

## 2023-12-04 DIAGNOSIS — M79.89 OTHER SPECIFIED SOFT TISSUE DISORDERS: ICD-10-CM

## 2023-12-04 PROCEDURE — 93306 TTE W/DOPPLER COMPLETE: CPT | Performed by: INTERNAL MEDICINE

## 2023-12-04 PROCEDURE — 93306 TTE W/DOPPLER COMPLETE: CPT

## 2023-12-05 LAB
AORTIC VALVE MEAN GRADIENT: 5
AORTIC VALVE PEAK VELOCITY: 1.51
AV PEAK GRADIENT: 9.1
AVA (PEAK VEL): 1.46
AVA (VTI): 1.75
EJECTION FRACTION APICAL 4 CHAMBER: 55.9
EJECTION FRACTION: 52
LEFT VENTRICLE INTERNAL DIMENSION DIASTOLE: 4.7 (ref 3.5–6)
LEFT VENTRICULAR OUTFLOW TRACT DIAMETER: 1.8
MITRAL VALVE E/A RATIO: 0.68
MITRAL VALVE E/E' RATIO: 17.8
RIGHT VENTRICLE PEAK SYSTOLIC PRESSURE: 30.5

## 2023-12-07 ENCOUNTER — LAB (OUTPATIENT)
Dept: LAB | Facility: LAB | Age: 78
End: 2023-12-07
Payer: MEDICARE

## 2023-12-07 DIAGNOSIS — N18.31 CHRONIC KIDNEY DISEASE, STAGE 3A (MULTI): Primary | ICD-10-CM

## 2023-12-07 LAB
ALBUMIN SERPL BCP-MCNC: 4 G/DL (ref 3.4–5)
ANION GAP SERPL CALC-SCNC: 14 MMOL/L (ref 10–20)
BUN SERPL-MCNC: 18 MG/DL (ref 6–23)
CALCIUM SERPL-MCNC: 9.4 MG/DL (ref 8.6–10.3)
CHLORIDE SERPL-SCNC: 102 MMOL/L (ref 98–107)
CO2 SERPL-SCNC: 27 MMOL/L (ref 21–32)
CREAT SERPL-MCNC: 1.21 MG/DL (ref 0.5–1.05)
CREAT UR-MCNC: 74.5 MG/DL (ref 20–320)
ERYTHROCYTE [DISTWIDTH] IN BLOOD BY AUTOMATED COUNT: 13.3 % (ref 11.5–14.5)
GFR SERPL CREATININE-BSD FRML MDRD: 46 ML/MIN/1.73M*2
GLUCOSE SERPL-MCNC: 218 MG/DL (ref 74–99)
HCT VFR BLD AUTO: 49.4 % (ref 36–46)
HGB BLD-MCNC: 15.7 G/DL (ref 12–16)
MCH RBC QN AUTO: 30.9 PG (ref 26–34)
MCHC RBC AUTO-ENTMCNC: 31.8 G/DL (ref 32–36)
MCV RBC AUTO: 97 FL (ref 80–100)
MICROALBUMIN UR-MCNC: 177.4 MG/L
MICROALBUMIN/CREAT UR: 238.1 UG/MG CREAT
NRBC BLD-RTO: 0 /100 WBCS (ref 0–0)
PHOSPHATE SERPL-MCNC: 2.7 MG/DL (ref 2.5–4.9)
PLATELET # BLD AUTO: 173 X10*3/UL (ref 150–450)
POTASSIUM SERPL-SCNC: 4 MMOL/L (ref 3.5–5.3)
RBC # BLD AUTO: 5.08 X10*6/UL (ref 4–5.2)
SODIUM SERPL-SCNC: 139 MMOL/L (ref 136–145)
WBC # BLD AUTO: 9.4 X10*3/UL (ref 4.4–11.3)

## 2023-12-07 PROCEDURE — 82043 UR ALBUMIN QUANTITATIVE: CPT

## 2023-12-07 PROCEDURE — 36415 COLL VENOUS BLD VENIPUNCTURE: CPT

## 2023-12-07 PROCEDURE — 80069 RENAL FUNCTION PANEL: CPT

## 2023-12-07 PROCEDURE — 85027 COMPLETE CBC AUTOMATED: CPT

## 2023-12-07 PROCEDURE — 82570 ASSAY OF URINE CREATININE: CPT

## 2023-12-11 RX ORDER — GABAPENTIN 600 MG/1
TABLET ORAL
Qty: 60 TABLET | Refills: 0 | OUTPATIENT
Start: 2023-12-11

## 2023-12-18 ENCOUNTER — ANCILLARY PROCEDURE (OUTPATIENT)
Dept: RADIOLOGY | Facility: CLINIC | Age: 78
End: 2023-12-18
Payer: MEDICARE

## 2023-12-18 DIAGNOSIS — N18.32 CHRONIC KIDNEY DISEASE, STAGE 3B (MULTI): ICD-10-CM

## 2023-12-18 PROCEDURE — 76770 US EXAM ABDO BACK WALL COMP: CPT

## 2023-12-18 PROCEDURE — 76770 US EXAM ABDO BACK WALL COMP: CPT | Performed by: STUDENT IN AN ORGANIZED HEALTH CARE EDUCATION/TRAINING PROGRAM

## 2023-12-20 ENCOUNTER — OFFICE VISIT (OUTPATIENT)
Dept: CARDIOLOGY | Facility: CLINIC | Age: 78
End: 2023-12-20
Payer: MEDICARE

## 2023-12-20 VITALS
DIASTOLIC BLOOD PRESSURE: 70 MMHG | SYSTOLIC BLOOD PRESSURE: 130 MMHG | WEIGHT: 166.8 LBS | BODY MASS INDEX: 33.63 KG/M2 | HEIGHT: 59 IN

## 2023-12-20 DIAGNOSIS — R53.83 OTHER FATIGUE: Primary | ICD-10-CM

## 2023-12-20 DIAGNOSIS — R60.0 LEG EDEMA: ICD-10-CM

## 2023-12-20 DIAGNOSIS — E11.9 DIABETES MELLITUS TYPE 2, INSULIN DEPENDENT (MULTI): ICD-10-CM

## 2023-12-20 DIAGNOSIS — Z79.4 DIABETES MELLITUS TYPE 2, INSULIN DEPENDENT (MULTI): ICD-10-CM

## 2023-12-20 DIAGNOSIS — N18.31 STAGE 3A CHRONIC KIDNEY DISEASE (MULTI): ICD-10-CM

## 2023-12-20 PROBLEM — N18.9 CHRONIC KIDNEY DISEASE (CKD): Status: ACTIVE | Noted: 2023-12-20

## 2023-12-20 PROCEDURE — 1159F MED LIST DOCD IN RCRD: CPT | Performed by: INTERNAL MEDICINE

## 2023-12-20 PROCEDURE — 4004F PT TOBACCO SCREEN RCVD TLK: CPT | Performed by: INTERNAL MEDICINE

## 2023-12-20 PROCEDURE — 1160F RVW MEDS BY RX/DR IN RCRD: CPT | Performed by: INTERNAL MEDICINE

## 2023-12-20 PROCEDURE — 99204 OFFICE O/P NEW MOD 45 MIN: CPT | Performed by: INTERNAL MEDICINE

## 2023-12-20 PROCEDURE — 93000 ELECTROCARDIOGRAM COMPLETE: CPT | Performed by: INTERNAL MEDICINE

## 2023-12-20 RX ORDER — ATORVASTATIN CALCIUM 20 MG/1
20 TABLET, FILM COATED ORAL DAILY
Qty: 90 TABLET | Refills: 3 | Status: SHIPPED | OUTPATIENT
Start: 2023-12-20 | End: 2024-02-13 | Stop reason: SDUPTHER

## 2023-12-20 RX ORDER — TRIAMTERENE/HYDROCHLOROTHIAZID 37.5-25 MG
1 TABLET ORAL DAILY
Qty: 30 TABLET | Refills: 11 | Status: SHIPPED | OUTPATIENT
Start: 2023-12-20 | End: 2024-02-13 | Stop reason: SDUPTHER

## 2023-12-20 RX ORDER — DAPAGLIFLOZIN 5 MG/1
5 TABLET, FILM COATED ORAL
COMMUNITY
Start: 2023-12-18 | End: 2024-02-13

## 2023-12-20 RX ORDER — INSULIN GLARGINE 100 [IU]/ML
INJECTION, SOLUTION SUBCUTANEOUS EVERY EVENING
COMMUNITY

## 2023-12-20 RX ORDER — GABAPENTIN 600 MG/1
600 TABLET ORAL 2 TIMES DAILY PRN
COMMUNITY
Start: 2023-05-02 | End: 2024-02-13

## 2023-12-20 RX ORDER — LISINOPRIL 10 MG/1
10 TABLET ORAL
COMMUNITY
Start: 2020-06-27 | End: 2024-02-13 | Stop reason: SDUPTHER

## 2023-12-20 RX ORDER — INSULIN ASPART 100 [IU]/ML
INJECTION, SOLUTION INTRAVENOUS; SUBCUTANEOUS 2 TIMES DAILY
COMMUNITY

## 2023-12-20 ASSESSMENT — ENCOUNTER SYMPTOMS
DEPRESSION: 0
LOSS OF SENSATION IN FEET: 0
OCCASIONAL FEELINGS OF UNSTEADINESS: 0

## 2023-12-20 NOTE — PROGRESS NOTES
Referred byCharla Martinez NP/Dr Jurado  for multiple cardiac risk factors, lower extremity edema, hyperlipidemia.     History Of Present Illness:    Anyi Magaña is a 78 y.o. female presenting with her .  She is an insulin requiring diabetic.  She has kidney disease stage III.  She has hypertension for which she takes lisinopril 10 mg daily.  She complains of fatigue and exertional shortness of breath.  She denies chest pressure tightness or heaviness denies palpitations lightheadedness presyncope or syncope.  She continues to smoke.  She has smoked since 15 years of age.  She enjoys cigarettes.  Currently smokes 3/4 pack/day.  BMI is excessive  EKG today shows normal sinus rhythm with marked sinus arrhythmia.  .  She has had elevated liver enzymes.  She tends to fall.  Mechanical fall.  For her chronic kidney disease she is followed by nephrology    Review of systems is as per primary care notes, which I have reviewed and concur.  Reports compliance with medications, although she did not bring her medication bottles, and I finds that several medicines were not on the list.  For instance atorvastatin was not listed.    Reviewed results of her recent echocardiogram and venous duplex of lower extremities from September 2023.  No evidence of DVT.  Echo shows preserved LV systolic function mild pulmonary hypertension and diastolic dysfunction  Past Surgical History:  She has a past surgical history that includes CT guided percutaneous biopsy liver (8/1/2022).      Social History:  She reports that she has been smoking cigarettes. She has never used smokeless tobacco. No history on file for alcohol use and drug use.    Family History:  No family history on file.     Allergies:  Patient has no known allergies.    Outpatient Medications:  Current Outpatient Medications   Medication Instructions    atorvastatin (LIPITOR) 20 mg, oral, Daily    dapagliflozin propanediol (FARXIGA) 5 mg, oral, Daily RT    gabapentin  "(NEURONTIN) 600 mg, oral, 2 times daily PRN    insulin glargine (Lantus U-100 Insulin) 100 unit/mL injection subcutaneous, Every evening, as directed Subcutaneous    lisinopril 10 mg, oral, Daily RT    NovoLOG Flexpen U-100 Insulin 100 unit/mL (3 mL) pen subcutaneous, 2 times daily, Inject 18 Units under the skin in the morning and 18 Units at noon and 18 Units in the evening. Inject with meals.<BR><BR>    triamterene-hydrochlorothiazid (Maxzide-25mg) 37.5-25 mg tablet 1 tablet, oral, Daily        Last Recorded Vitals:  Vitals:    12/20/23 1547 12/20/23 1645   BP:  130/70   Weight: 75.7 kg (166 lb 12.8 oz)    Height: 1.499 m (4' 11\")        Physical Exam:    GENERAL APPEARANCE: Well developed, well nourished, in no acute distress.  CHEST: Symmetric and non-tender.  INTEGUMENT: Skin warm and dry, without gross excoriationis or lesions.  HEENT: No gross abnormalities, no jugular venous distention no carotid bruit or scleral icterus  NECK: Supple, no JVD, no bruit. Thyroid not palpable. Carotid upstrokes normal.  NEURO/PSHCY: Alert and oriented x3; appropriate behavior and responses and responses, with normal balance and coordination  LUNGS: Clear to auscultation bilaterally; normal respiratory effort.  Breath sounds are very distant  HEART: Rate and rhythm regular with no evident murmur; no gallop appreciated. There are no rubs, clicks or heaves.   ABDOMEN: Soft, nontender, no masses  or bruits.  Abdomen is obese  MUSCULOSKELETAL: No obvious deformity identified  EXTREMITIES: Warm  There is lower extremity edema right greater than left.  She reports that she fell and broke her left foot, hence left foot has always been more swollen  PERIPHERAL VASCULAR: Pulses present and equally palpable; 2+ throughout.          Last Labs:  CBC -  Lab Results   Component Value Date    WBC 9.4 12/07/2023    HGB 15.7 12/07/2023    HCT 49.4 (H) 12/07/2023    MCV 97 12/07/2023     12/07/2023       CMP -  Lab Results   Component " "Value Date    CALCIUM 9.4 12/07/2023    PHOS 2.7 12/07/2023    ALBUMIN 4.0 12/07/2023       LIPID PANEL -   No results found for: \"CHOL\", \"TRIG\", \"HDL\", \"CHHDL\", \"LDLF\", \"VLDL\", \"NHDL\"    RENAL FUNCTION PANEL -   Lab Results   Component Value Date    GLUCOSE 218 (H) 12/07/2023     12/07/2023    K 4.0 12/07/2023     12/07/2023    CO2 27 12/07/2023    ANIONGAP 14 12/07/2023    BUN 18 12/07/2023    CREATININE 1.21 (H) 12/07/2023    CALCIUM 9.4 12/07/2023    PHOS 2.7 12/07/2023    ALBUMIN 4.0 12/07/2023        Lab Results   Component Value Date    HGBA1C 7.8 (H) 07/03/2023       LEjection Fractions:  EF   Date/Time Value Ref Range Status   12/04/2023 10:46 AM 52       RV systolic pressure 31 mmHg, left atrial volume index 21 mm/m², LV aortic sclerosis, mild mitral regurgitation mild tricuspid regurgitation        Assessment/Plan   Diagnoses and all orders for this visit:  Other fatigue  -     Follow Up In Cardiology; Future  -     TSH; Future  -     T4, free; Future  -     CT cardiac scoring wo IV contrast; Future  -     atorvastatin (Lipitor) 20 mg tablet; Take 1 tablet (20 mg) by mouth once daily.  -     triamterene-hydrochlorothiazid (Maxzide-25mg) 37.5-25 mg tablet; Take 1 tablet by mouth once daily.  -     Basic metabolic panel; Future  -     ECG 12 lead (Clinic Performed)  Diabetes mellitus type 2, insulin dependent (CMS/HCC)  -     Follow Up In Cardiology; Future  -     TSH; Future  -     T4, free; Future  -     CT cardiac scoring wo IV contrast; Future  -     atorvastatin (Lipitor) 20 mg tablet; Take 1 tablet (20 mg) by mouth once daily.  -     triamterene-hydrochlorothiazid (Maxzide-25mg) 37.5-25 mg tablet; Take 1 tablet by mouth once daily.  -     Basic metabolic panel; Future  Stage 3a chronic kidney disease (CMS/HCC)  Leg edema    Patient is a long-term smoker.  Patient has mild pulmonary hypertension based on her echo done recently.  She is also diabetic with stage IIIa kidney disease.  Physical " activity is subpar.  Her lower extremity edema could be due to a combination of diastolic dysfunction/diastolic heart failure, COPD her kidney disease, all contributory.    To further evaluate her fatigue, we will do free T4 and TSH, I did not see recent labs pertaining to that  I am not sure we are up-to-date on her medication regimen right now, given her hyperlipidemia I recommended atorvastatin 20 mg daily prior notes says that she had been taking 40 mg.  I have encouraged her to bring her medications to the office.    We talked about trying diuretics for lower extremity edema in addition to conservative approaches.  I told her that renal function will need to be followed closely because kidney function can deteriorate.  Will start Maxide 25 daily and get a basic metabolic profile in 7 to 10 days  Fall precautions reiterated  Coronary calcium score was ordered.  Based on results, we will proceed with further testing  Patient will follow-up after testing at which time further recommendations can be made    Thank you for allowing us to participate in Anyi's care, please do not hesitate to call if further questions arise,  Sincerely,    Shazia Avery MD [facility]  Provider Attestation - Scribe documentation    All medical record entries made by the Scribe were at my direction and personally dictated by me. I have reviewed the chart and agree that the record accurately reflects my personal performance of the history, physical exam, discussion and plan.

## 2023-12-22 PROBLEM — E11.9 DIABETES MELLITUS TYPE II, NON INSULIN DEPENDENT (MULTI): Status: ACTIVE | Noted: 2023-12-22

## 2023-12-22 PROBLEM — Z79.899 MEDICATION COURSE CHANGED: Status: ACTIVE | Noted: 2023-12-22

## 2023-12-22 PROBLEM — F17.200 SMOKER UNMOTIVATED TO QUIT: Status: ACTIVE | Noted: 2023-12-22

## 2024-02-05 ENCOUNTER — APPOINTMENT (OUTPATIENT)
Dept: RADIOLOGY | Facility: CLINIC | Age: 79
End: 2024-02-05
Payer: MEDICARE

## 2024-02-13 ENCOUNTER — OFFICE VISIT (OUTPATIENT)
Dept: CARDIOLOGY | Facility: CLINIC | Age: 79
End: 2024-02-13
Payer: MEDICARE

## 2024-02-13 VITALS
DIASTOLIC BLOOD PRESSURE: 82 MMHG | HEART RATE: 79 BPM | SYSTOLIC BLOOD PRESSURE: 138 MMHG | BODY MASS INDEX: 33.93 KG/M2 | WEIGHT: 168 LBS

## 2024-02-13 DIAGNOSIS — R53.83 OTHER FATIGUE: ICD-10-CM

## 2024-02-13 DIAGNOSIS — E11.9 DIABETES MELLITUS TYPE 2, INSULIN DEPENDENT (MULTI): ICD-10-CM

## 2024-02-13 DIAGNOSIS — Z79.4 DIABETES MELLITUS TYPE 2, INSULIN DEPENDENT (MULTI): ICD-10-CM

## 2024-02-13 PROCEDURE — 99213 OFFICE O/P EST LOW 20 MIN: CPT | Performed by: INTERNAL MEDICINE

## 2024-02-13 PROCEDURE — 1160F RVW MEDS BY RX/DR IN RCRD: CPT | Performed by: INTERNAL MEDICINE

## 2024-02-13 PROCEDURE — 1159F MED LIST DOCD IN RCRD: CPT | Performed by: INTERNAL MEDICINE

## 2024-02-13 RX ORDER — TRIAMTERENE/HYDROCHLOROTHIAZID 37.5-25 MG
1 TABLET ORAL DAILY
Qty: 90 TABLET | Refills: 1 | Status: SHIPPED | OUTPATIENT
Start: 2024-02-13 | End: 2025-02-12

## 2024-02-13 RX ORDER — ATORVASTATIN CALCIUM 20 MG/1
20 TABLET, FILM COATED ORAL DAILY
Qty: 90 TABLET | Refills: 1 | Status: SHIPPED | OUTPATIENT
Start: 2024-02-13 | End: 2025-02-12

## 2024-02-13 RX ORDER — LISINOPRIL 10 MG/1
10 TABLET ORAL
Qty: 90 TABLET | Refills: 1 | Status: SHIPPED | OUTPATIENT
Start: 2024-02-13 | End: 2025-02-12

## 2024-02-13 NOTE — PROGRESS NOTES
79-year-old female was first seen by me 12/20/2023, in cardiology consultation regarding multiple cardiac risk factors lower extremity edema hyperlipidemia.  She is a smoker and has smoked since age 15.  She is at increased fall risk.  She has chronic kidney disease that is followed by nephrology.  We ordered coronary calcium score, started Maxide 25 mg daily, ordered blood work, and advised that she bring in all her medications for review.  She did not proceed with blood work or coronary calcium score.  Coming to think of it, her age may be a contraindication for coronary calcium score.  Unfortunately she continues to smoke      Subjective :   Lower extremity edema has improved on Maxide.  Her blood pressure is now at target.  She has chronic shortness of breath.    Objective   Failed to redirect to the Timeline version of the [employer] Smart[employer].   Wt Readings from Last 3 Encounters:   02/13/24 76.2 kg (168 lb)   12/20/23 75.7 kg (166 lb 12.8 oz)   12/04/23 73.5 kg (162 lb)        Visit Vitals  /82 (BP Location: Right arm, Patient Position: Sitting)   Pulse 79   Wt 76.2 kg (168 lb)   BMI 33.93 kg/m²   Smoking Status Every Day   BSA 1.78 m²            Physical Exam:    Breath sounds are distant without crepitations or rhonchi heart sounds are regular without murmur rub or gallop she is awake oriented x 3 and has trace lower extremity edema.  Ambulates with the aid of a cane.  Meds:  Current Outpatient Medications   Medication Instructions    atorvastatin (LIPITOR) 20 mg, oral, Daily    insulin glargine (Lantus U-100 Insulin) 100 unit/mL injection subcutaneous, Every evening, as directed Subcutaneous    lisinopril 10 mg, oral, Daily RT    NovoLOG Flexpen U-100 Insulin 100 unit/mL (3 mL) pen subcutaneous, 2 times daily, Inject 18 Units under the skin in the morning and 18 Units at noon and 18 Units in the evening. Inject with meals.<BR><BR>    triamterene-hydrochlorothiazid (Maxzide-25mg) 37.5-25 mg tablet 1  tablet, oral, Daily          No Known Allergies          LABS:    Lab Results   Component Value Date    WBC 9.4 12/07/2023    HGB 15.7 12/07/2023    HCT 49.4 (H) 12/07/2023     12/07/2023     12/07/2023    K 4.0 12/07/2023     12/07/2023    CREATININE 1.21 (H) 12/07/2023    BUN 18 12/07/2023    CO2 27 12/07/2023    HGBA1C 7.8 (H) 07/03/2023                       Patient Active Problem List    Diagnosis Date Noted    Diabetes mellitus type II, non insulin dependent (CMS/HCC) 12/22/2023    Smoker unmotivated to quit 12/22/2023    Medication course changed 12/22/2023    Chronic kidney disease (CKD) 12/20/2023    Leg edema 12/20/2023                 Assessment:    1. Other fatigue  Follow Up In Cardiology    Follow Up In Cardiology    lisinopril 10 mg tablet    triamterene-hydrochlorothiazid (Maxzide-25mg) 37.5-25 mg tablet    atorvastatin (Lipitor) 20 mg tablet      2. Diabetes mellitus type 2, insulin dependent (CMS/[facility])  Follow Up In Cardiology    Follow Up In Cardiology    lisinopril 10 mg tablet    triamterene-hydrochlorothiazid (Maxzide-25mg) 37.5-25 mg tablet    atorvastatin (Lipitor) 20 mg tablet         Clinical discussion:  We have reviewed results of her echocardiogram and carotid ultrasound.  We will cancel coronary calcium score  Blood pressure is at target  Nicotine cessation was again emphasized  Lower extremity edema has improved on Maxide  Venous duplex September 2023-no DVT  Echocardiogram September 2023 preserved LV systolic function mild pulmonary hypertension and diastolic dysfunction    Recommendations:  1.  Basic metabolic profile today  2.  Continue current medical regimen, reiterated fall precautions and nicotine cessation, regular medical care per Dr. Jurado, I will see her on an as-needed basis  Thank you for allowing us to participate in Anyi's care, please do not hesitate to call if further questions arise,  Follow up : prn        Provider Attestation - Scribe  documentation    All medical record entries made by the Scribe were at my direction and personally dictated by me. I have reviewed the chart and agree that the record accurately reflects my personal performance of the history, physical exam, discussion and plan.

## 2024-02-14 ENCOUNTER — HOSPITAL ENCOUNTER (OUTPATIENT)
Dept: RADIOLOGY | Facility: EXTERNAL LOCATION | Age: 79
Discharge: HOME | End: 2024-02-14

## 2024-02-14 DIAGNOSIS — C50.911 MALIGNANT NEOPLASM OF UNSPECIFIED SITE OF RIGHT FEMALE BREAST (MULTI): ICD-10-CM

## 2024-02-19 ENCOUNTER — HOSPITAL ENCOUNTER (OUTPATIENT)
Dept: RADIOLOGY | Facility: HOSPITAL | Age: 79
Discharge: HOME | End: 2024-02-19
Payer: MEDICARE

## 2024-02-19 DIAGNOSIS — C50.111 MALIGNANT NEOPLASM OF CENTRAL PORTION OF RIGHT FEMALE BREAST (MULTI): ICD-10-CM

## 2024-02-19 DIAGNOSIS — C50.911 MALIGNANT NEOPLASM OF UNSPECIFIED SITE OF RIGHT FEMALE BREAST (MULTI): ICD-10-CM

## 2024-02-19 PROCEDURE — 77065 DX MAMMO INCL CAD UNI: CPT | Mod: RIGHT SIDE | Performed by: RADIOLOGY

## 2024-02-19 PROCEDURE — G0279 TOMOSYNTHESIS, MAMMO: HCPCS | Mod: RIGHT SIDE | Performed by: RADIOLOGY

## 2024-02-19 PROCEDURE — 76642 ULTRASOUND BREAST LIMITED: CPT | Mod: RT

## 2024-02-19 PROCEDURE — 77061 BREAST TOMOSYNTHESIS UNI: CPT | Mod: RT

## 2024-02-19 PROCEDURE — 76642 ULTRASOUND BREAST LIMITED: CPT | Mod: RIGHT SIDE | Performed by: RADIOLOGY

## 2024-03-07 ENCOUNTER — TELEPHONE (OUTPATIENT)
Dept: FAMILY MEDICINE CLINIC | Age: 79
End: 2024-03-07

## 2024-04-10 DIAGNOSIS — S81.802A WOUND OF LEFT LOWER EXTREMITY, INITIAL ENCOUNTER: Primary | ICD-10-CM

## 2024-04-10 RX ORDER — SULFAMETHOXAZOLE AND TRIMETHOPRIM 800; 160 MG/1; MG/1
1 TABLET ORAL 2 TIMES DAILY
Qty: 20 TABLET | Refills: 0 | Status: SHIPPED | OUTPATIENT
Start: 2024-04-10 | End: 2024-04-20

## 2024-04-10 RX ORDER — AMOXICILLIN AND CLAVULANATE POTASSIUM 875; 125 MG/1; MG/1
1 TABLET, FILM COATED ORAL 2 TIMES DAILY
Qty: 20 TABLET | Refills: 0 | Status: SHIPPED | OUTPATIENT
Start: 2024-04-10 | End: 2024-04-20

## 2024-04-11 ENCOUNTER — TELEPHONE (OUTPATIENT)
Dept: FAMILY MEDICINE CLINIC | Age: 79
End: 2024-04-11

## 2024-04-11 DIAGNOSIS — Z79.4 TYPE 2 DIABETES MELLITUS WITH STAGE 3A CHRONIC KIDNEY DISEASE, WITH LONG-TERM CURRENT USE OF INSULIN (HCC): ICD-10-CM

## 2024-04-11 DIAGNOSIS — S81.802A WOUND OF LEFT LOWER EXTREMITY, INITIAL ENCOUNTER: ICD-10-CM

## 2024-04-11 DIAGNOSIS — N18.31 TYPE 2 DIABETES MELLITUS WITH STAGE 3A CHRONIC KIDNEY DISEASE, WITH LONG-TERM CURRENT USE OF INSULIN (HCC): ICD-10-CM

## 2024-04-11 DIAGNOSIS — Z79.4 TYPE 2 DIABETES MELLITUS WITH STAGE 3A CHRONIC KIDNEY DISEASE, WITH LONG-TERM CURRENT USE OF INSULIN (HCC): Primary | ICD-10-CM

## 2024-04-11 DIAGNOSIS — N18.31 TYPE 2 DIABETES MELLITUS WITH STAGE 3A CHRONIC KIDNEY DISEASE, WITH LONG-TERM CURRENT USE OF INSULIN (HCC): Primary | ICD-10-CM

## 2024-04-11 DIAGNOSIS — E11.22 TYPE 2 DIABETES MELLITUS WITH STAGE 3A CHRONIC KIDNEY DISEASE, WITH LONG-TERM CURRENT USE OF INSULIN (HCC): Primary | ICD-10-CM

## 2024-04-11 DIAGNOSIS — E11.22 TYPE 2 DIABETES MELLITUS WITH STAGE 3A CHRONIC KIDNEY DISEASE, WITH LONG-TERM CURRENT USE OF INSULIN (HCC): ICD-10-CM

## 2024-04-11 LAB
ALBUMIN SERPL-MCNC: 3.5 G/DL (ref 3.5–4.6)
ALP SERPL-CCNC: 110 U/L (ref 40–130)
ALT SERPL-CCNC: <5 U/L (ref 0–33)
ANION GAP SERPL CALCULATED.3IONS-SCNC: 15 MEQ/L (ref 9–15)
AST SERPL-CCNC: 14 U/L (ref 0–35)
BASOPHILS # BLD: 0 K/UL (ref 0–0.2)
BASOPHILS NFR BLD: 0.5 %
BILIRUB SERPL-MCNC: <0.2 MG/DL (ref 0.2–0.7)
BUN SERPL-MCNC: 20 MG/DL (ref 8–23)
CALCIUM SERPL-MCNC: 8.9 MG/DL (ref 8.5–9.9)
CHLORIDE SERPL-SCNC: 100 MEQ/L (ref 95–107)
CO2 SERPL-SCNC: 21 MEQ/L (ref 20–31)
CREAT SERPL-MCNC: 1.28 MG/DL (ref 0.5–0.9)
EOSINOPHIL # BLD: 0.2 K/UL (ref 0–0.7)
EOSINOPHIL NFR BLD: 2.5 %
ERYTHROCYTE [DISTWIDTH] IN BLOOD BY AUTOMATED COUNT: 13.4 % (ref 11.5–14.5)
GLOBULIN SER CALC-MCNC: 4 G/DL (ref 2.3–3.5)
GLUCOSE SERPL-MCNC: 416 MG/DL (ref 70–99)
HCT VFR BLD AUTO: 42.8 % (ref 37–47)
HGB BLD-MCNC: 13.8 G/DL (ref 12–16)
LYMPHOCYTES # BLD: 1.7 K/UL (ref 1–4.8)
LYMPHOCYTES NFR BLD: 21.1 %
MCH RBC QN AUTO: 32.2 PG (ref 27–31.3)
MCHC RBC AUTO-ENTMCNC: 32.2 % (ref 33–37)
MCV RBC AUTO: 99.8 FL (ref 79.4–94.8)
MONOCYTES # BLD: 0.9 K/UL (ref 0.2–0.8)
MONOCYTES NFR BLD: 10.3 %
NEUTROPHILS # BLD: 5.4 K/UL (ref 1.4–6.5)
NEUTS SEG NFR BLD: 65.1 %
PLATELET # BLD AUTO: 208 K/UL (ref 130–400)
POTASSIUM SERPL-SCNC: 5.3 MEQ/L (ref 3.4–4.9)
PROT SERPL-MCNC: 7.5 G/DL (ref 6.3–8)
RBC # BLD AUTO: 4.29 M/UL (ref 4.2–5.4)
SODIUM SERPL-SCNC: 136 MEQ/L (ref 135–144)
WBC # BLD AUTO: 8.2 K/UL (ref 4.8–10.8)

## 2024-04-11 NOTE — TELEPHONE ENCOUNTER
Critical Lab report- [facility] ([place] called to report critical lab (ordered by Dr Talavera)     PT glucose is  416

## 2024-04-12 LAB
ESTIMATED AVERAGE GLUCOSE: 258 MG/DL
HBA1C MFR BLD: 10.6 % (ref 4–6)

## 2024-04-14 DIAGNOSIS — L97.929 ULCER OF LEFT LOWER EXTREMITY, UNSPECIFIED ULCER STAGE (HCC): Primary | ICD-10-CM

## 2024-04-14 RX ORDER — SODIUM POLYSTYRENE SULFONATE 15 G/60ML
SUSPENSION ORAL; RECTAL
Qty: 240 ML | Refills: 3 | Status: SHIPPED | OUTPATIENT
Start: 2024-04-14

## 2024-04-14 RX ORDER — TRAMADOL HYDROCHLORIDE 50 MG/1
50 TABLET ORAL EVERY 8 HOURS PRN
Qty: 21 TABLET | Refills: 0 | Status: SHIPPED | OUTPATIENT
Start: 2024-04-14 | End: 2024-04-21

## 2024-04-15 ENCOUNTER — HOSPITAL ENCOUNTER (OUTPATIENT)
Dept: WOUND CARE | Age: 79
Discharge: HOME | End: 2024-04-15
Attending: PODIATRIST
Payer: MEDICARE

## 2024-04-15 VITALS
DIASTOLIC BLOOD PRESSURE: 95 MMHG | TEMPERATURE: 96.5 F | RESPIRATION RATE: 16 BRPM | SYSTOLIC BLOOD PRESSURE: 152 MMHG | HEART RATE: 86 BPM

## 2024-04-15 DIAGNOSIS — I87.2 PERIPHERAL VENOUS INSUFFICIENCY: ICD-10-CM

## 2024-04-15 DIAGNOSIS — L97.222 NON-PRESSURE CHRONIC ULCER OF LEFT CALF WITH FAT LAYER EXPOSED (HCC): ICD-10-CM

## 2024-04-15 DIAGNOSIS — L97.212 NON-PRESSURE CHRONIC ULCER OF RIGHT CALF WITH FAT LAYER EXPOSED (HCC): Primary | ICD-10-CM

## 2024-04-15 PROCEDURE — 11045 DBRDMT SUBQ TISS EACH ADDL: CPT

## 2024-04-15 PROCEDURE — 99213 OFFICE O/P EST LOW 20 MIN: CPT

## 2024-04-15 PROCEDURE — 11042 DBRDMT SUBQ TIS 1ST 20SQCM/<: CPT

## 2024-04-15 ASSESSMENT — PAIN DESCRIPTION - LOCATION: LOCATION: LEG

## 2024-04-15 ASSESSMENT — PAIN DESCRIPTION - DESCRIPTORS: DESCRIPTORS: SHARP

## 2024-04-15 ASSESSMENT — PAIN DESCRIPTION - ORIENTATION: ORIENTATION: LEFT;RIGHT

## 2024-04-15 ASSESSMENT — PAIN DESCRIPTION - FREQUENCY: FREQUENCY: INTERMITTENT

## 2024-04-15 ASSESSMENT — PAIN SCALES - GENERAL: PAINLEVEL_OUTOF10: 10

## 2024-04-15 NOTE — CODE DOCUMENTATION
[facility] Physician Billing Sheet.     Catherine Inman  AGE: 79 y.o.   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

## 2024-04-15 NOTE — DISCHARGE INSTRUCTIONS
[facility]   Physician Orders and Discharge Instructions  [facility]  [address] [place]  [address]  Telephone: [phone]      [phone]      NAME:  Catherine Inman          YOB: 1945  MEDICAL RECORD NUMBER:  [id number]    Your  is:  Sara    Home Care/Facility:    Wound Location: Left and Right Leg    Dressing orders: 1.Cleanse wound(s) with normal saline.  2. Apply dry SILVERCEL OR CALCIUM ALGINATE WITH Ag or eqivalent to wound bed then drawtex if needed to wounds on left leg  3. Cover with 4x4's and wrap with gauze (christa or kerlix)  4. Change  Every other day or Monday, Wednesday, and Friday      Compression:Apply medium compression hose to  bilateral lower leg(s), may remove at bedtime, reapply first thing in the morning, avoid prolonged standing, elevate legs when sitting.(35 right, 41 left)      Offloading Device:    Other Instructions: Dressing supplies ordered today and will be sent to your home. Keep your legs elevated as much as possible. Monitor your salt intake. Too much can make you retain fluid. Continue taking antibiotics as prescribed.     Keep all dressings clean, dry and intact.  Keep pressure off the wound(s) at all times.     Follow up visit   2 Weeks April 29, 2024 at 9:30am    Please give 24 hour notice if unable to keep appointment. [phone]    If you experience any of the following, please call the Wound Care Service at  [phone] or go to the nearest emergency room.   *Increase in pain *Temperature over 101 *Increase in drainage from your wound or a foul odor  *Uncontrolled swelling *Need for compression bandage changes due to slippage, breakthrough drainage       PLEASE NOTE: IF YOU ARE UNABLE TO OBTAIN WOUND SUPPLIES, CONTINUE TO USE THE SUPPLIES YOU HAVE AVAILABLE UNTIL YOU ARE ABLE TO REACH US. IT IS MOST IMPORTANT TO KEEP THE WOUND COVERED AT ALL

## 2024-04-15 NOTE — PLAN OF CARE
Problem: Wound:  Goal: Will show signs of wound healing; wound closure and no evidence of infection  Description: Will show signs of wound healing; wound closure and no evidence of infection  4/15/2024 0924 by Sara Bello, RN  Outcome: Progressing  4/15/2024 0924 by Sara Bello, RN  Outcome: Progressing     
Assessment Blanchable erythema;Edematous 04/15/24 0930   Margins Undefined edges 04/15/24 0930   Wound Thickness Description not for Pressure Injury Full thickness 04/15/24 0930   Number of days: 0       Wound 04/15/24 Leg Left;Medial #2 (Active)   Wound Image   04/15/24 0930   Wound Etiology Venous 04/15/24 0930   Wound Cleansed Cleansed with saline 04/15/24 0930   Dressing/Treatment Alginate with Ag 04/15/24 0955   Wound Length (cm) 7 cm 04/15/24 0930   Wound Width (cm) 8.6 cm 04/15/24 0930   Wound Depth (cm) 0.1 cm 04/15/24 0930   Wound Surface Area (cm^2) 60.2 cm^2 04/15/24 0930   Wound Volume (cm^3) 6.02 cm^3 04/15/24 0930   Post-Procedure Length (cm) 7 cm 04/15/24 0945   Post-Procedure Width (cm) 8.6 cm 04/15/24 0945   Post-Procedure Depth (cm) 0.1 cm 04/15/24 0945   Post-Procedure Surface Area (cm^2) 60.2 cm^2 04/15/24 0945   Post-Procedure Volume (cm^3) 6.02 cm^3 04/15/24 0945   Wound Assessment Pink/red;Slough 04/15/24 0930   Drainage Amount Large (50-75% saturated) 04/15/24 0930   Drainage Description Serous;Yellow;Serosanguinous 04/15/24 0930   Odor None 04/15/24 0930   Amira-wound Assessment Blanchable erythema;Edematous 04/15/24 0930   Margins Undefined edges 04/15/24 0930   Wound Thickness Description not for Pressure Injury Full thickness 04/15/24 0930   Number of days: 0       Wound 04/15/24 Leg Right;Anterior #3 (Active)   Wound Image   04/15/24 0930   Wound Etiology Traumatic 04/15/24 0930   Wound Cleansed Cleansed with saline 04/15/24 0930   Dressing/Treatment Alginate with Ag;Dry dressing 04/15/24 0955   Wound Length (cm) 1.3 cm 04/15/24 0930   Wound Width (cm) 1.1 cm 04/15/24 0930   Wound Depth (cm) 0.1 cm 04/15/24 0930   Wound Surface Area (cm^2) 1.43 cm^2 04/15/24 0930   Wound Volume (cm^3) 0.143 cm^3 04/15/24 0930   Post-Procedure Length (cm) 1.3 cm 04/15/24 0951   Post-Procedure Width (cm) 1.1 cm 04/15/24 0951   Post-Procedure Depth (cm) 0.1 cm 04/15/24 0951   Post-Procedure Surface Area

## 2024-04-15 NOTE — PROGRESS NOTES
[facility]   Progress Note and Procedure Note      Catherine Inman  MEDICAL RECORD NUMBER:  [id number]  AGE: 79 y.o.   GENDER: female  : 1945  EPISODE DATE:  4/15/2024    Subjective:     Chief Complaint   Patient presents with    Wound Check         HISTORY of PRESENT ILLNESS HPI     Catherine Inman is a 79 y.o. female who presents today for wound/ulcer evaluation.   History of Wound Context: B/l venous leg ulcers present for about 2 weeks.  States she was recently started on PO augmentin and Bactrim by her PCP.  States she is tolerating the abx well.  States she has been gettinga  lot of clear yellow drainage from both legs. Denies nausea, vomiting, fevers, or chills.    Wound/Ulcer Pain Timing/Severity: waxing and waning, moderate  Quality of pain: throbbing, shooting, tender  Severity:  5 / 10   Modifying Factors: Pain is relieved/improved with rest  Associated Signs/Symptoms: edema, drainage, and pain    Ulcer Identification:  Ulcer Type: venous  Contributing Factors: edema, venous stasis, diabetes, poor glucose control, and obesity    Wound: N/A        PAST MEDICAL HISTORY        Diagnosis Date    Breast cancer (HCC)     right (16-17 yrs ago)    Cancer (HCC)     Breast    Diabetes mellitus (HCC)     History of therapeutic radiation     Hyperlipidemia     Hypertension        PAST SURGICAL HISTORY    Past Surgical History:   Procedure Laterality Date    APPENDECTOMY      BREAST BIOPSY Right     malignant (16-17 yrs ago)    BREAST LUMPECTOMY Right     malignant    CARPAL TUNNEL RELEASE Left     CT NEEDLE BIOPSY LIVER PERCUTANEOUS Right 2022    Performed by Dr. Machado - diagnostics sent    CT NEEDLE BIOPSY LIVER PERCUTANEOUS  2022    CT NEEDLE BIOPSY LIVER PERCUTANEOUS 2022 [facility] CT SCAN    HERNIA REPAIR      Umbilical    HYSTERECTOMY (CERVIX STATUS UNKNOWN)      total but left part of one ovary    OVARY REMOVAL      left part of one ovary    TONSILLECTOMY         FAMILY

## 2024-04-29 ENCOUNTER — HOSPITAL ENCOUNTER (OUTPATIENT)
Dept: WOUND CARE | Age: 79
Discharge: HOME | End: 2024-04-29
Attending: PODIATRIST
Payer: MEDICARE

## 2024-04-29 VITALS
SYSTOLIC BLOOD PRESSURE: 157 MMHG | HEART RATE: 87 BPM | TEMPERATURE: 96.6 F | RESPIRATION RATE: 16 BRPM | DIASTOLIC BLOOD PRESSURE: 93 MMHG

## 2024-04-29 PROCEDURE — 11042 DBRDMT SUBQ TIS 1ST 20SQCM/<: CPT

## 2024-04-29 PROCEDURE — 11045 DBRDMT SUBQ TISS EACH ADDL: CPT

## 2024-04-29 NOTE — PROGRESS NOTES
[facility]   Progress Note and Procedure Note      Catherine Inman  MEDICAL RECORD NUMBER:  [id number]  AGE: 79 y.o.   GENDER: female  : 1945  EPISODE DATE:  2024    Subjective:     Chief Complaint   Patient presents with    Wound Check         HISTORY of PRESENT ILLNESS HPI     Catherine Inman is a 79 y.o. female who presents today for wound/ulcer evaluation.   History of Wound Context: B/l venous leg ulcers.  She is improving.  Denies nausea, vomiting, fevers, or chills.  Wound/Ulcer Pain Timing/Severity: intermittent  Quality of pain: sharp  Severity:  2 / 10   Modifying Factors: Pain is relieved/improved with rest  Associated Signs/Symptoms: edema, drainage, and pain    Ulcer Identification:  Ulcer Type: venous  Contributing Factors: edema, venous stasis, diabetes, and obesity    Wound: N/A        PAST MEDICAL HISTORY        Diagnosis Date    Breast cancer (HCC)     right (16-17 yrs ago)    Cancer (HCC)     Breast    Diabetes mellitus (HCC)     History of therapeutic radiation     Hyperlipidemia     Hypertension        PAST SURGICAL HISTORY    Past Surgical History:   Procedure Laterality Date    APPENDECTOMY      BREAST BIOPSY Right     malignant (16-17 yrs ago)    BREAST LUMPECTOMY Right     malignant    CARPAL TUNNEL RELEASE Left     CT NEEDLE BIOPSY LIVER PERCUTANEOUS Right 2022    Performed by Dr. Machado - diagnostics sent    CT NEEDLE BIOPSY LIVER PERCUTANEOUS  2022    CT NEEDLE BIOPSY LIVER PERCUTANEOUS 2022 [facility] CT SCAN    HERNIA REPAIR      Umbilical    HYSTERECTOMY (CERVIX STATUS UNKNOWN)      total but left part of one ovary    OVARY REMOVAL      left part of one ovary    TONSILLECTOMY         FAMILY HISTORY    Family History   Problem Relation Age of Onset    Breast Cancer Mother     Heart Attack Father     Diabetes Brother     Colon Cancer Neg Hx        SOCIAL HISTORY    Social History     Tobacco Use    Smoking status: Every Day     Current

## 2024-04-29 NOTE — PLAN OF CARE
Problem: Chronic Conditions and Co-morbidities  Goal: Patient's chronic conditions and co-morbidity symptoms are monitored and maintained or improved  Outcome: Progressing     Problem: Chronic Conditions and Co-morbidities  Goal: Patient's chronic conditions and co-morbidity symptoms are monitored and maintained or improved  Outcome: Progressing     Problem: Wound:  Goal: Will show signs of wound healing; wound closure and no evidence of infection  Outcome: Progressing

## 2024-04-29 NOTE — CODE DOCUMENTATION
[facility] Physician Billing Sheet.     Catherine Inman  AGE: 79 y.o.   GENDER: female  : 1945  TODAY'S DATE:  2024    ICD-10 CODES  Active Hospital Problems    Diagnosis Date Noted    Peripheral venous insufficiency [I87.2] 04/15/2024    Non-pressure chronic ulcer of left calf with fat layer exposed (HCC) [L97.222] 04/15/2024    Non-pressure chronic ulcer of right calf with fat layer exposed (HCC) [L97.212] 04/15/2024    Type 2 diabetes mellitus with hyperglycemia [E11.65] 2021       PHYSICIAN PROCEDURES    CPT CODE  62782 - LT  46166 - LT (qty 2)      Electronically signed by Geronimo Tucker DPM on 2024 at 10:32 AM      ]

## 2024-05-13 ENCOUNTER — HOSPITAL ENCOUNTER (OUTPATIENT)
Dept: WOUND CARE | Age: 79
Discharge: HOME | End: 2024-05-13
Attending: PODIATRIST
Payer: MEDICARE

## 2024-05-13 VITALS
TEMPERATURE: 96.6 F | HEART RATE: 90 BPM | DIASTOLIC BLOOD PRESSURE: 92 MMHG | SYSTOLIC BLOOD PRESSURE: 160 MMHG | RESPIRATION RATE: 17 BRPM

## 2024-05-13 PROCEDURE — 11045 DBRDMT SUBQ TISS EACH ADDL: CPT

## 2024-05-13 PROCEDURE — 11042 DBRDMT SUBQ TIS 1ST 20SQCM/<: CPT

## 2024-05-13 RX ORDER — LIDOCAINE HYDROCHLORIDE 20 MG/ML
JELLY TOPICAL ONCE
OUTPATIENT
Start: 2024-05-13 | End: 2024-05-13

## 2024-05-13 RX ORDER — LIDOCAINE HYDROCHLORIDE 40 MG/ML
SOLUTION TOPICAL ONCE
OUTPATIENT
Start: 2024-05-13 | End: 2024-05-13

## 2024-05-13 RX ORDER — GENTAMICIN SULFATE 1 MG/G
OINTMENT TOPICAL ONCE
OUTPATIENT
Start: 2024-05-13 | End: 2024-05-13

## 2024-05-13 RX ORDER — LIDOCAINE 40 MG/G
CREAM TOPICAL ONCE
OUTPATIENT
Start: 2024-05-13 | End: 2024-05-13

## 2024-05-13 RX ORDER — SODIUM CHLOR/HYPOCHLOROUS ACID 0.033 %
SOLUTION, IRRIGATION IRRIGATION ONCE
OUTPATIENT
Start: 2024-05-13 | End: 2024-05-13

## 2024-05-13 RX ORDER — LIDOCAINE 50 MG/G
OINTMENT TOPICAL ONCE
OUTPATIENT
Start: 2024-05-13 | End: 2024-05-13

## 2024-05-13 RX ORDER — BACITRACIN ZINC AND POLYMYXIN B SULFATE 500; 1000 [USP'U]/G; [USP'U]/G
OINTMENT TOPICAL ONCE
OUTPATIENT
Start: 2024-05-13 | End: 2024-05-13

## 2024-05-13 RX ORDER — CLOBETASOL PROPIONATE 0.5 MG/G
OINTMENT TOPICAL ONCE
OUTPATIENT
Start: 2024-05-13 | End: 2024-05-13

## 2024-05-13 RX ORDER — IBUPROFEN 200 MG
TABLET ORAL ONCE
OUTPATIENT
Start: 2024-05-13 | End: 2024-05-13

## 2024-05-13 RX ORDER — GINSENG 100 MG
CAPSULE ORAL ONCE
OUTPATIENT
Start: 2024-05-13 | End: 2024-05-13

## 2024-05-13 RX ORDER — TRIAMCINOLONE ACETONIDE 1 MG/G
OINTMENT TOPICAL ONCE
OUTPATIENT
Start: 2024-05-13 | End: 2024-05-13

## 2024-05-13 RX ORDER — BETAMETHASONE DIPROPIONATE 0.5 MG/G
CREAM TOPICAL ONCE
OUTPATIENT
Start: 2024-05-13 | End: 2024-05-13

## 2024-05-13 NOTE — PROGRESS NOTES
[facility]   Progress Note and Procedure Note      Catherine Inman  MEDICAL RECORD NUMBER:  [id number]  AGE: 79 y.o.   GENDER: female  : 1945  EPISODE DATE:  2024    Subjective:     Chief Complaint   Patient presents with    Wound Check         HISTORY of PRESENT ILLNESS HPI     Catherine Inman is a 79 y.o. female who presents today for wound/ulcer evaluation.   History of Wound Context: Left LE venous ulcers.  She is improving.  Denies nausea, vomiting, fevers, or chills.  Wound/Ulcer Pain Timing/Severity: intermittent  Quality of pain: sharp  Severity:  1 / 10   Modifying Factors: Pain is relieved/improved with rest  Associated Signs/Symptoms: edema, drainage, and pain    Ulcer Identification:  Ulcer Type: venous  Contributing Factors: edema, venous stasis, lymphedema, diabetes, and obesity    Wound: N/A        PAST MEDICAL HISTORY        Diagnosis Date    Breast cancer (HCC)     right (16-17 yrs ago)    Cancer (HCC)     Breast    Diabetes mellitus (HCC)     History of therapeutic radiation     Hyperlipidemia     Hypertension        PAST SURGICAL HISTORY    Past Surgical History:   Procedure Laterality Date    APPENDECTOMY      BREAST BIOPSY Right     malignant (16-17 yrs ago)    BREAST LUMPECTOMY Right     malignant    CARPAL TUNNEL RELEASE Left     CT NEEDLE BIOPSY LIVER PERCUTANEOUS Right 2022    Performed by Dr. Machado - diagnostics sent    CT NEEDLE BIOPSY LIVER PERCUTANEOUS  2022    CT NEEDLE BIOPSY LIVER PERCUTANEOUS 2022 [facility] CT SCAN    HERNIA REPAIR      Umbilical    HYSTERECTOMY (CERVIX STATUS UNKNOWN)      total but left part of one ovary    OVARY REMOVAL      left part of one ovary    TONSILLECTOMY         FAMILY HISTORY    Family History   Problem Relation Age of Onset    Breast Cancer Mother     Heart Attack Father     Diabetes Brother     Colon Cancer Neg Hx        SOCIAL HISTORY    Social History     Tobacco Use    Smoking status: Every Day

## 2024-05-13 NOTE — DISCHARGE INSTRUCTIONS
AVAILABLE UNTIL YOU ARE ABLE TO REACH US. IT IS MOST IMPORTANT TO KEEP THE WOUND COVERED AT ALL TIMES     Electronically signed by Geronimo Tucker DPM on 5/13/2024 at 10:09 AM

## 2024-05-13 NOTE — PLAN OF CARE
Problem: Wound:  Goal: Will show signs of wound healing; wound closure and no evidence of infection  Description: Will show signs of wound healing; wound closure and no evidence of infection  Outcome: Progressing      Utah State 

## 2024-05-13 NOTE — CODE DOCUMENTATION
[facility] Physician Billing Sheet.     Catherine Inman  AGE: 79 y.o.   GENDER: female  : 1945  TODAY'S DATE:  2024    ICD-10 CODES  Active Hospital Problems    Diagnosis Date Noted    Peripheral venous insufficiency [I87.2] 04/15/2024    Non-pressure chronic ulcer of left calf with fat layer exposed (HCC) [L97.222] 04/15/2024    Type 2 diabetes mellitus with hyperglycemia [E11.65] 2021       PHYSICIAN PROCEDURES    CPT CODE  26369 - LT  07316 - LT (qty 1)      Electronically signed by Geronimo Tucker DPM on 2024 at 10:12 AM

## 2024-05-13 NOTE — FLOWSHEET NOTE
Area (cm^2) 1.43 cm^2 04/15/24 0951   Post-Procedure Volume (cm^3) 0.143 cm^3 04/15/24 0951   Drainage Amount None (dry) 04/29/24 0947   Drainage Description Serous 04/15/24 0930   Odor None 04/15/24 0930   Amira-wound Assessment Intact 04/15/24 0930   Margins Defined edges 04/15/24 0930   Wound Thickness Description not for Pressure Injury Full thickness 04/15/24 0930   Number of days: 28          Supplies Requested :      WOUND #: 1   PRIMARY DRESSING:  Alginate with silver pad  SECONDARY DRESSING:  None   Cover and Secure with: 4X4 gauze pad  Conforming roll gauze     FREQUENCY OF DRESSING CHANGES:  Every other day             ADDITIONAL ITEMS:  [] Gloves Small  [x] Gloves Medium [] Gloves Large [] Gloves XLarge  [] Tape 1\" [] Tape 2\" [] Tape 3\"  [x] Medipore Tape  [x] Saline  [] Skin Prep   [] Adhesive Remover   [] Cotton Tip Applicators   [] Other:    Patient Wound(s) Debrided: [x] Yes   [] No    Debribement Type: subcutaneous tissue    Debridement Date: 5/13/2024    Patient currently being seen by Home Health: [] Yes   [x] No    Duration for needed supplies:  []15  [x]30  []60  []90 Days    Provider Information:      PROVIDER'S NAME:  LIAT LYNN DPM  NPI:  [id number]

## 2024-05-30 NOTE — PROGRESS NOTES
with long-term current use of insulin (HCC)    Novolin N 14-15 UNITS QHS   Novolog 15 units 3 times daily with meals   We will order Dexcom  Jardiance 10 mg orally daily        Hyperlipidemia, unspecified hyperlipidemia type-continue Lipitor 40 mg orally daily          Essential hypertension-continue lisinopril 10 mg orally daily        Lower extremity weakness: Referral to physical therapy.        Tremor-trial of propranolol 10 mg orally daily.          Neuropathy-continue Neurontin 300 mg twice daily        CKD 3 : The patient's most recent renal function (July-2021) was within normal limits.  Creatinine in January 2021 was 1.35.  Obtain a comprehensive metabolic panel to evaluate the patient's renal function today continue lisinopril 10 mg orally daily.  Jardiance.        Return in about 2 months (around 8/3/2024).    An electronic signature was used to authenticate this note.      Patient has a mobility limitation that significantly impairs his/her ability to participate in one or more of the following  mobility related activities of daily living (MRADL’s) eating, bathing, toileting, personal cares, ambulating, grooming, hygiene, dressing upper body, dressing lower body, meal preparation, and taking own medications  in the home which prevents the beneficiary from accomplishing the MRADL’s and places the beneficiary at a reasonably determined heightened risk of morbidity or mortality secondary to the attempts to perform the MRADL and prevents the beneficiary from completing the MRADL within a reasonable time frame.    Patient is able to safely use the walker and the functional mobility deficit can be sufficiently resolved by the use of a walker.           Catherine Inman is a 79 y.o. female evaluated via telephone on 6/3/2024 for Diabetes and Chronic Kidney Disease      Tin Talavera MD      On the basis of positive falls risk screening, assessment and plan is as follows: patient will follow up in 90 day(s)

## 2024-06-03 ENCOUNTER — OFFICE VISIT (OUTPATIENT)
Dept: FAMILY MEDICINE CLINIC | Age: 79
End: 2024-06-03
Payer: MEDICARE

## 2024-06-03 ENCOUNTER — HOSPITAL ENCOUNTER (OUTPATIENT)
Dept: WOUND CARE | Age: 79
Discharge: HOME | End: 2024-06-03
Attending: PODIATRIST
Payer: MEDICARE

## 2024-06-03 VITALS
BODY MASS INDEX: 35.26 KG/M2 | OXYGEN SATURATION: 97 % | DIASTOLIC BLOOD PRESSURE: 87 MMHG | WEIGHT: 168 LBS | HEIGHT: 58 IN | SYSTOLIC BLOOD PRESSURE: 138 MMHG | RESPIRATION RATE: 14 BRPM | HEART RATE: 92 BPM

## 2024-06-03 VITALS
OXYGEN SATURATION: 93 % | HEIGHT: 58 IN | RESPIRATION RATE: 14 BRPM | WEIGHT: 168 LBS | BODY MASS INDEX: 35.26 KG/M2 | SYSTOLIC BLOOD PRESSURE: 138 MMHG | HEART RATE: 92 BPM | DIASTOLIC BLOOD PRESSURE: 87 MMHG

## 2024-06-03 VITALS
HEART RATE: 57 BPM | TEMPERATURE: 96 F | SYSTOLIC BLOOD PRESSURE: 189 MMHG | DIASTOLIC BLOOD PRESSURE: 90 MMHG | RESPIRATION RATE: 17 BRPM

## 2024-06-03 DIAGNOSIS — K73.9 CHRONIC HEPATITIS, UNSPECIFIED (HCC): ICD-10-CM

## 2024-06-03 DIAGNOSIS — Z00.00 MEDICARE ANNUAL WELLNESS VISIT, SUBSEQUENT: Primary | ICD-10-CM

## 2024-06-03 DIAGNOSIS — D32.9 BENIGN NEOPLASM OF MENINGES, UNSPECIFIED (HCC): ICD-10-CM

## 2024-06-03 DIAGNOSIS — R25.1 TREMOR: Primary | ICD-10-CM

## 2024-06-03 DIAGNOSIS — Z91.81 AT HIGH RISK FOR FALLS: ICD-10-CM

## 2024-06-03 DIAGNOSIS — N18.31 TYPE 2 DIABETES MELLITUS WITH STAGE 3A CHRONIC KIDNEY DISEASE, WITH LONG-TERM CURRENT USE OF INSULIN (HCC): ICD-10-CM

## 2024-06-03 DIAGNOSIS — E11.22 TYPE 2 DIABETES MELLITUS WITH STAGE 3A CHRONIC KIDNEY DISEASE, WITH LONG-TERM CURRENT USE OF INSULIN (HCC): ICD-10-CM

## 2024-06-03 DIAGNOSIS — Z79.4 TYPE 2 DIABETES MELLITUS WITH STAGE 3A CHRONIC KIDNEY DISEASE, WITH LONG-TERM CURRENT USE OF INSULIN (HCC): ICD-10-CM

## 2024-06-03 DIAGNOSIS — E66.01 SEVERE OBESITY (BMI 35.0-39.9) WITH COMORBIDITY (HCC): ICD-10-CM

## 2024-06-03 DIAGNOSIS — L97.212 NON-PRESSURE CHRONIC ULCER OF RIGHT CALF WITH FAT LAYER EXPOSED (HCC): ICD-10-CM

## 2024-06-03 DIAGNOSIS — R29.898 WEAKNESS OF BOTH LOWER EXTREMITIES: ICD-10-CM

## 2024-06-03 DIAGNOSIS — Z78.0 POST-MENOPAUSAL: Primary | ICD-10-CM

## 2024-06-03 DIAGNOSIS — L97.222 NON-PRESSURE CHRONIC ULCER OF LEFT CALF WITH FAT LAYER EXPOSED (HCC): Primary | ICD-10-CM

## 2024-06-03 PROCEDURE — 3075F SYST BP GE 130 - 139MM HG: CPT | Performed by: INTERNAL MEDICINE

## 2024-06-03 PROCEDURE — G8400 PT W/DXA NO RESULTS DOC: HCPCS | Performed by: INTERNAL MEDICINE

## 2024-06-03 PROCEDURE — 11042 DBRDMT SUBQ TIS 1ST 20SQCM/<: CPT

## 2024-06-03 PROCEDURE — 1090F PRES/ABSN URINE INCON ASSESS: CPT | Performed by: INTERNAL MEDICINE

## 2024-06-03 PROCEDURE — G8428 CUR MEDS NOT DOCUMENT: HCPCS | Performed by: INTERNAL MEDICINE

## 2024-06-03 PROCEDURE — 3046F HEMOGLOBIN A1C LEVEL >9.0%: CPT | Performed by: INTERNAL MEDICINE

## 2024-06-03 PROCEDURE — 1123F ACP DISCUSS/DSCN MKR DOCD: CPT | Performed by: INTERNAL MEDICINE

## 2024-06-03 PROCEDURE — G8417 CALC BMI ABV UP PARAM F/U: HCPCS | Performed by: INTERNAL MEDICINE

## 2024-06-03 PROCEDURE — 99214 OFFICE O/P EST MOD 30 MIN: CPT | Performed by: INTERNAL MEDICINE

## 2024-06-03 PROCEDURE — 3079F DIAST BP 80-89 MM HG: CPT | Performed by: INTERNAL MEDICINE

## 2024-06-03 PROCEDURE — 4004F PT TOBACCO SCREEN RCVD TLK: CPT | Performed by: INTERNAL MEDICINE

## 2024-06-03 PROCEDURE — 6370000000 HC RX 637 (ALT 250 FOR IP): Performed by: PODIATRIST

## 2024-06-03 PROCEDURE — G0439 PPPS, SUBSEQ VISIT: HCPCS | Performed by: INTERNAL MEDICINE

## 2024-06-03 RX ORDER — BACITRACIN ZINC AND POLYMYXIN B SULFATE 500; 1000 [USP'U]/G; [USP'U]/G
OINTMENT TOPICAL ONCE
OUTPATIENT
Start: 2024-06-03 | End: 2024-06-03

## 2024-06-03 RX ORDER — IBUPROFEN 200 MG
TABLET ORAL ONCE
OUTPATIENT
Start: 2024-06-03 | End: 2024-06-03

## 2024-06-03 RX ORDER — LIDOCAINE 50 MG/G
OINTMENT TOPICAL ONCE
OUTPATIENT
Start: 2024-06-03 | End: 2024-06-03

## 2024-06-03 RX ORDER — CLOBETASOL PROPIONATE 0.5 MG/G
OINTMENT TOPICAL ONCE
OUTPATIENT
Start: 2024-06-03 | End: 2024-06-03

## 2024-06-03 RX ORDER — LIDOCAINE 40 MG/G
CREAM TOPICAL ONCE
OUTPATIENT
Start: 2024-06-03 | End: 2024-06-03

## 2024-06-03 RX ORDER — TRIAMCINOLONE ACETONIDE 1 MG/G
OINTMENT TOPICAL ONCE
OUTPATIENT
Start: 2024-06-03 | End: 2024-06-03

## 2024-06-03 RX ORDER — BETAMETHASONE DIPROPIONATE 0.5 MG/G
CREAM TOPICAL ONCE
OUTPATIENT
Start: 2024-06-03 | End: 2024-06-03

## 2024-06-03 RX ORDER — PREGABALIN 25 MG/1
25 CAPSULE ORAL 3 TIMES DAILY
Qty: 90 CAPSULE | Refills: 0 | Status: SHIPPED | OUTPATIENT
Start: 2024-06-03 | End: 2024-07-03

## 2024-06-03 RX ORDER — SODIUM CHLOR/HYPOCHLOROUS ACID 0.033 %
SOLUTION, IRRIGATION IRRIGATION ONCE
OUTPATIENT
Start: 2024-06-03 | End: 2024-06-03

## 2024-06-03 RX ORDER — GENTAMICIN SULFATE 1 MG/G
OINTMENT TOPICAL ONCE
OUTPATIENT
Start: 2024-06-03 | End: 2024-06-03

## 2024-06-03 RX ORDER — LIDOCAINE HYDROCHLORIDE 20 MG/ML
JELLY TOPICAL ONCE
OUTPATIENT
Start: 2024-06-03 | End: 2024-06-03

## 2024-06-03 RX ORDER — LIDOCAINE HYDROCHLORIDE 20 MG/ML
JELLY TOPICAL ONCE
Status: COMPLETED | OUTPATIENT
Start: 2024-06-03 | End: 2024-06-03

## 2024-06-03 RX ORDER — GINSENG 100 MG
CAPSULE ORAL ONCE
OUTPATIENT
Start: 2024-06-03 | End: 2024-06-03

## 2024-06-03 RX ORDER — PROPRANOLOL HYDROCHLORIDE 10 MG/1
10 TABLET ORAL DAILY
Qty: 90 TABLET | Refills: 3 | Status: SHIPPED | OUTPATIENT
Start: 2024-06-03

## 2024-06-03 RX ORDER — LIDOCAINE HYDROCHLORIDE 40 MG/ML
SOLUTION TOPICAL ONCE
OUTPATIENT
Start: 2024-06-03 | End: 2024-06-03

## 2024-06-03 RX ADMIN — LIDOCAINE HYDROCHLORIDE: 20 JELLY TOPICAL at 09:41

## 2024-06-03 SDOH — ECONOMIC STABILITY: FOOD INSECURITY: WITHIN THE PAST 12 MONTHS, YOU WORRIED THAT YOUR FOOD WOULD RUN OUT BEFORE YOU GOT MONEY TO BUY MORE.: NEVER TRUE

## 2024-06-03 SDOH — ECONOMIC STABILITY: HOUSING INSECURITY
IN THE LAST 12 MONTHS, WAS THERE A TIME WHEN YOU DID NOT HAVE A STEADY PLACE TO SLEEP OR SLEPT IN A SHELTER (INCLUDING NOW)?: NO

## 2024-06-03 SDOH — ECONOMIC STABILITY: FOOD INSECURITY: WITHIN THE PAST 12 MONTHS, THE FOOD YOU BOUGHT JUST DIDN'T LAST AND YOU DIDN'T HAVE MONEY TO GET MORE.: NEVER TRUE

## 2024-06-03 SDOH — ECONOMIC STABILITY: INCOME INSECURITY: HOW HARD IS IT FOR YOU TO PAY FOR THE VERY BASICS LIKE FOOD, HOUSING, MEDICAL CARE, AND HEATING?: NOT HARD AT ALL

## 2024-06-03 ASSESSMENT — PATIENT HEALTH QUESTIONNAIRE - PHQ9
2. FEELING DOWN, DEPRESSED OR HOPELESS: NOT AT ALL
SUM OF ALL RESPONSES TO PHQ QUESTIONS 1-9: 0
1. LITTLE INTEREST OR PLEASURE IN DOING THINGS: NOT AT ALL
SUM OF ALL RESPONSES TO PHQ QUESTIONS 1-9: 0
2. FEELING DOWN, DEPRESSED OR HOPELESS: NOT AT ALL
1. LITTLE INTEREST OR PLEASURE IN DOING THINGS: NOT AT ALL
SUM OF ALL RESPONSES TO PHQ9 QUESTIONS 1 & 2: 0
SUM OF ALL RESPONSES TO PHQ QUESTIONS 1-9: 0
SUM OF ALL RESPONSES TO PHQ9 QUESTIONS 1 & 2: 0
SUM OF ALL RESPONSES TO PHQ QUESTIONS 1-9: 0

## 2024-06-03 NOTE — DISCHARGE INSTRUCTIONS
[facility]   Physician Orders and Discharge Instructions  [facility]  [address] [place]  [address]  Telephone: [phone]      [phone]        NAME:  Catherine Inman                                                                                             YOB: 1945  MEDICAL RECORD NUMBER:  [id number]     Your  is:  Sara     Home Care/Facility:     Wound Location: Left  Leg     Dressing orders: 1.Cleanse wound(s) with normal saline.  2. Apply adaptic and then  SILVERCEL OR CALCIUM ALGINATE WITH Ag or eqivalent to wound bed   3. Cover with 4x4's and wrap with gauze (christa or kerlix)  4. Change  Every other day or Monday, Wednesday, and Friday        Compression:Apply medium compression hose to  bilateral lower leg(s), may remove at bedtime, reapply first thing in the morning, avoid prolonged standing, elevate legs when sitting.(40 right, 43 left)        Offloading Device:     Other Instructions:  Keep your legs elevated as much as possible. Monitor your salt intake. Too much can make you retain fluid. You can apply lotion to the dry skin around the wound. Do not get it in the wound.      Keep all dressings clean, dry and intact.  Keep pressure off the wound(s) at all times.      Follow up visit   2 Weeks June 17, 2024 at 9:30am     Please give 24 hour notice if unable to keep appointment. [phone]     If you experience any of the following, please call the Wound Care Service at  [phone] or go to the nearest emergency room.        *Increase in pain         *Temperature over 101           *Increase in drainage from your wound or a foul odor  *Uncontrolled swelling            *Need for compression bandage changes due to slippage, breakthrough drainage       PLEASE NOTE: IF YOU ARE UNABLE TO OBTAIN WOUND SUPPLIES, CONTINUE TO USE THE SUPPLIES YOU HAVE AVAILABLE UNTIL YOU ARE ABLE TO REACH US.

## 2024-06-03 NOTE — PROGRESS NOTES
Margins Defined edges 06/03/24 0925   Wound Thickness Description not for Pressure Injury Full thickness 06/03/24 0925   Number of days: 49       Wound 04/15/24 Leg Distal;Anterior;Left #4 (Active)   Wound Image    06/03/24 0940   Wound Etiology Venous 06/03/24 0940   Wound Cleansed Cleansed with saline 06/03/24 0940   Wound Length (cm) 1 cm 06/03/24 0940   Wound Width (cm) 0.7 cm 06/03/24 0940   Wound Depth (cm) 0.1 cm 06/03/24 0940   Wound Surface Area (cm^2) 0.7 cm^2 06/03/24 0940   Wound Volume (cm^3) 0.07 cm^3 06/03/24 0940   Post-Procedure Length (cm) 1 cm 06/03/24 0950   Post-Procedure Width (cm) 0.7 cm 06/03/24 0950   Post-Procedure Depth (cm) 0.1 cm 06/03/24 0950   Post-Procedure Surface Area (cm^2) 0.7 cm^2 06/03/24 0950   Post-Procedure Volume (cm^3) 0.07 cm^3 06/03/24 0950   Wound Assessment Granulation tissue;Slough 06/03/24 0940   Drainage Amount Scant (moist but unmeasurable) 06/03/24 0940   Drainage Description Serous 06/03/24 0940   Odor None 06/03/24 0940   Amira-wound Assessment Dry/flaky;Edematous 06/03/24 0940   Margins Undefined edges 06/03/24 0940   Wound Thickness Description not for Pressure Injury Full thickness 06/03/24 0940   Number of days: 49             Percent of Wound/Ulcer Debrided: 100%    Total Surface Area Debrided:  0.98 sq cm     Diabetic/Pressure/Non Pressure Ulcers:  Ulcer: Non-Pressure ulcer, fat layer exposed      Bleeding:  Minimal    Hemostasis Achieved:  by pressure    Procedural Pain:  1  / 10     Post Procedural Pain:  1 / 10     Response to treatment:  Well tolerated by patient.       Plan:     She is to elevate her legs, reduce sodium intake and wear compression for edema control.  Plan of care was discussed with patient and she is in agreement.        Treatment Note please see attached Discharge Instructions    In my professional opinion this patient would benefit from HBO Therapy: No    Written patient dismissal instructions given to patient and signed by patient

## 2024-06-03 NOTE — CODE DOCUMENTATION
[facility] Physician Billing Sheet.     Catherine Britter  AGE: 79 y.o.   GENDER: female  : 1945  TODAY'S DATE:  6/3/2024    ICD-10 CODES  Active Hospital Problems    Diagnosis Date Noted    Peripheral venous insufficiency [I87.2] 04/15/2024    Non-pressure chronic ulcer of left calf with fat layer exposed (HCC) [L97.222] 04/15/2024    Type 2 diabetes mellitus with hyperglycemia [E11.65] 2021       PHYSICIAN PROCEDURES    CPT CODE  42788 - LT      Electronically signed by Geronimo Tucker DPM on 6/3/2024 at 9:58 AM

## 2024-06-03 NOTE — PROGRESS NOTES
46.0 (L)    Calcium      8.5 - 9.9 mg/dL   9.4    Total Protein      6.3 - 8.0 g/dL   7.8    Albumin      3.5 - 4.6 g/dL   3.9    Bilirubin      0.2 - 0.7 mg/dL   <0.2    Alk Phos      40 - 130 U/L   103    ALT      0 - 33 U/L   40 (H)    AST      0 - 35 U/L   49 (H)    Globulin      2.3 - 3.5 g/dL   3.9 (H)    Cholesterol, Total      0 - 199 mg/dL  147     Triglycerides      0 - 150 mg/dL  112     HDL Cholesterol      40 - 59 mg/dL  57     LDL Calculated      0 - 129 mg/dL  68     Hemoglobin A1C      % 8.9   9.0 (H)         Latest Ref Rng & Units 4/11/2024     9:25 AM 4/11/2024     9:12 AM 1/11/2023    11:28 PM   LAB PRIMARY CARE   A1C 4.0 - 6.0 % 10.6      A1C POC 4.0 - 6.0 % 10.6      GLU random 70 - 99 mg/dL  416  238     - 144 mEq/L  136  138    K 3.4 - 4.9 mEq/L  5.3  5.3    BUN 8 - 23 mg/dL  20  36    CR 0.50 - 0.90 mg/dL  1.28  1.35    GFR >60  42.3  40.0    CA 8.5 - 9.9 mg/dL  8.9  9.1    ALT 0 - 33 U/L  <5  25    AST 0 - 35 U/L  14  23    HGB 12.0 - 16.0 g/dL  13.8         Lab Results   Component Value Date/Time    CHOL 136 05/02/2022 11:23 AM    CHOL 147 01/04/2021 04:18 PM    TRIG 149 05/02/2022 11:23 AM    TRIG 112 01/04/2021 04:18 PM    HDL 46 05/02/2022 11:23 AM    HDL 57 01/04/2021 04:18 PM    GLUCOSE 416 04/11/2024 09:12 AM    LABA1C 10.6 04/11/2024 09:25 AM    LABA1C 7.9 01/11/2023 11:56 AM    LABA1C 9.6 04/25/2022 11:37 AM       The ASCVD Risk score (Frederick DK, et al., 2019) failed to calculate for the following reasons:    The 2019 ASCVD risk score is only valid for ages 40 to 79    Immunization History   Administered Date(s) Administered    COVID-19, PFIZER PURPLE top, DILUTE for use, (age 12 y+), 30mcg/0.3mL 02/01/2021, 03/01/2021, 11/02/2021    Influenza Virus Vaccine 11/07/2006, 11/15/2007    Influenza, FLUAD, (age 65 y+), Adjuvanted, 0.5mL 09/14/2020, 12/02/2022    Pneumococcal, PPSV23, PNEUMOVAX 23, (age 2y+), SC/IM, 0.5mL 01/01/2000, 09/14/2020    TD 2LF, TDVAX, (age 7y+), IM, 0.5mL

## 2024-06-04 NOTE — PROGRESS NOTES
Medicare Annual Wellness Visit    Catherine Inman is here for Medicare AWV    Assessment & Plan   Medicare annual wellness visit, subsequent  Recommendations for Preventive Services Due: see orders and patient instructions/AVS.  Recommended screening schedule for the next 5-10 years is provided to the patient in written form: see Patient Instructions/AVS.     No follow-ups on file.     Subjective   The patient is experiencing numbness and tingling in her hands  Patient's complete Health Risk Assessment and screening values have been reviewed and are found in Flowsheets. The following problems were reviewed today and where indicated follow up appointments were made and/or referrals ordered.    Positive Risk Factor Screenings with Interventions:    Fall Risk:  Do you feel unsteady or are you worried about falling? : (!) yes  2 or more falls in past year?: (!) yes  Fall with injury in past year?: (!) yes                  Activity, Diet, and Weight:  On average, how many days per week do you engage in moderate to strenuous exercise (like a brisk walk)?: 0 days  On average, how many minutes do you engage in exercise at this level?: 0 min    Do you eat balanced/healthy meals regularly?: Yes    Body mass index is 35.11 kg/m². (!) Abnormal                   Advanced Directives:  Do you have a Living Will?: (!) No      Tobacco Use:  Tobacco Use: High Risk (6/3/2024)    Patient History     Smoking Tobacco Use: Every Day     Smokeless Tobacco Use: Never     Passive Exposure: Not on file     E-cigarette/Vaping       Questions Responses    E-cigarette/Vaping Use Never User    Start Date     Passive Exposure     Quit Date     Counseling Given     Comments                     Objective   Vitals:    06/03/24 1147   BP: 138/87   Pulse: 92   Resp: 14   SpO2: 97%   Weight: 76.2 kg (168 lb)   Height: 1.473 m (4' 10\")      Body mass index is 35.11 kg/m².        Physical Exam  Constitutional:       General: She is not in acute

## 2024-06-17 ENCOUNTER — HOSPITAL ENCOUNTER (OUTPATIENT)
Dept: WOUND CARE | Age: 79
Discharge: HOME | End: 2024-06-17
Attending: PODIATRIST
Payer: MEDICARE

## 2024-06-17 VITALS
DIASTOLIC BLOOD PRESSURE: 68 MMHG | HEART RATE: 62 BPM | TEMPERATURE: 95.6 F | RESPIRATION RATE: 16 BRPM | SYSTOLIC BLOOD PRESSURE: 129 MMHG

## 2024-06-17 PROCEDURE — 99213 OFFICE O/P EST LOW 20 MIN: CPT

## 2024-06-17 RX ORDER — LIDOCAINE HYDROCHLORIDE 40 MG/ML
SOLUTION TOPICAL ONCE
Status: CANCELLED | OUTPATIENT
Start: 2024-06-17 | End: 2024-06-17

## 2024-06-17 RX ORDER — SODIUM CHLOR/HYPOCHLOROUS ACID 0.033 %
SOLUTION, IRRIGATION IRRIGATION ONCE
Status: CANCELLED | OUTPATIENT
Start: 2024-06-17 | End: 2024-06-17

## 2024-06-17 RX ORDER — GINSENG 100 MG
CAPSULE ORAL ONCE
Status: CANCELLED | OUTPATIENT
Start: 2024-06-17 | End: 2024-06-17

## 2024-06-17 RX ORDER — BACITRACIN ZINC AND POLYMYXIN B SULFATE 500; 1000 [USP'U]/G; [USP'U]/G
OINTMENT TOPICAL ONCE
Status: CANCELLED | OUTPATIENT
Start: 2024-06-17 | End: 2024-06-17

## 2024-06-17 RX ORDER — LIDOCAINE HYDROCHLORIDE 20 MG/ML
JELLY TOPICAL ONCE
Status: CANCELLED | OUTPATIENT
Start: 2024-06-17 | End: 2024-06-17

## 2024-06-17 RX ORDER — LIDOCAINE 50 MG/G
OINTMENT TOPICAL ONCE
Status: CANCELLED | OUTPATIENT
Start: 2024-06-17 | End: 2024-06-17

## 2024-06-17 RX ORDER — GENTAMICIN SULFATE 1 MG/G
OINTMENT TOPICAL ONCE
Status: CANCELLED | OUTPATIENT
Start: 2024-06-17 | End: 2024-06-17

## 2024-06-17 RX ORDER — LIDOCAINE 40 MG/G
CREAM TOPICAL ONCE
Status: CANCELLED | OUTPATIENT
Start: 2024-06-17 | End: 2024-06-17

## 2024-06-17 RX ORDER — TRIAMCINOLONE ACETONIDE 1 MG/G
OINTMENT TOPICAL ONCE
Status: CANCELLED | OUTPATIENT
Start: 2024-06-17 | End: 2024-06-17

## 2024-06-17 RX ORDER — BETAMETHASONE DIPROPIONATE 0.5 MG/G
CREAM TOPICAL ONCE
Status: CANCELLED | OUTPATIENT
Start: 2024-06-17 | End: 2024-06-17

## 2024-06-17 RX ORDER — CLOBETASOL PROPIONATE 0.5 MG/G
OINTMENT TOPICAL ONCE
Status: CANCELLED | OUTPATIENT
Start: 2024-06-17 | End: 2024-06-17

## 2024-06-17 RX ORDER — IBUPROFEN 200 MG
TABLET ORAL ONCE
Status: CANCELLED | OUTPATIENT
Start: 2024-06-17 | End: 2024-06-17

## 2024-06-17 NOTE — CODE DOCUMENTATION
[facility] Physician Billing Sheet.     Catherine Britter  AGE: 79 y.o.   GENDER: female  : 1945  TODAY'S DATE:  2024    ICD-10 CODES  Active Hospital Problems    Diagnosis Date Noted    Peripheral venous insufficiency [I87.2] 04/15/2024    Non-pressure chronic ulcer of left calf with fat layer exposed (HCC) [L97.222] 04/15/2024    Type 2 diabetes mellitus with hyperglycemia [E11.65] 2021       PHYSICIAN PROCEDURES    CPT CODE  25211      Electronically signed by Geronimo Tucker DPM on 2024 at 10:25 AM

## 2024-06-17 NOTE — DISCHARGE INSTRUCTIONS
Wound Clinic Physician Orders and Discharge Instructions  [address] [address]  Telephone: [phone]     FAX [phone]    NAME:  Catherine Inman  YOB: 1945  MEDICAL RECORD NUMBER:  [id number]  DATE:  6/17/2024    Congratulations!! You have completed your treatment.   1. Return to your Primary Care Physician for all your health issues.   2. Resume your ordinary activities as tolerated.   3. Take your medications as prescribed by your primary care physician.   4. Check your skin daily for cracks, bruises, sores, or dryness. Use a moisturizer as needed.   5. Clean and dry your skin, using mild soap and warm water (not hot).   6. Avoid alcohol and caffeine and do not smoke.   7. Maintain a nutritious diet.   8. Avoid pressure on your wound site. Keep your legs elevated above the level of the heart whenever possible.   9. Continue to use wraps/stockings/compression as prescribed.   10. Replace compression stockings every four to six months as needed to ensure proper fit.   11. Wear well-fitting shoes and leg garments.       CONTINUE TO DO DRESSINGS FOR ANOTHER WEEK TO MAKE SURE ALL AREAS ARE HEALED AND PROTECTED.  (Adaptic, silvercel and dry dressing three times a week)  Today in the wound center apply medium compression tubi  to bilateral legs, (38 right, 42 left)       THANK YOU FOR ALLOWING US TO SERVE YOU. PLEASE CALL IF YOU DEVELOP ANOTHER WOUND. [phone]             Electronically signed by Sara Bello RN on 6/17/2024 at 10:19 AM     Electronically signed by Geronimo Tucker DPM on 6/17/2024 at 10:24 AM

## 2024-06-17 NOTE — PROGRESS NOTES
06/17/24 0938   Post-Procedure Length (cm) 1 cm 06/03/24 0950   Post-Procedure Width (cm) 0.7 cm 06/03/24 0950   Post-Procedure Depth (cm) 0.1 cm 06/03/24 0950   Post-Procedure Surface Area (cm^2) 0.7 cm^2 06/03/24 0950   Post-Procedure Volume (cm^3) 0.07 cm^3 06/03/24 0950   Wound Assessment Granulation tissue;Slough 06/03/24 0940   Drainage Amount None (dry) 06/17/24 0938   Drainage Description Serous 06/03/24 0940   Odor None 06/03/24 0940   Amira-wound Assessment Dry/flaky;Edematous 06/03/24 0940   Margins Undefined edges 06/03/24 0940   Wound Thickness Description not for Pressure Injury Full thickness 06/03/24 0940   Number of days: 63              Plan:     She is to elevate her legs, reduce sodium intake and wear compression for edema control.  Plan of care was discussed with patient and she is in agreement.      Treatment Note please see attached Discharge Instructions    In my professional opinion this patient would benefit from HBO Therapy: No    Written patient dismissal instructions given to patient and signed by patient or POA.         Discharge Instructions         Wound Clinic Physician Orders and Discharge Instructions  [place] [address]  Telephone: [phone]     FAX [phone]    NAME:  Catherine Inman  YOB: 1945  MEDICAL RECORD NUMBER:  [id number]  DATE:  6/17/2024    Congratulations!! You have completed your treatment.   1. Return to your Primary Care Physician for all your health issues.   2. Resume your ordinary activities as tolerated.   3. Take your medications as prescribed by your primary care physician.   4. Check your skin daily for cracks, bruises, sores, or dryness. Use a moisturizer as needed.   5. Clean and dry your skin, using mild soap and warm water (not hot).   6. Avoid alcohol and caffeine and do not smoke.   7. Maintain a nutritious diet.   8. Avoid pressure on your wound site. Keep your legs elevated above the level

## 2024-07-29 DIAGNOSIS — Z79.4 TYPE 2 DIABETES MELLITUS WITH STAGE 3A CHRONIC KIDNEY DISEASE, WITH LONG-TERM CURRENT USE OF INSULIN (HCC): Primary | ICD-10-CM

## 2024-07-29 DIAGNOSIS — E11.22 TYPE 2 DIABETES MELLITUS WITH STAGE 3A CHRONIC KIDNEY DISEASE, WITH LONG-TERM CURRENT USE OF INSULIN (HCC): Primary | ICD-10-CM

## 2024-07-29 DIAGNOSIS — N18.31 TYPE 2 DIABETES MELLITUS WITH STAGE 3A CHRONIC KIDNEY DISEASE, WITH LONG-TERM CURRENT USE OF INSULIN (HCC): Primary | ICD-10-CM

## 2024-08-15 NOTE — PROGRESS NOTES
2024    Catherine Inman (:  1945) is a 79 y.o. female     75-year-old diabetic female with associated neuropathy hypertensive hyperlipidemic female with hx of a resting tremor presents for follow-up visit.           Type 2 diabetes (A1C=10.6 2024.):Novolog 18 units 3 times daily with meals.  Jardiance 10 mg orally daily she is also compliant with Lipitor 40 mg orally daily. Lantus 14 units. The patient has been checking her blood sugars 4 times daily.        Obesity: The patient's present body mass index is 34.28        Numbness and tingling in the hands bilaterally: The patient reports that she is experiencing bilateral numbness and tingling and pain in both hands.  Symptoms have been worsening over the course of the past 2 months.          Behavioral changes: The patient reports that she has been increasingly agitated.  She has a history of a meningioma and voices concern as to whether the meningioma could have increased in size and may be causing behavioral changes.            Hypertension: The patient is compliant with Norvasc 5 mg daily.          Dysphagia: This medical concern was not addressed today.          Hyperlipidemia: compliant with Lipitor 4- mg daily            Resting tremor: The patient was previously prescribed primidone 50 mg twice daily.  Patient's tremor persists.        Back pain: The patient previously reported achy, 7/10, constant back pain that has not responded well to ibuprofen.  Her back pain is controlled at this time.      At present she denies polyuria,  Polydipsia, constitutional, sinus, visual, cardiopulmonary, urologic, additional gastrointestinal, immunologic/hematologic, musculoskeletal, neurologic,dermatologic, or psychiatric complaints.        Patient Active Problem List   Diagnosis    Hypertension, essential    Malignant neoplasm of female breast (HCC)    Type 2 diabetes mellitus with chronic kidney disease (HCC)    Meningioma, cerebral (HCC)

## 2024-08-19 ENCOUNTER — OFFICE VISIT (OUTPATIENT)
Dept: FAMILY MEDICINE CLINIC | Age: 79
End: 2024-08-19
Payer: MEDICARE

## 2024-08-19 VITALS
SYSTOLIC BLOOD PRESSURE: 124 MMHG | HEIGHT: 58 IN | DIASTOLIC BLOOD PRESSURE: 74 MMHG | WEIGHT: 164 LBS | HEART RATE: 68 BPM | RESPIRATION RATE: 14 BRPM | BODY MASS INDEX: 34.43 KG/M2 | OXYGEN SATURATION: 97 %

## 2024-08-19 DIAGNOSIS — E11.22 TYPE 2 DIABETES MELLITUS WITH STAGE 3A CHRONIC KIDNEY DISEASE, WITH LONG-TERM CURRENT USE OF INSULIN (HCC): Primary | ICD-10-CM

## 2024-08-19 DIAGNOSIS — N18.31 STAGE 3A CHRONIC KIDNEY DISEASE (HCC): ICD-10-CM

## 2024-08-19 DIAGNOSIS — N18.31 TYPE 2 DIABETES MELLITUS WITH STAGE 3A CHRONIC KIDNEY DISEASE, WITH LONG-TERM CURRENT USE OF INSULIN (HCC): ICD-10-CM

## 2024-08-19 DIAGNOSIS — Z79.4 TYPE 2 DIABETES MELLITUS WITH STAGE 3A CHRONIC KIDNEY DISEASE, WITH LONG-TERM CURRENT USE OF INSULIN (HCC): Primary | ICD-10-CM

## 2024-08-19 DIAGNOSIS — Z79.4 TYPE 2 DIABETES MELLITUS WITH STAGE 3A CHRONIC KIDNEY DISEASE, WITH LONG-TERM CURRENT USE OF INSULIN (HCC): ICD-10-CM

## 2024-08-19 DIAGNOSIS — N18.31 TYPE 2 DIABETES MELLITUS WITH STAGE 3A CHRONIC KIDNEY DISEASE, WITH LONG-TERM CURRENT USE OF INSULIN (HCC): Primary | ICD-10-CM

## 2024-08-19 DIAGNOSIS — E11.22 TYPE 2 DIABETES MELLITUS WITH STAGE 3A CHRONIC KIDNEY DISEASE, WITH LONG-TERM CURRENT USE OF INSULIN (HCC): ICD-10-CM

## 2024-08-19 DIAGNOSIS — E66.01 SEVERE OBESITY (BMI 35.0-39.9) WITH COMORBIDITY (HCC): ICD-10-CM

## 2024-08-19 LAB
ALBUMIN SERPL-MCNC: 3.8 G/DL (ref 3.5–4.6)
ALP SERPL-CCNC: 141 U/L (ref 40–130)
ALT SERPL-CCNC: 8 U/L (ref 0–33)
ANION GAP SERPL CALCULATED.3IONS-SCNC: 13 MEQ/L (ref 9–15)
AST SERPL-CCNC: 14 U/L (ref 0–35)
BASOPHILS # BLD: 0.1 K/UL (ref 0–0.2)
BASOPHILS NFR BLD: 0.8 %
BILIRUB SERPL-MCNC: <0.2 MG/DL (ref 0.2–0.7)
BUN SERPL-MCNC: 19 MG/DL (ref 8–23)
CALCIUM SERPL-MCNC: 9.3 MG/DL (ref 8.5–9.9)
CHLORIDE SERPL-SCNC: 102 MEQ/L (ref 95–107)
CO2 SERPL-SCNC: 22 MEQ/L (ref 20–31)
CREAT SERPL-MCNC: 1.14 MG/DL (ref 0.5–0.9)
EOSINOPHIL # BLD: 0.3 K/UL (ref 0–0.7)
EOSINOPHIL NFR BLD: 3.3 %
ERYTHROCYTE [DISTWIDTH] IN BLOOD BY AUTOMATED COUNT: 13.2 % (ref 11.5–14.5)
GLOBULIN SER CALC-MCNC: 3.9 G/DL (ref 2.3–3.5)
GLUCOSE SERPL-MCNC: 354 MG/DL (ref 70–99)
HCT VFR BLD AUTO: 43.7 % (ref 37–47)
HGB BLD-MCNC: 14.2 G/DL (ref 12–16)
LYMPHOCYTES # BLD: 2.4 K/UL (ref 1–4.8)
LYMPHOCYTES NFR BLD: 27.3 %
MCH RBC QN AUTO: 30.9 PG (ref 27–31.3)
MCHC RBC AUTO-ENTMCNC: 32.5 % (ref 33–37)
MCV RBC AUTO: 95.2 FL (ref 79.4–94.8)
MONOCYTES # BLD: 0.8 K/UL (ref 0.2–0.8)
MONOCYTES NFR BLD: 9.1 %
NEUTROPHILS # BLD: 5.2 K/UL (ref 1.4–6.5)
NEUTS SEG NFR BLD: 59.2 %
PLATELET # BLD AUTO: 201 K/UL (ref 130–400)
POTASSIUM SERPL-SCNC: 4.8 MEQ/L (ref 3.4–4.9)
PROT SERPL-MCNC: 7.7 G/DL (ref 6.3–8)
RBC # BLD AUTO: 4.59 M/UL (ref 4.2–5.4)
SODIUM SERPL-SCNC: 137 MEQ/L (ref 135–144)
WBC # BLD AUTO: 8.8 K/UL (ref 4.8–10.8)

## 2024-08-19 PROCEDURE — 3046F HEMOGLOBIN A1C LEVEL >9.0%: CPT | Performed by: INTERNAL MEDICINE

## 2024-08-19 PROCEDURE — G8427 DOCREV CUR MEDS BY ELIG CLIN: HCPCS | Performed by: INTERNAL MEDICINE

## 2024-08-19 PROCEDURE — 1123F ACP DISCUSS/DSCN MKR DOCD: CPT | Performed by: INTERNAL MEDICINE

## 2024-08-19 PROCEDURE — G8417 CALC BMI ABV UP PARAM F/U: HCPCS | Performed by: INTERNAL MEDICINE

## 2024-08-19 PROCEDURE — 3074F SYST BP LT 130 MM HG: CPT | Performed by: INTERNAL MEDICINE

## 2024-08-19 PROCEDURE — G8400 PT W/DXA NO RESULTS DOC: HCPCS | Performed by: INTERNAL MEDICINE

## 2024-08-19 PROCEDURE — 4004F PT TOBACCO SCREEN RCVD TLK: CPT | Performed by: INTERNAL MEDICINE

## 2024-08-19 PROCEDURE — 3078F DIAST BP <80 MM HG: CPT | Performed by: INTERNAL MEDICINE

## 2024-08-19 PROCEDURE — 1090F PRES/ABSN URINE INCON ASSESS: CPT | Performed by: INTERNAL MEDICINE

## 2024-08-19 PROCEDURE — 99214 OFFICE O/P EST MOD 30 MIN: CPT | Performed by: INTERNAL MEDICINE

## 2024-08-20 LAB
ESTIMATED AVERAGE GLUCOSE: 212 MG/DL
HBA1C MFR BLD: 9 % (ref 4–6)

## 2024-08-28 ENCOUNTER — APPOINTMENT (OUTPATIENT)
Dept: GENERAL RADIOLOGY | Age: 79
End: 2024-08-28
Payer: MEDICARE

## 2024-08-28 ENCOUNTER — HOSPITAL ENCOUNTER (EMERGENCY)
Age: 79
Discharge: HOME | End: 2024-08-28
Attending: EMERGENCY MEDICINE
Payer: MEDICARE

## 2024-08-28 VITALS
SYSTOLIC BLOOD PRESSURE: 179 MMHG | HEART RATE: 85 BPM | TEMPERATURE: 98.1 F | HEIGHT: 58 IN | OXYGEN SATURATION: 90 % | WEIGHT: 162 LBS | BODY MASS INDEX: 34 KG/M2 | RESPIRATION RATE: 18 BRPM | DIASTOLIC BLOOD PRESSURE: 69 MMHG

## 2024-08-28 DIAGNOSIS — S22.32XA CLOSED FRACTURE OF ONE RIB OF LEFT SIDE, INITIAL ENCOUNTER: Primary | ICD-10-CM
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
## 2024-08-28 PROCEDURE — 6370000000 HC RX 637 (ALT 250 FOR IP): Performed by: EMERGENCY MEDICINE

## 2024-08-28 PROCEDURE — 71045 X-RAY EXAM CHEST 1 VIEW: CPT

## 2024-08-28 PROCEDURE — 99285 EMERGENCY DEPT VISIT HI MDM: CPT

## 2024-08-28 PROCEDURE — 73030 X-RAY EXAM OF SHOULDER: CPT

## 2024-08-28 RX ORDER — HYDROCODONE BITARTRATE AND ACETAMINOPHEN 5; 325 MG/1; MG/1
1 TABLET ORAL ONCE
Status: COMPLETED | OUTPATIENT
Start: 2024-08-28 | End: 2024-08-28

## 2024-08-28 RX ORDER — HYDROCODONE BITARTRATE AND ACETAMINOPHEN 5; 325 MG/1; MG/1
1 TABLET ORAL EVERY 6 HOURS PRN
Qty: 12 TABLET | Refills: 0 | Status: SHIPPED | OUTPATIENT
Start: 2024-08-28 | End: 2024-08-31

## 2024-08-28 RX ADMIN — HYDROCODONE BITARTRATE AND ACETAMINOPHEN 1 TABLET: 5; 325 TABLET ORAL at 17:48

## 2024-08-28 ASSESSMENT — ENCOUNTER SYMPTOMS
VOMITING: 0
SORE THROAT: 0
NAUSEA: 0
ABDOMINAL PAIN: 0
CHEST TIGHTNESS: 0
SHORTNESS OF BREATH: 0
EYE PAIN: 0
BACK PAIN: 1

## 2024-08-28 ASSESSMENT — PAIN - FUNCTIONAL ASSESSMENT
PAIN_FUNCTIONAL_ASSESSMENT: 0-10
PAIN_FUNCTIONAL_ASSESSMENT: 0-10

## 2024-08-28 ASSESSMENT — PAIN DESCRIPTION - LOCATION
LOCATION: BACK
LOCATION: BACK

## 2024-08-28 ASSESSMENT — PAIN DESCRIPTION - ORIENTATION
ORIENTATION: LEFT;UPPER
ORIENTATION: LEFT;UPPER;OUTER

## 2024-08-28 ASSESSMENT — LIFESTYLE VARIABLES
HOW OFTEN DO YOU HAVE A DRINK CONTAINING ALCOHOL: NEVER
HOW MANY STANDARD DRINKS CONTAINING ALCOHOL DO YOU HAVE ON A TYPICAL DAY: PATIENT DOES NOT DRINK
HOW OFTEN DO YOU HAVE A DRINK CONTAINING ALCOHOL: NEVER
HOW MANY STANDARD DRINKS CONTAINING ALCOHOL DO YOU HAVE ON A TYPICAL DAY: PATIENT DOES NOT DRINK

## 2024-08-28 ASSESSMENT — PAIN SCALES - GENERAL
PAINLEVEL_OUTOF10: 8
PAINLEVEL_OUTOF10: 9

## 2024-08-28 NOTE — ED PROVIDER NOTES
No respiratory distress.      Breath sounds: Normal breath sounds. No wheezing.   Abdominal:      General: Bowel sounds are normal.      Palpations: Abdomen is soft.      Tenderness: There is no abdominal tenderness. There is no guarding.   Musculoskeletal:         General: Tenderness and signs of injury present. Normal range of motion.      Cervical back: Normal range of motion and neck supple.      Right lower leg: No edema.      Left lower leg: No edema.      Comments: Left upper  just above the left scapula.  Mild ecchymotic spot.  No subcu emphysema or crepitance.   Skin:     General: Skin is warm and dry.      Findings: No rash.   Neurological:      Mental Status: She is alert and oriented to person, place, and time.      Cranial Nerves: No cranial nerve deficit.   Psychiatric:         Behavior: Behavior normal.         DIAGNOSTIC RESULTS     EKG: All EKG's are interpreted by the Emergency Department Physician who either signs or Co-signs this chart in the absence of a cardiologist.        RADIOLOGY:   Non-plain film images such as CT, Ultrasound and MRI are read by the radiologist. Plain radiographic images are visualized and preliminarily interpreted by the emergency physician with the below findings:        Interpretation per the Radiologist below, if available at the time of this note:    XR CHEST PORTABLE   Final Result   1.  Cardiomegaly is seen without acute process.  Numeral 2.  No acute   fracture seen in the left shoulder.  Degenerative changes are seen.         XR SHOULDER LEFT (MIN 2 VIEWS)   Final Result   1.  Cardiomegaly is seen without acute process.  Numeral 2.  No acute   fracture seen in the left shoulder.  Degenerative changes are seen.           Chest x-ray shows 1 rib fracture that I can see on left side.  Left shoulder no fractures    ED BEDSIDE ULTRASOUND:   Performed by ED Physician - none    LABS:  Labs Reviewed - No data to display    All other labs were within normal  signed)  Attending Emergency Physician            Sugey Medeiros DO  08/28/24 1805

## 2024-08-28 NOTE — ED NOTES
Xray in for portable films  
Normal vision: sees adequately in most situations; can see medication labels, newsprint

## 2024-09-09 ENCOUNTER — APPOINTMENT (OUTPATIENT)
Dept: GENERAL RADIOLOGY | Age: 79
End: 2024-09-09
Payer: MEDICARE

## 2024-09-09 ENCOUNTER — HOSPITAL ENCOUNTER (INPATIENT)
Age: 79
LOS: 4 days | Discharge: NURSING, REHAB OR LONG-TERM CARE | End: 2024-09-13
Attending: INTERNAL MEDICINE | Admitting: INTERNAL MEDICINE
Payer: MEDICARE

## 2024-09-09 ENCOUNTER — APPOINTMENT (OUTPATIENT)
Dept: CT IMAGING | Age: 79
End: 2024-09-09
Payer: MEDICARE

## 2024-09-09 DIAGNOSIS — L03.116 CELLULITIS OF LEFT LOWER EXTREMITY: Primary | ICD-10-CM

## 2024-09-09 DIAGNOSIS — E11.65 HYPERGLYCEMIA DUE TO DIABETES MELLITUS (HCC): ICD-10-CM

## 2024-09-09 LAB
ALBUMIN SERPL-MCNC: 3.8 G/DL (ref 3.5–4.6)
ALP SERPL-CCNC: 148 U/L (ref 40–130)
ALT SERPL-CCNC: 7 U/L (ref 0–33)
ANION GAP SERPL CALCULATED.3IONS-SCNC: 14 MEQ/L (ref 9–15)
APTT PPP: 26.7 SEC (ref 24.4–36.8)
AST SERPL-CCNC: 10 U/L (ref 0–35)
BACTERIA URNS QL MICRO: ABNORMAL /HPF
BASOPHILS # BLD: 0 K/UL (ref 0–0.2)
BASOPHILS NFR BLD: 0.4 %
BILIRUB SERPL-MCNC: 0.4 MG/DL (ref 0.2–0.7)
BILIRUB UR QL STRIP: NEGATIVE
BUN SERPL-MCNC: 18 MG/DL (ref 8–23)
CALCIUM SERPL-MCNC: 9.4 MG/DL (ref 8.5–9.9)
CHLORIDE SERPL-SCNC: 91 MEQ/L (ref 95–107)
CLARITY UR: CLEAR
CO2 SERPL-SCNC: 23 MEQ/L (ref 20–31)
COLOR UR: YELLOW
CREAT SERPL-MCNC: 1.01 MG/DL (ref 0.5–0.9)
EKG ATRIAL RATE: 83 BPM
EKG P AXIS: 44 DEGREES
EKG P-R INTERVAL: 204 MS
EKG Q-T INTERVAL: 354 MS
EKG QRS DURATION: 82 MS
EKG QTC CALCULATION (BAZETT): 415 MS
EKG R AXIS: 7 DEGREES
EKG T AXIS: 80 DEGREES
EKG VENTRICULAR RATE: 83 BPM
EOSINOPHIL # BLD: 0.2 K/UL (ref 0–0.7)
EOSINOPHIL NFR BLD: 1.7 %
EPI CELLS #/AREA URNS AUTO: ABNORMAL /HPF (ref 0–5)
ERYTHROCYTE [DISTWIDTH] IN BLOOD BY AUTOMATED COUNT: 13.1 % (ref 11.5–14.5)
ETHANOL PERCENT: NORMAL G/DL
ETHANOLAMINE SERPL-MCNC: <10 MG/DL (ref 0–0.08)
GLOBULIN SER CALC-MCNC: 4 G/DL (ref 2.3–3.5)
GLUCOSE BLD-MCNC: 136 MG/DL (ref 70–99)
GLUCOSE BLD-MCNC: 165 MG/DL (ref 70–99)
GLUCOSE BLD-MCNC: 305 MG/DL (ref 70–99)
GLUCOSE SERPL-MCNC: 443 MG/DL (ref 70–99)
GLUCOSE UR STRIP-MCNC: >=1000 MG/DL
HCT VFR BLD AUTO: 47.2 % (ref 37–47)
HGB BLD-MCNC: 15.7 G/DL (ref 12–16)
HGB UR QL STRIP: NEGATIVE
HYALINE CASTS #/AREA URNS AUTO: ABNORMAL /HPF (ref 0–5)
INR PPP: 1.1
KETONES UR STRIP-MCNC: 15 MG/DL
LACTATE BLDV-SCNC: 1.3 MMOL/L (ref 0.5–2.2)
LEUKOCYTE ESTERASE UR QL STRIP: ABNORMAL
LYMPHOCYTES # BLD: 1.5 K/UL (ref 1–4.8)
LYMPHOCYTES NFR BLD: 16.1 %
MCH RBC QN AUTO: 30.5 PG (ref 27–31.3)
MCHC RBC AUTO-ENTMCNC: 33.3 % (ref 33–37)
MCV RBC AUTO: 91.7 FL (ref 79.4–94.8)
MONOCYTES # BLD: 0.8 K/UL (ref 0.2–0.8)
MONOCYTES NFR BLD: 8.3 %
NEUTROPHILS # BLD: 6.7 K/UL (ref 1.4–6.5)
NEUTS SEG NFR BLD: 73.1 %
NITRITE UR QL STRIP: NEGATIVE
PERFORMED ON: ABNORMAL
PH UR STRIP: 7 [PH] (ref 5–9)
PLATELET # BLD AUTO: 214 K/UL (ref 130–400)
POTASSIUM SERPL-SCNC: 4.6 MEQ/L (ref 3.4–4.9)
PROCALCITONIN SERPL IA-MCNC: 0.07 NG/ML (ref 0–0.15)
PROT SERPL-MCNC: 7.8 G/DL (ref 6.3–8)
PROT UR STRIP-MCNC: 30 MG/DL
PROTHROMBIN TIME: 13.9 SEC (ref 12.3–14.9)
RBC # BLD AUTO: 5.15 M/UL (ref 4.2–5.4)
RBC #/AREA URNS AUTO: ABNORMAL /HPF (ref 0–5)
SODIUM SERPL-SCNC: 128 MEQ/L (ref 135–144)
SP GR UR STRIP: 1.02 (ref 1–1.03)
TROPONIN, HIGH SENSITIVITY: 11 NG/L (ref 0–19)
TROPONIN, HIGH SENSITIVITY: 12 NG/L (ref 0–19)
URINE REFLEX TO CULTURE: YES
UROBILINOGEN UR STRIP-ACNC: 0.2 E.U./DL
WBC # BLD AUTO: 9.2 K/UL (ref 4.8–10.8)
WBC #/AREA URNS AUTO: ABNORMAL /HPF (ref 0–5)

## 2024-09-09 PROCEDURE — 87070 CULTURE OTHR SPECIMN AEROBIC: CPT

## 2024-09-09 PROCEDURE — 85610 PROTHROMBIN TIME: CPT

## 2024-09-09 PROCEDURE — 84145 PROCALCITONIN (PCT): CPT

## 2024-09-09 PROCEDURE — 87088 URINE BACTERIA CULTURE: CPT

## 2024-09-09 PROCEDURE — 80053 COMPREHEN METABOLIC PANEL: CPT

## 2024-09-09 PROCEDURE — 82077 ASSAY SPEC XCP UR&BREATH IA: CPT

## 2024-09-09 PROCEDURE — 87186 SC STD MICRODIL/AGAR DIL: CPT

## 2024-09-09 PROCEDURE — 85730 THROMBOPLASTIN TIME PARTIAL: CPT

## 2024-09-09 PROCEDURE — 93010 ELECTROCARDIOGRAM REPORT: CPT | Performed by: INTERNAL MEDICINE

## 2024-09-09 PROCEDURE — 6370000000 HC RX 637 (ALT 250 FOR IP): Performed by: PHYSICIAN ASSISTANT

## 2024-09-09 PROCEDURE — 6370000000 HC RX 637 (ALT 250 FOR IP): Performed by: INTERNAL MEDICINE

## 2024-09-09 PROCEDURE — 71045 X-RAY EXAM CHEST 1 VIEW: CPT

## 2024-09-09 PROCEDURE — 2580000003 HC RX 258: Performed by: PHYSICIAN ASSISTANT

## 2024-09-09 PROCEDURE — 2580000003 HC RX 258: Performed by: INTERNAL MEDICINE

## 2024-09-09 PROCEDURE — 99285 EMERGENCY DEPT VISIT HI MDM: CPT

## 2024-09-09 PROCEDURE — 70450 CT HEAD/BRAIN W/O DYE: CPT

## 2024-09-09 PROCEDURE — 84484 ASSAY OF TROPONIN QUANT: CPT

## 2024-09-09 PROCEDURE — 1210000000 HC MED SURG R&B

## 2024-09-09 PROCEDURE — 87086 URINE CULTURE/COLONY COUNT: CPT

## 2024-09-09 PROCEDURE — 72125 CT NECK SPINE W/O DYE: CPT

## 2024-09-09 PROCEDURE — 96372 THER/PROPH/DIAG INJ SC/IM: CPT

## 2024-09-09 PROCEDURE — 81001 URINALYSIS AUTO W/SCOPE: CPT

## 2024-09-09 PROCEDURE — 93005 ELECTROCARDIOGRAM TRACING: CPT | Performed by: PHYSICIAN ASSISTANT

## 2024-09-09 PROCEDURE — 96365 THER/PROPH/DIAG IV INF INIT: CPT

## 2024-09-09 PROCEDURE — 96366 THER/PROPH/DIAG IV INF ADDON: CPT

## 2024-09-09 PROCEDURE — 83605 ASSAY OF LACTIC ACID: CPT

## 2024-09-09 PROCEDURE — 85025 COMPLETE CBC W/AUTO DIFF WBC: CPT

## 2024-09-09 PROCEDURE — 86403 PARTICLE AGGLUT ANTBDY SCRN: CPT

## 2024-09-09 PROCEDURE — 36415 COLL VENOUS BLD VENIPUNCTURE: CPT

## 2024-09-09 PROCEDURE — 87040 BLOOD CULTURE FOR BACTERIA: CPT

## 2024-09-09 PROCEDURE — 6360000002 HC RX W HCPCS: Performed by: PHYSICIAN ASSISTANT

## 2024-09-09 PROCEDURE — 87075 CULTR BACTERIA EXCEPT BLOOD: CPT

## 2024-09-09 RX ORDER — ACETAMINOPHEN 325 MG/1
650 TABLET ORAL EVERY 6 HOURS PRN
Status: DISCONTINUED | OUTPATIENT
Start: 2024-09-09 | End: 2024-09-13 | Stop reason: HOSPADM

## 2024-09-09 RX ORDER — INSULIN LISPRO 100 [IU]/ML
0-4 INJECTION, SOLUTION INTRAVENOUS; SUBCUTANEOUS NIGHTLY
Status: DISCONTINUED | OUTPATIENT
Start: 2024-09-09 | End: 2024-09-13 | Stop reason: HOSPADM

## 2024-09-09 RX ORDER — INSULIN LISPRO 100 [IU]/ML
0-4 INJECTION, SOLUTION INTRAVENOUS; SUBCUTANEOUS
Status: DISCONTINUED | OUTPATIENT
Start: 2024-09-09 | End: 2024-09-13 | Stop reason: HOSPADM

## 2024-09-09 RX ORDER — MAGNESIUM SULFATE IN WATER 40 MG/ML
2000 INJECTION, SOLUTION INTRAVENOUS PRN
Status: DISCONTINUED | OUTPATIENT
Start: 2024-09-09 | End: 2024-09-13 | Stop reason: HOSPADM

## 2024-09-09 RX ORDER — DEXTROSE MONOHYDRATE 100 MG/ML
INJECTION, SOLUTION INTRAVENOUS CONTINUOUS PRN
Status: DISCONTINUED | OUTPATIENT
Start: 2024-09-09 | End: 2024-09-13 | Stop reason: HOSPADM

## 2024-09-09 RX ORDER — LABETALOL HYDROCHLORIDE 5 MG/ML
10 INJECTION, SOLUTION INTRAVENOUS EVERY 4 HOURS PRN
Status: DISCONTINUED | OUTPATIENT
Start: 2024-09-09 | End: 2024-09-13 | Stop reason: HOSPADM

## 2024-09-09 RX ORDER — INSULIN LISPRO 100 [IU]/ML
0.08 INJECTION, SOLUTION INTRAVENOUS; SUBCUTANEOUS
Status: DISCONTINUED | OUTPATIENT
Start: 2024-09-09 | End: 2024-09-13 | Stop reason: HOSPADM

## 2024-09-09 RX ORDER — GLUCAGON 1 MG/ML
1 KIT INJECTION PRN
Status: DISCONTINUED | OUTPATIENT
Start: 2024-09-09 | End: 2024-09-13 | Stop reason: HOSPADM

## 2024-09-09 RX ORDER — SODIUM CHLORIDE 0.9 % (FLUSH) 0.9 %
5-40 SYRINGE (ML) INJECTION EVERY 12 HOURS SCHEDULED
Status: DISCONTINUED | OUTPATIENT
Start: 2024-09-09 | End: 2024-09-13 | Stop reason: HOSPADM

## 2024-09-09 RX ORDER — ACETAMINOPHEN 650 MG/1
650 SUPPOSITORY RECTAL EVERY 6 HOURS PRN
Status: DISCONTINUED | OUTPATIENT
Start: 2024-09-09 | End: 2024-09-13 | Stop reason: HOSPADM

## 2024-09-09 RX ORDER — INSULIN GLARGINE 100 [IU]/ML
0.25 INJECTION, SOLUTION SUBCUTANEOUS NIGHTLY
Status: DISCONTINUED | OUTPATIENT
Start: 2024-09-09 | End: 2024-09-13 | Stop reason: HOSPADM

## 2024-09-09 RX ORDER — INSULIN LISPRO 100 [IU]/ML
14 INJECTION, SOLUTION INTRAVENOUS; SUBCUTANEOUS ONCE
Status: COMPLETED | OUTPATIENT
Start: 2024-09-09 | End: 2024-09-09

## 2024-09-09 RX ORDER — POLYETHYLENE GLYCOL 3350 17 G/17G
17 POWDER, FOR SOLUTION ORAL DAILY PRN
Status: DISCONTINUED | OUTPATIENT
Start: 2024-09-09 | End: 2024-09-13 | Stop reason: HOSPADM

## 2024-09-09 RX ORDER — SODIUM CHLORIDE 9 MG/ML
INJECTION, SOLUTION INTRAVENOUS PRN
Status: DISCONTINUED | OUTPATIENT
Start: 2024-09-09 | End: 2024-09-13 | Stop reason: HOSPADM

## 2024-09-09 RX ORDER — POTASSIUM CHLORIDE 7.45 MG/ML
10 INJECTION INTRAVENOUS PRN
Status: DISCONTINUED | OUTPATIENT
Start: 2024-09-09 | End: 2024-09-13 | Stop reason: HOSPADM

## 2024-09-09 RX ORDER — ONDANSETRON 2 MG/ML
4 INJECTION INTRAMUSCULAR; INTRAVENOUS EVERY 6 HOURS PRN
Status: DISCONTINUED | OUTPATIENT
Start: 2024-09-09 | End: 2024-09-13 | Stop reason: HOSPADM

## 2024-09-09 RX ORDER — ENOXAPARIN SODIUM 100 MG/ML
40 INJECTION SUBCUTANEOUS DAILY
Status: DISCONTINUED | OUTPATIENT
Start: 2024-09-10 | End: 2024-09-13 | Stop reason: HOSPADM

## 2024-09-09 RX ORDER — POTASSIUM CHLORIDE 1500 MG/1
40 TABLET, EXTENDED RELEASE ORAL PRN
Status: DISCONTINUED | OUTPATIENT
Start: 2024-09-09 | End: 2024-09-13 | Stop reason: HOSPADM

## 2024-09-09 RX ORDER — 0.9 % SODIUM CHLORIDE 0.9 %
1000 INTRAVENOUS SOLUTION INTRAVENOUS ONCE
Status: COMPLETED | OUTPATIENT
Start: 2024-09-09 | End: 2024-09-09

## 2024-09-09 RX ORDER — ONDANSETRON 4 MG/1
4 TABLET, ORALLY DISINTEGRATING ORAL EVERY 8 HOURS PRN
Status: DISCONTINUED | OUTPATIENT
Start: 2024-09-09 | End: 2024-09-13 | Stop reason: HOSPADM

## 2024-09-09 RX ORDER — SODIUM CHLORIDE 0.9 % (FLUSH) 0.9 %
5-40 SYRINGE (ML) INJECTION PRN
Status: DISCONTINUED | OUTPATIENT
Start: 2024-09-09 | End: 2024-09-13 | Stop reason: HOSPADM

## 2024-09-09 RX ADMIN — VANCOMYCIN HYDROCHLORIDE 1000 MG: 1 INJECTION, POWDER, LYOPHILIZED, FOR SOLUTION INTRAVENOUS at 14:18

## 2024-09-09 RX ADMIN — INSULIN LISPRO 6 UNITS: 100 INJECTION, SOLUTION INTRAVENOUS; SUBCUTANEOUS at 18:25

## 2024-09-09 RX ADMIN — INSULIN GLARGINE 18 UNITS: 100 INJECTION, SOLUTION SUBCUTANEOUS at 21:25

## 2024-09-09 RX ADMIN — SODIUM CHLORIDE 1000 ML: 9 INJECTION, SOLUTION INTRAVENOUS at 14:10

## 2024-09-09 RX ADMIN — INSULIN LISPRO 14 UNITS: 100 INJECTION, SOLUTION INTRAVENOUS; SUBCUTANEOUS at 14:19

## 2024-09-09 RX ADMIN — SODIUM CHLORIDE, PRESERVATIVE FREE 10 ML: 5 INJECTION INTRAVENOUS at 21:25

## 2024-09-09 ASSESSMENT — ENCOUNTER SYMPTOMS
ABDOMINAL PAIN: 0
SHORTNESS OF BREATH: 0
COLOR CHANGE: 0
SORE THROAT: 0
NAUSEA: 0
EYE DISCHARGE: 0
CONSTIPATION: 0
VOMITING: 0
ABDOMINAL DISTENTION: 0
RHINORRHEA: 0

## 2024-09-09 ASSESSMENT — PAIN DESCRIPTION - ORIENTATION: ORIENTATION: RIGHT

## 2024-09-09 ASSESSMENT — PAIN DESCRIPTION - PAIN TYPE: TYPE: ACUTE PAIN

## 2024-09-09 ASSESSMENT — PAIN DESCRIPTION - LOCATION: LOCATION: RIB CAGE;SHOULDER

## 2024-09-09 ASSESSMENT — PAIN - FUNCTIONAL ASSESSMENT: PAIN_FUNCTIONAL_ASSESSMENT: 0-10

## 2024-09-09 ASSESSMENT — PAIN SCALES - GENERAL: PAINLEVEL_OUTOF10: 0

## 2024-09-09 ASSESSMENT — LIFESTYLE VARIABLES
HOW OFTEN DO YOU HAVE A DRINK CONTAINING ALCOHOL: NEVER
HOW MANY STANDARD DRINKS CONTAINING ALCOHOL DO YOU HAVE ON A TYPICAL DAY: PATIENT DOES NOT DRINK

## 2024-09-10 ENCOUNTER — APPOINTMENT (OUTPATIENT)
Dept: GENERAL RADIOLOGY | Age: 79
End: 2024-09-10
Payer: MEDICARE

## 2024-09-10 PROBLEM — N39.0 ACUTE LOWER UTI: Status: ACTIVE | Noted: 2024-09-10

## 2024-09-10 PROBLEM — L97.921 CHRONIC ULCER OF LEFT LEG, LIMITED TO BREAKDOWN OF SKIN (HCC): Status: ACTIVE | Noted: 2024-09-10

## 2024-09-10 LAB
ALBUMIN SERPL-MCNC: 3.2 G/DL (ref 3.5–4.6)
ANION GAP SERPL CALCULATED.3IONS-SCNC: 11 MEQ/L (ref 9–15)
BASOPHILS # BLD: 0 K/UL (ref 0–0.2)
BASOPHILS NFR BLD: 0.2 %
BUN SERPL-MCNC: 19 MG/DL (ref 8–23)
CALCIUM SERPL-MCNC: 8.8 MG/DL (ref 8.5–9.9)
CHLORIDE SERPL-SCNC: 98 MEQ/L (ref 95–107)
CO2 SERPL-SCNC: 24 MEQ/L (ref 20–31)
CREAT SERPL-MCNC: 1 MG/DL (ref 0.5–0.9)
CRP SERPL HS-MCNC: 27.8 MG/L (ref 0–5)
EOSINOPHIL # BLD: 0.3 K/UL (ref 0–0.7)
EOSINOPHIL NFR BLD: 2.8 %
ERYTHROCYTE [DISTWIDTH] IN BLOOD BY AUTOMATED COUNT: 13.2 % (ref 11.5–14.5)
ERYTHROCYTE [SEDIMENTATION RATE] IN BLOOD BY WESTERGREN METHOD: 43 MM (ref 0–30)
GLUCOSE BLD-MCNC: 175 MG/DL (ref 70–99)
GLUCOSE BLD-MCNC: 231 MG/DL (ref 70–99)
GLUCOSE BLD-MCNC: 231 MG/DL (ref 70–99)
GLUCOSE BLD-MCNC: 276 MG/DL (ref 70–99)
GLUCOSE SERPL-MCNC: 213 MG/DL (ref 70–99)
HCT VFR BLD AUTO: 42.5 % (ref 37–47)
HGB BLD-MCNC: 14.4 G/DL (ref 12–16)
LYMPHOCYTES # BLD: 2.9 K/UL (ref 1–4.8)
LYMPHOCYTES NFR BLD: 31 %
MAGNESIUM SERPL-MCNC: 2 MG/DL (ref 1.7–2.4)
MCH RBC QN AUTO: 31.3 PG (ref 27–31.3)
MCHC RBC AUTO-ENTMCNC: 33.9 % (ref 33–37)
MCV RBC AUTO: 92.4 FL (ref 79.4–94.8)
MONOCYTES # BLD: 0.9 K/UL (ref 0.2–0.8)
MONOCYTES NFR BLD: 9.3 %
NEUTROPHILS # BLD: 5.2 K/UL (ref 1.4–6.5)
NEUTS SEG NFR BLD: 56.2 %
PERFORMED ON: ABNORMAL
PHOSPHATE SERPL-MCNC: 2.5 MG/DL (ref 2.3–4.8)
PLATELET # BLD AUTO: 204 K/UL (ref 130–400)
POTASSIUM SERPL-SCNC: 4 MEQ/L (ref 3.4–4.9)
PROCALCITONIN SERPL IA-MCNC: 0.07 NG/ML (ref 0–0.15)
RBC # BLD AUTO: 4.6 M/UL (ref 4.2–5.4)
SODIUM SERPL-SCNC: 133 MEQ/L (ref 135–144)
WBC # BLD AUTO: 9.2 K/UL (ref 4.8–10.8)

## 2024-09-10 PROCEDURE — 97162 PT EVAL MOD COMPLEX 30 MIN: CPT

## 2024-09-10 PROCEDURE — 97166 OT EVAL MOD COMPLEX 45 MIN: CPT

## 2024-09-10 PROCEDURE — 85652 RBC SED RATE AUTOMATED: CPT

## 2024-09-10 PROCEDURE — 6360000002 HC RX W HCPCS: Performed by: INTERNAL MEDICINE

## 2024-09-10 PROCEDURE — 6370000000 HC RX 637 (ALT 250 FOR IP): Performed by: INTERNAL MEDICINE

## 2024-09-10 PROCEDURE — 1210000000 HC MED SURG R&B

## 2024-09-10 PROCEDURE — 83735 ASSAY OF MAGNESIUM: CPT

## 2024-09-10 PROCEDURE — 73590 X-RAY EXAM OF LOWER LEG: CPT

## 2024-09-10 PROCEDURE — 99213 OFFICE O/P EST LOW 20 MIN: CPT

## 2024-09-10 PROCEDURE — 2580000003 HC RX 258: Performed by: INTERNAL MEDICINE

## 2024-09-10 PROCEDURE — 99222 1ST HOSP IP/OBS MODERATE 55: CPT | Performed by: INTERNAL MEDICINE

## 2024-09-10 PROCEDURE — 85025 COMPLETE CBC W/AUTO DIFF WBC: CPT

## 2024-09-10 PROCEDURE — 36415 COLL VENOUS BLD VENIPUNCTURE: CPT

## 2024-09-10 PROCEDURE — 84145 PROCALCITONIN (PCT): CPT

## 2024-09-10 PROCEDURE — 86140 C-REACTIVE PROTEIN: CPT

## 2024-09-10 PROCEDURE — 80069 RENAL FUNCTION PANEL: CPT

## 2024-09-10 RX ORDER — PRIMIDONE 50 MG/1
50 TABLET ORAL 2 TIMES DAILY
Status: DISCONTINUED | OUTPATIENT
Start: 2024-09-10 | End: 2024-09-13 | Stop reason: HOSPADM

## 2024-09-10 RX ORDER — ATORVASTATIN CALCIUM 40 MG/1
40 TABLET, FILM COATED ORAL NIGHTLY
Status: DISCONTINUED | OUTPATIENT
Start: 2024-09-10 | End: 2024-09-13 | Stop reason: HOSPADM

## 2024-09-10 RX ORDER — PROPRANOLOL HCL 10 MG
10 TABLET ORAL DAILY
Status: DISCONTINUED | OUTPATIENT
Start: 2024-09-10 | End: 2024-09-13 | Stop reason: HOSPADM

## 2024-09-10 RX ORDER — AMLODIPINE BESYLATE 5 MG/1
5 TABLET ORAL DAILY
Status: DISCONTINUED | OUTPATIENT
Start: 2024-09-10 | End: 2024-09-13 | Stop reason: HOSPADM

## 2024-09-10 RX ADMIN — SODIUM CHLORIDE, PRESERVATIVE FREE 10 ML: 5 INJECTION INTRAVENOUS at 09:37

## 2024-09-10 RX ADMIN — SODIUM CHLORIDE, PRESERVATIVE FREE 10 ML: 5 INJECTION INTRAVENOUS at 21:59

## 2024-09-10 RX ADMIN — INSULIN LISPRO 6 UNITS: 100 INJECTION, SOLUTION INTRAVENOUS; SUBCUTANEOUS at 17:05

## 2024-09-10 RX ADMIN — INSULIN LISPRO 1 UNITS: 100 INJECTION, SOLUTION INTRAVENOUS; SUBCUTANEOUS at 17:05

## 2024-09-10 RX ADMIN — SODIUM CHLORIDE 1250 MG: 9 INJECTION, SOLUTION INTRAVENOUS at 13:40

## 2024-09-10 RX ADMIN — CEFTRIAXONE SODIUM 1000 MG: 1 INJECTION, POWDER, FOR SOLUTION INTRAMUSCULAR; INTRAVENOUS at 16:02

## 2024-09-10 RX ADMIN — INSULIN LISPRO 6 UNITS: 100 INJECTION, SOLUTION INTRAVENOUS; SUBCUTANEOUS at 12:44

## 2024-09-10 RX ADMIN — INSULIN LISPRO 2 UNITS: 100 INJECTION, SOLUTION INTRAVENOUS; SUBCUTANEOUS at 12:44

## 2024-09-10 RX ADMIN — INSULIN LISPRO 6 UNITS: 100 INJECTION, SOLUTION INTRAVENOUS; SUBCUTANEOUS at 09:36

## 2024-09-10 RX ADMIN — INSULIN GLARGINE 18 UNITS: 100 INJECTION, SOLUTION SUBCUTANEOUS at 21:58

## 2024-09-10 RX ADMIN — ENOXAPARIN SODIUM 40 MG: 100 INJECTION SUBCUTANEOUS at 09:37

## 2024-09-10 RX ADMIN — PRIMIDONE 50 MG: 50 TABLET ORAL at 21:59

## 2024-09-10 RX ADMIN — AMLODIPINE BESYLATE 5 MG: 5 TABLET ORAL at 12:52

## 2024-09-10 RX ADMIN — SODIUM CHLORIDE, PRESERVATIVE FREE 10 ML: 5 INJECTION INTRAVENOUS at 16:03

## 2024-09-10 RX ADMIN — PRIMIDONE 50 MG: 50 TABLET ORAL at 12:52

## 2024-09-10 RX ADMIN — ATORVASTATIN CALCIUM 40 MG: 40 TABLET, FILM COATED ORAL at 21:59

## 2024-09-10 RX ADMIN — PROPRANOLOL HYDROCHLORIDE 10 MG: 10 TABLET ORAL at 21:59

## 2024-09-10 RX ADMIN — CEFAZOLIN 2000 MG: 2 INJECTION, POWDER, FOR SOLUTION INTRAMUSCULAR; INTRAVENOUS at 12:49

## 2024-09-10 RX ADMIN — INSULIN LISPRO 1 UNITS: 100 INJECTION, SOLUTION INTRAVENOUS; SUBCUTANEOUS at 09:36

## 2024-09-10 ASSESSMENT — PAIN DESCRIPTION - DESCRIPTORS: DESCRIPTORS: SHARP

## 2024-09-10 ASSESSMENT — PAIN DESCRIPTION - LOCATION
LOCATION: LEG
LOCATION: LEG

## 2024-09-10 ASSESSMENT — PAIN SCALES - GENERAL
PAINLEVEL_OUTOF10: 8
PAINLEVEL_OUTOF10: 3

## 2024-09-10 ASSESSMENT — PAIN DESCRIPTION - ORIENTATION
ORIENTATION: LEFT
ORIENTATION: LEFT;UPPER

## 2024-09-11 LAB
ALBUMIN SERPL-MCNC: 3.2 G/DL (ref 3.5–4.6)
ANION GAP SERPL CALCULATED.3IONS-SCNC: 11 MEQ/L (ref 9–15)
BACTERIA UR CULT: ABNORMAL
BACTERIA UR CULT: ABNORMAL
BASOPHILS # BLD: 0.1 K/UL (ref 0–0.2)
BASOPHILS NFR BLD: 0.6 %
BUN SERPL-MCNC: 16 MG/DL (ref 8–23)
CALCIUM SERPL-MCNC: 8.8 MG/DL (ref 8.5–9.9)
CHLORIDE SERPL-SCNC: 99 MEQ/L (ref 95–107)
CO2 SERPL-SCNC: 24 MEQ/L (ref 20–31)
CREAT SERPL-MCNC: 0.94 MG/DL (ref 0.5–0.9)
EOSINOPHIL # BLD: 0.3 K/UL (ref 0–0.7)
EOSINOPHIL NFR BLD: 3 %
ERYTHROCYTE [DISTWIDTH] IN BLOOD BY AUTOMATED COUNT: 13.1 % (ref 11.5–14.5)
GLUCOSE BLD-MCNC: 156 MG/DL (ref 70–99)
GLUCOSE BLD-MCNC: 194 MG/DL (ref 70–99)
GLUCOSE BLD-MCNC: 242 MG/DL (ref 70–99)
GLUCOSE BLD-MCNC: 249 MG/DL (ref 70–99)
GLUCOSE SERPL-MCNC: 216 MG/DL (ref 70–99)
HCT VFR BLD AUTO: 41.4 % (ref 37–47)
HGB BLD-MCNC: 13.6 G/DL (ref 12–16)
LYMPHOCYTES # BLD: 2.7 K/UL (ref 1–4.8)
LYMPHOCYTES NFR BLD: 30.4 %
MAGNESIUM SERPL-MCNC: 2.1 MG/DL (ref 1.7–2.4)
MCH RBC QN AUTO: 30.2 PG (ref 27–31.3)
MCHC RBC AUTO-ENTMCNC: 32.9 % (ref 33–37)
MCV RBC AUTO: 91.8 FL (ref 79.4–94.8)
MONOCYTES # BLD: 0.9 K/UL (ref 0.2–0.8)
MONOCYTES NFR BLD: 10.2 %
NEUTROPHILS # BLD: 4.9 K/UL (ref 1.4–6.5)
NEUTS SEG NFR BLD: 55.2 %
ORGANISM: ABNORMAL
PERFORMED ON: ABNORMAL
PHOSPHATE SERPL-MCNC: 3.1 MG/DL (ref 2.3–4.8)
PLATELET # BLD AUTO: 195 K/UL (ref 130–400)
POTASSIUM SERPL-SCNC: 3.9 MEQ/L (ref 3.4–4.9)
RBC # BLD AUTO: 4.51 M/UL (ref 4.2–5.4)
SODIUM SERPL-SCNC: 134 MEQ/L (ref 135–144)
WBC # BLD AUTO: 8.9 K/UL (ref 4.8–10.8)

## 2024-09-11 PROCEDURE — 1210000000 HC MED SURG R&B

## 2024-09-11 PROCEDURE — 97535 SELF CARE MNGMENT TRAINING: CPT

## 2024-09-11 PROCEDURE — 85025 COMPLETE CBC W/AUTO DIFF WBC: CPT

## 2024-09-11 PROCEDURE — 80069 RENAL FUNCTION PANEL: CPT

## 2024-09-11 PROCEDURE — 2580000003 HC RX 258: Performed by: INTERNAL MEDICINE

## 2024-09-11 PROCEDURE — 6360000002 HC RX W HCPCS: Performed by: INTERNAL MEDICINE

## 2024-09-11 PROCEDURE — 83735 ASSAY OF MAGNESIUM: CPT

## 2024-09-11 PROCEDURE — 6370000000 HC RX 637 (ALT 250 FOR IP): Performed by: INTERNAL MEDICINE

## 2024-09-11 PROCEDURE — 99232 SBSQ HOSP IP/OBS MODERATE 35: CPT | Performed by: INTERNAL MEDICINE

## 2024-09-11 PROCEDURE — 36415 COLL VENOUS BLD VENIPUNCTURE: CPT

## 2024-09-11 PROCEDURE — 97110 THERAPEUTIC EXERCISES: CPT

## 2024-09-11 RX ORDER — LIDOCAINE 4 G/G
1 PATCH TOPICAL DAILY
Status: DISCONTINUED | OUTPATIENT
Start: 2024-09-11 | End: 2024-09-13 | Stop reason: HOSPADM

## 2024-09-11 RX ADMIN — PROPRANOLOL HYDROCHLORIDE 10 MG: 10 TABLET ORAL at 21:46

## 2024-09-11 RX ADMIN — PRIMIDONE 50 MG: 50 TABLET ORAL at 21:46

## 2024-09-11 RX ADMIN — ACETAMINOPHEN 325MG 650 MG: 325 TABLET ORAL at 09:32

## 2024-09-11 RX ADMIN — ENOXAPARIN SODIUM 40 MG: 100 INJECTION SUBCUTANEOUS at 08:32

## 2024-09-11 RX ADMIN — ATORVASTATIN CALCIUM 40 MG: 40 TABLET, FILM COATED ORAL at 21:46

## 2024-09-11 RX ADMIN — INSULIN LISPRO 6 UNITS: 100 INJECTION, SOLUTION INTRAVENOUS; SUBCUTANEOUS at 12:26

## 2024-09-11 RX ADMIN — INSULIN LISPRO 6 UNITS: 100 INJECTION, SOLUTION INTRAVENOUS; SUBCUTANEOUS at 08:46

## 2024-09-11 RX ADMIN — AMLODIPINE BESYLATE 5 MG: 5 TABLET ORAL at 08:32

## 2024-09-11 RX ADMIN — CEFTRIAXONE SODIUM 1000 MG: 1 INJECTION, POWDER, FOR SOLUTION INTRAMUSCULAR; INTRAVENOUS at 16:16

## 2024-09-11 RX ADMIN — INSULIN LISPRO 1 UNITS: 100 INJECTION, SOLUTION INTRAVENOUS; SUBCUTANEOUS at 12:26

## 2024-09-11 RX ADMIN — INSULIN LISPRO 1 UNITS: 100 INJECTION, SOLUTION INTRAVENOUS; SUBCUTANEOUS at 08:32

## 2024-09-11 RX ADMIN — INSULIN GLARGINE 18 UNITS: 100 INJECTION, SOLUTION SUBCUTANEOUS at 21:47

## 2024-09-11 RX ADMIN — INSULIN LISPRO 6 UNITS: 100 INJECTION, SOLUTION INTRAVENOUS; SUBCUTANEOUS at 16:45

## 2024-09-11 RX ADMIN — PRIMIDONE 50 MG: 50 TABLET ORAL at 08:32

## 2024-09-11 RX ADMIN — SODIUM CHLORIDE, PRESERVATIVE FREE 10 ML: 5 INJECTION INTRAVENOUS at 21:47

## 2024-09-11 RX ADMIN — SODIUM CHLORIDE, PRESERVATIVE FREE 10 ML: 5 INJECTION INTRAVENOUS at 08:47

## 2024-09-11 ASSESSMENT — PAIN DESCRIPTION - DESCRIPTORS
DESCRIPTORS: ACHING
DESCRIPTORS: ACHING

## 2024-09-11 ASSESSMENT — PAIN DESCRIPTION - ORIENTATION
ORIENTATION: LEFT;LOWER
ORIENTATION: LEFT;LOWER
ORIENTATION: LOWER

## 2024-09-11 ASSESSMENT — PAIN DESCRIPTION - LOCATION
LOCATION: LEG
LOCATION: LEG;SHOULDER

## 2024-09-11 ASSESSMENT — PAIN SCALES - GENERAL
PAINLEVEL_OUTOF10: 6
PAINLEVEL_OUTOF10: 6
PAINLEVEL_OUTOF10: 3

## 2024-09-12 PROBLEM — A49.02 MRSA (METHICILLIN RESISTANT STAPHYLOCOCCUS AUREUS) INFECTION: Status: ACTIVE | Noted: 2024-09-12

## 2024-09-12 LAB
ALBUMIN SERPL-MCNC: 3.1 G/DL (ref 3.5–4.6)
ANION GAP SERPL CALCULATED.3IONS-SCNC: 10 MEQ/L (ref 9–15)
BASOPHILS # BLD: 0 K/UL (ref 0–0.2)
BASOPHILS NFR BLD: 0.6 %
BUN SERPL-MCNC: 15 MG/DL (ref 8–23)
CALCIUM SERPL-MCNC: 8.9 MG/DL (ref 8.5–9.9)
CHLORIDE SERPL-SCNC: 101 MEQ/L (ref 95–107)
CO2 SERPL-SCNC: 26 MEQ/L (ref 20–31)
CREAT SERPL-MCNC: 0.89 MG/DL (ref 0.5–0.9)
EOSINOPHIL # BLD: 0.3 K/UL (ref 0–0.7)
EOSINOPHIL NFR BLD: 3.9 %
ERYTHROCYTE [DISTWIDTH] IN BLOOD BY AUTOMATED COUNT: 13.1 % (ref 11.5–14.5)
GLUCOSE BLD-MCNC: 179 MG/DL (ref 70–99)
GLUCOSE BLD-MCNC: 213 MG/DL (ref 70–99)
GLUCOSE BLD-MCNC: 231 MG/DL (ref 70–99)
GLUCOSE BLD-MCNC: 295 MG/DL (ref 70–99)
GLUCOSE SERPL-MCNC: 164 MG/DL (ref 70–99)
HCT VFR BLD AUTO: 42.1 % (ref 37–47)
HGB BLD-MCNC: 14.1 G/DL (ref 12–16)
LYMPHOCYTES # BLD: 2.6 K/UL (ref 1–4.8)
LYMPHOCYTES NFR BLD: 35.8 %
MAGNESIUM SERPL-MCNC: 2.2 MG/DL (ref 1.7–2.4)
MCH RBC QN AUTO: 30.6 PG (ref 27–31.3)
MCHC RBC AUTO-ENTMCNC: 33.5 % (ref 33–37)
MCV RBC AUTO: 91.3 FL (ref 79.4–94.8)
MONOCYTES # BLD: 0.7 K/UL (ref 0.2–0.8)
MONOCYTES NFR BLD: 9.6 %
NEUTROPHILS # BLD: 3.6 K/UL (ref 1.4–6.5)
NEUTS SEG NFR BLD: 49.5 %
PERFORMED ON: ABNORMAL
PHOSPHATE SERPL-MCNC: 3.2 MG/DL (ref 2.3–4.8)
PLATELET # BLD AUTO: 207 K/UL (ref 130–400)
POTASSIUM SERPL-SCNC: 3.9 MEQ/L (ref 3.4–4.9)
RBC # BLD AUTO: 4.61 M/UL (ref 4.2–5.4)
SODIUM SERPL-SCNC: 137 MEQ/L (ref 135–144)
TSH REFLEX: 1.5 UIU/ML (ref 0.44–3.86)
WBC # BLD AUTO: 7.3 K/UL (ref 4.8–10.8)

## 2024-09-12 PROCEDURE — 6360000002 HC RX W HCPCS: Performed by: INTERNAL MEDICINE

## 2024-09-12 PROCEDURE — 83735 ASSAY OF MAGNESIUM: CPT

## 2024-09-12 PROCEDURE — 84443 ASSAY THYROID STIM HORMONE: CPT

## 2024-09-12 PROCEDURE — 2580000003 HC RX 258: Performed by: INTERNAL MEDICINE

## 2024-09-12 PROCEDURE — 6370000000 HC RX 637 (ALT 250 FOR IP): Performed by: INTERNAL MEDICINE

## 2024-09-12 PROCEDURE — 80069 RENAL FUNCTION PANEL: CPT

## 2024-09-12 PROCEDURE — APPSS45 APP SPLIT SHARED TIME 31-45 MINUTES: Performed by: NURSE PRACTITIONER

## 2024-09-12 PROCEDURE — 99222 1ST HOSP IP/OBS MODERATE 55: CPT | Performed by: PSYCHIATRY & NEUROLOGY

## 2024-09-12 PROCEDURE — 1210000000 HC MED SURG R&B

## 2024-09-12 PROCEDURE — 36415 COLL VENOUS BLD VENIPUNCTURE: CPT

## 2024-09-12 PROCEDURE — 85025 COMPLETE CBC W/AUTO DIFF WBC: CPT

## 2024-09-12 PROCEDURE — 82607 VITAMIN B-12: CPT

## 2024-09-12 PROCEDURE — 97535 SELF CARE MNGMENT TRAINING: CPT

## 2024-09-12 PROCEDURE — 82746 ASSAY OF FOLIC ACID SERUM: CPT

## 2024-09-12 PROCEDURE — 99232 SBSQ HOSP IP/OBS MODERATE 35: CPT | Performed by: INTERNAL MEDICINE

## 2024-09-12 RX ORDER — DOXYCYCLINE 100 MG/1
100 CAPSULE ORAL EVERY 12 HOURS SCHEDULED
Status: DISCONTINUED | OUTPATIENT
Start: 2024-09-12 | End: 2024-09-13 | Stop reason: HOSPADM

## 2024-09-12 RX ORDER — DOXYCYCLINE HYCLATE 100 MG
100 TABLET ORAL 2 TIMES DAILY
Qty: 28 TABLET | Refills: 0
Start: 2024-09-12 | End: 2024-09-26

## 2024-09-12 RX ORDER — NITROFURANTOIN 25; 75 MG/1; MG/1
100 CAPSULE ORAL 2 TIMES DAILY
Qty: 6 CAPSULE | Refills: 0
Start: 2024-09-12 | End: 2024-09-15

## 2024-09-12 RX ADMIN — SODIUM CHLORIDE, PRESERVATIVE FREE 10 ML: 5 INJECTION INTRAVENOUS at 09:50

## 2024-09-12 RX ADMIN — INSULIN LISPRO 2 UNITS: 100 INJECTION, SOLUTION INTRAVENOUS; SUBCUTANEOUS at 17:51

## 2024-09-12 RX ADMIN — SODIUM CHLORIDE, PRESERVATIVE FREE 10 ML: 5 INJECTION INTRAVENOUS at 21:06

## 2024-09-12 RX ADMIN — PROPRANOLOL HYDROCHLORIDE 10 MG: 10 TABLET ORAL at 21:03

## 2024-09-12 RX ADMIN — CEFTRIAXONE SODIUM 1000 MG: 1 INJECTION, POWDER, FOR SOLUTION INTRAMUSCULAR; INTRAVENOUS at 17:15

## 2024-09-12 RX ADMIN — ENOXAPARIN SODIUM 40 MG: 100 INJECTION SUBCUTANEOUS at 09:49

## 2024-09-12 RX ADMIN — PRIMIDONE 50 MG: 50 TABLET ORAL at 09:49

## 2024-09-12 RX ADMIN — ATORVASTATIN CALCIUM 40 MG: 40 TABLET, FILM COATED ORAL at 21:05

## 2024-09-12 RX ADMIN — AMLODIPINE BESYLATE 5 MG: 5 TABLET ORAL at 09:49

## 2024-09-12 RX ADMIN — PRIMIDONE 50 MG: 50 TABLET ORAL at 21:05

## 2024-09-12 RX ADMIN — INSULIN LISPRO 6 UNITS: 100 INJECTION, SOLUTION INTRAVENOUS; SUBCUTANEOUS at 17:51

## 2024-09-12 RX ADMIN — INSULIN GLARGINE 18 UNITS: 100 INJECTION, SOLUTION SUBCUTANEOUS at 21:05

## 2024-09-12 RX ADMIN — DOXYCYCLINE HYCLATE 100 MG: 100 CAPSULE ORAL at 21:06

## 2024-09-12 ASSESSMENT — PAIN SCALES - GENERAL
PAINLEVEL_OUTOF10: 3
PAINLEVEL_OUTOF10: 0

## 2024-09-12 ASSESSMENT — ENCOUNTER SYMPTOMS
NAUSEA: 0
COUGH: 0
TROUBLE SWALLOWING: 0
WHEEZING: 0
SHORTNESS OF BREATH: 0
VOMITING: 0
COLOR CHANGE: 0
CHEST TIGHTNESS: 0

## 2024-09-13 ENCOUNTER — ENROLLMENT (OUTPATIENT)
Dept: PHARMACY | Facility: CLINIC | Age: 79
End: 2024-09-13

## 2024-09-13 VITALS
SYSTOLIC BLOOD PRESSURE: 129 MMHG | WEIGHT: 160 LBS | OXYGEN SATURATION: 94 % | DIASTOLIC BLOOD PRESSURE: 74 MMHG | RESPIRATION RATE: 20 BRPM | HEIGHT: 58 IN | HEART RATE: 94 BPM | TEMPERATURE: 97.7 F | BODY MASS INDEX: 33.58 KG/M2

## 2024-09-13 LAB
ALBUMIN SERPL-MCNC: 3 G/DL (ref 3.5–4.6)
ANION GAP SERPL CALCULATED.3IONS-SCNC: 9 MEQ/L (ref 9–15)
BASOPHILS # BLD: 0.1 K/UL (ref 0–0.2)
BASOPHILS NFR BLD: 0.7 %
BUN SERPL-MCNC: 19 MG/DL (ref 8–23)
CALCIUM SERPL-MCNC: 8.9 MG/DL (ref 8.5–9.9)
CHLORIDE SERPL-SCNC: 98 MEQ/L (ref 95–107)
CO2 SERPL-SCNC: 27 MEQ/L (ref 20–31)
CREAT SERPL-MCNC: 0.9 MG/DL (ref 0.5–0.9)
EOSINOPHIL # BLD: 0.2 K/UL (ref 0–0.7)
EOSINOPHIL NFR BLD: 2.9 %
ERYTHROCYTE [DISTWIDTH] IN BLOOD BY AUTOMATED COUNT: 13 % (ref 11.5–14.5)
FOLATE: 5.8 NG/ML (ref 4.8–24.2)
GLUCOSE BLD-MCNC: 174 MG/DL (ref 70–99)
GLUCOSE BLD-MCNC: 289 MG/DL (ref 70–99)
GLUCOSE SERPL-MCNC: 177 MG/DL (ref 70–99)
HCT VFR BLD AUTO: 40.2 % (ref 37–47)
HGB BLD-MCNC: 13.4 G/DL (ref 12–16)
LYMPHOCYTES # BLD: 1.8 K/UL (ref 1–4.8)
LYMPHOCYTES NFR BLD: 23.8 %
MCH RBC QN AUTO: 30.5 PG (ref 27–31.3)
MCHC RBC AUTO-ENTMCNC: 33.3 % (ref 33–37)
MCV RBC AUTO: 91.4 FL (ref 79.4–94.8)
MONOCYTES # BLD: 0.9 K/UL (ref 0.2–0.8)
MONOCYTES NFR BLD: 11.5 %
NEUTROPHILS # BLD: 4.6 K/UL (ref 1.4–6.5)
NEUTS SEG NFR BLD: 60.7 %
PERFORMED ON: ABNORMAL
PERFORMED ON: ABNORMAL
PHOSPHATE SERPL-MCNC: 3.2 MG/DL (ref 2.3–4.8)
PLATELET # BLD AUTO: 230 K/UL (ref 130–400)
POTASSIUM SERPL-SCNC: 3.8 MEQ/L (ref 3.4–4.9)
RBC # BLD AUTO: 4.4 M/UL (ref 4.2–5.4)
SODIUM SERPL-SCNC: 134 MEQ/L (ref 135–144)
VITAMIN B-12: 409 PG/ML (ref 232–1245)
WBC # BLD AUTO: 7.6 K/UL (ref 4.8–10.8)

## 2024-09-13 PROCEDURE — APPSS15 APP SPLIT SHARED TIME 0-15 MINUTES: Performed by: NURSE PRACTITIONER

## 2024-09-13 PROCEDURE — 6370000000 HC RX 637 (ALT 250 FOR IP): Performed by: INTERNAL MEDICINE

## 2024-09-13 PROCEDURE — 95816 EEG AWAKE AND DROWSY: CPT

## 2024-09-13 PROCEDURE — 99232 SBSQ HOSP IP/OBS MODERATE 35: CPT | Performed by: PSYCHIATRY & NEUROLOGY

## 2024-09-13 PROCEDURE — 99232 SBSQ HOSP IP/OBS MODERATE 35: CPT | Performed by: INTERNAL MEDICINE

## 2024-09-13 PROCEDURE — 6360000002 HC RX W HCPCS: Performed by: INTERNAL MEDICINE

## 2024-09-13 PROCEDURE — 36415 COLL VENOUS BLD VENIPUNCTURE: CPT

## 2024-09-13 PROCEDURE — 85025 COMPLETE CBC W/AUTO DIFF WBC: CPT

## 2024-09-13 PROCEDURE — 80069 RENAL FUNCTION PANEL: CPT

## 2024-09-13 PROCEDURE — 2580000003 HC RX 258: Performed by: INTERNAL MEDICINE

## 2024-09-13 RX ADMIN — ENOXAPARIN SODIUM 40 MG: 100 INJECTION SUBCUTANEOUS at 10:09

## 2024-09-13 RX ADMIN — PRIMIDONE 50 MG: 50 TABLET ORAL at 10:09

## 2024-09-13 RX ADMIN — DOXYCYCLINE HYCLATE 100 MG: 100 CAPSULE ORAL at 10:09

## 2024-09-13 RX ADMIN — INSULIN LISPRO 2 UNITS: 100 INJECTION, SOLUTION INTRAVENOUS; SUBCUTANEOUS at 12:53

## 2024-09-13 RX ADMIN — SODIUM CHLORIDE, PRESERVATIVE FREE 10 ML: 5 INJECTION INTRAVENOUS at 10:10

## 2024-09-13 RX ADMIN — INSULIN LISPRO 6 UNITS: 100 INJECTION, SOLUTION INTRAVENOUS; SUBCUTANEOUS at 10:09

## 2024-09-13 RX ADMIN — INSULIN LISPRO 6 UNITS: 100 INJECTION, SOLUTION INTRAVENOUS; SUBCUTANEOUS at 12:53

## 2024-09-13 RX ADMIN — AMLODIPINE BESYLATE 5 MG: 5 TABLET ORAL at 10:09

## 2024-09-13 ASSESSMENT — ENCOUNTER SYMPTOMS
COLOR CHANGE: 0
COUGH: 0
NAUSEA: 0
WHEEZING: 0
VOMITING: 0
TROUBLE SWALLOWING: 0
SHORTNESS OF BREATH: 0
CHEST TIGHTNESS: 0

## 2024-09-14 LAB — BACTERIA BLD CULT: NORMAL

## 2024-09-15 LAB
CULTURE WOUND: ABNORMAL
CULTURE WOUND: ABNORMAL
ORGANISM: ABNORMAL

## 2024-09-16 ENCOUNTER — OFFICE VISIT (OUTPATIENT)
Dept: GERIATRIC MEDICINE | Age: 79
End: 2024-09-16

## 2024-09-16 DIAGNOSIS — I10 HYPERTENSION, UNSPECIFIED TYPE: ICD-10-CM

## 2024-09-16 DIAGNOSIS — N18.31 STAGE 3A CHRONIC KIDNEY DISEASE (HCC): ICD-10-CM

## 2024-09-16 DIAGNOSIS — S81.809S MULTIPLE OPENS WOUND OF LOWER EXTREMITY, UNSPECIFIED LATERALITY, SEQUELA: ICD-10-CM

## 2024-09-16 DIAGNOSIS — E78.5 HYPERLIPIDEMIA, UNSPECIFIED HYPERLIPIDEMIA TYPE: ICD-10-CM

## 2024-09-16 DIAGNOSIS — L03.90 CELLULITIS, UNSPECIFIED CELLULITIS SITE: Primary | ICD-10-CM

## 2024-09-18 ENCOUNTER — OFFICE VISIT (OUTPATIENT)
Dept: GERIATRIC MEDICINE | Age: 79
End: 2024-09-18
Payer: MEDICARE

## 2024-09-18 DIAGNOSIS — S81.809S MULTIPLE OPENS WOUND OF LOWER EXTREMITY, UNSPECIFIED LATERALITY, SEQUELA: ICD-10-CM

## 2024-09-18 DIAGNOSIS — N18.31 STAGE 3A CHRONIC KIDNEY DISEASE (HCC): ICD-10-CM

## 2024-09-18 DIAGNOSIS — E78.5 HYPERLIPIDEMIA, UNSPECIFIED HYPERLIPIDEMIA TYPE: ICD-10-CM

## 2024-09-18 DIAGNOSIS — L03.90 CELLULITIS, UNSPECIFIED CELLULITIS SITE: Primary | ICD-10-CM

## 2024-09-18 DIAGNOSIS — I10 HYPERTENSION, UNSPECIFIED TYPE: ICD-10-CM

## 2024-09-18 PROCEDURE — 99308 SBSQ NF CARE LOW MDM 20: CPT | Performed by: INTERNAL MEDICINE

## 2024-09-18 PROCEDURE — 1123F ACP DISCUSS/DSCN MKR DOCD: CPT | Performed by: INTERNAL MEDICINE

## 2024-09-19 ENCOUNTER — OFFICE VISIT (OUTPATIENT)
Dept: GERIATRIC MEDICINE | Age: 79
End: 2024-09-19

## 2024-09-19 DIAGNOSIS — L03.90 CELLULITIS, UNSPECIFIED CELLULITIS SITE: Primary | ICD-10-CM

## 2024-09-19 DIAGNOSIS — S81.809S MULTIPLE OPENS WOUND OF LOWER EXTREMITY, UNSPECIFIED LATERALITY, SEQUELA: ICD-10-CM

## 2024-09-19 DIAGNOSIS — E78.5 HYPERLIPIDEMIA, UNSPECIFIED HYPERLIPIDEMIA TYPE: ICD-10-CM

## 2024-09-19 DIAGNOSIS — N18.31 STAGE 3A CHRONIC KIDNEY DISEASE (HCC): ICD-10-CM

## 2024-09-19 DIAGNOSIS — I10 HYPERTENSION, UNSPECIFIED TYPE: ICD-10-CM

## 2024-09-20 ENCOUNTER — OFFICE VISIT (OUTPATIENT)
Dept: GERIATRIC MEDICINE | Age: 79
End: 2024-09-20

## 2024-09-20 DIAGNOSIS — I10 HYPERTENSION, UNSPECIFIED TYPE: ICD-10-CM

## 2024-09-20 DIAGNOSIS — L03.90 CELLULITIS, UNSPECIFIED CELLULITIS SITE: Primary | ICD-10-CM

## 2024-09-20 DIAGNOSIS — N18.31 STAGE 3A CHRONIC KIDNEY DISEASE (HCC): ICD-10-CM

## 2024-09-20 DIAGNOSIS — E78.5 HYPERLIPIDEMIA, UNSPECIFIED HYPERLIPIDEMIA TYPE: ICD-10-CM

## 2024-09-20 DIAGNOSIS — I10 HYPERTENSION, ESSENTIAL: ICD-10-CM

## 2024-09-20 DIAGNOSIS — S81.809S MULTIPLE OPENS WOUND OF LOWER EXTREMITY, UNSPECIFIED LATERALITY, SEQUELA: ICD-10-CM

## 2024-09-23 ENCOUNTER — OFFICE VISIT (OUTPATIENT)
Dept: GERIATRIC MEDICINE | Age: 79
End: 2024-09-23

## 2024-09-23 DIAGNOSIS — I10 HYPERTENSION, UNSPECIFIED TYPE: ICD-10-CM

## 2024-09-23 DIAGNOSIS — S81.809S MULTIPLE OPENS WOUND OF LOWER EXTREMITY, UNSPECIFIED LATERALITY, SEQUELA: ICD-10-CM

## 2024-09-23 DIAGNOSIS — E78.5 HYPERLIPIDEMIA, UNSPECIFIED HYPERLIPIDEMIA TYPE: ICD-10-CM

## 2024-09-23 DIAGNOSIS — L03.90 CELLULITIS, UNSPECIFIED CELLULITIS SITE: Primary | ICD-10-CM

## 2024-09-23 DIAGNOSIS — I10 HYPERTENSION, ESSENTIAL: ICD-10-CM

## 2024-09-23 DIAGNOSIS — N18.31 STAGE 3A CHRONIC KIDNEY DISEASE (HCC): ICD-10-CM

## 2024-09-24 ENCOUNTER — OFFICE VISIT (OUTPATIENT)
Dept: GERIATRIC MEDICINE | Age: 79
End: 2024-09-24
Payer: MEDICARE

## 2024-09-24 ENCOUNTER — HOSPITAL ENCOUNTER (OUTPATIENT)
Dept: WOUND CARE | Age: 79
Discharge: HOME | End: 2024-09-24
Attending: STUDENT IN AN ORGANIZED HEALTH CARE EDUCATION/TRAINING PROGRAM
Payer: MEDICARE

## 2024-09-24 DIAGNOSIS — N18.31 STAGE 3A CHRONIC KIDNEY DISEASE (HCC): ICD-10-CM

## 2024-09-24 DIAGNOSIS — I10 HYPERTENSION, UNSPECIFIED TYPE: ICD-10-CM

## 2024-09-24 DIAGNOSIS — L03.90 CELLULITIS, UNSPECIFIED CELLULITIS SITE: Primary | ICD-10-CM

## 2024-09-24 DIAGNOSIS — E78.5 HYPERLIPIDEMIA, UNSPECIFIED HYPERLIPIDEMIA TYPE: ICD-10-CM

## 2024-09-24 DIAGNOSIS — I10 HYPERTENSION, ESSENTIAL: ICD-10-CM

## 2024-09-24 DIAGNOSIS — S81.809S MULTIPLE OPENS WOUND OF LOWER EXTREMITY, UNSPECIFIED LATERALITY, SEQUELA: ICD-10-CM

## 2024-09-24 PROCEDURE — 1123F ACP DISCUSS/DSCN MKR DOCD: CPT | Performed by: INTERNAL MEDICINE

## 2024-09-24 PROCEDURE — 99213 OFFICE O/P EST LOW 20 MIN: CPT

## 2024-09-24 PROCEDURE — 97598 DBRDMT OPN WND ADDL 20CM/<: CPT

## 2024-09-24 PROCEDURE — 97597 DBRDMT OPN WND 1ST 20 CM/<: CPT

## 2024-09-24 PROCEDURE — 99308 SBSQ NF CARE LOW MDM 20: CPT | Performed by: INTERNAL MEDICINE

## 2024-09-24 RX ORDER — ACETAMINOPHEN 325 MG/1
650 TABLET ORAL EVERY 4 HOURS PRN
COMMUNITY

## 2024-09-24 RX ORDER — CODEINE PHOSPHATE AND GUAIFENESIN 10; 100 MG/5ML; MG/5ML
5 SOLUTION ORAL 3 TIMES DAILY PRN
COMMUNITY

## 2024-09-24 RX ORDER — BISACODYL 10 MG
10 SUPPOSITORY, RECTAL RECTAL DAILY PRN
COMMUNITY

## 2024-09-24 RX ORDER — MAGNESIUM HYDROXIDE/ALUMINUM HYDROXICE/SIMETHICONE 120; 1200; 1200 MG/30ML; MG/30ML; MG/30ML
5 SUSPENSION ORAL EVERY 6 HOURS PRN
COMMUNITY

## 2024-09-26 ENCOUNTER — OFFICE VISIT (OUTPATIENT)
Dept: INFECTIOUS DISEASES | Age: 79
End: 2024-09-26
Payer: MEDICARE

## 2024-09-26 ENCOUNTER — OFFICE VISIT (OUTPATIENT)
Dept: GERIATRIC MEDICINE | Age: 79
End: 2024-09-26
Payer: MEDICARE

## 2024-09-26 VITALS
HEART RATE: 63 BPM | OXYGEN SATURATION: 96 % | SYSTOLIC BLOOD PRESSURE: 102 MMHG | HEIGHT: 60 IN | WEIGHT: 163.4 LBS | TEMPERATURE: 98.1 F | DIASTOLIC BLOOD PRESSURE: 60 MMHG | BODY MASS INDEX: 32.08 KG/M2 | RESPIRATION RATE: 16 BRPM

## 2024-09-26 DIAGNOSIS — E78.5 HYPERLIPIDEMIA, UNSPECIFIED HYPERLIPIDEMIA TYPE: ICD-10-CM

## 2024-09-26 DIAGNOSIS — L98.499 INFECTED ULCER OF SKIN, UNSPECIFIED ULCER STAGE (HCC): Primary | ICD-10-CM

## 2024-09-26 DIAGNOSIS — L03.90 CELLULITIS, UNSPECIFIED CELLULITIS SITE: Primary | ICD-10-CM

## 2024-09-26 DIAGNOSIS — L08.9 INFECTED ULCER OF SKIN, UNSPECIFIED ULCER STAGE (HCC): Primary | ICD-10-CM

## 2024-09-26 DIAGNOSIS — I10 HYPERTENSION, ESSENTIAL: ICD-10-CM

## 2024-09-26 DIAGNOSIS — S81.809S MULTIPLE OPENS WOUND OF LOWER EXTREMITY, UNSPECIFIED LATERALITY, SEQUELA: ICD-10-CM

## 2024-09-26 DIAGNOSIS — I10 HYPERTENSION, UNSPECIFIED TYPE: ICD-10-CM

## 2024-09-26 DIAGNOSIS — A49.02 MRSA (METHICILLIN RESISTANT STAPHYLOCOCCUS AUREUS) INFECTION: ICD-10-CM

## 2024-09-26 DIAGNOSIS — N18.31 STAGE 3A CHRONIC KIDNEY DISEASE (HCC): ICD-10-CM

## 2024-09-26 PROCEDURE — G8400 PT W/DXA NO RESULTS DOC: HCPCS | Performed by: INTERNAL MEDICINE

## 2024-09-26 PROCEDURE — G8428 CUR MEDS NOT DOCUMENT: HCPCS | Performed by: INTERNAL MEDICINE

## 2024-09-26 PROCEDURE — 1123F ACP DISCUSS/DSCN MKR DOCD: CPT | Performed by: INTERNAL MEDICINE

## 2024-09-26 PROCEDURE — 1111F DSCHRG MED/CURRENT MED MERGE: CPT | Performed by: INTERNAL MEDICINE

## 2024-09-26 PROCEDURE — 99213 OFFICE O/P EST LOW 20 MIN: CPT | Performed by: INTERNAL MEDICINE

## 2024-09-26 PROCEDURE — 3078F DIAST BP <80 MM HG: CPT | Performed by: INTERNAL MEDICINE

## 2024-09-26 PROCEDURE — G8417 CALC BMI ABV UP PARAM F/U: HCPCS | Performed by: INTERNAL MEDICINE

## 2024-09-26 PROCEDURE — 99308 SBSQ NF CARE LOW MDM 20: CPT | Performed by: INTERNAL MEDICINE

## 2024-09-26 PROCEDURE — 1036F TOBACCO NON-USER: CPT | Performed by: INTERNAL MEDICINE

## 2024-09-26 PROCEDURE — 3074F SYST BP LT 130 MM HG: CPT | Performed by: INTERNAL MEDICINE

## 2024-09-26 PROCEDURE — 1090F PRES/ABSN URINE INCON ASSESS: CPT | Performed by: INTERNAL MEDICINE

## 2024-09-26 ASSESSMENT — PATIENT HEALTH QUESTIONNAIRE - PHQ9
2. FEELING DOWN, DEPRESSED OR HOPELESS: NOT AT ALL
SUM OF ALL RESPONSES TO PHQ QUESTIONS 1-9: 0
1. LITTLE INTEREST OR PLEASURE IN DOING THINGS: NOT AT ALL
SUM OF ALL RESPONSES TO PHQ9 QUESTIONS 1 & 2: 0
SUM OF ALL RESPONSES TO PHQ QUESTIONS 1-9: 0

## 2024-09-27 ENCOUNTER — TELEPHONE (OUTPATIENT)
Dept: FAMILY MEDICINE CLINIC | Age: 79
End: 2024-09-27

## 2024-09-27 ENCOUNTER — OFFICE VISIT (OUTPATIENT)
Dept: GERIATRIC MEDICINE | Age: 79
End: 2024-09-27

## 2024-09-27 DIAGNOSIS — I10 HYPERTENSION, UNSPECIFIED TYPE: ICD-10-CM

## 2024-09-27 DIAGNOSIS — S81.809S MULTIPLE OPENS WOUND OF LOWER EXTREMITY, UNSPECIFIED LATERALITY, SEQUELA: ICD-10-CM

## 2024-09-27 DIAGNOSIS — E78.5 HYPERLIPIDEMIA, UNSPECIFIED HYPERLIPIDEMIA TYPE: ICD-10-CM

## 2024-09-27 DIAGNOSIS — N18.31 STAGE 3A CHRONIC KIDNEY DISEASE (HCC): ICD-10-CM

## 2024-09-27 DIAGNOSIS — I10 HYPERTENSION, ESSENTIAL: ICD-10-CM

## 2024-09-27 DIAGNOSIS — L03.90 CELLULITIS, UNSPECIFIED CELLULITIS SITE: Primary | ICD-10-CM

## 2024-09-27 NOTE — TELEPHONE ENCOUNTER
Arlene from [facility] called and is asking that if Dr. Talavera will follow patient for OT, PT, and skilled nursing care?

## 2024-09-28 ENCOUNTER — OFFICE VISIT (OUTPATIENT)
Dept: GERIATRIC MEDICINE | Age: 79
End: 2024-09-28

## 2024-09-28 DIAGNOSIS — I10 HYPERTENSION, UNSPECIFIED TYPE: ICD-10-CM

## 2024-09-28 DIAGNOSIS — E78.5 HYPERLIPIDEMIA, UNSPECIFIED HYPERLIPIDEMIA TYPE: ICD-10-CM

## 2024-09-28 DIAGNOSIS — S81.809S MULTIPLE OPENS WOUND OF LOWER EXTREMITY, UNSPECIFIED LATERALITY, SEQUELA: ICD-10-CM

## 2024-09-28 DIAGNOSIS — I10 HYPERTENSION, ESSENTIAL: ICD-10-CM

## 2024-09-28 DIAGNOSIS — N18.31 STAGE 3A CHRONIC KIDNEY DISEASE (HCC): ICD-10-CM

## 2024-09-28 DIAGNOSIS — L03.90 CELLULITIS, UNSPECIFIED CELLULITIS SITE: Primary | ICD-10-CM

## 2024-09-30 ENCOUNTER — TELEPHONE (OUTPATIENT)
Dept: FAMILY MEDICINE CLINIC | Age: 79
End: 2024-09-30

## 2024-09-30 NOTE — TELEPHONE ENCOUNTER
Marce from [facility] is requesting verbal orders for Skilled Nursing for Patient. Please call Marce at [phone].

## 2024-10-01 ENCOUNTER — HOSPITAL ENCOUNTER (OUTPATIENT)
Dept: WOUND CARE | Age: 79
Discharge: HOME | End: 2024-10-01
Attending: STUDENT IN AN ORGANIZED HEALTH CARE EDUCATION/TRAINING PROGRAM
Payer: MEDICARE

## 2024-10-01 VITALS
DIASTOLIC BLOOD PRESSURE: 65 MMHG | SYSTOLIC BLOOD PRESSURE: 116 MMHG | HEART RATE: 66 BPM | RESPIRATION RATE: 18 BRPM | TEMPERATURE: 96.8 F

## 2024-10-01 DIAGNOSIS — L97.222 NON-PRESSURE CHRONIC ULCER OF LEFT CALF WITH FAT LAYER EXPOSED (HCC): ICD-10-CM

## 2024-10-01 DIAGNOSIS — L97.212 NON-PRESSURE CHRONIC ULCER OF RIGHT CALF WITH FAT LAYER EXPOSED (HCC): Primary | ICD-10-CM

## 2024-10-01 PROBLEM — L97.921 CHRONIC ULCER OF LEFT LEG, LIMITED TO BREAKDOWN OF SKIN (HCC): Status: RESOLVED | Noted: 2024-09-10 | Resolved: 2024-10-01

## 2024-10-01 PROBLEM — L03.116 CELLULITIS OF LEFT LOWER EXTREMITY: Status: RESOLVED | Noted: 2024-09-09 | Resolved: 2024-10-01

## 2024-10-01 PROCEDURE — 99213 OFFICE O/P EST LOW 20 MIN: CPT

## 2024-10-01 NOTE — PROGRESS NOTES
[facility]   Progress Note and Procedure Note      Catherine Inman  MEDICAL RECORD NUMBER:  [id number]  AGE: 79 y.o.   GENDER: female  : 1945  EPISODE DATE:  10/1/2024    Subjective:     Chief Complaint   Patient presents with    Wound Check         HISTORY of PRESENT ILLNESS HPI     Catherine Inman is a 79 y.o. female who presents today for wound/ulcer evaluation.   History of Wound Context: Bilateral lower extremity venous ulcers with unna boot applications. Wounds are healed today.   Wound/Ulcer Pain Timing/Severity: none  Quality of pain: N/A  Severity:  0 / 10   Modifying Factors: None  Associated Signs/Symptoms: edema    Ulcer Identification:  Ulcer Type: venous  Contributing Factors: edema, venous stasis, and decreased mobility      PAST MEDICAL HISTORY        Diagnosis Date    Breast cancer (HCC)     right (16-17 yrs ago)    Cancer (HCC)     Breast    Diabetes mellitus (HCC)     History of therapeutic radiation     Hyperlipidemia     Hypertension        PAST SURGICAL HISTORY    Past Surgical History:   Procedure Laterality Date    APPENDECTOMY      BREAST BIOPSY Right     malignant (16-17 yrs ago)    BREAST LUMPECTOMY Right     malignant    CARPAL TUNNEL RELEASE Left     CT NEEDLE BIOPSY LIVER PERCUTANEOUS Right 2022    Performed by Dr. Machado - diagnostics sent    CT NEEDLE BIOPSY LIVER PERCUTANEOUS  2022    CT NEEDLE BIOPSY LIVER PERCUTANEOUS 2022 [facility] CT SCAN    HERNIA REPAIR      Umbilical    HYSTERECTOMY (CERVIX STATUS UNKNOWN)      total but left part of one ovary    OVARY REMOVAL      left part of one ovary    TONSILLECTOMY         FAMILY HISTORY    Family History   Problem Relation Age of Onset    Breast Cancer Mother     Heart Attack Father     Diabetes Brother     Colon Cancer Neg Hx        SOCIAL HISTORY    Social History     Tobacco Use    Smoking status: Former     Current packs/day: 0.50     Average packs/day: 0.5 packs/day for 50.0 years (25.0 ttl pk-yrs)

## 2024-10-01 NOTE — DISCHARGE INSTRUCTIONS
Wound Clinic Physician Orders and Discharge Instructions  [address] [address]  Telephone: [phone]     FAX [phone]    NAME:  Catherine Inman  YOB: 1945  MEDICAL RECORD NUMBER:  [id number]  DATE:  10/1/2024    Congratulations!! You have completed your treatment.   1. Return to your Primary Care Physician for all your health issues.   2. Resume your ordinary activities as tolerated.   3. Take your medications as prescribed by your primary care physician.   4. Check your skin daily for cracks, bruises, sores, or dryness. Use a moisturizer as needed.   5. Clean and dry your skin, using mild soap and warm water (not hot).   6. Avoid alcohol and caffeine and do not smoke.   7. Maintain a nutritious diet.   8. Avoid pressure on your wound site. Keep your legs elevated above the level of the heart whenever possible.   9. Continue to use wraps/stockings/compression as prescribed.   10. Replace compression stockings every four to six months as needed to ensure proper fit.   11. Wear well-fitting shoes and leg garments.     Apply medium (15-20mmhg) compression hose to  bilateral lower leg(s), may remove at bedtime, reapply first thing in the morning, avoid prolonged standing, elevate legs when sitting. (40)        THANK YOU FOR ALLOWING US TO SERVE YOU. PLEASE CALL IF YOU DEVELOP ANOTHER WOUND. [phone]             Electronically signed by Sara Bello RN on 10/1/2024 at 3:30 PM     Electronically signed by MYRON Silverman CNP on 10/1/2024 at 3:31 PM

## 2024-10-01 NOTE — PROGRESS NOTES
SNF PROGRESS NOTE      Cc- cellulits of leg       Patient is a Catherine Inman 79 y.o. female who is being seen at [facility] s/p hospital stay for cellulitis.     Patient has wound care appt today.  She is sitting up in the chair. She is eating well.         Past Medical History:   Diagnosis Date    Breast cancer (HCC)     right (16-17 yrs ago)    Cancer (HCC)     Breast    Diabetes mellitus (HCC)     History of therapeutic radiation     Hyperlipidemia     Hypertension      Patient has no known allergies.    VS reviewed    Gen- Alert and oriented x 3   Heart- RRR no murmur 1+ b/l  LE edema   Lungs- CTA b/l no resp distress RA  oxygen   Abd- bs x 4           Assessment and Plan    Cellulitis   F/u ID on 9/26/24  Doxycycline until 9/29/24  Wound care appt today   LE wounds   Wound care   DM  HLD  Statin   HTN  Propanolol   Norvasc   CKD stage 3       FARIDA Wang DO     Electronically signed by: Aura Freeman DO on 9/24/2024

## 2024-10-01 NOTE — PROGRESS NOTES
SNF PROGRESS NOTE      Cc- cellulits of leg       Patient is a Catherine Inman 79 y.o. female who is being seen at [facility] s/p hospital stay for cellulitis.     Patient is sitting in the chair. Patient has ID appt today. She denies any SOB. No pain.         Past Medical History:   Diagnosis Date    Breast cancer (HCC)     right (16-17 yrs ago)    Cancer (HCC)     Breast    Diabetes mellitus (HCC)     History of therapeutic radiation     Hyperlipidemia     Hypertension      Patient has no known allergies.    VS reviewed       Gen- Alert and oriented x 3   Heart- RRR no murmur 1+ b/l  LE edema   Lungs- CTA b/l no resp distress RA  oxygen   Abd- bs x 4              Assessment and Plan    Cellulitis   F/u ID in 2 weeks.   Doxycycline -- ID recommended 2 additional weeks   Wound care   LE wounds   Wound care   DM  HLD  Statin   HTN  Propanolol   Norvasc   CKD stage 3        Upcoming discharge.       FARIDA Wang DO     Electronically signed by: Aura Freeman DO on 9/26/2024

## 2024-10-03 ENCOUNTER — APPOINTMENT (OUTPATIENT)
Dept: CT IMAGING | Age: 79
DRG: 853 | End: 2024-10-03
Payer: MEDICARE

## 2024-10-03 ENCOUNTER — APPOINTMENT (OUTPATIENT)
Dept: GENERAL RADIOLOGY | Age: 79
DRG: 853 | End: 2024-10-03
Payer: MEDICARE

## 2024-10-03 ENCOUNTER — HOSPITAL ENCOUNTER (INPATIENT)
Age: 79
LOS: 13 days | Discharge: NURSING, REHAB OR LONG-TERM CARE | DRG: 853 | End: 2024-10-16
Attending: STUDENT IN AN ORGANIZED HEALTH CARE EDUCATION/TRAINING PROGRAM
Payer: MEDICARE

## 2024-10-03 DIAGNOSIS — A41.9 SEPTICEMIA (HCC): ICD-10-CM

## 2024-10-03 DIAGNOSIS — M79.662 BILATERAL CALF PAIN: ICD-10-CM

## 2024-10-03 DIAGNOSIS — N17.9 AKI (ACUTE KIDNEY INJURY) (HCC): ICD-10-CM

## 2024-10-03 DIAGNOSIS — M79.661 BILATERAL CALF PAIN: ICD-10-CM

## 2024-10-03 DIAGNOSIS — T68.XXXA HYPOTHERMIA, INITIAL ENCOUNTER: ICD-10-CM

## 2024-10-03 DIAGNOSIS — E16.2 HYPOGLYCEMIA: ICD-10-CM

## 2024-10-03 DIAGNOSIS — I95.9 HYPOTENSION, UNSPECIFIED HYPOTENSION TYPE: ICD-10-CM

## 2024-10-03 DIAGNOSIS — R41.82 ALTERED MENTAL STATUS, UNSPECIFIED ALTERED MENTAL STATUS TYPE: Primary | ICD-10-CM

## 2024-10-03 PROBLEM — R57.9 SHOCK: Status: ACTIVE | Noted: 2024-10-03

## 2024-10-03 LAB
ALBUMIN SERPL-MCNC: 3.6 G/DL (ref 3.5–4.6)
ALP SERPL-CCNC: 200 U/L (ref 40–130)
ALT SERPL-CCNC: 42 U/L (ref 0–33)
AMPHET UR QL SCN: ABNORMAL
ANION GAP SERPL CALCULATED.3IONS-SCNC: 8 MEQ/L (ref 9–15)
APTT PPP: 28.6 SEC (ref 24.4–36.8)
AST SERPL-CCNC: 55 U/L (ref 0–35)
BARBITURATES UR QL SCN: POSITIVE
BASOPHILS # BLD: 0 K/UL (ref 0–0.2)
BASOPHILS NFR BLD: 0.4 %
BENZODIAZ UR QL SCN: ABNORMAL
BILIRUB SERPL-MCNC: 0.3 MG/DL (ref 0.2–0.7)
BILIRUB UR QL STRIP: NEGATIVE
BNP BLD-MCNC: 477 PG/ML
BUN SERPL-MCNC: 28 MG/DL (ref 8–23)
CALCIUM SERPL-MCNC: 9.3 MG/DL (ref 8.5–9.9)
CANNABINOIDS UR QL SCN: ABNORMAL
CHLORIDE SERPL-SCNC: 102 MEQ/L (ref 95–107)
CLARITY UR: CLEAR
CO2 SERPL-SCNC: 27 MEQ/L (ref 20–31)
COCAINE UR QL SCN: ABNORMAL
COLOR UR: YELLOW
CREAT SERPL-MCNC: 1.15 MG/DL (ref 0.5–0.9)
DRUG SCREEN COMMENT UR-IMP: ABNORMAL
EOSINOPHIL # BLD: 0.2 K/UL (ref 0–0.7)
EOSINOPHIL NFR BLD: 2.9 %
ERYTHROCYTE [DISTWIDTH] IN BLOOD BY AUTOMATED COUNT: 13.7 % (ref 11.5–14.5)
ETHANOL PERCENT: NORMAL G/DL
ETHANOLAMINE SERPL-MCNC: <10 MG/DL (ref 0–0.08)
FENTANYL SCREEN, URINE: ABNORMAL
GLOBULIN SER CALC-MCNC: 3.9 G/DL (ref 2.3–3.5)
GLUCOSE BLD-MCNC: 296 MG/DL (ref 70–99)
GLUCOSE BLD-MCNC: 328 MG/DL (ref 70–99)
GLUCOSE BLD-MCNC: 449 MG/DL (ref 70–99)
GLUCOSE BLD-MCNC: 62 MG/DL (ref 70–99)
GLUCOSE SERPL-MCNC: 76 MG/DL (ref 70–99)
GLUCOSE UR STRIP-MCNC: >=1000 MG/DL
HCT VFR BLD AUTO: 43.2 % (ref 37–47)
HGB BLD-MCNC: 13.8 G/DL (ref 12–16)
HGB UR QL STRIP: NEGATIVE
INR PPP: 1.1
KETONES UR STRIP-MCNC: NEGATIVE MG/DL
LACTATE BLDV-SCNC: 1.2 MMOL/L (ref 0.5–2.2)
LACTATE BLDV-SCNC: 1.4 MMOL/L (ref 0.5–2.2)
LEUKOCYTE ESTERASE UR QL STRIP: NEGATIVE
LIPASE SERPL-CCNC: 17 U/L (ref 12–95)
LYMPHOCYTES # BLD: 1.8 K/UL (ref 1–4.8)
LYMPHOCYTES NFR BLD: 25.3 %
MAGNESIUM SERPL-MCNC: 2.3 MG/DL (ref 1.7–2.4)
MCH RBC QN AUTO: 30.6 PG (ref 27–31.3)
MCHC RBC AUTO-ENTMCNC: 31.9 % (ref 33–37)
MCV RBC AUTO: 95.8 FL (ref 79.4–94.8)
METHADONE UR QL SCN: ABNORMAL
MONOCYTES # BLD: 0.7 K/UL (ref 0.2–0.8)
MONOCYTES NFR BLD: 9.8 %
NEUTROPHILS # BLD: 4.5 K/UL (ref 1.4–6.5)
NEUTS SEG NFR BLD: 61.2 %
NITRITE UR QL STRIP: NEGATIVE
OPIATES UR QL SCN: ABNORMAL
OXYCODONE UR QL SCN: ABNORMAL
PCP UR QL SCN: ABNORMAL
PERFORMED ON: ABNORMAL
PH UR STRIP: 6.5 [PH] (ref 5–9)
PLATELET # BLD AUTO: 186 K/UL (ref 130–400)
POTASSIUM SERPL-SCNC: 4 MEQ/L (ref 3.4–4.9)
PROCALCITONIN SERPL IA-MCNC: 0.07 NG/ML (ref 0–0.15)
PROPOXYPH UR QL SCN: ABNORMAL
PROT SERPL-MCNC: 7.5 G/DL (ref 6.3–8)
PROT UR STRIP-MCNC: NEGATIVE MG/DL
PROTHROMBIN TIME: 14 SEC (ref 12.3–14.9)
RBC # BLD AUTO: 4.51 M/UL (ref 4.2–5.4)
SODIUM SERPL-SCNC: 137 MEQ/L (ref 135–144)
SP GR UR STRIP: 1.01 (ref 1–1.03)
TROPONIN, HIGH SENSITIVITY: 10 NG/L (ref 0–19)
TROPONIN, HIGH SENSITIVITY: 8 NG/L (ref 0–19)
URINE REFLEX TO CULTURE: ABNORMAL
UROBILINOGEN UR STRIP-ACNC: 0.2 E.U./DL
WBC # BLD AUTO: 7.3 K/UL (ref 4.8–10.8)

## 2024-10-03 PROCEDURE — 70450 CT HEAD/BRAIN W/O DYE: CPT

## 2024-10-03 PROCEDURE — 81003 URINALYSIS AUTO W/O SCOPE: CPT

## 2024-10-03 PROCEDURE — 99291 CRITICAL CARE FIRST HOUR: CPT | Performed by: INTERNAL MEDICINE

## 2024-10-03 PROCEDURE — 6370000000 HC RX 637 (ALT 250 FOR IP): Performed by: STUDENT IN AN ORGANIZED HEALTH CARE EDUCATION/TRAINING PROGRAM

## 2024-10-03 PROCEDURE — 85025 COMPLETE CBC W/AUTO DIFF WBC: CPT

## 2024-10-03 PROCEDURE — 87040 BLOOD CULTURE FOR BACTERIA: CPT

## 2024-10-03 PROCEDURE — 83036 HEMOGLOBIN GLYCOSYLATED A1C: CPT

## 2024-10-03 PROCEDURE — 36415 COLL VENOUS BLD VENIPUNCTURE: CPT

## 2024-10-03 PROCEDURE — 96367 TX/PROPH/DG ADDL SEQ IV INF: CPT

## 2024-10-03 PROCEDURE — 93005 ELECTROCARDIOGRAM TRACING: CPT | Performed by: PHYSICIAN ASSISTANT

## 2024-10-03 PROCEDURE — 99285 EMERGENCY DEPT VISIT HI MDM: CPT

## 2024-10-03 PROCEDURE — 82077 ASSAY SPEC XCP UR&BREATH IA: CPT

## 2024-10-03 PROCEDURE — 85730 THROMBOPLASTIN TIME PARTIAL: CPT

## 2024-10-03 PROCEDURE — 83690 ASSAY OF LIPASE: CPT

## 2024-10-03 PROCEDURE — 96365 THER/PROPH/DIAG IV INF INIT: CPT

## 2024-10-03 PROCEDURE — 96361 HYDRATE IV INFUSION ADD-ON: CPT

## 2024-10-03 PROCEDURE — 85610 PROTHROMBIN TIME: CPT

## 2024-10-03 PROCEDURE — 2580000003 HC RX 258: Performed by: STUDENT IN AN ORGANIZED HEALTH CARE EDUCATION/TRAINING PROGRAM

## 2024-10-03 PROCEDURE — 83605 ASSAY OF LACTIC ACID: CPT

## 2024-10-03 PROCEDURE — 6360000002 HC RX W HCPCS: Performed by: STUDENT IN AN ORGANIZED HEALTH CARE EDUCATION/TRAINING PROGRAM

## 2024-10-03 PROCEDURE — 2580000003 HC RX 258: Performed by: PHYSICIAN ASSISTANT

## 2024-10-03 PROCEDURE — 2500000003 HC RX 250 WO HCPCS: Performed by: STUDENT IN AN ORGANIZED HEALTH CARE EDUCATION/TRAINING PROGRAM

## 2024-10-03 PROCEDURE — 71045 X-RAY EXAM CHEST 1 VIEW: CPT

## 2024-10-03 PROCEDURE — 83735 ASSAY OF MAGNESIUM: CPT

## 2024-10-03 PROCEDURE — 83880 ASSAY OF NATRIURETIC PEPTIDE: CPT

## 2024-10-03 PROCEDURE — 2060000000 HC ICU INTERMEDIATE R&B

## 2024-10-03 PROCEDURE — 84145 PROCALCITONIN (PCT): CPT

## 2024-10-03 PROCEDURE — 84484 ASSAY OF TROPONIN QUANT: CPT

## 2024-10-03 PROCEDURE — 80053 COMPREHEN METABOLIC PANEL: CPT

## 2024-10-03 PROCEDURE — 80307 DRUG TEST PRSMV CHEM ANLYZR: CPT

## 2024-10-03 RX ORDER — INSULIN LISPRO 100 [IU]/ML
0-4 INJECTION, SOLUTION INTRAVENOUS; SUBCUTANEOUS NIGHTLY
Status: DISCONTINUED | OUTPATIENT
Start: 2024-10-03 | End: 2024-10-05

## 2024-10-03 RX ORDER — SODIUM CHLORIDE 0.9 % (FLUSH) 0.9 %
5-40 SYRINGE (ML) INJECTION EVERY 12 HOURS SCHEDULED
Status: DISCONTINUED | OUTPATIENT
Start: 2024-10-03 | End: 2024-10-16 | Stop reason: HOSPADM

## 2024-10-03 RX ORDER — DEXTROSE MONOHYDRATE 25 G/50ML
25 INJECTION, SOLUTION INTRAVENOUS ONCE
Status: COMPLETED | OUTPATIENT
Start: 2024-10-03 | End: 2024-10-03

## 2024-10-03 RX ORDER — ACETAMINOPHEN 650 MG/1
650 SUPPOSITORY RECTAL EVERY 6 HOURS PRN
Status: DISCONTINUED | OUTPATIENT
Start: 2024-10-03 | End: 2024-10-16 | Stop reason: HOSPADM

## 2024-10-03 RX ORDER — GLUCAGON 1 MG/ML
1 KIT INJECTION PRN
Status: DISCONTINUED | OUTPATIENT
Start: 2024-10-03 | End: 2024-10-16 | Stop reason: HOSPADM

## 2024-10-03 RX ORDER — NOREPINEPHRINE BITARTRATE 0.06 MG/ML
1-100 INJECTION, SOLUTION INTRAVENOUS CONTINUOUS
Status: DISCONTINUED | OUTPATIENT
Start: 2024-10-03 | End: 2024-10-04

## 2024-10-03 RX ORDER — SODIUM CHLORIDE, SODIUM LACTATE, POTASSIUM CHLORIDE, CALCIUM CHLORIDE 600; 310; 30; 20 MG/100ML; MG/100ML; MG/100ML; MG/100ML
INJECTION, SOLUTION INTRAVENOUS CONTINUOUS
Status: DISCONTINUED | OUTPATIENT
Start: 2024-10-03 | End: 2024-10-04

## 2024-10-03 RX ORDER — INSULIN LISPRO 100 [IU]/ML
0-8 INJECTION, SOLUTION INTRAVENOUS; SUBCUTANEOUS
Status: DISCONTINUED | OUTPATIENT
Start: 2024-10-03 | End: 2024-10-05

## 2024-10-03 RX ORDER — DEXTROSE MONOHYDRATE 100 MG/ML
INJECTION, SOLUTION INTRAVENOUS CONTINUOUS PRN
Status: DISCONTINUED | OUTPATIENT
Start: 2024-10-03 | End: 2024-10-16 | Stop reason: HOSPADM

## 2024-10-03 RX ORDER — ACETAMINOPHEN 325 MG/1
650 TABLET ORAL EVERY 6 HOURS PRN
Status: DISCONTINUED | OUTPATIENT
Start: 2024-10-03 | End: 2024-10-16 | Stop reason: HOSPADM

## 2024-10-03 RX ORDER — 0.9 % SODIUM CHLORIDE 0.9 %
30 INTRAVENOUS SOLUTION INTRAVENOUS ONCE
Status: COMPLETED | OUTPATIENT
Start: 2024-10-03 | End: 2024-10-03

## 2024-10-03 RX ORDER — 0.9 % SODIUM CHLORIDE 0.9 %
1000 INTRAVENOUS SOLUTION INTRAVENOUS ONCE
Status: DISCONTINUED | OUTPATIENT
Start: 2024-10-03 | End: 2024-10-04

## 2024-10-03 RX ORDER — SODIUM CHLORIDE 9 MG/ML
INJECTION, SOLUTION INTRAVENOUS PRN
Status: DISCONTINUED | OUTPATIENT
Start: 2024-10-03 | End: 2024-10-16 | Stop reason: HOSPADM

## 2024-10-03 RX ORDER — DEXTROSE MONOHYDRATE AND SODIUM CHLORIDE 5; .9 G/100ML; G/100ML
INJECTION, SOLUTION INTRAVENOUS CONTINUOUS
Status: DISCONTINUED | OUTPATIENT
Start: 2024-10-03 | End: 2024-10-03

## 2024-10-03 RX ORDER — ENOXAPARIN SODIUM 100 MG/ML
40 INJECTION SUBCUTANEOUS DAILY
Status: DISCONTINUED | OUTPATIENT
Start: 2024-10-03 | End: 2024-10-04

## 2024-10-03 RX ORDER — SODIUM CHLORIDE, SODIUM LACTATE, POTASSIUM CHLORIDE, CALCIUM CHLORIDE 600; 310; 30; 20 MG/100ML; MG/100ML; MG/100ML; MG/100ML
INJECTION, SOLUTION INTRAVENOUS CONTINUOUS
Status: DISCONTINUED | OUTPATIENT
Start: 2024-10-03 | End: 2024-10-03

## 2024-10-03 RX ORDER — 0.9 % SODIUM CHLORIDE 0.9 %
1000 INTRAVENOUS SOLUTION INTRAVENOUS ONCE
Status: COMPLETED | OUTPATIENT
Start: 2024-10-03 | End: 2024-10-03

## 2024-10-03 RX ORDER — SODIUM CHLORIDE 0.9 % (FLUSH) 0.9 %
5-40 SYRINGE (ML) INJECTION PRN
Status: DISCONTINUED | OUTPATIENT
Start: 2024-10-03 | End: 2024-10-16 | Stop reason: HOSPADM

## 2024-10-03 RX ORDER — DEXTROSE MONOHYDRATE AND SODIUM CHLORIDE 5; .9 G/100ML; G/100ML
1000 INJECTION, SOLUTION INTRAVENOUS ONCE
Status: COMPLETED | OUTPATIENT
Start: 2024-10-03 | End: 2024-10-03

## 2024-10-03 RX ADMIN — Medication 10 ML: at 20:33

## 2024-10-03 RX ADMIN — DEXTROSE MONOHYDRATE 25 G: 25 INJECTION, SOLUTION INTRAVENOUS at 12:32

## 2024-10-03 RX ADMIN — INSULIN LISPRO 4 UNITS: 100 INJECTION, SOLUTION INTRAVENOUS; SUBCUTANEOUS at 20:31

## 2024-10-03 RX ADMIN — DEXTROSE AND SODIUM CHLORIDE 1000 ML: 5; 900 INJECTION, SOLUTION INTRAVENOUS at 12:39

## 2024-10-03 RX ADMIN — SODIUM CHLORIDE 2232 ML: 9 INJECTION, SOLUTION INTRAVENOUS at 12:10

## 2024-10-03 RX ADMIN — PIPERACILLIN AND TAZOBACTAM 4500 MG: 4; .5 INJECTION, POWDER, LYOPHILIZED, FOR SOLUTION INTRAVENOUS at 18:29

## 2024-10-03 RX ADMIN — SODIUM CHLORIDE, POTASSIUM CHLORIDE, SODIUM LACTATE AND CALCIUM CHLORIDE: 600; 310; 30; 20 INJECTION, SOLUTION INTRAVENOUS at 18:26

## 2024-10-03 RX ADMIN — DEXTROSE AND SODIUM CHLORIDE 1000 ML: 5; 900 INJECTION, SOLUTION INTRAVENOUS at 12:58

## 2024-10-03 RX ADMIN — PIPERACILLIN AND TAZOBACTAM 4500 MG: 4; .5 INJECTION, POWDER, FOR SOLUTION INTRAVENOUS at 13:32

## 2024-10-03 RX ADMIN — SODIUM CHLORIDE, POTASSIUM CHLORIDE, SODIUM LACTATE AND CALCIUM CHLORIDE: 600; 310; 30; 20 INJECTION, SOLUTION INTRAVENOUS at 16:47

## 2024-10-03 RX ADMIN — SODIUM CHLORIDE 1000 ML: 9 INJECTION, SOLUTION INTRAVENOUS at 13:57

## 2024-10-03 RX ADMIN — SODIUM CHLORIDE 1500 MG: 9 INJECTION, SOLUTION INTRAVENOUS at 14:26

## 2024-10-03 RX ADMIN — SODIUM CHLORIDE 1250 MG: 9 INJECTION, SOLUTION INTRAVENOUS at 18:34

## 2024-10-03 ASSESSMENT — PAIN - FUNCTIONAL ASSESSMENT
PAIN_FUNCTIONAL_ASSESSMENT: NONE - DENIES PAIN
PAIN_FUNCTIONAL_ASSESSMENT: NONE - DENIES PAIN

## 2024-10-03 ASSESSMENT — PAIN DESCRIPTION - RADICULAR PAIN: RADICULAR_PAIN: ABSENT

## 2024-10-03 NOTE — ED PROVIDER NOTES
[facility] ED  EMERGENCY DEPARTMENT ENCOUNTER      Pt Name: Catherine Inman  MRN: [id number]  Birthdate 1945  Date of evaluation: 10/3/2024  Provider: Lyndon Collazo PA-C  11:20 AM EDT    CHIEF COMPLAINT       Chief Complaint   Patient presents with    Altered Mental Status     EMS was called for AMS that started this AM, blood glucose on their arrival was 42         HISTORY OF PRESENT ILLNESS   (Location/Symptom, Timing/Onset, Context/Setting, Quality, Duration, Modifying Factors, Severity)  Note limiting factors.   Catherine Inman is a 79 y.o. female who presents to the emergency department with concern for altered mental status,  states that at 7:30 AM this morning, she got up she seemed to be fine at that time, he gave her her usual morning insulin 18 units, he states patient did not eat breakfast as far as he knows, he left to go to work.  He states sometime after that his daughter came back to their home to bring their daughter back, and found patient seemingly confused EMS was contacted, and a blood glucose level was checked to be at 42 mg/dL, she was given D50 and glucose levels did increase.  EMS states during transport to the hospital patient did seem to become bradycardic, and she was given a dose of atropine.  On arrival to the ED patient is awake to verbal stimuli, she is oriented, answering all questions appropriately, no facial droop, no slurring of speech, no drift or weakness noted upper lower extremities.  Patient is sedate, and does fall back to sleep easily when not stimulated.  Past medical history significant for diabetes, hypertension, hyperlipidemia, breast cancer.    Per  recent admission to the hospital for hypoglycemia secondary to wound infection    HPI    Nursing Notes were reviewed.    REVIEW OF SYSTEMS    (2-9 systems for level 4, 10 or more for level 5)     Review of Systems   Constitutional:  Negative for activity change and appetite change.   HENT:

## 2024-10-03 NOTE — PROGRESS NOTES
[facility]   Pharmacy Dose Adjustment Per Protocol:  Zosyn Extended Interval Interchange      Catherine Inman is a 79 y.o. female.     The following ordered dose of Zosyn has been changed to optimize its pharmacodynamic profile per [facility] pharmacy policy approved by [facility].    Recent Labs     10/03/24  1134   CREATININE 1.15*   BUN 28*   WBC 7.3     .   , Weight - Scale: 74.4 kg (164 lb), Body mass index is 32.03 kg/m².  Estimated Creatinine Clearance: 35 mL/min (A) (based on SCr of 1.15 mg/dL (H)).    Medication Ordered:   Traditional Dosing   [] Zosyn 2.25 gm q6-8 hr   [] Zosyn 3.375 gm q6-8 hr   [x] Zosyn 4.5 gm q6-8 hr   [] Zosyn 2.25 gm q6-8 hr   [] Zosyn 3.375 gm q6-8 hr   [] Zosyn 4.5 gm q6-8 hr     New Dose      Piperacillin/Tazobactam - Extended Infusion Dosing (4-hour infusion) - Preferred Dosing Strategy       Renal Function (CrCl mL/min)  >= 20  < 20, HD, PD  CRRT    All Indications - Loading dose of 4500 milligrams x 1 over 30 minutes or via IV push. Maintenance dose  should begin 6 hours after load for CrCl > 20 and 8 hours after load for CrCl < 20.    Maintenance dosing for all indications except as outlined below  [] 3375mg q8h  [] 3375mg q12h  [] 3375mg q8h    Febrile neutropenia, Cystic fibrosis, BMI > 40*, local Pseudomonas susceptibility < 80% (refer to page 3 for indications)     [x] 4500mg q8h  [] 4500 q12h  [] 4500mg q8h    *Consider 3375mg q8h for indication of Urinary tract infections in BMI > 40. Adjust for decreased renal function.           Thank You,  Pepe Monreal RP  10/3/2024 6:12 PM

## 2024-10-03 NOTE — PROGRESS NOTES
[facility]   Pharmacy Pharmacokinetic Monitoring Service - Vancomycin     Catherine Inman is a 79 y.o. female starting on vancomycin therapy for sepsis. Pharmacy consulted by Dr. Agrawal for monitoring and adjustment.    Target Concentration: Goal AUC/JODIE 400-600 mg*hr/L    Additional Antimicrobials: Zosyn    Pertinent Laboratory Values:   Wt Readings from Last 1 Encounters:   10/03/24 74.4 kg (164 lb)     Temp Readings from Last 1 Encounters:   10/03/24 98.1 °F (36.7 °C) (Oral)     Estimated Creatinine Clearance: 35 mL/min (A) (based on SCr of 1.15 mg/dL (H)).  Recent Labs     10/03/24  1134   CREATININE 1.15*   BUN 28*   WBC 7.3     Procalcitonin:   Procalcitonin   Date Value Ref Range Status   10/03/2024 0.07 0.00 - 0.15 ng/mL Final     Comment:     Suspected Sepsis:  Low likelihood of sepsis  <.50 ng/mL    Increased likelihood of sepsis 0.50-2.00 ng/mL  Antibiotics encouraged    High risk of sepsis/shock   >2.00 ng/mL  Antibiotics strongly encouraged    Suspected Lower Respiratory Tract Infections:  Low likelihood of bacterial infection  <0.24 ng/mL    Increased likelihood of bacterial infection >0.24 ng/mL  Antibiotics encouraged    With successful antibiotic therapy, PCT levels should decrease  rapidly. (Half-life of 24 to 36 hours.)    Procalcitonin values from samples collected within the first  6 hours of systemic infection may still be low.  Retesting may be indicated.  Values from day 1 and day 4 can be entered into the Change in  Procalcitonin Calculator to determine the patient's  Mortality Risk Prognosis  ([email or web address])    In healthy neonates, plasma Procalcitonin (PCT) concentrations  increase gradually after birth, reaching peak values at about  24 hours of age then decrease to normal values below 0.5 ng/mL  by 48-72 hours of age.           Pertinent Cultures:  Culture Date Source Results   10/03/24 Blood x 2 pending   MRSA Nasal Swab: N/A. Non-respiratory

## 2024-10-03 NOTE — ED TRIAGE NOTES
Family called EMS for AMS that started this AM, blood glucose was 42 according to EMS and atropine was given for bradycardia, Pt drowsy on arrival, arouses easily to verbal stimuli, oriented x 3, denies pain, breathes are equal and unlabored,

## 2024-10-03 NOTE — PROGRESS NOTES
1930- Assumed care of patient for 12 hour shift. Chart and medications reviewed. Patient resting in bed. Standard safety measures in place. Family members at bedside. Skin checked with day shift nurse. Call light and belongings within reach.     2030- Spoke with Dr. Ramsay face to face regarding patient condition. Agreed to transferring patient to medical surgical floor.      2045- Patient and  informed on transfer and room number.     2103- Spoke with Monitor room confirmed telemetry monitoring was on for room 177.     2113- Report to [facility] on [address] [facility].     2115- Transport request placed.     2153- Patient left ICU with transport to [facility] room 195.     Electronically signed by Manda Hebert RN on 10/3/2024 at 9:53 PM

## 2024-10-03 NOTE — CONSULTS
Inpatient consult to Critical Care  Consult performed by: Yg Goncalves MD  Consult ordered by: Alejandro Agrawal MD            Admit Date: 10/3/2024    PCP:  Tin Talavera MD         CHIEF COMPLAINT / HPI:                The patient is a 79 y.o. female with significant past medical history of  HTN, IDDM2, CKD3, CORDOVA  who presented with mental status changes which was relatively acute. Recent Tx for MRSA cellulitis and E.Coli UTI.   Daughter checked on her this AM and had low glucose.      Found to be hypotensive in ER. Given multiple fluid boluses. Has been bradycardic.        Past Medical History:      Diagnosis Date    Breast cancer (HCC)     right (16-17 yrs ago)    Cancer (HCC)     Breast    Diabetes mellitus (HCC)     History of therapeutic radiation     Hyperlipidemia     Hypertension         Past Surgical History:        Procedure Laterality Date    APPENDECTOMY      BREAST BIOPSY Right     malignant (16-17 yrs ago)    BREAST LUMPECTOMY Right     malignant    CARPAL TUNNEL RELEASE Left     CT NEEDLE BIOPSY LIVER PERCUTANEOUS Right 08/01/2022    Performed by Dr. Machado - diagnostics sent    CT NEEDLE BIOPSY LIVER PERCUTANEOUS  8/1/2022    CT NEEDLE BIOPSY LIVER PERCUTANEOUS 8/1/2022 [facility] CT SCAN    HERNIA REPAIR      Umbilical    HYSTERECTOMY (CERVIX STATUS UNKNOWN)      total but left part of one ovary    OVARY REMOVAL      left part of one ovary    TONSILLECTOMY         Current Medications:     sodium chloride  1,000 mL IntraVENous Once    sodium chloride  1,000 mL IntraVENous Once    sodium chloride flush  5-40 mL IntraVENous 2 times per day    enoxaparin  40 mg SubCUTAneous Daily    vancomycin  1,500 mg IntraVENous Q12H    piperacillin-tazobactam  4,500 mg IntraVENous Q6H    insulin lispro  0-8 Units SubCUTAneous TID     insulin lispro  0-4 Units SubCUTAneous Nightly     Home Meds:  Prior to Admission medications    Medication Sig Start Date End Date Taking? Authorizing Provider   bisacodyl

## 2024-10-03 NOTE — H&P
DEPARTMENT OF HOSPITAL MEDICINE    HISTORY AND PHYSICAL EXAM    PATIENT NAME:  Catherine Inman    MRN:  [id number]  SERVICE DATE:  10/3/2024   SERVICE TIME:  5:31 PM    Primary Care Physician: Tin Talavera MD     SUBJECTIVE  CHIEF COMPLAINT:  Altered Mental Status (EMS was called for AMS that started this AM, blood glucose on their arrival was 42)       Altered Mental Status          Catherine Inman is a 79 y.o. female with PMH HTN, IDDM2, CKD3, CORDOVA, severe obesity, breast cancer s/p lumpectomy and RT, tobacco use disorder, who  presents to the emergency department with chief complaint of AMS.        Patient was reportedly in her normal state of health last night and early this morning. Daughter says she left patient's home and when she came back patient was slumped over and had not left the room she was in when the daughter initially left. Patient was not responding to them and ambulance was called. Glc 42 on EMS arrival. Family says she ate very well last night and typically eats well.  said he gave her normal dose of insulin this morning before he left the house but apparently patient didn't eat this morning. Family says her blood sugars tend to be labile. No recent concerns for cough, fever, chills, abdominal pain, dysuria, or diarrhea. There has been some concerns by family over worsening forgetfulness over the past few months. She was seen by neuro during recent admission where there was concern for possible underlying dementia. EEG was normal at that time and CT showed R frontal meningioma.    Patient was recently discharged to SNF on 9/13 after being treated with L leg MRSA cellulitis and E. Coli Uti. 2 weeks of doxycycline was recommended at that time though family says she is still on an abx. She was switched to Macrobid at [facility] for her UTI.     In ED, patient arrived confused, hypotensive, bradycardic, and hypothermic. Confusion did improve some after dextrose-containing fluids were given  General: No focal deficit present.      Mental Status: She is oriented to person, place, and time.   Psychiatric:         Mood and Affect: Mood normal.         Thought Content: Thought content normal.         Judgment: Judgment normal.       Neurologic Exam     Mental Status   Oriented to person, place, and time.        DATA:     Diagnostic tests reviewed for today's visit:    Most recent labs and imaging results reviewed.     LABS:    Abnormal Labs Reviewed   CBC WITH AUTO DIFFERENTIAL - Abnormal; Notable for the following components:       Result Value    MCV 95.8 (*)     MCHC 31.9 (*)     All other components within normal limits   COMPREHENSIVE METABOLIC PANEL - Abnormal; Notable for the following components:    Anion Gap 8 (*)     BUN 28 (*)     Creatinine 1.15 (*)     Est, Glom Filt Rate 48.0 (*)     Alkaline Phosphatase 200 (*)     ALT 42 (*)     AST 55 (*)     Globulin 3.9 (*)     All other components within normal limits   URINALYSIS WITH REFLEX TO CULTURE - Abnormal; Notable for the following components:    Glucose, Ur >=1000 (*)     All other components within normal limits   POCT GLUCOSE - Abnormal; Notable for the following components:    POC Glucose 62 (*)     All other components within normal limits   POCT GLUCOSE - Abnormal; Notable for the following components:    POC Glucose 449 (*)     All other components within normal limits       IMAGING:   CT Head W/O Contrast    Result Date: 10/3/2024  No evidence of acute intracranial hemorrhage or mass effect.     XR CHEST PORTABLE    Result Date: 10/3/2024  No acute process. Mild cardiomegaly.      XR CHEST PORTABLE   Final Result   No acute process.      Mild cardiomegaly.         CT Head W/O Contrast   Final Result   No evidence of acute intracranial hemorrhage or mass effect.             ASSESSMENT AND PLAN      Shock, likely septic - no clear source yet given CXR and UA unrevealing. No recent diarrhea or abdominal pain reported   -s/p > 5 L IVF

## 2024-10-04 ENCOUNTER — ANESTHESIA (OUTPATIENT)
Dept: OPERATING ROOM | Age: 79
End: 2024-10-04
Payer: MEDICARE

## 2024-10-04 ENCOUNTER — APPOINTMENT (OUTPATIENT)
Dept: GENERAL RADIOLOGY | Age: 79
DRG: 853 | End: 2024-10-04
Payer: MEDICARE

## 2024-10-04 ENCOUNTER — ANESTHESIA EVENT (OUTPATIENT)
Dept: OPERATING ROOM | Age: 79
End: 2024-10-04
Payer: MEDICARE

## 2024-10-04 ENCOUNTER — APPOINTMENT (OUTPATIENT)
Dept: CT IMAGING | Age: 79
DRG: 853 | End: 2024-10-04
Payer: MEDICARE

## 2024-10-04 PROBLEM — R41.82 ALTERED MENTAL STATUS: Status: ACTIVE | Noted: 2024-10-04

## 2024-10-04 LAB
ABO + RH BLD: NORMAL
ANION GAP SERPL CALCULATED.3IONS-SCNC: 6 MEQ/L (ref 9–15)
BLD GP AB SCN SERPL QL: NORMAL
BUN SERPL-MCNC: 16 MG/DL (ref 8–23)
CALCIUM SERPL-MCNC: 8.2 MG/DL (ref 8.5–9.9)
CHLORIDE SERPL-SCNC: 109 MEQ/L (ref 95–107)
CO2 SERPL-SCNC: 24 MEQ/L (ref 20–31)
CORTISOL COLLECTION INFO: ABNORMAL
CORTISOL: 49.8 UG/DL (ref 2.5–19.5)
CREAT SERPL-MCNC: 0.93 MG/DL (ref 0.5–0.9)
EKG ATRIAL RATE: 58 BPM
EKG ATRIAL RATE: 63 BPM
EKG P AXIS: 28 DEGREES
EKG P AXIS: 55 DEGREES
EKG P-R INTERVAL: 210 MS
EKG P-R INTERVAL: 242 MS
EKG Q-T INTERVAL: 448 MS
EKG Q-T INTERVAL: 496 MS
EKG QRS DURATION: 76 MS
EKG QRS DURATION: 88 MS
EKG QTC CALCULATION (BAZETT): 439 MS
EKG QTC CALCULATION (BAZETT): 507 MS
EKG R AXIS: 23 DEGREES
EKG R AXIS: 28 DEGREES
EKG T AXIS: 174 DEGREES
EKG T AXIS: 89 DEGREES
EKG VENTRICULAR RATE: 58 BPM
EKG VENTRICULAR RATE: 63 BPM
ERYTHROCYTE [DISTWIDTH] IN BLOOD BY AUTOMATED COUNT: 13.9 % (ref 11.5–14.5)
ESTIMATED AVERAGE GLUCOSE: 223 MG/DL
GLUCOSE BLD-MCNC: 218 MG/DL (ref 70–99)
GLUCOSE BLD-MCNC: 242 MG/DL (ref 70–99)
GLUCOSE BLD-MCNC: 273 MG/DL (ref 70–99)
GLUCOSE SERPL-MCNC: 164 MG/DL (ref 70–99)
HBA1C MFR BLD: 9.4 % (ref 4–6)
HCT VFR BLD AUTO: 35.1 % (ref 37–47)
HGB BLD-MCNC: 11.6 G/DL (ref 12–16)
MCH RBC QN AUTO: 31.6 PG (ref 27–31.3)
MCHC RBC AUTO-ENTMCNC: 33 % (ref 33–37)
MCV RBC AUTO: 95.6 FL (ref 79.4–94.8)
PERFORMED ON: ABNORMAL
PLATELET # BLD AUTO: 164 K/UL (ref 130–400)
POTASSIUM SERPL-SCNC: 4.2 MEQ/L (ref 3.4–4.9)
RBC # BLD AUTO: 3.67 M/UL (ref 4.2–5.4)
SODIUM SERPL-SCNC: 139 MEQ/L (ref 135–144)
WBC # BLD AUTO: 9.4 K/UL (ref 4.8–10.8)

## 2024-10-04 PROCEDURE — 36415 COLL VENOUS BLD VENIPUNCTURE: CPT

## 2024-10-04 PROCEDURE — 2720000010 HC SURG SUPPLY STERILE: Performed by: SURGERY

## 2024-10-04 PROCEDURE — 6360000002 HC RX W HCPCS: Performed by: STUDENT IN AN ORGANIZED HEALTH CARE EDUCATION/TRAINING PROGRAM

## 2024-10-04 PROCEDURE — 99222 1ST HOSP IP/OBS MODERATE 55: CPT | Performed by: SURGERY

## 2024-10-04 PROCEDURE — 88307 TISSUE EXAM BY PATHOLOGIST: CPT

## 2024-10-04 PROCEDURE — 97166 OT EVAL MOD COMPLEX 45 MIN: CPT

## 2024-10-04 PROCEDURE — 6360000002 HC RX W HCPCS: Performed by: SURGERY

## 2024-10-04 PROCEDURE — 82533 TOTAL CORTISOL: CPT

## 2024-10-04 PROCEDURE — 7100000000 HC PACU RECOVERY - FIRST 15 MIN: Performed by: SURGERY

## 2024-10-04 PROCEDURE — 6360000004 HC RX CONTRAST MEDICATION: Performed by: STUDENT IN AN ORGANIZED HEALTH CARE EDUCATION/TRAINING PROGRAM

## 2024-10-04 PROCEDURE — 3600000014 HC SURGERY LEVEL 4 ADDTL 15MIN: Performed by: SURGERY

## 2024-10-04 PROCEDURE — 80048 BASIC METABOLIC PNL TOTAL CA: CPT

## 2024-10-04 PROCEDURE — 3700000000 HC ANESTHESIA ATTENDED CARE: Performed by: SURGERY

## 2024-10-04 PROCEDURE — 71045 X-RAY EXAM CHEST 1 VIEW: CPT

## 2024-10-04 PROCEDURE — 0WBF0ZX EXCISION OF ABDOMINAL WALL, OPEN APPROACH, DIAGNOSTIC: ICD-10-PCS | Performed by: SURGERY

## 2024-10-04 PROCEDURE — 97161 PT EVAL LOW COMPLEX 20 MIN: CPT

## 2024-10-04 PROCEDURE — 2580000003 HC RX 258: Performed by: SURGERY

## 2024-10-04 PROCEDURE — 86901 BLOOD TYPING SEROLOGIC RH(D): CPT

## 2024-10-04 PROCEDURE — 6360000002 HC RX W HCPCS: Performed by: REGISTERED NURSE

## 2024-10-04 PROCEDURE — 6360000002 HC RX W HCPCS: Performed by: ANESTHESIOLOGY

## 2024-10-04 PROCEDURE — 3700000001 HC ADD 15 MINUTES (ANESTHESIA): Performed by: SURGERY

## 2024-10-04 PROCEDURE — 2580000003 HC RX 258: Performed by: REGISTERED NURSE

## 2024-10-04 PROCEDURE — 2500000003 HC RX 250 WO HCPCS: Performed by: ANESTHESIOLOGY

## 2024-10-04 PROCEDURE — 85027 COMPLETE CBC AUTOMATED: CPT

## 2024-10-04 PROCEDURE — 2709999900 HC NON-CHARGEABLE SUPPLY: Performed by: SURGERY

## 2024-10-04 PROCEDURE — 6370000000 HC RX 637 (ALT 250 FOR IP)

## 2024-10-04 PROCEDURE — 7100000001 HC PACU RECOVERY - ADDTL 15 MIN: Performed by: SURGERY

## 2024-10-04 PROCEDURE — 2700000000 HC OXYGEN THERAPY PER DAY

## 2024-10-04 PROCEDURE — 74177 CT ABD & PELVIS W/CONTRAST: CPT

## 2024-10-04 PROCEDURE — 86900 BLOOD TYPING SEROLOGIC ABO: CPT

## 2024-10-04 PROCEDURE — 86850 RBC ANTIBODY SCREEN: CPT

## 2024-10-04 PROCEDURE — 0DNU0ZZ RELEASE OMENTUM, OPEN APPROACH: ICD-10-PCS | Performed by: SURGERY

## 2024-10-04 PROCEDURE — 97162 PT EVAL MOD COMPLEX 30 MIN: CPT

## 2024-10-04 PROCEDURE — 2580000003 HC RX 258: Performed by: STUDENT IN AN ORGANIZED HEALTH CARE EDUCATION/TRAINING PROGRAM

## 2024-10-04 PROCEDURE — 99213 OFFICE O/P EST LOW 20 MIN: CPT

## 2024-10-04 PROCEDURE — 44005 FREEING OF BOWEL ADHESION: CPT | Performed by: SURGERY

## 2024-10-04 PROCEDURE — 99232 SBSQ HOSP IP/OBS MODERATE 35: CPT | Performed by: INTERNAL MEDICINE

## 2024-10-04 PROCEDURE — P9041 ALBUMIN (HUMAN),5%, 50ML: HCPCS | Performed by: ANESTHESIOLOGY

## 2024-10-04 PROCEDURE — 2060000000 HC ICU INTERMEDIATE R&B

## 2024-10-04 PROCEDURE — 6370000000 HC RX 637 (ALT 250 FOR IP): Performed by: STUDENT IN AN ORGANIZED HEALTH CARE EDUCATION/TRAINING PROGRAM

## 2024-10-04 PROCEDURE — 2500000003 HC RX 250 WO HCPCS: Performed by: REGISTERED NURSE

## 2024-10-04 PROCEDURE — 3600000004 HC SURGERY LEVEL 4 BASE: Performed by: SURGERY

## 2024-10-04 RX ORDER — SODIUM CHLORIDE, SODIUM LACTATE, POTASSIUM CHLORIDE, CALCIUM CHLORIDE 600; 310; 30; 20 MG/100ML; MG/100ML; MG/100ML; MG/100ML
INJECTION, SOLUTION INTRAVENOUS CONTINUOUS
Status: DISCONTINUED | OUTPATIENT
Start: 2024-10-04 | End: 2024-10-04

## 2024-10-04 RX ORDER — MORPHINE SULFATE 2 MG/ML
2 INJECTION, SOLUTION INTRAMUSCULAR; INTRAVENOUS
Status: DISCONTINUED | OUTPATIENT
Start: 2024-10-04 | End: 2024-10-05

## 2024-10-04 RX ORDER — SUCCINYLCHOLINE/SOD CL,ISO/PF 100 MG/5ML
SYRINGE (ML) INTRAVENOUS
Status: DISCONTINUED | OUTPATIENT
Start: 2024-10-04 | End: 2024-10-04 | Stop reason: SDUPTHER

## 2024-10-04 RX ORDER — HALOPERIDOL 5 MG/ML
0.5 INJECTION INTRAMUSCULAR EVERY 6 HOURS PRN
Status: DISCONTINUED | OUTPATIENT
Start: 2024-10-04 | End: 2024-10-04

## 2024-10-04 RX ORDER — MAGNESIUM HYDROXIDE/ALUMINUM HYDROXICE/SIMETHICONE 120; 1200; 1200 MG/30ML; MG/30ML; MG/30ML
5 SUSPENSION ORAL EVERY 6 HOURS PRN
Status: DISCONTINUED | OUTPATIENT
Start: 2024-10-04 | End: 2024-10-16 | Stop reason: HOSPADM

## 2024-10-04 RX ORDER — SODIUM CHLORIDE, SODIUM LACTATE, POTASSIUM CHLORIDE, CALCIUM CHLORIDE 600; 310; 30; 20 MG/100ML; MG/100ML; MG/100ML; MG/100ML
INJECTION, SOLUTION INTRAVENOUS
Status: DISCONTINUED | OUTPATIENT
Start: 2024-10-04 | End: 2024-10-04 | Stop reason: SDUPTHER

## 2024-10-04 RX ORDER — SENNA AND DOCUSATE SODIUM 50; 8.6 MG/1; MG/1
1 TABLET, FILM COATED ORAL DAILY
Status: DISCONTINUED | OUTPATIENT
Start: 2024-10-04 | End: 2024-10-04

## 2024-10-04 RX ORDER — OXYCODONE HYDROCHLORIDE 5 MG/1
5 TABLET ORAL
Status: DISCONTINUED | OUTPATIENT
Start: 2024-10-04 | End: 2024-10-04 | Stop reason: HOSPADM

## 2024-10-04 RX ORDER — PROPRANOLOL HYDROCHLORIDE 10 MG/1
10 TABLET ORAL DAILY
Status: DISCONTINUED | OUTPATIENT
Start: 2024-10-04 | End: 2024-10-05

## 2024-10-04 RX ORDER — SODIUM CHLORIDE 0.9 % (FLUSH) 0.9 %
5-40 SYRINGE (ML) INJECTION PRN
Status: DISCONTINUED | OUTPATIENT
Start: 2024-10-04 | End: 2024-10-04 | Stop reason: HOSPADM

## 2024-10-04 RX ORDER — SODIUM CHLORIDE 0.9 % (FLUSH) 0.9 %
5-40 SYRINGE (ML) INJECTION EVERY 12 HOURS SCHEDULED
Status: DISCONTINUED | OUTPATIENT
Start: 2024-10-04 | End: 2024-10-04 | Stop reason: HOSPADM

## 2024-10-04 RX ORDER — POLYETHYLENE GLYCOL 3350 17 G/17G
17 POWDER, FOR SOLUTION ORAL DAILY
Status: DISCONTINUED | OUTPATIENT
Start: 2024-10-04 | End: 2024-10-04

## 2024-10-04 RX ORDER — FENTANYL CITRATE 0.05 MG/ML
INJECTION, SOLUTION INTRAMUSCULAR; INTRAVENOUS
Status: DISCONTINUED
Start: 2024-10-04 | End: 2024-10-04

## 2024-10-04 RX ORDER — SODIUM CHLORIDE 9 MG/ML
INJECTION, SOLUTION INTRAVENOUS PRN
Status: DISCONTINUED | OUTPATIENT
Start: 2024-10-04 | End: 2024-10-04 | Stop reason: HOSPADM

## 2024-10-04 RX ORDER — ROCURONIUM BROMIDE 10 MG/ML
INJECTION, SOLUTION INTRAVENOUS
Status: DISCONTINUED | OUTPATIENT
Start: 2024-10-04 | End: 2024-10-04 | Stop reason: SDUPTHER

## 2024-10-04 RX ORDER — CODEINE PHOSPHATE AND GUAIFENESIN 10; 100 MG/5ML; MG/5ML
5 SOLUTION ORAL 3 TIMES DAILY PRN
Status: DISCONTINUED | OUTPATIENT
Start: 2024-10-04 | End: 2024-10-16 | Stop reason: HOSPADM

## 2024-10-04 RX ORDER — MEPERIDINE HYDROCHLORIDE 25 MG/ML
12.5 INJECTION INTRAMUSCULAR; INTRAVENOUS; SUBCUTANEOUS
Status: DISCONTINUED | OUTPATIENT
Start: 2024-10-04 | End: 2024-10-04 | Stop reason: HOSPADM

## 2024-10-04 RX ORDER — INSULIN GLARGINE 100 [IU]/ML
14 INJECTION, SOLUTION SUBCUTANEOUS NIGHTLY
Status: DISCONTINUED | OUTPATIENT
Start: 2024-10-04 | End: 2024-10-04

## 2024-10-04 RX ORDER — PROPOFOL 10 MG/ML
INJECTION, EMULSION INTRAVENOUS
Status: DISCONTINUED | OUTPATIENT
Start: 2024-10-04 | End: 2024-10-04 | Stop reason: SDUPTHER

## 2024-10-04 RX ORDER — DICYCLOMINE HCL 20 MG
20 TABLET ORAL 4 TIMES DAILY PRN
Status: DISCONTINUED | OUTPATIENT
Start: 2024-10-04 | End: 2024-10-04

## 2024-10-04 RX ORDER — FENTANYL CITRATE 50 UG/ML
INJECTION, SOLUTION INTRAMUSCULAR; INTRAVENOUS
Status: DISCONTINUED | OUTPATIENT
Start: 2024-10-04 | End: 2024-10-04 | Stop reason: SDUPTHER

## 2024-10-04 RX ORDER — DIPHENHYDRAMINE HYDROCHLORIDE 50 MG/ML
12.5 INJECTION INTRAMUSCULAR; INTRAVENOUS
Status: DISCONTINUED | OUTPATIENT
Start: 2024-10-04 | End: 2024-10-04 | Stop reason: HOSPADM

## 2024-10-04 RX ORDER — INSULIN LISPRO 100 [IU]/ML
8 INJECTION, SOLUTION INTRAVENOUS; SUBCUTANEOUS
Status: DISCONTINUED | OUTPATIENT
Start: 2024-10-04 | End: 2024-10-04

## 2024-10-04 RX ORDER — ALBUMIN, HUMAN INJ 5% 5 %
SOLUTION INTRAVENOUS
Status: DISCONTINUED | OUTPATIENT
Start: 2024-10-04 | End: 2024-10-04 | Stop reason: SDUPTHER

## 2024-10-04 RX ORDER — BISACODYL 10 MG
10 SUPPOSITORY, RECTAL RECTAL DAILY PRN
Status: DISCONTINUED | OUTPATIENT
Start: 2024-10-04 | End: 2024-10-04

## 2024-10-04 RX ORDER — EPHEDRINE SULFATE/0.9% NACL/PF 25 MG/5 ML
SYRINGE (ML) INTRAVENOUS
Status: DISCONTINUED | OUTPATIENT
Start: 2024-10-04 | End: 2024-10-04 | Stop reason: SDUPTHER

## 2024-10-04 RX ORDER — IOPAMIDOL 755 MG/ML
75 INJECTION, SOLUTION INTRAVASCULAR
Status: COMPLETED | OUTPATIENT
Start: 2024-10-04 | End: 2024-10-04

## 2024-10-04 RX ORDER — FENTANYL CITRATE 0.05 MG/ML
50 INJECTION, SOLUTION INTRAMUSCULAR; INTRAVENOUS EVERY 10 MIN PRN
Status: COMPLETED | OUTPATIENT
Start: 2024-10-04 | End: 2024-10-04

## 2024-10-04 RX ORDER — PRIMIDONE 50 MG/1
50 TABLET ORAL 2 TIMES DAILY
Status: DISCONTINUED | OUTPATIENT
Start: 2024-10-04 | End: 2024-10-16 | Stop reason: HOSPADM

## 2024-10-04 RX ORDER — ATORVASTATIN CALCIUM 40 MG/1
40 TABLET, FILM COATED ORAL
Status: DISCONTINUED | OUTPATIENT
Start: 2024-10-04 | End: 2024-10-16 | Stop reason: HOSPADM

## 2024-10-04 RX ORDER — AMLODIPINE BESYLATE 5 MG/1
5 TABLET ORAL DAILY
Status: DISCONTINUED | OUTPATIENT
Start: 2024-10-04 | End: 2024-10-16 | Stop reason: HOSPADM

## 2024-10-04 RX ORDER — INSULIN GLARGINE 100 [IU]/ML
12 INJECTION, SOLUTION SUBCUTANEOUS
Status: DISCONTINUED | OUTPATIENT
Start: 2024-10-05 | End: 2024-10-05

## 2024-10-04 RX ORDER — AMMONIUM LACTATE 12 G/100G
LOTION TOPICAL 2 TIMES DAILY
Status: DISCONTINUED | OUTPATIENT
Start: 2024-10-04 | End: 2024-10-16 | Stop reason: HOSPADM

## 2024-10-04 RX ORDER — ONDANSETRON 2 MG/ML
INJECTION INTRAMUSCULAR; INTRAVENOUS
Status: DISCONTINUED | OUTPATIENT
Start: 2024-10-04 | End: 2024-10-04 | Stop reason: SDUPTHER

## 2024-10-04 RX ORDER — NALOXONE HYDROCHLORIDE 0.4 MG/ML
INJECTION, SOLUTION INTRAMUSCULAR; INTRAVENOUS; SUBCUTANEOUS PRN
Status: DISCONTINUED | OUTPATIENT
Start: 2024-10-04 | End: 2024-10-04 | Stop reason: HOSPADM

## 2024-10-04 RX ORDER — MAGNESIUM HYDROXIDE 1200 MG/15ML
LIQUID ORAL CONTINUOUS PRN
Status: DISCONTINUED | OUTPATIENT
Start: 2024-10-04 | End: 2024-10-04 | Stop reason: HOSPADM

## 2024-10-04 RX ORDER — METOCLOPRAMIDE HYDROCHLORIDE 5 MG/ML
10 INJECTION INTRAMUSCULAR; INTRAVENOUS
Status: DISCONTINUED | OUTPATIENT
Start: 2024-10-04 | End: 2024-10-04 | Stop reason: HOSPADM

## 2024-10-04 RX ORDER — DEXAMETHASONE SODIUM PHOSPHATE 4 MG/ML
INJECTION, SOLUTION INTRA-ARTICULAR; INTRALESIONAL; INTRAMUSCULAR; INTRAVENOUS; SOFT TISSUE
Status: DISCONTINUED | OUTPATIENT
Start: 2024-10-04 | End: 2024-10-04 | Stop reason: SDUPTHER

## 2024-10-04 RX ORDER — ONDANSETRON 2 MG/ML
4 INJECTION INTRAMUSCULAR; INTRAVENOUS
Status: DISCONTINUED | OUTPATIENT
Start: 2024-10-04 | End: 2024-10-04 | Stop reason: HOSPADM

## 2024-10-04 RX ADMIN — DEXAMETHASONE SODIUM PHOSPHATE 8 MG: 4 INJECTION, SOLUTION INTRAMUSCULAR; INTRAVENOUS at 16:39

## 2024-10-04 RX ADMIN — PHENYLEPHRINE HYDROCHLORIDE 100 MCG: 10 INJECTION INTRAVENOUS at 17:14

## 2024-10-04 RX ADMIN — SUGAMMADEX 200 MG: 100 INJECTION, SOLUTION INTRAVENOUS at 20:23

## 2024-10-04 RX ADMIN — FENTANYL CITRATE 50 MCG: 0.05 INJECTION, SOLUTION INTRAMUSCULAR; INTRAVENOUS at 21:30

## 2024-10-04 RX ADMIN — PHENYLEPHRINE HYDROCHLORIDE 100 MCG: 10 INJECTION INTRAVENOUS at 16:45

## 2024-10-04 RX ADMIN — SODIUM CHLORIDE, POTASSIUM CHLORIDE, SODIUM LACTATE AND CALCIUM CHLORIDE: 600; 310; 30; 20 INJECTION, SOLUTION INTRAVENOUS at 11:55

## 2024-10-04 RX ADMIN — IOPAMIDOL 75 ML: 755 INJECTION, SOLUTION INTRAVENOUS at 12:15

## 2024-10-04 RX ADMIN — EPHEDRINE SULFATE 10 MG: 5 INJECTION INTRAVENOUS at 18:11

## 2024-10-04 RX ADMIN — PHENYLEPHRINE HYDROCHLORIDE 200 MCG: 10 INJECTION INTRAVENOUS at 17:45

## 2024-10-04 RX ADMIN — ALUMINUM HYDROXIDE, MAGNESIUM HYDROXIDE, AND SIMETHICONE 5 ML: 200; 200; 20 SUSPENSION ORAL at 08:53

## 2024-10-04 RX ADMIN — Medication 10 ML: at 14:16

## 2024-10-04 RX ADMIN — PHENYLEPHRINE HYDROCHLORIDE 100 MCG: 10 INJECTION INTRAVENOUS at 17:10

## 2024-10-04 RX ADMIN — SODIUM CHLORIDE, POTASSIUM CHLORIDE, SODIUM LACTATE AND CALCIUM CHLORIDE: 600; 310; 30; 20 INJECTION, SOLUTION INTRAVENOUS at 19:46

## 2024-10-04 RX ADMIN — FENTANYL CITRATE 50 MCG: 0.05 INJECTION, SOLUTION INTRAMUSCULAR; INTRAVENOUS at 21:13

## 2024-10-04 RX ADMIN — PHENYLEPHRINE HYDROCHLORIDE 200 MCG: 10 INJECTION INTRAVENOUS at 18:32

## 2024-10-04 RX ADMIN — FENTANYL CITRATE 50 MCG: 0.05 INJECTION, SOLUTION INTRAMUSCULAR; INTRAVENOUS at 21:53

## 2024-10-04 RX ADMIN — PHENYLEPHRINE HYDROCHLORIDE 200 MCG: 10 INJECTION INTRAVENOUS at 17:34

## 2024-10-04 RX ADMIN — ALBUMIN (HUMAN) 500 ML: 12.5 INJECTION, SOLUTION INTRAVENOUS at 18:38

## 2024-10-04 RX ADMIN — FENTANYL CITRATE 50 MCG: 50 INJECTION, SOLUTION INTRAMUSCULAR; INTRAVENOUS at 16:58

## 2024-10-04 RX ADMIN — EPHEDRINE SULFATE 15 MG: 5 INJECTION INTRAVENOUS at 17:14

## 2024-10-04 RX ADMIN — HALOPERIDOL LACTATE 0.5 MG: 5 INJECTION, SOLUTION INTRAMUSCULAR at 13:32

## 2024-10-04 RX ADMIN — FENTANYL CITRATE 50 MCG: 50 INJECTION, SOLUTION INTRAMUSCULAR; INTRAVENOUS at 16:41

## 2024-10-04 RX ADMIN — PHENYLEPHRINE HYDROCHLORIDE 200 MCG: 10 INJECTION INTRAVENOUS at 17:16

## 2024-10-04 RX ADMIN — PHENYLEPHRINE HYDROCHLORIDE 200 MCG: 10 INJECTION INTRAVENOUS at 18:24

## 2024-10-04 RX ADMIN — MORPHINE SULFATE 2 MG: 2 INJECTION, SOLUTION INTRAMUSCULAR; INTRAVENOUS at 22:53

## 2024-10-04 RX ADMIN — PROPOFOL 140 MG: 10 INJECTION, EMULSION INTRAVENOUS at 16:39

## 2024-10-04 RX ADMIN — PIPERACILLIN AND TAZOBACTAM 4500 MG: 4; .5 INJECTION, POWDER, LYOPHILIZED, FOR SOLUTION INTRAVENOUS at 01:26

## 2024-10-04 RX ADMIN — PROPOFOL 60 MG: 10 INJECTION, EMULSION INTRAVENOUS at 16:57

## 2024-10-04 RX ADMIN — ROCURONIUM BROMIDE 50 MG: 10 INJECTION, SOLUTION INTRAVENOUS at 16:42

## 2024-10-04 RX ADMIN — FENTANYL CITRATE 50 MCG: 0.05 INJECTION, SOLUTION INTRAMUSCULAR; INTRAVENOUS at 20:57

## 2024-10-04 RX ADMIN — PHENYLEPHRINE HYDROCHLORIDE 100 MCG: 10 INJECTION INTRAVENOUS at 16:47

## 2024-10-04 RX ADMIN — ENOXAPARIN SODIUM 40 MG: 100 INJECTION SUBCUTANEOUS at 10:40

## 2024-10-04 RX ADMIN — Medication 100 MG: at 16:40

## 2024-10-04 RX ADMIN — ROCURONIUM BROMIDE 10 MG: 10 INJECTION, SOLUTION INTRAVENOUS at 17:10

## 2024-10-04 RX ADMIN — PHENYLEPHRINE HYDROCHLORIDE 100 MCG: 10 INJECTION INTRAVENOUS at 17:13

## 2024-10-04 RX ADMIN — DICYCLOMINE HYDROCHLORIDE 20 MG: 20 TABLET ORAL at 08:53

## 2024-10-04 RX ADMIN — ROCURONIUM BROMIDE 40 MG: 10 INJECTION, SOLUTION INTRAVENOUS at 17:28

## 2024-10-04 RX ADMIN — AMMONIUM LACTATE: 17 LOTION TOPICAL at 10:40

## 2024-10-04 RX ADMIN — DEXAMETHASONE SODIUM PHOSPHATE 10 MG: 4 INJECTION, SOLUTION INTRAMUSCULAR; INTRAVENOUS at 17:50

## 2024-10-04 RX ADMIN — PIPERACILLIN AND TAZOBACTAM 4500 MG: 4; .5 INJECTION, POWDER, LYOPHILIZED, FOR SOLUTION INTRAVENOUS at 12:41

## 2024-10-04 RX ADMIN — ONDANSETRON 4 MG: 2 INJECTION INTRAMUSCULAR; INTRAVENOUS at 16:39

## 2024-10-04 RX ADMIN — SODIUM CHLORIDE, POTASSIUM CHLORIDE, SODIUM LACTATE AND CALCIUM CHLORIDE: 600; 310; 30; 20 INJECTION, SOLUTION INTRAVENOUS at 05:38

## 2024-10-04 RX ADMIN — PHENYLEPHRINE HYDROCHLORIDE 200 MCG: 10 INJECTION INTRAVENOUS at 20:04

## 2024-10-04 RX ADMIN — SODIUM CHLORIDE, POTASSIUM CHLORIDE, SODIUM LACTATE AND CALCIUM CHLORIDE: 600; 310; 30; 20 INJECTION, SOLUTION INTRAVENOUS at 16:41

## 2024-10-04 ASSESSMENT — PAIN DESCRIPTION - ORIENTATION
ORIENTATION: MID

## 2024-10-04 ASSESSMENT — PAIN SCALES - GENERAL
PAINLEVEL_OUTOF10: 0
PAINLEVEL_OUTOF10: 10
PAINLEVEL_OUTOF10: 6
PAINLEVEL_OUTOF10: 6
PAINLEVEL_OUTOF10: 10
PAINLEVEL_OUTOF10: 6
PAINLEVEL_OUTOF10: 0
PAINLEVEL_OUTOF10: 5
PAINLEVEL_OUTOF10: 6
PAINLEVEL_OUTOF10: 2

## 2024-10-04 ASSESSMENT — PAIN - FUNCTIONAL ASSESSMENT
PAIN_FUNCTIONAL_ASSESSMENT: PREVENTS OR INTERFERES SOME ACTIVE ACTIVITIES AND ADLS

## 2024-10-04 ASSESSMENT — PAIN DESCRIPTION - DESCRIPTORS
DESCRIPTORS: CRAMPING

## 2024-10-04 ASSESSMENT — PAIN DESCRIPTION - LOCATION
LOCATION: ABDOMEN
LOCATION: ABDOMEN
LOCATION: GENERALIZED;BACK;ABDOMEN
LOCATION: ABDOMEN

## 2024-10-04 NOTE — ACP (ADVANCE CARE PLANNING)
Advance Care Planning   Healthcare Decision Maker:    Primary Decision Maker: JON MARRERO - Spouse - [phone]    Secondary Decision Maker: TESFAYE GONZALEZ - Child - [phone]    Secondary Decision Maker: KOKO CARVALHO - Child - [phone]

## 2024-10-04 NOTE — PLAN OF CARE
See OT evaluation for all goals and OT POC. Electronically signed by Maude Galvan OTR/L on 10/4/2024 at 2:30 PM

## 2024-10-04 NOTE — PROGRESS NOTES
[facility] - ACUTE     NAME: Catherine Inman  : 1945 (79 y.o.)  MRN: [id number]  CODE STATUS: Full Code  Room: [place] [address]    Date of Service: 10/4/2024    Patient Diagnosis(es): Shock [R57.9]  Hypoglycemia [E16.2]  Septicemia (HCC) [A41.9]  THAIS (acute kidney injury) (HCC) [N17.9]  Hypothermia, initial encounter [T68.XXXA]  Hypotension, unspecified hypotension type [I95.9]  Altered mental status, unspecified altered mental status type [R41.82]   Patient Active Problem List    Diagnosis Date Noted    Chronic hepatitis, unspecified (HCC) 2023    Abnormal liver enzymes 2022    Elevated LFTs 2022    CORDOVA (nonalcoholic steatohepatitis) 2022    Shock 10/03/2024    MRSA (methicillin resistant Staphylococcus aureus) infection 2024    Acute lower UTI 09/10/2024    Peripheral venous insufficiency 04/15/2024    Choking 2023    Pharyngoesophageal dysphagia 2023    Stage 3a chronic kidney disease (HCC) 2022    Type 2 diabetes mellitus with hyperglycemia 2021    Hypertension, essential 2020    Controlled type 2 diabetes mellitus with chronic kidney disease (HCC) 2020    Meningioma, cerebral (HCC) 2020    Malignant neoplasm of female breast (HCC) 2006        Past Medical History:   Diagnosis Date    Breast cancer (HCC)     right (16-17 yrs ago)    Cancer (HCC)     Breast    Diabetes mellitus (HCC)     History of therapeutic radiation     Hyperlipidemia     Hypertension      Past Surgical History:   Procedure Laterality Date    APPENDECTOMY      BREAST BIOPSY Right     malignant (16-17 yrs ago)    BREAST LUMPECTOMY Right     malignant    CARPAL TUNNEL RELEASE Left     CT NEEDLE BIOPSY LIVER PERCUTANEOUS Right 2022    Performed by Dr. Machado - diagnostics sent    CT NEEDLE BIOPSY LIVER PERCUTANEOUS  2022    CT NEEDLE BIOPSY LIVER PERCUTANEOUS 2022 [facility] CT SCAN    HERNIA REPAIR      Umbilical     HYSTERECTOMY (CERVIX STATUS UNKNOWN)      total but left part of one ovary    OVARY REMOVAL      left part of one ovary    TONSILLECTOMY          Restrictions  Restrictions/Precautions: Up as Tolerated, Fall Risk, Contact Precautions     Safety Devices: Safety Devices  Type of Devices: All fall risk precautions in place;Call light within reach;Left in bed;Bed alarm in place;Nurse notified     Patient's date of birth confirmed: Yes    General:  Chart Reviewed: Yes  Patient assessed for rehabilitation services?: Yes    Subjective  Subjective: \"I'm feeling nauseous and pain in my stomach\"       Pain at start of treatment: Yes: 5-6/10    Pain at end of treatment: Yes: 5-6/10    Location: Abdomen  Description: Pain and nausea  Nursing notified: Declined  RN:   Intervention: Repositioned    Prior Level of Function:  Social/Functional History  Lives With: Spouse  Type of Home: Apartment  Home Layout: One level  Home Access: Stairs to enter without rails  Entrance Stairs - Number of Steps: 1 with grab bar  Bathroom Shower/Tub: Tub/Shower unit  Bathroom Equipment: Shower chair, Grab bars in shower, Hand-held shower  Home Equipment: Cane, Walker - Rolling  ADL Assistance: Independent  Homemaking Assistance: Independent (Shares with spouse)  Ambulation Assistance: Independent (WW)  Transfer Assistance: Independent  Active : No  Patient's  Info: Spouse  Occupation: Retired  Leisure & Hobbies: Go to the club for drinks/dinner    OBJECTIVE:     Orientation Status:  Orientation  Overall Orientation Status: Impaired  Orientation Level: Oriented to situation;Oriented to place;Oriented to person    Observation:  Observation/Palpation  Posture: Good  Observation: Pt alert and attentive, reports nausea and pain in abdomen, 1 episode of emesis during assessment    Cognition Status:  Cognition  Overall Cognitive Status: WFL    Perception Status:  Perception  Overall Perceptual Status: WFL    Vision and Hearing

## 2024-10-04 NOTE — PROGRESS NOTES
Pulmonary progress Note    10/4/2024 10:02 AM    Subjective:   Admit Date: 10/3/2024  PCP: Tin Talavera MD    Chief Complaint   Patient presents with    Altered Mental Status     EMS was called for AMS that started this AM, blood glucose on their arrival was 42     Interval History: Mental status is a bit better but having nausea and vomiting.  Having abdominal pain as well.  She is currently NPO.  Family at the bedside.  Currently on antibiotics.  Blood pressure is better.    14 points review of systems has been obtained and negative except to was mentioned in HPI.     Medications:   Scheduled Meds:   [Held by provider] amLODIPine  5 mg Oral Daily    atorvastatin  40 mg Oral QHS    insulin glargine  14 Units SubCUTAneous Nightly    primidone  50 mg Oral BID    propranolol  10 mg Oral Daily    ammonium lactate   Topical BID    sennosides-docusate sodium  1 tablet Oral Daily    polyethylene glycol  17 g Oral Daily    sodium chloride flush  5-40 mL IntraVENous 2 times per day    enoxaparin  40 mg SubCUTAneous Daily    vancomycin  1,250 mg IntraVENous Q24H    piperacillin-tazobactam  4,500 mg IntraVENous Q8H    insulin lispro  0-8 Units SubCUTAneous TID WC    insulin lispro  0-4 Units SubCUTAneous Nightly    vancomycin (VANCOCIN) intermittent dosing (placeholder)   Other RX Placeholder     Continuous Infusions:   norepinephrine Stopped (10/03/24 1643)    sodium chloride      lactated ringers IV soln 150 mL/hr at 10/04/24 0538    dextrose           Objective:   Vitals:   Temp (24hrs), Av.3 °F (36.3 °C), Min:95.4 °F (35.2 °C), Max:98.4 °F (36.9 °C)    BP (!) 135/44   Pulse 56   Temp 98.3 °F (36.8 °C) (Axillary)   Resp 18   Ht 1.473 m (4' 10\")   Wt 77 kg (169 lb 12.1 oz)   SpO2 95%   BMI 35.48 kg/m²   I/O:24HR INTAKE/OUTPUT:    Intake/Output Summary (Last 24 hours) at 10/4/2024 1002  Last data filed at 10/4/2024 0539  Gross per 24 hour   Intake 3417.49 ml   Output 2100 ml   Net 1317.49 ml

## 2024-10-04 NOTE — CARE COORDINATION
assessed)    PT AM-PAC:   /24  OT AM-PAC:   /24    Family can provide assistance at DC: Yes  Would you like Case Management to discuss the discharge plan with any other family members/significant others, and if so, who? Yes ( and children)  Plans to Return to Present Housing: Unknown at present  Other Identified Issues/Barriers to RETURNING to current housing:   Potential Assistance needed at discharge: Outpatient PT/OT            Potential DME:    Patient expects to discharge to: Apartment  Plan for transportation at discharge:      Financial    Payor: [employer] MEDICARE / Plan: [employer] CHOICE-PPO MEDICARE / Product Type: *No Product type* /     Does insurance require precert for SNF: Yes    Potential assistance Purchasing Medications:    Meds-to-Beds request: Yes      [facility] - [place], [place] - [address] [facility] [phone] - F [phone]  [facility] [facility] [address]  Phone: [phone] Fax: [phone]    [id number] - [place], [place] - [address] [facility] [phone] - F [phone]  [address] [place] [address]  Phone: [phone] Fax: [phone]    [id number] [place], [place] - [address] [place] [address] [phone] - F [phone]  [address] [place] [address]  Phone: [phone] Fax: [phone]    [id number] - [address] [phone] - F [phone]  [address] [place] [address]  Phone: [phone] Fax: [phone]      Notes:    Factors facilitating achievement of predicted outcomes: Pleasant    Barriers to discharge: Medical complications    Additional Case Management Notes: This LSW met with patient at bedside.  Patient is a re-admission. Had be discharged from [facility] on 9/13/2024 to [facility].  She had been at home, and returned to the ER last night.  Patient is not a , does not use oxygen at home and is not a dialysis patient.  Patient has a walker, shower chair and a high rise  toilet at home.  Patient has been to [facility] for skilled rehab.  Patient is agreeable to HHC and or SNF if recommended by physician.  List of choices will be provided if needed.    The Plan for Transition of Care is related to the following treatment goals of Shock [R57.9]  Hypoglycemia [E16.2]  Septicemia (HCC) [A41.9]  THAIS (acute kidney injury) (HCC) [N17.9]  Hypothermia, initial encounter [T68.XXXA]  Hypotension, unspecified hypotension type [I95.9]  Altered mental status, unspecified altered mental status type [R41.82]    IF APPLICABLE: The Patient and/or patient representative Catherine and her family were provided with a choice of provider and agrees with the discharge plan. Freedom of choice list with basic dialogue that supports the patient's individualized plan of care/goals and shares the quality data associated with the providers was provided to:     Patient Representative Name:       The Patient and/or Patient Representative Agree with the Discharge Plan?      FELIPA Ward  Case Management Department  Electronically signed by FELIPA Ward on 10/4/24 at 9:49 AM EDT

## 2024-10-04 NOTE — PROGRESS NOTES
[facility] Hospitalist Progress Note    Admitting Date and Time: 10/3/2024 11:06 AM  Admit Dx: Shock [R57.9]  Hypoglycemia [E16.2]  Septicemia (HCC) [A41.9]  THAIS (acute kidney injury) (HCC) [N17.9]  Hypothermia, initial encounter [T68.XXXA]  Hypotension, unspecified hypotension type [I95.9]  Altered mental status, unspecified altered mental status type [R41.82]    Subjective:    No acute events overnight. She was transferred out of ICU and BP has been normotensive. She has been vomiting with worsening abdominal pain around bilateral sides extending up to epigastrium with associated vomiting this morning. She did have BM yesterday but reportedly felt constipated. Family corroborates she is constipated at home but never with these symptoms. Patient's mental status much improved today and is AAOx3. Answered all of family's questions extensively.       ROS: denies fever, chills, cp, sob, n/v, HA unless stated above.       [Held by provider] amLODIPine  5 mg Oral Daily    atorvastatin  40 mg Oral QHS    insulin glargine  14 Units SubCUTAneous Nightly    primidone  50 mg Oral BID    propranolol  10 mg Oral Daily    ammonium lactate   Topical BID    sennosides-docusate sodium  1 tablet Oral Daily    polyethylene glycol  17 g Oral Daily    sodium chloride flush  5-40 mL IntraVENous 2 times per day    enoxaparin  40 mg SubCUTAneous Daily    vancomycin  1,250 mg IntraVENous Q24H    piperacillin-tazobactam  4,500 mg IntraVENous Q8H    insulin lispro  0-8 Units SubCUTAneous TID WC    insulin lispro  0-4 Units SubCUTAneous Nightly    vancomycin (VANCOCIN) intermittent dosing (placeholder)   Other RX Placeholder     haloperidol lactate, 0.5 mg, Q6H PRN  dicyclomine, 20 mg, 4x Daily PRN  aluminum & magnesium hydroxide-simethicone, 5 mL, Q6H PRN  bisacodyl, 10 mg, Daily PRN  guaiFENesin-codeine, 5 mL, TID PRN  magnesium hydroxide, 30 mL, Daily PRN  sodium phosphate, 1 enema, Once PRN  iopamidol, 75 mL, ONCE PRN  sodium

## 2024-10-04 NOTE — FLOWSHEET NOTE
NG placed  to suction  #16  AT 59   stat XRay ordered for place ment   consent signed  family here  pt to go to surgery Dr Donaldson here explained surgery to pt

## 2024-10-04 NOTE — ANESTHESIA PRE PROCEDURE
Department of Anesthesiology  Preprocedure Note       Name:  Catherine Inman   Age:  79 y.o.  :  1945                                          MRN:  [id number]         Date:  10/4/2024      Surgeon: Surgeon(s):  Noemi Donaldson MD    Procedure: Procedure(s):  EXPLORATORY LAPAROTOMY possible bowel resection poss ostomy room 195    Medications prior to admission:   Prior to Admission medications    Medication Sig Start Date End Date Taking? Authorizing Provider   aluminum & magnesium hydroxide-simethicone (MAALOX) 200-200-20 MG/5ML SUSP suspension Take 5 mLs by mouth every 6 hours as needed for Indigestion   Yes ProviderWilfredo MD   acetaminophen (TYLENOL) 325 MG tablet Take 2 tablets by mouth every 4 hours as needed for Pain   Yes Wilfredo Quick MD   propranolol (INDERAL) 10 MG tablet Take 1 tablet by mouth daily 6/3/24  Yes Tin Talavera MD   insulin aspart (NOVOLOG FLEXPEN) 100 UNIT/ML injection pen INJECT 18 UNITS UNDER THE SKIN THREE TIMES DAILY BEFORE MEALS. 23  Yes Tin Talavera MD   amLODIPine (NORVASC) 5 MG tablet Take 1 tablet by mouth daily 23  Yes Tin Talavera MD   atorvastatin (LIPITOR) 40 MG tablet TAKE 1 TABLET BY MOUTH EVERY DAY 22  Yes Tin Talavera MD   primidone (MYSOLINE) 50 MG tablet TAKE 1 TABLET BY MOUTH TWO TIMES A DAY 22  Yes Tin Talavera MD   insulin glargine (LANTUS) 100 UNIT/ML injection vial Inject 14 Units into the skin nightly   Yes Wilfredo Quick MD   bisacodyl (DULCOLAX) 10 MG suppository Place 1 suppository rectally daily as needed for Constipation    Wilfredo Quick MD   sodium phosphate (FLEET) 7-19 GM/118ML Place 1 enema rectally once as needed    Wilfredo Quick MD   magnesium hydroxide (MILK OF MAGNESIA) 400 MG/5ML suspension Take 30 mLs by mouth daily as needed for Constipation    Wilfredo Quick MD   guaiFENesin-codeine (GUAIFENESIN AC) 100-10 MG/5ML liquid Take 5 mLs by mouth 3 times

## 2024-10-04 NOTE — FLOWSHEET NOTE
Pt is throwing up multiple  times   Dr Agrawal aware  bentyl and and maalox given  but pt unable to keep down

## 2024-10-04 NOTE — PROGRESS NOTES
Physical Therapy Med Surg Initial Assessment  Facility/Department: [address] TELEMETRY  Room: [place] [address]     NAME: Catherine Inman  : 1945 (79 y.o.)  MRN: [id number]  CODE STATUS: Full Code    Date of Service: 10/4/2024    Patient Diagnosis(es): Shock [R57.9]  Hypoglycemia [E16.2]  Septicemia (HCC) [A41.9]  THAIS (acute kidney injury) (HCC) [N17.9]  Hypothermia, initial encounter [T68.XXXA]  Hypotension, unspecified hypotension type [I95.9]  Altered mental status, unspecified altered mental status type [R41.82]   Chief Complaint   Patient presents with    Altered Mental Status     EMS was called for AMS that started this AM, blood glucose on their arrival was 42     Patient Active Problem List    Diagnosis Date Noted    Chronic hepatitis, unspecified (HCC) 2023    Abnormal liver enzymes 2022    Elevated LFTs 2022    CORDOVA (nonalcoholic steatohepatitis) 2022    Shock 10/03/2024    MRSA (methicillin resistant Staphylococcus aureus) infection 2024    Acute lower UTI 09/10/2024    Peripheral venous insufficiency 04/15/2024    Choking 2023    Pharyngoesophageal dysphagia 2023    Stage 3a chronic kidney disease (HCC) 2022    Type 2 diabetes mellitus with hyperglycemia 2021    Hypertension, essential 2020    Controlled type 2 diabetes mellitus with chronic kidney disease (HCC) 2020    Meningioma, cerebral (HCC) 2020    Malignant neoplasm of female breast (HCC) 2006      Past Medical History:   Diagnosis Date    Breast cancer (HCC)     right (16-17 yrs ago)    Cancer (HCC)     Breast    Diabetes mellitus (HCC)     History of therapeutic radiation     Hyperlipidemia     Hypertension      Past Surgical History:   Procedure Laterality Date    APPENDECTOMY      BREAST BIOPSY Right     malignant (16-17 yrs ago)    BREAST LUMPECTOMY Right     malignant    CARPAL TUNNEL RELEASE Left     CT NEEDLE BIOPSY LIVER PERCUTANEOUS Right 2022

## 2024-10-04 NOTE — PROGRESS NOTES
symptoms of irregular blood glucose. Dispense sufficient amount for indicated testing frequency plus additional to accommodate PRN testing needs. 100 strip 5    blood glucose monitor kit and supplies Test 3 times a day 1 kit 0    blood glucose monitor supplies Lancets,  Test 3 times a day & as needed for symptoms of irregular blood glucose. Dispense sufficient amount for indicated testing frequency plus additional to accommodate PRN testing needs. 100 each 5    ONETOUCH ULTRA strip Use, as directed, three times a day to test blood sugar 100 each 3    glucose (GLUTOSE) 40 % GEL Take 37.5 mLs by mouth See Admin Instructions 45 g 1    Handicap Placard MISC by Does not apply route Diagnosis: COPD  Duration 6/14/2021-6/14/2023 1 each 0    Alcohol Swabs (ALCOHOL PADS) 70 % PADS Use, as directed, three times a day to test blood sugar      Blood Glucose Calibration (OT ULTRA/FASTTK CNTRL SOLN) SOLN USE TO CONFIRM ACCURACY OF GLUCOSE METER      Blood Glucose Monitoring Suppl (ONE TOUCH ULTRA 2) w/Device KIT Use, as directed, three times a day to test blood sugar      EASY COMFORT PEN NEEDLES 32G X 4 MM MISC Use to inject insulin 3 times a day as directed      Lancet Devices (EASY MINI EJECT LANCING DEVICE) MISC Use, as directed, three times a day to test blood sugar      Easy Comfort Lancets MISC Use, as directed, three times a day to test blood sugar         Objective    BP (!) 135/44   Pulse 56   Temp 98.3 °F (36.8 °C) (Axillary)   Resp 18   Ht 1.473 m (4' 10\")   Wt 77 kg (169 lb 12.1 oz)   SpO2 95%   BMI 35.48 kg/m²     LABS:  WBC:    Lab Results   Component Value Date/Time    WBC 9.4 10/04/2024 04:54 AM     H/H:    Lab Results   Component Value Date/Time    HGB 11.6 10/04/2024 04:54 AM    HCT 35.1 10/04/2024 04:54 AM     PTT:    Lab Results   Component Value Date/Time    APTT 28.6 10/03/2024 02:07 PM   [APTT}  PT/INR:    Lab Results   Component Value Date/Time    PROTIME 14.0 10/03/2024 02:07 PM    INR 1.1  10/03/2024 02:07 PM     HgBA1c:    Lab Results   Component Value Date/Time    LABA1C 9.0 08/19/2024 01:25 PM       Assessment   Miguelangel Risk Score: Miguelangel Scale Score: 20    Patient Active Problem List   Diagnosis    Hypertension, essential    Malignant neoplasm of female breast (HCC)    Controlled type 2 diabetes mellitus with chronic kidney disease (HCC)    Meningioma, cerebral (HCC)    Type 2 diabetes mellitus with hyperglycemia    Stage 3a chronic kidney disease (HCC)    CORDOVA (nonalcoholic steatohepatitis)    Elevated LFTs    Abnormal liver enzymes    Chronic hepatitis, unspecified (HCC)    Choking    Pharyngoesophageal dysphagia    Peripheral venous insufficiency    Acute lower UTI    MRSA (methicillin resistant Staphylococcus aureus) infection    Shock     Assessment: Patient sitting in bed, agreeable to assessment. Abdominal and groin folds with redness and moisture noted. No open areas noted at this time. Recommend interdry for moisture management. BLE assessed. BLE with edema and dry/flaky skin. Patient has history of venous ulcers to BLE. Recommending lac hydrin application BID. ACE wraps for light compression daily from base of toes to base of knees. No open areas were noted on either leg. Large flakes present. Patient with emesis at end of assessment, in basin. Informed unit nursing, Promise BIRD. Wound ostomy to sign off, please reconsult if needed.       Plan   Plan of Care:  see above    Specialty Bed Required : No   [] Low Air Loss   [] Pressure Redistribution  [] Fluid Immersion  [] Bariatric  [] Other:     Current Diet: ADULT DIET; Regular  Dietician consult:  No    Discharge Plan:  Placement for patient upon discharge: home with support    Patient appropriate for Outpatient Wound Care Center: No    Referrals:  []   [] Home Health Care  [] Supplies  [] Other    Patient/Caregiver Teaching:  Level of patient/caregiver understanding able to:   [] Indicates understanding       [] Needs

## 2024-10-04 NOTE — CONSULTS
trachea midline, skin normal  PULM: Chest symmetric, no increased work of breathing or accessory muscle use  CV: Heart regular rate  ABD: Soft, TTP in upper abdomen, mildly distended, no guarding, non peritonitic   EXTREMITIES: Warm, dry, BLE ACE wraps in place    LABS:     Recent Labs     10/03/24  1134 10/03/24  1311 10/03/24  1407 10/04/24  0454   WBC 7.3  --   --  9.4   HGB 13.8  --   --  11.6*   HCT 43.2  --   --  35.1*     --   --  164     --   --  139   K 4.0  --   --  4.2     --   --  109*   CO2 27  --   --  24   BUN 28*  --   --  16   CREATININE 1.15*  --   --  0.93*   MG 2.3  --   --   --    CALCIUM 9.3  --   --  8.2*   INR  --   --  1.1  --    AST 55*  --   --   --    ALT 42*  --   --   --    BILITOT 0.3  --   --   --    LIPASE 17  --   --   --    LACTA 1.4  --  1.2  --    NITRU  --  Negative  --   --    COLORU  --  Yellow  --   --        RADIOLOGY:   I have personally reviewed the following films:    CT AP  10/4/24  FINDINGS: Lower Chest: Moderate cardiomegaly with small to moderate bilateral pleural effusions and atelectasis Organs: Slightly nodular contour of the liver.  Mild gallbladder distension with possible tiny stones.  No biliary dilatation.  The pancreas demonstrates no significant abnormality.  The spleen is homogeneous.  Bilateral adrenal gland hyperplasia.  Bilateral renal atrophy with numerous fluid attenuation probable cysts GI/Bowel: Moderate-sized hiatal hernia.  Mildly distended left-sided jejunal non thickened small bowel loops are seen, with fluid distension.  Advanced interloop fluid and inflammation are identified.  There is change in caliber/transition point in the left central abdomen (coronal series 601, image 41) and axial series 2, image 90).  Sigmoid diverticula are seen. Pelvis: No pelvic mass, adenopathy, or fluid collection. Peritoneum/Retroperitoneum: There is no lymphadenopathy.  Portal venous and venous structures are unremarkable.  Arterial

## 2024-10-05 LAB
ANION GAP SERPL CALCULATED.3IONS-SCNC: 12 MEQ/L (ref 9–15)
BUN SERPL-MCNC: 12 MG/DL (ref 8–23)
CALCIUM SERPL-MCNC: 8.2 MG/DL (ref 8.5–9.9)
CHLORIDE SERPL-SCNC: 105 MEQ/L (ref 95–107)
CO2 SERPL-SCNC: 19 MEQ/L (ref 20–31)
CREAT SERPL-MCNC: 0.87 MG/DL (ref 0.5–0.9)
ERYTHROCYTE [DISTWIDTH] IN BLOOD BY AUTOMATED COUNT: 13.7 % (ref 11.5–14.5)
GLUCOSE BLD-MCNC: 218 MG/DL (ref 70–99)
GLUCOSE BLD-MCNC: 349 MG/DL (ref 70–99)
GLUCOSE BLD-MCNC: 404 MG/DL (ref 70–99)
GLUCOSE BLD-MCNC: 98 MG/DL (ref 70–99)
GLUCOSE SERPL-MCNC: 398 MG/DL (ref 70–99)
HCT VFR BLD AUTO: 40.3 % (ref 37–47)
HGB BLD-MCNC: 13.1 G/DL (ref 12–16)
MCH RBC QN AUTO: 31.1 PG (ref 27–31.3)
MCHC RBC AUTO-ENTMCNC: 32.5 % (ref 33–37)
MCV RBC AUTO: 95.7 FL (ref 79.4–94.8)
PERFORMED ON: ABNORMAL
PERFORMED ON: NORMAL
PLATELET # BLD AUTO: 168 K/UL (ref 130–400)
POTASSIUM SERPL-SCNC: 4 MEQ/L (ref 3.4–4.9)
RBC # BLD AUTO: 4.21 M/UL (ref 4.2–5.4)
SODIUM SERPL-SCNC: 136 MEQ/L (ref 135–144)
VANCOMYCIN SERPL-MCNC: 7.4 UG/ML (ref 10–40)
WBC # BLD AUTO: 14.1 K/UL (ref 4.8–10.8)

## 2024-10-05 PROCEDURE — 2700000000 HC OXYGEN THERAPY PER DAY

## 2024-10-05 PROCEDURE — 80202 ASSAY OF VANCOMYCIN: CPT

## 2024-10-05 PROCEDURE — 6370000000 HC RX 637 (ALT 250 FOR IP): Performed by: STUDENT IN AN ORGANIZED HEALTH CARE EDUCATION/TRAINING PROGRAM

## 2024-10-05 PROCEDURE — 36415 COLL VENOUS BLD VENIPUNCTURE: CPT

## 2024-10-05 PROCEDURE — 51798 US URINE CAPACITY MEASURE: CPT

## 2024-10-05 PROCEDURE — 6360000002 HC RX W HCPCS: Performed by: SURGERY

## 2024-10-05 PROCEDURE — 80048 BASIC METABOLIC PNL TOTAL CA: CPT

## 2024-10-05 PROCEDURE — 2060000000 HC ICU INTERMEDIATE R&B

## 2024-10-05 PROCEDURE — 6360000002 HC RX W HCPCS: Performed by: STUDENT IN AN ORGANIZED HEALTH CARE EDUCATION/TRAINING PROGRAM

## 2024-10-05 PROCEDURE — 2580000003 HC RX 258: Performed by: SURGERY

## 2024-10-05 PROCEDURE — 99024 POSTOP FOLLOW-UP VISIT: CPT | Performed by: SURGERY

## 2024-10-05 PROCEDURE — 2580000003 HC RX 258: Performed by: STUDENT IN AN ORGANIZED HEALTH CARE EDUCATION/TRAINING PROGRAM

## 2024-10-05 PROCEDURE — 97535 SELF CARE MNGMENT TRAINING: CPT

## 2024-10-05 PROCEDURE — 85027 COMPLETE CBC AUTOMATED: CPT

## 2024-10-05 RX ORDER — INSULIN GLARGINE 100 [IU]/ML
10 INJECTION, SOLUTION SUBCUTANEOUS EVERY MORNING
Status: DISCONTINUED | OUTPATIENT
Start: 2024-10-05 | End: 2024-10-10

## 2024-10-05 RX ORDER — MORPHINE SULFATE 2 MG/ML
2 INJECTION, SOLUTION INTRAMUSCULAR; INTRAVENOUS
Status: DISCONTINUED | OUTPATIENT
Start: 2024-10-05 | End: 2024-10-15

## 2024-10-05 RX ORDER — INSULIN LISPRO 100 [IU]/ML
0-8 INJECTION, SOLUTION INTRAVENOUS; SUBCUTANEOUS EVERY 4 HOURS
Status: DISCONTINUED | OUTPATIENT
Start: 2024-10-05 | End: 2024-10-10

## 2024-10-05 RX ORDER — DEXTROSE MONOHYDRATE 100 MG/ML
INJECTION, SOLUTION INTRAVENOUS CONTINUOUS PRN
Status: DISCONTINUED | OUTPATIENT
Start: 2024-10-05 | End: 2024-10-05 | Stop reason: SDUPTHER

## 2024-10-05 RX ORDER — ENOXAPARIN SODIUM 100 MG/ML
40 INJECTION SUBCUTANEOUS DAILY
Status: DISCONTINUED | OUTPATIENT
Start: 2024-10-06 | End: 2024-10-16 | Stop reason: HOSPADM

## 2024-10-05 RX ADMIN — INSULIN LISPRO 8 UNITS: 100 INJECTION, SOLUTION INTRAVENOUS; SUBCUTANEOUS at 17:49

## 2024-10-05 RX ADMIN — INSULIN LISPRO 8 UNITS: 100 INJECTION, SOLUTION INTRAVENOUS; SUBCUTANEOUS at 13:09

## 2024-10-05 RX ADMIN — MORPHINE SULFATE 2 MG: 2 INJECTION, SOLUTION INTRAMUSCULAR; INTRAVENOUS at 03:20

## 2024-10-05 RX ADMIN — PIPERACILLIN AND TAZOBACTAM 4500 MG: 4; .5 INJECTION, POWDER, LYOPHILIZED, FOR SOLUTION INTRAVENOUS at 23:52

## 2024-10-05 RX ADMIN — Medication 10 ML: at 21:00

## 2024-10-05 RX ADMIN — AMMONIUM LACTATE: 17 LOTION TOPICAL at 21:00

## 2024-10-05 RX ADMIN — PIPERACILLIN AND TAZOBACTAM 4500 MG: 4; .5 INJECTION, POWDER, LYOPHILIZED, FOR SOLUTION INTRAVENOUS at 14:48

## 2024-10-05 RX ADMIN — INSULIN GLARGINE 10 UNITS: 100 INJECTION, SOLUTION SUBCUTANEOUS at 13:08

## 2024-10-05 RX ADMIN — SODIUM CHLORIDE 1250 MG: 9 INJECTION, SOLUTION INTRAVENOUS at 13:17

## 2024-10-05 RX ADMIN — PIPERACILLIN AND TAZOBACTAM 4500 MG: 4; .5 INJECTION, POWDER, LYOPHILIZED, FOR SOLUTION INTRAVENOUS at 03:17

## 2024-10-05 ASSESSMENT — PAIN SCALES - GENERAL
PAINLEVEL_OUTOF10: 3
PAINLEVEL_OUTOF10: 8

## 2024-10-05 NOTE — PROGRESS NOTES
Pharmacy Vancomycin Consult     Vancomycin Day: 3  Current Dosing: held given dose duplication    Recent Labs     10/04/24  0454 10/05/24  0529   BUN 16 12   CREATININE 0.93* 0.87   WBC 9.4 14.1*       Intake/Output Summary (Last 24 hours) at 10/5/2024 0701  Last data filed at 10/4/2024 2027  Gross per 24 hour   Intake --   Output 1650 ml   Net -1650 ml     Cultures  Recent Labs     10/03/24  1326 10/03/24  1134 09/09/24  1805   BC  --  No Growth to date.  Any change in status will be called.  --    BLOODCULT2 No Growth to date.  Any change in status will be called.  --   --    ORG  --   --  Escherichia coli*   LABURIN  --   --  [facility] [place] [id number] ([phone]  *  10 to 50,000 CFU/ML     Height: 147.3 cm (4' 10\"), Weight - Scale: 76.4 kg (168 lb 6.9 oz), Body mass index is 35.2 kg/m².    Estimated Creatinine Clearance: 48 mL/min (based on SCr of 0.87 mg/dL).  .    Trough:  Recent Labs     10/05/24  0529   VANCORANDOM 7.4*      Assessment/Plan:  Data input into [employer] platform. Maintenance dose was held given error 10/3. Will resume dosing at vanco 1250mg IV q18h. Predicted therapeutic . Plan repeat level in 24-48 hours to further assess dosing. Timing of future trough levels may be adjusted based on culture results, renal function, and clinical response.    Thank you,  Sugey May, [facility] PharmD

## 2024-10-05 NOTE — OP NOTE
Operative Note        Patient: Catherine Inman  YOB: 1945  MRN: [id number]     Date of Procedure: 10/4/2024  Procedure Start Time: 5:03 pm  Procedure End Time: 7:44 pm     Pre-Op Diagnosis: Small bowel obstruction  Post-Op Diagnosis: Same       Procedure: Exploratory laparotomy, lysis of adhesions, washout, abdominal wall mass biopsy     Surgeon: Noeim Donaldson MD   First Assistant: Tash Finch and Sara Shaikh PA-C      Anesthesia: General     Estimated Blood Loss (mL): 200     Specimens: Abdominal wall mass     Implants: None      Drains: NGT, vidal     Indications: Catherine Inman is a 79 y.o. female admitted with AMS that later developed abdominal pain in the setting of nausea and vomiting. CT AP revealed SBO with interloop free fluid. As such, she was recommended to undergo urgent surgical intervention. Risks, benefits, and alternatives were discussed with the patient who expressed understanding and agreed to proceed with surgery. Consent form signed.     Findings: Patchy, ischemic bowel that improved after LIZBETH     Detailed Description of Procedure:   The patient was brought to the operating room and placed supine on the operating room table. Sequential compression device were placed and plugged in. General anesthesia was administered. A NG tube was previously placed and a vidal catheter was placed. The patient was then positioned, prepped, and draped in the usual sterile fashion. A time-out was performed to verify patient and procedure. A midline incision was made and dissected down through the subcutaneous tissue. The abdominal cavity was entered sharply. I did go through a large hard mass below her umbilicus with Ethibond sutures present, assumed a piece of mesh from a prior hernia repair with mesh. There was extensive anterior abdominal wall adhesions as well as omental adhesions in the pelvis that were taken down using a combination of electrocautery (Bovie and ligasure) and

## 2024-10-05 NOTE — PROGRESS NOTES
Report called to [facility] [place]. Patient resting quietly. Awaiting transport. Family notified of transfer back to [facility]. They will go to the room.

## 2024-10-05 NOTE — PROGRESS NOTES
Narritive note:  2303  Pt returned to floor family present.Vitals taken, NG in placed at 59.  Pt moaning in pain states pain is 10/10, prn morphine given, surgeon contacted to update on condition

## 2024-10-05 NOTE — PROGRESS NOTES
[facility] Hospitalist Progress Note    Admitting Date and Time: 10/3/2024 11:06 AM  Admit Dx: Shock [R57.9]  Hypoglycemia [E16.2]  Septicemia (HCC) [A41.9]  THAIS (acute kidney injury) (HCC) [N17.9]  Hypothermia, initial encounter [T68.XXXA]  Hypotension, unspecified hypotension type [I95.9]  Altered mental status, unspecified altered mental status type [R41.82]    Subjective:    No acute events overnight. Patient this morning oriented to person and place. Slightly more confused as she did not remember surgery yesterday. Updated family extensively on plan of care. Discussed with RN and surgeon as well     ROS: denies fever, chills, cp, sob, n/v, HA unless stated above.       vancomycin  1,250 mg IntraVENous Q18H    insulin lispro  0-8 Units SubCUTAneous Q4H    insulin glargine  10 Units SubCUTAneous QAM    [Held by provider] amLODIPine  5 mg Oral Daily    atorvastatin  40 mg Oral QHS    primidone  50 mg Oral BID    propranolol  10 mg Oral Daily    ammonium lactate   Topical BID    sodium chloride flush  5-40 mL IntraVENous 2 times per day    piperacillin-tazobactam  4,500 mg IntraVENous Q8H    vancomycin (VANCOCIN) intermittent dosing (placeholder)   Other RX Placeholder     morphine, 2 mg, Q2H PRN  aluminum & magnesium hydroxide-simethicone, 5 mL, Q6H PRN  guaiFENesin-codeine, 5 mL, TID PRN  magnesium hydroxide, 30 mL, Daily PRN  phenol, 1 spray, Q2H PRN  sodium chloride flush, 5-40 mL, PRN  sodium chloride, , PRN  acetaminophen, 650 mg, Q6H PRN   Or  acetaminophen, 650 mg, Q6H PRN  glucose, 4 tablet, PRN  dextrose bolus, 125 mL, PRN   Or  dextrose bolus, 250 mL, PRN  glucagon (rDNA), 1 mg, PRN  dextrose, , Continuous PRN         Objective:    BP (!) 98/57   Pulse (!) 104   Temp 98.1 °F (36.7 °C) (Oral)   Resp 18   Ht 1.473 m (4' 10\")   Wt 76.4 kg (168 lb 6.9 oz)   SpO2 100%   BMI 35.20 kg/m²     General Appearance: alert and oriented to person, place, and in no acute distress  Skin: warm and  Result   No acute process.      Mild cardiomegaly.         CT Head W/O Contrast   Final Result   No evidence of acute intracranial hemorrhage or mass effect.             Assessment/Plan:      Shock, likely septic vs hypovolemic, resolved - SBO likely strong contributor  -s/p > 5 L IVF boluses in ED  -Lactate wnl on admission  -pressors started on admission, she was transferred out of ICU 10/4  -empiric vanc/Zosyn  -Intensivist following   -Follow up blood cultures    SBO   -S/p ex lap/lysis of adhesions 10/4  -NG to LWS  -Surgeon following, defer to surgeon on when NG comes out/initiation of diet      T2DM w/ Hypoglycemia - Glc 42 per EMS prior to arrival in setting of poor PO intake which is likely strong contributor  -Endocrinology consult for insulin recommendations given her reported history of labile Glc   -sliding scale insulin   -home Lantus reduced 25% in setting of NPO status   -DM teacher consult     Acute encephalopathy - both metabolic and infectious etiologies contributing in setting of hypoglycemia and presumed septic shock. Now may be experiencing inpatient delirium  -Moving to room w/ window   -CT head unremarkable  -Management as above      Cognitive decline  -Recent folate, B12, TSH, EEG normal  -Follow up outpatient      HTN  -Hold home meds in setting of recent shock     Bradycardia - HR dropped to 30s while vomiting. Likely vasovagal  -Continue telemetry   -dc propranolol            VTE Prophylaxis: low molecular weight heparin -  start       Electronically signed by Alejandro Agrawal MD on 10/5/2024 at 11:38 AM

## 2024-10-05 NOTE — CARE COORDINATION
SPOKE TO PT AND DTR AT BEDSIDE.  DISCUSSED THERAPIES REC, PT AND DTR AGREEABLE TO RETURN TO [facility].  PT IS NOT READY FOR DC YET.  WILL FAX REFERRAL INFO.    FAXED REFERRAL, SPOKE TO JENNIFER FROM [facility].

## 2024-10-05 NOTE — PROGRESS NOTES
GENERAL SURGERY  PROGRESS NOTE    Pt Name: Catherine Inman  MRN: [id number]  Date: 10/5/2024    Subjective  CHEL overnight. Afebrile, VSS, satting well on NC 3L. Weaned to RA at bedside this morning. Pt doing well, pain is controlled on current on regimen.  Had some nausea earlier this morning that has since resolved with drainage of her NG tube.  She has a mild sore throat with the NGT. No bowel function yet.  Has not yet gotten out of bed.     Vitals  BP (!) 158/53   Pulse 77   Temp 97.8 °F (36.6 °C) (Tympanic)   Resp 18   Ht 1.473 m (4' 10\")   Wt 76.4 kg (168 lb 6.9 oz)   SpO2 99%   BMI 35.20 kg/m²      Physical Exam  GEN: A&Ox3, NAD, cooperative   HENT: NGT in place to LIWS with dark bilious output  PULM: no increased work of breathing, satting well on RA  CV: regular rate  ABD: Soft, minimally but appropriately TTP (improved), midline incision c/d/I with scant strikethrough at inferior aspect of wound, abdominal binder in place  EXT: Warm, dry    Intake/ Output    Intake/Output Summary (Last 24 hours) at 10/5/2024 0650  Last data filed at 10/4/2024 2027  Gross per 24 hour   Intake --   Output 1650 ml   Net -1650 ml     Labs  Recent Labs     10/03/24  1134 10/03/24  1311 10/03/24  1407 10/04/24  0454 10/05/24  0529   WBC 7.3  --   --  9.4 14.1*   HGB 13.8  --   --  11.6* 13.1   HCT 43.2  --   --  35.1* 40.3     --   --  164 168     --   --  139 136   K 4.0  --   --  4.2 4.0     --   --  109* 105   CO2 27  --   --  24 19*   BUN 28*  --   --  16 12   CREATININE 1.15*  --   --  0.93* 0.87   MG 2.3  --   --   --   --    CALCIUM 9.3  --   --  8.2* 8.2*   INR  --   --  1.1  --   --    AST 55*  --   --   --   --    ALT 42*  --   --   --   --    BILITOT 0.3  --   --   --   --    LIPASE 17  --   --   --   --    LACTA 1.4  --  1.2  --   --    NITRU  --  Negative  --   --   --    COLORU  --  Yellow  --   --   --      Assessment/ Plan  79F with PMH of HTN, HLD, right breast cancer (s/p radiation),

## 2024-10-05 NOTE — ANESTHESIA POSTPROCEDURE EVALUATION
Department of Anesthesiology  Postprocedure Note    Patient: Catherine Inman  MRN: [id number]  YOB: 1945  Date of evaluation: 10/4/2024    Procedure Summary       Date: 10/04/24 Room / Location: [address]    Anesthesia Start: 1633 Anesthesia Stop:     Procedure: EXPLORATORY LAPAROTOMY lysis of adhesions (Abdomen) Diagnosis:       Septicemia (HCC)      (Septicemia (HCC) [A41.9])    Surgeons: Noemi Donaldson MD Responsible Provider: Arnoldo Monsalve MD    Anesthesia Type: general ASA Status: 6            Anesthesia Type: No value filed.    Henrique Phase I: Henrique Score: 9    Henrique Phase II:      Anesthesia Post Evaluation    Patient location during evaluation: PACU  Patient participation: complete - patient participated  Level of consciousness: awake  Pain score: 0  Airway patency: patent  Nausea & Vomiting: no nausea  Cardiovascular status: blood pressure returned to baseline  Respiratory status: acceptable  Hydration status: euvolemic  Pain management: adequate    No notable events documented.

## 2024-10-05 NOTE — CONSULTS
Spiritual Health History and Assessment/Progress Note  [facility] [place]    Spiritual/Emotional Needs,  ,  ,      Name: Catherine Inman MRN: [id number]    Age: 79 y.o.     Sex: female   Language: English   Congregational: [employer]   Shock     Date: 10/5/2024            Total Time Calculated: 15 min              Spiritual Assessment began in [facility]        Referral/Consult From: Patient, Physician   Encounter Overview/Reason: Spiritual/Emotional Needs  Service Provided For: Patient, Family    Minna, Belief, Meaning:   Patient is connected with a minna tradition or spiritual practice  Family/Friends are connected with a minna tradition or spiritual practice      Importance and Influence:  Patient has spiritual/personal beliefs that influence decisions regarding their health  Family/Friends have spiritual/personal beliefs that influence decisions regarding the patient's health    Community:  Patient feels well-supported. Support system includes: Children, [place] Community, Friends, and Extended family  Family/Friends feel well-supported. Support system includes: Children, [place] Community, Friends, and Extended family    Assessment and Plan of Care:     Patient Interventions include: Facilitated expression of thoughts and feelings  Family/Friends Interventions include: Facilitated expression of thoughts and feelings    Patient Plan of Care: Spiritual Care available upon further referral  Family/Friends Plan of Care: Spiritual Care available upon further referral    Electronically signed by LAURE Shelley on 10/5/2024 at 1:51 PM

## 2024-10-05 NOTE — PROGRESS NOTES
Physical Therapy Med Surg Daily Treatment Note  Facility/Department: [address] TELEMETRY  Room: [place] [address]       NAME: Catherine Inman  : 1945 (79 y.o.)  MRN: [id number]  CODE STATUS: Full Code    Date of Service: 10/5/2024    Patient Diagnosis(es): Shock [R57.9]  Hypoglycemia [E16.2]  Septicemia (HCC) [A41.9]  THAIS (acute kidney injury) (HCC) [N17.9]  Hypothermia, initial encounter [T68.XXXA]  Hypotension, unspecified hypotension type [I95.9]  Altered mental status, unspecified altered mental status type [R41.82]   Chief Complaint   Patient presents with    Altered Mental Status     EMS was called for AMS that started this AM, blood glucose on their arrival was 42     Patient Active Problem List    Diagnosis Date Noted    Chronic hepatitis, unspecified (HCC) 2023    Abnormal liver enzymes 2022    Elevated LFTs 2022    CORDOVA (nonalcoholic steatohepatitis) 2022    Altered mental status 10/04/2024    Shock 10/03/2024    MRSA (methicillin resistant Staphylococcus aureus) infection 2024    Acute lower UTI 09/10/2024    Peripheral venous insufficiency 04/15/2024    Choking 2023    Pharyngoesophageal dysphagia 2023    Stage 3a chronic kidney disease (HCC) 2022    Type 2 diabetes mellitus with hyperglycemia 2021    Hypertension, essential 2020    Controlled type 2 diabetes mellitus with chronic kidney disease (HCC) 2020    Meningioma, cerebral (HCC) 2020    Malignant neoplasm of female breast (HCC) 2006        Past Medical History:   Diagnosis Date    Breast cancer (HCC)     right (16-17 yrs ago)    Cancer (HCC)     Breast    Diabetes mellitus (HCC)     History of therapeutic radiation     Hyperlipidemia     Hypertension      Past Surgical History:   Procedure Laterality Date    APPENDECTOMY      BREAST BIOPSY Right     malignant (16-17 yrs ago)    BREAST LUMPECTOMY Right     malignant    CARPAL TUNNEL RELEASE Left     CT NEEDLE BIOPSY LIVER

## 2024-10-05 NOTE — BRIEF OP NOTE
Brief Postoperative Note      Patient: Catherine Inamn  YOB: 1945  MRN: [id number]    Date of Procedure: 10/4/2024  Procedure Start Time: 5:03 pm  Procedure End Time: 7:44 pm    Pre-Op Diagnosis: Small bowel obstruction  Post-Op Diagnosis: Same       Procedure: Exploratory laparotomy, lysis of adhesions, washout, abdominal wall mass biopsy    Surgeon: Noemi Donaldson MD   First Assistant: Tash Finch    Anesthesia: General    Estimated Blood Loss (mL): 200     Complications: None    Specimens:   ID Type Source Tests Collected by Time Destination   A : abdominal wall mass Tissue Abdomen SURGICAL PATHOLOGY Noemi Donaldson MD 10/4/2024 2001      Implants: None      Drains:   Perez    Findings: Patchy ischemic bowel that improved after LIZBETH.  Infection Present At Time Of Surgery (PATOS) (choose all levels that have infection present): None      Electronically signed by Noemi Donaldson MD on 10/4/2024 at 8:56 PM

## 2024-10-06 LAB
ANION GAP SERPL CALCULATED.3IONS-SCNC: 9 MEQ/L (ref 9–15)
BUN SERPL-MCNC: 13 MG/DL (ref 8–23)
CALCIUM SERPL-MCNC: 8.2 MG/DL (ref 8.5–9.9)
CHLORIDE SERPL-SCNC: 105 MEQ/L (ref 95–107)
CO2 SERPL-SCNC: 25 MEQ/L (ref 20–31)
CREAT SERPL-MCNC: 0.9 MG/DL (ref 0.5–0.9)
ERYTHROCYTE [DISTWIDTH] IN BLOOD BY AUTOMATED COUNT: 14.2 % (ref 11.5–14.5)
GLUCOSE BLD-MCNC: 140 MG/DL (ref 70–99)
GLUCOSE BLD-MCNC: 140 MG/DL (ref 70–99)
GLUCOSE BLD-MCNC: 172 MG/DL (ref 70–99)
GLUCOSE BLD-MCNC: 181 MG/DL (ref 70–99)
GLUCOSE BLD-MCNC: 192 MG/DL (ref 70–99)
GLUCOSE BLD-MCNC: 195 MG/DL (ref 70–99)
GLUCOSE BLD-MCNC: 213 MG/DL (ref 70–99)
GLUCOSE SERPL-MCNC: 168 MG/DL (ref 70–99)
HCT VFR BLD AUTO: 33.6 % (ref 37–47)
HGB BLD-MCNC: 11.2 G/DL (ref 12–16)
MAGNESIUM SERPL-MCNC: 1.8 MG/DL (ref 1.7–2.4)
MCH RBC QN AUTO: 31.7 PG (ref 27–31.3)
MCHC RBC AUTO-ENTMCNC: 33.3 % (ref 33–37)
MCV RBC AUTO: 95.2 FL (ref 79.4–94.8)
PERFORMED ON: ABNORMAL
PLATELET # BLD AUTO: 164 K/UL (ref 130–400)
POTASSIUM SERPL-SCNC: 3.6 MEQ/L (ref 3.4–4.9)
RBC # BLD AUTO: 3.53 M/UL (ref 4.2–5.4)
SODIUM SERPL-SCNC: 139 MEQ/L (ref 135–144)
VANCOMYCIN SERPL-MCNC: 20.3 UG/ML (ref 10–40)
WBC # BLD AUTO: 12.8 K/UL (ref 4.8–10.8)

## 2024-10-06 PROCEDURE — 80048 BASIC METABOLIC PNL TOTAL CA: CPT

## 2024-10-06 PROCEDURE — 6360000002 HC RX W HCPCS: Performed by: STUDENT IN AN ORGANIZED HEALTH CARE EDUCATION/TRAINING PROGRAM

## 2024-10-06 PROCEDURE — 6370000000 HC RX 637 (ALT 250 FOR IP): Performed by: SURGERY

## 2024-10-06 PROCEDURE — 85027 COMPLETE CBC AUTOMATED: CPT

## 2024-10-06 PROCEDURE — 2580000003 HC RX 258: Performed by: SURGERY

## 2024-10-06 PROCEDURE — 2700000000 HC OXYGEN THERAPY PER DAY

## 2024-10-06 PROCEDURE — 2580000003 HC RX 258: Performed by: STUDENT IN AN ORGANIZED HEALTH CARE EDUCATION/TRAINING PROGRAM

## 2024-10-06 PROCEDURE — 99024 POSTOP FOLLOW-UP VISIT: CPT | Performed by: SURGERY

## 2024-10-06 PROCEDURE — 6360000002 HC RX W HCPCS: Performed by: SURGERY

## 2024-10-06 PROCEDURE — 83735 ASSAY OF MAGNESIUM: CPT

## 2024-10-06 PROCEDURE — 80202 ASSAY OF VANCOMYCIN: CPT

## 2024-10-06 PROCEDURE — 2060000000 HC ICU INTERMEDIATE R&B

## 2024-10-06 PROCEDURE — 6370000000 HC RX 637 (ALT 250 FOR IP): Performed by: STUDENT IN AN ORGANIZED HEALTH CARE EDUCATION/TRAINING PROGRAM

## 2024-10-06 PROCEDURE — 36415 COLL VENOUS BLD VENIPUNCTURE: CPT

## 2024-10-06 RX ORDER — SODIUM CHLORIDE, SODIUM LACTATE, POTASSIUM CHLORIDE, CALCIUM CHLORIDE 600; 310; 30; 20 MG/100ML; MG/100ML; MG/100ML; MG/100ML
INJECTION, SOLUTION INTRAVENOUS CONTINUOUS
Status: DISCONTINUED | OUTPATIENT
Start: 2024-10-06 | End: 2024-10-07

## 2024-10-06 RX ORDER — POTASSIUM CHLORIDE 7.45 MG/ML
10 INJECTION INTRAVENOUS
Status: COMPLETED | OUTPATIENT
Start: 2024-10-06 | End: 2024-10-06

## 2024-10-06 RX ORDER — MAGNESIUM SULFATE IN WATER 40 MG/ML
2000 INJECTION, SOLUTION INTRAVENOUS ONCE
Status: COMPLETED | OUTPATIENT
Start: 2024-10-06 | End: 2024-10-06

## 2024-10-06 RX ADMIN — SODIUM CHLORIDE, POTASSIUM CHLORIDE, SODIUM LACTATE AND CALCIUM CHLORIDE: 600; 310; 30; 20 INJECTION, SOLUTION INTRAVENOUS at 23:45

## 2024-10-06 RX ADMIN — POTASSIUM CHLORIDE 10 MEQ: 10 INJECTION, SOLUTION INTRAVENOUS at 08:00

## 2024-10-06 RX ADMIN — MAGNESIUM SULFATE HEPTAHYDRATE 2000 MG: 40 INJECTION, SOLUTION INTRAVENOUS at 13:40

## 2024-10-06 RX ADMIN — POTASSIUM CHLORIDE 10 MEQ: 10 INJECTION, SOLUTION INTRAVENOUS at 13:33

## 2024-10-06 RX ADMIN — PIPERACILLIN AND TAZOBACTAM 4500 MG: 4; .5 INJECTION, POWDER, FOR SOLUTION INTRAVENOUS at 09:05

## 2024-10-06 RX ADMIN — SODIUM CHLORIDE 1250 MG: 9 INJECTION, SOLUTION INTRAVENOUS at 06:28

## 2024-10-06 RX ADMIN — POTASSIUM CHLORIDE 10 MEQ: 10 INJECTION, SOLUTION INTRAVENOUS at 14:42

## 2024-10-06 RX ADMIN — AMMONIUM LACTATE: 17 LOTION TOPICAL at 21:21

## 2024-10-06 RX ADMIN — PIPERACILLIN AND TAZOBACTAM 4500 MG: 4; .5 INJECTION, POWDER, FOR SOLUTION INTRAVENOUS at 18:35

## 2024-10-06 RX ADMIN — ATORVASTATIN CALCIUM 40 MG: 40 TABLET, FILM COATED ORAL at 21:20

## 2024-10-06 RX ADMIN — SODIUM CHLORIDE, POTASSIUM CHLORIDE, SODIUM LACTATE AND CALCIUM CHLORIDE: 600; 310; 30; 20 INJECTION, SOLUTION INTRAVENOUS at 09:59

## 2024-10-06 RX ADMIN — INSULIN GLARGINE 10 UNITS: 100 INJECTION, SOLUTION SUBCUTANEOUS at 08:51

## 2024-10-06 RX ADMIN — Medication 10 ML: at 21:20

## 2024-10-06 RX ADMIN — ENOXAPARIN SODIUM 40 MG: 100 INJECTION SUBCUTANEOUS at 08:48

## 2024-10-06 ASSESSMENT — PAIN SCALES - WONG BAKER: WONGBAKER_NUMERICALRESPONSE: NO HURT

## 2024-10-06 ASSESSMENT — PAIN SCALES - GENERAL: PAINLEVEL_OUTOF10: 0

## 2024-10-06 NOTE — PLAN OF CARE
Problem: Chronic Conditions and Co-morbidities  Goal: Patient's chronic conditions and co-morbidity symptoms are monitored and maintained or improved  Outcome: Progressing  Flowsheets (Taken 10/5/2024 1118 by Katharine Daniel, RN)  Care Plan - Patient's Chronic Conditions and Co-Morbidity Symptoms are Monitored and Maintained or Improved:   Monitor and assess patient's chronic conditions and comorbid symptoms for stability, deterioration, or improvement   Collaborate with multidisciplinary team to address chronic and comorbid conditions and prevent exacerbation or deterioration   Update acute care plan with appropriate goals if chronic or comorbid symptoms are exacerbated and prevent overall improvement and discharge     Problem: Discharge Planning  Goal: Discharge to home or other facility with appropriate resources  Outcome: Progressing  Flowsheets (Taken 10/5/2024 1118 by Katharine Daniel, RN)  Discharge to home or other facility with appropriate resources:   Identify barriers to discharge with patient and caregiver   Arrange for needed discharge resources and transportation as appropriate   Identify discharge learning needs (meds, wound care, etc)   Refer to discharge planning if patient needs post-hospital services based on physician order or complex needs related to functional status, cognitive ability or social support system     Problem: Safety - Adult  Goal: Free from fall injury  Outcome: Progressing     Problem: Pain  Goal: Verbalizes/displays adequate comfort level or baseline comfort level  Outcome: Progressing     Problem: Skin/Tissue Integrity  Goal: Absence of new skin breakdown  Description: 1.  Monitor for areas of redness and/or skin breakdown  2.  Assess vascular access sites hourly  3.  Every 4-6 hours minimum:  Change oxygen saturation probe site  4.  Every 4-6 hours:  If on nasal continuous positive airway pressure, respiratory therapy assess nares and determine need for appliance change

## 2024-10-06 NOTE — PROGRESS NOTES
[facility] Hospitalist Progress Note    Admitting Date and Time: 10/3/2024 11:06 AM  Admit Dx: Shock [R57.9]  Hypoglycemia [E16.2]  Septicemia (HCC) [A41.9]  THAIS (acute kidney injury) (HCC) [N17.9]  Hypothermia, initial encounter [T68.XXXA]  Hypotension, unspecified hypotension type [I95.9]  Altered mental status, unspecified altered mental status type [R41.82]    Subjective:    No acute events overnight. Patient this morning oriented x 3. She is without abdominal pain, does admit to a lot of fatigue. Not passing flatus or BM yet. No nausea/vomiting     ROS: denies fever, chills, cp, sob, n/v, HA unless stated above.       potassium chloride  10 mEq IntraVENous Q2H    piperacillin-tazobactam  4,500 mg IntraVENous Q8H    magnesium sulfate  2,000 mg IntraVENous Once    vancomycin  1,250 mg IntraVENous Q18H    insulin lispro  0-8 Units SubCUTAneous Q4H    insulin glargine  10 Units SubCUTAneous QAM    enoxaparin  40 mg SubCUTAneous Daily    [Held by provider] amLODIPine  5 mg Oral Daily    atorvastatin  40 mg Oral QHS    [Held by provider] primidone  50 mg Oral BID    ammonium lactate   Topical BID    sodium chloride flush  5-40 mL IntraVENous 2 times per day    vancomycin (VANCOCIN) intermittent dosing (placeholder)   Other RX Placeholder     morphine, 2 mg, Q2H PRN  aluminum & magnesium hydroxide-simethicone, 5 mL, Q6H PRN  guaiFENesin-codeine, 5 mL, TID PRN  magnesium hydroxide, 30 mL, Daily PRN  sodium chloride flush, 5-40 mL, PRN  sodium chloride, , PRN  acetaminophen, 650 mg, Q6H PRN   Or  acetaminophen, 650 mg, Q6H PRN  glucose, 4 tablet, PRN  dextrose bolus, 125 mL, PRN   Or  dextrose bolus, 250 mL, PRN  glucagon (rDNA), 1 mg, PRN  dextrose, , Continuous PRN         Objective:    /74   Pulse (!) 110   Temp 98.1 °F (36.7 °C) (Oral)   Resp 18   Ht 1.473 m (4' 10\")   Wt 76.4 kg (168 lb 6.9 oz)   SpO2 92%   BMI 35.20 kg/m²     General Appearance: alert and oriented to person, place, time and

## 2024-10-06 NOTE — FLOWSHEET NOTE
Dr Donaldson here and spoke with family about pts status.    1100  Dr Donaldson dcd the pts vidal and flushed ng tube.      1700  Pt has not voided. Pt was on the bedpan earlier.  Pt bladder scanned but had no urine.  Some difficulty with prove placement due to  where the pts surgical incision is.      1910  Pt still has not voided.  Dr Donaldson messaged and made aware.  Orders recd.  Night shift updated on new orders.

## 2024-10-06 NOTE — PROGRESS NOTES
1900- Assumed care of patient for 12 hour shift. Chart and medications reviewed. Patient resting in bed. Bed in lowest position, wheels locked and alarm on. Call light and belongings within reach.     2111- PM shift assessment completed. A/O x4. Vitals stable. Lung sounds diminished bilaterally. Heart sounds regular. Sinus rhythm on tele. Abdomen taunt and tender. Surgical scar and dressing clean, dry and intact. Bowel sounds absent. Abdominal binder in place. External cathter in place. Trace edema noted BLE. Pedal pulses palpable bilaterally. Skin warm, pink and dry. Redness noted to groin/ abdominal folds. Dual lumen IV Left forearm #18 and #20, #18 infusing Lactated ringers @ 75 ml/hr and #20 flushed, dressing clean, dry and intact. Call light and belongings within reach.     2345- New bag lactated ringers started infusing @ 75 ml/hr.     0000- Patient resting in bed. Vitals stable. Blood glucose 140. Call light and belongings within reach.     0400-  Patient resting in bed. Vitals stable. Blood glucose 159. Call light and belongings within reach.     0630- Patient resting in bed. No significant events this shift. Bed alarm on. Call light and belongings within reach.    Electronically signed by Manda Hebert RN on 10/7/2024 at 6:30 AM

## 2024-10-06 NOTE — PROGRESS NOTES
GENERAL SURGERY  PROGRESS NOTE    Pt Name: Catherine Inman  MRN: [id number]  Date: 10/6/2024    Subjective  CHEL overnight. Afebrile, mild tachycardia, satting well on RA. Doing well this morning. Not sure how much she slept. Pain controlled on current regimen. Denies nausea/ vomiting. NGT output 550cc for yesterday (no accurately recorded). Voiding spontaneously. Thirsty. Not passing flatus yet. Got OOB yesterday with PT. Getting up to 1000 on IS. Has a productive cough (quit smoking 1 month ago), typically has a morning productive cough.    Vitals  /74   Pulse (!) 110   Temp 98.1 °F (36.7 °C) (Oral)   Resp 18   Ht 1.473 m (4' 10\")   Wt 76.4 kg (168 lb 6.9 oz)   SpO2 92%   BMI 35.20 kg/m²      Physical Exam  GEN: A&Ox3, NAD, cooperative   HENT: NGT in place to LIWS with dark bilious output  PULM: no increased work of breathing, satting well on RA  CV: regular rate  ABD: Soft, minimally but appropriately TTP, midline incision c/d/I with staples, abdominal binder in place  EXT: Warm, dry    Intake/ Output    Intake/Output Summary (Last 24 hours) at 10/6/2024 1032  Last data filed at 10/5/2024 1752  Gross per 24 hour   Intake 496.51 ml   Output 1200 ml   Net -703.49 ml     Labs  Recent Labs     10/03/24  1134 10/03/24  1311 10/03/24  1407 10/04/24  0454 10/05/24  0529 10/06/24  0530   WBC 7.3  --   --  9.4 14.1* 12.8*   HGB 13.8  --   --  11.6* 13.1 11.2*   HCT 43.2  --   --  35.1* 40.3 33.6*     --   --  164 168 164     --   --  139 136 139   K 4.0  --   --  4.2 4.0 3.6     --   --  109* 105 105   CO2 27  --   --  24 19* 25   BUN 28*  --   --  16 12 13   CREATININE 1.15*  --   --  0.93* 0.87 0.90   MG 2.3  --   --   --   --  1.8   CALCIUM 9.3  --   --  8.2* 8.2* 8.2*   INR  --   --  1.1  --   --   --    AST 55*  --   --   --   --   --    ALT 42*  --   --   --   --   --    BILITOT 0.3  --   --   --   --   --    LIPASE 17  --   --   --   --   --    LACTA 1.4  --  1.2  --   --   --

## 2024-10-06 NOTE — PROGRESS NOTES
Pharmacy Vancomycin Consult     Vancomycin Day: 4  Current Dosing: Vanco 1250mg IV q18h    Recent Labs     10/05/24  0529 10/06/24  0530   BUN 12 13   CREATININE 0.87 0.90   WBC 14.1* 12.8*       Intake/Output Summary (Last 24 hours) at 10/6/2024 0956  Last data filed at 10/5/2024 1752  Gross per 24 hour   Intake 496.51 ml   Output 1200 ml   Net -703.49 ml     Cultures  Recent Labs     10/03/24  1326 10/03/24  1134 09/09/24  1805   BC  --  No Growth to date.  Any change in status will be called.  --    BLOODCULT2 No Growth to date.  Any change in status will be called.  --   --    ORG  --   --  Escherichia coli*   LABURIN  --   --  Performed at [facility] [place] [id number]  ([phone]  *  10 to 50,000 CFU/ML     Height: 147.3 cm (4' 10\"), Weight - Scale: 76.4 kg (168 lb 6.9 oz), Body mass index is 35.2 kg/m².    Estimated Creatinine Clearance: 46 mL/min (based on SCr of 0.9 mg/dL).  .    Trough:  Recent Labs     10/06/24  0846   VANCORANDOM 20.3      Assessment/Plan:  Data input into [employer] platform. Current dosing therapeutic . Will continue current dosing and monitor. Timing of future trough levels may be adjusted based on culture results, renal function, and clinical response.    Thank you,  Sugey May, [facility] PharmD

## 2024-10-07 ENCOUNTER — APPOINTMENT (OUTPATIENT)
Dept: ULTRASOUND IMAGING | Age: 79
DRG: 853 | End: 2024-10-07
Payer: MEDICARE

## 2024-10-07 ENCOUNTER — TELEPHONE (OUTPATIENT)
Dept: FAMILY MEDICINE CLINIC | Age: 79
End: 2024-10-07

## 2024-10-07 PROBLEM — E11.649 HYPOGLYCEMIA ASSOCIATED WITH TYPE 2 DIABETES MELLITUS (HCC): Status: ACTIVE | Noted: 2024-10-07

## 2024-10-07 LAB
ANION GAP SERPL CALCULATED.3IONS-SCNC: 12 MEQ/L (ref 9–15)
BUN SERPL-MCNC: 10 MG/DL (ref 8–23)
CALCIUM SERPL-MCNC: 8 MG/DL (ref 8.5–9.9)
CHLORIDE SERPL-SCNC: 104 MEQ/L (ref 95–107)
CO2 SERPL-SCNC: 23 MEQ/L (ref 20–31)
CREAT SERPL-MCNC: 0.78 MG/DL (ref 0.5–0.9)
ERYTHROCYTE [DISTWIDTH] IN BLOOD BY AUTOMATED COUNT: 13.8 % (ref 11.5–14.5)
GLUCOSE BLD-MCNC: 159 MG/DL (ref 70–99)
GLUCOSE BLD-MCNC: 161 MG/DL (ref 70–99)
GLUCOSE BLD-MCNC: 162 MG/DL (ref 70–99)
GLUCOSE BLD-MCNC: 190 MG/DL (ref 70–99)
GLUCOSE BLD-MCNC: 195 MG/DL (ref 70–99)
GLUCOSE BLD-MCNC: 243 MG/DL (ref 70–99)
GLUCOSE SERPL-MCNC: 167 MG/DL (ref 70–99)
HCT VFR BLD AUTO: 33.4 % (ref 37–47)
HGB BLD-MCNC: 11.1 G/DL (ref 12–16)
MAGNESIUM SERPL-MCNC: 2.2 MG/DL (ref 1.7–2.4)
MCH RBC QN AUTO: 31.3 PG (ref 27–31.3)
MCHC RBC AUTO-ENTMCNC: 33.2 % (ref 33–37)
MCV RBC AUTO: 94.1 FL (ref 79.4–94.8)
PERFORMED ON: ABNORMAL
PLATELET # BLD AUTO: 174 K/UL (ref 130–400)
POTASSIUM SERPL-SCNC: 3.6 MEQ/L (ref 3.4–4.9)
RBC # BLD AUTO: 3.55 M/UL (ref 4.2–5.4)
SODIUM SERPL-SCNC: 139 MEQ/L (ref 135–144)
WBC # BLD AUTO: 11.1 K/UL (ref 4.8–10.8)

## 2024-10-07 PROCEDURE — 6360000002 HC RX W HCPCS: Performed by: SURGERY

## 2024-10-07 PROCEDURE — 2500000003 HC RX 250 WO HCPCS: Performed by: SURGERY

## 2024-10-07 PROCEDURE — 99024 POSTOP FOLLOW-UP VISIT: CPT | Performed by: SURGERY

## 2024-10-07 PROCEDURE — 97535 SELF CARE MNGMENT TRAINING: CPT

## 2024-10-07 PROCEDURE — 80048 BASIC METABOLIC PNL TOTAL CA: CPT

## 2024-10-07 PROCEDURE — 36415 COLL VENOUS BLD VENIPUNCTURE: CPT

## 2024-10-07 PROCEDURE — 6370000000 HC RX 637 (ALT 250 FOR IP): Performed by: SURGERY

## 2024-10-07 PROCEDURE — 6370000000 HC RX 637 (ALT 250 FOR IP): Performed by: STUDENT IN AN ORGANIZED HEALTH CARE EDUCATION/TRAINING PROGRAM

## 2024-10-07 PROCEDURE — 2580000003 HC RX 258: Performed by: SURGERY

## 2024-10-07 PROCEDURE — 93970 EXTREMITY STUDY: CPT

## 2024-10-07 PROCEDURE — 2060000000 HC ICU INTERMEDIATE R&B

## 2024-10-07 PROCEDURE — 99222 1ST HOSP IP/OBS MODERATE 55: CPT | Performed by: INTERNAL MEDICINE

## 2024-10-07 PROCEDURE — 85027 COMPLETE CBC AUTOMATED: CPT

## 2024-10-07 PROCEDURE — 83735 ASSAY OF MAGNESIUM: CPT

## 2024-10-07 RX ORDER — DEXTROSE MONOHYDRATE, SODIUM CHLORIDE, AND POTASSIUM CHLORIDE 50; 1.49; 4.5 G/1000ML; G/1000ML; G/1000ML
INJECTION, SOLUTION INTRAVENOUS CONTINUOUS
Status: DISCONTINUED | OUTPATIENT
Start: 2024-10-07 | End: 2024-10-10

## 2024-10-07 RX ORDER — POTASSIUM CHLORIDE 7.45 MG/ML
10 INJECTION INTRAVENOUS
Status: COMPLETED | OUTPATIENT
Start: 2024-10-07 | End: 2024-10-07

## 2024-10-07 RX ORDER — ONDANSETRON 4 MG/1
4 TABLET, FILM COATED ORAL ONCE
Status: COMPLETED | OUTPATIENT
Start: 2024-10-07 | End: 2024-10-07

## 2024-10-07 RX ADMIN — ONDANSETRON HYDROCHLORIDE 4 MG: 4 TABLET, FILM COATED ORAL at 19:31

## 2024-10-07 RX ADMIN — POTASSIUM CHLORIDE, DEXTROSE MONOHYDRATE AND SODIUM CHLORIDE: 150; 5; 450 INJECTION, SOLUTION INTRAVENOUS at 13:15

## 2024-10-07 RX ADMIN — POTASSIUM CHLORIDE 10 MEQ: 7.46 INJECTION, SOLUTION INTRAVENOUS at 11:52

## 2024-10-07 RX ADMIN — Medication 10 ML: at 20:09

## 2024-10-07 RX ADMIN — INSULIN LISPRO 2 UNITS: 100 INJECTION, SOLUTION INTRAVENOUS; SUBCUTANEOUS at 23:22

## 2024-10-07 RX ADMIN — PRIMIDONE 50 MG: 50 TABLET ORAL at 20:09

## 2024-10-07 RX ADMIN — AMMONIUM LACTATE: 17 LOTION TOPICAL at 07:40

## 2024-10-07 RX ADMIN — POTASSIUM CHLORIDE 10 MEQ: 7.46 INJECTION, SOLUTION INTRAVENOUS at 10:49

## 2024-10-07 RX ADMIN — Medication 10 ML: at 07:41

## 2024-10-07 RX ADMIN — INSULIN GLARGINE 10 UNITS: 100 INJECTION, SOLUTION SUBCUTANEOUS at 07:41

## 2024-10-07 RX ADMIN — ENOXAPARIN SODIUM 40 MG: 100 INJECTION SUBCUTANEOUS at 07:40

## 2024-10-07 RX ADMIN — ATORVASTATIN CALCIUM 40 MG: 40 TABLET, FILM COATED ORAL at 20:09

## 2024-10-07 RX ADMIN — PIPERACILLIN AND TAZOBACTAM 4500 MG: 4; .5 INJECTION, POWDER, FOR SOLUTION INTRAVENOUS at 12:33

## 2024-10-07 RX ADMIN — PIPERACILLIN AND TAZOBACTAM 4500 MG: 4; .5 INJECTION, POWDER, FOR SOLUTION INTRAVENOUS at 20:09

## 2024-10-07 RX ADMIN — PIPERACILLIN AND TAZOBACTAM 4500 MG: 4; .5 INJECTION, POWDER, FOR SOLUTION INTRAVENOUS at 04:09

## 2024-10-07 ASSESSMENT — PAIN SCALES - GENERAL: PAINLEVEL_OUTOF10: 3

## 2024-10-07 ASSESSMENT — PAIN DESCRIPTION - RADICULAR PAIN: RADICULAR_PAIN: ABSENT

## 2024-10-07 NOTE — PROGRESS NOTES
Physician Progress Note      PATIENT:               BJORN DOUGLAS  CSN #:                  [id number]  :                       1945  ADMIT DATE:       10/3/2024 11:06 AM  DISCH DATE:  RESPONDING  PROVIDER #:        Alejandro Agrawal MD          QUERY TEXT:    Patient admitted with \"patchy ischemic bowel that improved after LIZBETH.   Infection Present At Time Of Surgery:: None\" per general surgery Op Note.   Noted documentation of \"Shock, likely septic vs hypovolemic, resolved - SBO   likely strong contributor\" and \"Acute encephalopathy - both metabolic and   infectious etiologies contributing in setting of hypoglycemia and presumed   septic shock.\" in the 10/7/24 attending PN. In order to support the diagnosis   of sepsis, please include additional clinical indicators in your   documentation.  Or please document if the diagnosis of sepsis has bee    The medical record reflects the following:  Risk Factors: female age 79  T2DM  CKD3  obesity  Clinical Indicators: on admission WBC 7.3   T 95.4 rectal   P 48   R 12  BP   86/66  Treatment: IV Zosyn  CT abdomen  pelvis  general aurgery  pulmonology    Juan Miguel Vines RN CCDS  [phone]  Options provided:  -- Sepsis is present as evidenced by: Please document evidence., Please   document evidence.  -- Sepsis was ruled out after study.  -- Other - I will add my own diagnosis  -- Disagree - Not applicable / Not valid  -- Disagree - Clinically unable to determine / Unknown  -- Refer to Clinical Documentation Reviewer    PROVIDER RESPONSE TEXT:    Sepsis is present as evidenced by: Please document evidence. hypothermia,   hypotension unresponsive to IV fluid resuscitation    Query created by: Juan Miguel Vines on 10/7/2024 1:33 PM      Electronically signed by:  Alejandro Agrawal MD 10/7/2024 3:32 PM

## 2024-10-07 NOTE — PLAN OF CARE
Care plan reviewed and ongoing.  Electronically signed by Manda Hebert RN on 10/6/2024 at 9:55 PM

## 2024-10-07 NOTE — TELEPHONE ENCOUNTER
[facility] states they are faxing over a form that would need to be signed and faxed back along with office visit notes.    Form was received and given to Laura due to being a scam.

## 2024-10-07 NOTE — PROGRESS NOTES
GENERAL SURGERY  PROGRESS NOTE    Pt Name: Catherine Inman  MRN: [id number]  Date: 10/7/2024    Subjective  CHEL overnight. Afebrile, VSS on RA. Doing really well this morning. Denies abdominal pain. Not sure if she passed any flatus yet. No nausea/ vomiting or BM. Got OOB yesterday. Getting up to 1000 on IS.     Vitals  /68   Pulse 94   Temp 98.1 °F (36.7 °C) (Oral)   Resp 18   Ht 1.473 m (4' 10\")   Wt 76.4 kg (168 lb 6.9 oz)   SpO2 93%   BMI 35.20 kg/m²      Physical Exam  GEN: A&Ox3, NAD, cooperative   HENT: NGT in place to LIWS with dark bilious output  PULM: no increased work of breathing, satting well on RA  CV: regular rate  ABD: Soft, NT, ND, midline incision c/d/I with staples, abdominal binder in place  EXT: Warm, dry    Intake/ Output    Intake/Output Summary (Last 24 hours) at 10/7/2024 1241  Last data filed at 10/7/2024 1133  Gross per 24 hour   Intake 1357.28 ml   Output 2450 ml   Net -1092.72 ml     Date 10/07/24 0000 - 10/07/24 2359   Shift 6866-6658 9838-5346 8638-1207 24 Hour Total   INTAKE   Shift Total(mL/kg)       OUTPUT   Urine(mL/kg/hr) 800(1.3) 750  1550   Shift Total(mL/kg) 800(10.5) 750(9.8)  1550(20.3)   Weight (kg) 76.4 76.4 76.4 76.4     Labs  Recent Labs     10/05/24  0529 10/06/24  0530 10/07/24  0643   WBC 14.1* 12.8* 11.1*   HGB 13.1 11.2* 11.1*   HCT 40.3 33.6* 33.4*    164 174    139 139   K 4.0 3.6 3.6    105 104   CO2 19* 25 23   BUN 12 13 10   CREATININE 0.87 0.90 0.78   MG  --  1.8 2.2   CALCIUM 8.2* 8.2* 8.0*     Assessment/ Plan  79F with PMH of HTN, HLD, right breast cancer (s/p radiation), CORDOVA, CKD3, DM, MRSA cellulitis, and E coli UTI admitted for AMS and found to have SBO on CT AP 10/4. Now POD 3 s/p ex lap, LIZBETH, washout, and abdominal wall mass biopsy. Recovering well as expected.    Pain control: prn morphine, ice  Prn antiemetic  Encourage IS x10/hour while awake  Advance to CLD, HLIV when taking in adequate PO  Replete lytes prn,

## 2024-10-07 NOTE — DISCHARGE INSTR - COC
Continuity of Care Form    Patient Name: Catherine Inman   :  1945  MRN:  [id number]    Admit date:  10/3/2024  Discharge date:  ***    Code Status Order: Full Code   Advance Directives:   Advance Care Flowsheet Documentation        Date/Time Healthcare Directive Type of Healthcare Directive Copy in Chart Healthcare Agent Appointed Healthcare Agent's Name Healthcare Agent's Phone Number    10/04/24 1295 Unknown, patient unable to respond due to medical condition  --  --  --  --  --                     Admitting Physician:  Alejandro Agrawal MD  PCP: Tin Taalvera MD    Discharging Nurse: ***  Discharging Hospital Unit/Room#: W163/W163-01  Discharging Unit Phone Number: ***    Emergency Contact:   Extended Emergency Contact Information  Primary Emergency Contact: ELIDAJON  Address: [address] [place], [place] [address] [facility]  Home Phone: [phone]  Relation: Spouse  Secondary Emergency Contact: TESFAYE GONZALEZ  Mobile Phone: [phone]  Relation: Child    Past Surgical History:  Past Surgical History:   Procedure Laterality Date    APPENDECTOMY      BREAST BIOPSY Right     malignant (16-17 yrs ago)    BREAST LUMPECTOMY Right     malignant    CARPAL TUNNEL RELEASE Left     CT NEEDLE BIOPSY LIVER PERCUTANEOUS Right 2022    Performed by Dr. Machado - diagnostics sent    CT NEEDLE BIOPSY LIVER PERCUTANEOUS  2022    CT NEEDLE BIOPSY LIVER PERCUTANEOUS 2022 [facility] [place] SCAN    HERNIA REPAIR      Umbilical    HYSTERECTOMY (CERVIX STATUS UNKNOWN)      total but left part of one ovary    LAPAROTOMY N/A 10/4/2024    EXPLORATORY LAPAROTOMY lysis of adhesions performed by Noemi Donaldson MD at [facility] [place]    OVARY REMOVAL      left part of one ovary    TONSILLECTOMY         Immunization History:   Immunization History   Administered Date(s) Administered    COVID-19, PFIZER PURPLE top, DILUTE for use, (age 12 y+), 30mcg/0.3mL 2021, 2021, 2021    Influenza Virus

## 2024-10-07 NOTE — CARE COORDINATION
LSW spoke with [facility] to follow up on referral. Dina in admissions stated they are able to accept patient and will start pre cert today.

## 2024-10-07 NOTE — PROGRESS NOTES
Physical Therapy Med Surg Daily Treatment Note  Facility/Department: [address] TELEMETRY  Room: [place] [address]       NAME: Catherine Inman  : 1945 (79 y.o.)  MRN: [id number]  CODE STATUS: Full Code    Date of Service: 10/7/2024    Patient Diagnosis(es): Shock [R57.9]  Hypoglycemia [E16.2]  Septicemia (HCC) [A41.9]  THAIS (acute kidney injury) (HCC) [N17.9]  Hypothermia, initial encounter [T68.XXXA]  Hypotension, unspecified hypotension type [I95.9]  Altered mental status, unspecified altered mental status type [R41.82]   Chief Complaint   Patient presents with    Altered Mental Status     EMS was called for AMS that started this AM, blood glucose on their arrival was 42     Patient Active Problem List    Diagnosis Date Noted    Chronic hepatitis, unspecified (HCC) 2023    Abnormal liver enzymes 2022    Elevated LFTs 2022    CORDOVA (nonalcoholic steatohepatitis) 2022    Altered mental status 10/04/2024    Shock 10/03/2024    MRSA (methicillin resistant Staphylococcus aureus) infection 2024    Acute lower UTI 09/10/2024    Peripheral venous insufficiency 04/15/2024    Choking 2023    Pharyngoesophageal dysphagia 2023    Stage 3a chronic kidney disease (HCC) 2022    Type 2 diabetes mellitus with hyperglycemia 2021    Hypertension, essential 2020    Controlled type 2 diabetes mellitus with chronic kidney disease (HCC) 2020    Meningioma, cerebral (HCC) 2020    Malignant neoplasm of female breast (HCC) 2006        Past Medical History:   Diagnosis Date    Breast cancer (HCC)     right (16-17 yrs ago)    Cancer (HCC)     Breast    Diabetes mellitus (HCC)     History of therapeutic radiation     Hyperlipidemia     Hypertension      Past Surgical History:   Procedure Laterality Date    APPENDECTOMY      BREAST BIOPSY Right     malignant (16-17 yrs ago)    BREAST LUMPECTOMY Right     malignant    CARPAL TUNNEL RELEASE Left     CT NEEDLE BIOPSY LIVER

## 2024-10-07 NOTE — PROGRESS NOTES
[facility] Hospitalist Progress Note    Admitting Date and Time: 10/3/2024 11:06 AM  Admit Dx: Shock [R57.9]  Hypoglycemia [E16.2]  Septicemia (HCC) [A41.9]  THAIS (acute kidney injury) (HCC) [N17.9]  Hypothermia, initial encounter [T68.XXXA]  Hypotension, unspecified hypotension type [I95.9]  Altered mental status, unspecified altered mental status type [R41.82]    Subjective:    No acute events overnight. Patient this morning feeling improved. She denies any abdominal pain, dizziness, lightheadedness. She is unsure if she is passing flatus or has had a BM. Tolerating sips of clears well over last 24 hours. Updated  at bedside     ROS: denies fever, chills, cp, sob, n/v, HA unless stated above.       piperacillin-tazobactam  4,500 mg IntraVENous Q8H    insulin lispro  0-8 Units SubCUTAneous Q4H    insulin glargine  10 Units SubCUTAneous QAM    enoxaparin  40 mg SubCUTAneous Daily    [Held by provider] amLODIPine  5 mg Oral Daily    atorvastatin  40 mg Oral QHS    [Held by provider] primidone  50 mg Oral BID    ammonium lactate   Topical BID    sodium chloride flush  5-40 mL IntraVENous 2 times per day     morphine, 2 mg, Q2H PRN  aluminum & magnesium hydroxide-simethicone, 5 mL, Q6H PRN  guaiFENesin-codeine, 5 mL, TID PRN  magnesium hydroxide, 30 mL, Daily PRN  sodium chloride flush, 5-40 mL, PRN  sodium chloride, , PRN  acetaminophen, 650 mg, Q6H PRN   Or  acetaminophen, 650 mg, Q6H PRN  glucose, 4 tablet, PRN  dextrose bolus, 125 mL, PRN   Or  dextrose bolus, 250 mL, PRN  glucagon (rDNA), 1 mg, PRN  dextrose, , Continuous PRN         Objective:    /68   Pulse 94   Temp 98.1 °F (36.7 °C) (Oral)   Resp 18   Ht 1.473 m (4' 10\")   Wt 76.4 kg (168 lb 6.9 oz)   SpO2 93%   BMI 35.20 kg/m²     General Appearance: alert and oriented to person, place, time and in no acute distress  Skin: warm and dry  Head: normocephalic and atraumatic  Eyes: pupils equal, round, and reactive to light,

## 2024-10-08 ENCOUNTER — APPOINTMENT (OUTPATIENT)
Dept: GENERAL RADIOLOGY | Age: 79
DRG: 853 | End: 2024-10-08
Payer: MEDICARE

## 2024-10-08 ENCOUNTER — APPOINTMENT (OUTPATIENT)
Dept: INTERVENTIONAL RADIOLOGY/VASCULAR | Age: 79
DRG: 853 | End: 2024-10-08
Attending: INTERNAL MEDICINE
Payer: MEDICARE

## 2024-10-08 PROBLEM — E16.2 HYPOGLYCEMIA: Status: ACTIVE | Noted: 2024-10-08

## 2024-10-08 LAB
ANION GAP SERPL CALCULATED.3IONS-SCNC: 10 MEQ/L (ref 9–15)
ANION GAP SERPL CALCULATED.3IONS-SCNC: 12 MEQ/L (ref 9–15)
BACTERIA BLD CULT ORG #2: NORMAL
BACTERIA BLD CULT: NORMAL
BUN SERPL-MCNC: 10 MG/DL (ref 8–23)
BUN SERPL-MCNC: 10 MG/DL (ref 8–23)
CALCIUM SERPL-MCNC: 8.2 MG/DL (ref 8.5–9.9)
CALCIUM SERPL-MCNC: 8.3 MG/DL (ref 8.5–9.9)
CHLORIDE SERPL-SCNC: 97 MEQ/L (ref 95–107)
CHLORIDE SERPL-SCNC: 98 MEQ/L (ref 95–107)
CO2 SERPL-SCNC: 27 MEQ/L (ref 20–31)
CO2 SERPL-SCNC: 29 MEQ/L (ref 20–31)
CREAT SERPL-MCNC: 0.71 MG/DL (ref 0.5–0.9)
CREAT SERPL-MCNC: 0.82 MG/DL (ref 0.5–0.9)
ERYTHROCYTE [DISTWIDTH] IN BLOOD BY AUTOMATED COUNT: 13.3 % (ref 11.5–14.5)
GLUCOSE BLD-MCNC: 188 MG/DL (ref 70–99)
GLUCOSE BLD-MCNC: 204 MG/DL (ref 70–99)
GLUCOSE BLD-MCNC: 204 MG/DL (ref 70–99)
GLUCOSE BLD-MCNC: 235 MG/DL (ref 70–99)
GLUCOSE SERPL-MCNC: 187 MG/DL (ref 70–99)
GLUCOSE SERPL-MCNC: 197 MG/DL (ref 70–99)
HCT VFR BLD AUTO: 37.1 % (ref 37–47)
HGB BLD-MCNC: 12.5 G/DL (ref 12–16)
MAGNESIUM SERPL-MCNC: 2.1 MG/DL (ref 1.7–2.4)
MCH RBC QN AUTO: 31.1 PG (ref 27–31.3)
MCHC RBC AUTO-ENTMCNC: 33.7 % (ref 33–37)
MCV RBC AUTO: 92.3 FL (ref 79.4–94.8)
PERFORMED ON: ABNORMAL
PLATELET # BLD AUTO: 219 K/UL (ref 130–400)
POTASSIUM SERPL-SCNC: 3.3 MEQ/L (ref 3.4–4.9)
POTASSIUM SERPL-SCNC: 3.9 MEQ/L (ref 3.4–4.9)
RBC # BLD AUTO: 4.02 M/UL (ref 4.2–5.4)
SODIUM SERPL-SCNC: 136 MEQ/L (ref 135–144)
SODIUM SERPL-SCNC: 137 MEQ/L (ref 135–144)
WBC # BLD AUTO: 11.3 K/UL (ref 4.8–10.8)

## 2024-10-08 PROCEDURE — 2580000003 HC RX 258: Performed by: SURGERY

## 2024-10-08 PROCEDURE — 97535 SELF CARE MNGMENT TRAINING: CPT

## 2024-10-08 PROCEDURE — 71045 X-RAY EXAM CHEST 1 VIEW: CPT

## 2024-10-08 PROCEDURE — 36415 COLL VENOUS BLD VENIPUNCTURE: CPT

## 2024-10-08 PROCEDURE — 2709999900 IR PICC WO SQ PORT/PUMP > 5 YEARS

## 2024-10-08 PROCEDURE — 99232 SBSQ HOSP IP/OBS MODERATE 35: CPT | Performed by: PHYSICIAN ASSISTANT

## 2024-10-08 PROCEDURE — 99024 POSTOP FOLLOW-UP VISIT: CPT | Performed by: SURGERY

## 2024-10-08 PROCEDURE — 85027 COMPLETE CBC AUTOMATED: CPT

## 2024-10-08 PROCEDURE — 2500000003 HC RX 250 WO HCPCS: Performed by: SURGERY

## 2024-10-08 PROCEDURE — 74018 RADEX ABDOMEN 1 VIEW: CPT

## 2024-10-08 PROCEDURE — 2060000000 HC ICU INTERMEDIATE R&B

## 2024-10-08 PROCEDURE — 6370000000 HC RX 637 (ALT 250 FOR IP): Performed by: STUDENT IN AN ORGANIZED HEALTH CARE EDUCATION/TRAINING PROGRAM

## 2024-10-08 PROCEDURE — 02HV33Z INSERTION OF INFUSION DEVICE INTO SUPERIOR VENA CAVA, PERCUTANEOUS APPROACH: ICD-10-PCS | Performed by: STUDENT IN AN ORGANIZED HEALTH CARE EDUCATION/TRAINING PROGRAM

## 2024-10-08 PROCEDURE — 6360000002 HC RX W HCPCS: Performed by: SURGERY

## 2024-10-08 PROCEDURE — 80048 BASIC METABOLIC PNL TOTAL CA: CPT

## 2024-10-08 PROCEDURE — 2500000003 HC RX 250 WO HCPCS: Performed by: INTERNAL MEDICINE

## 2024-10-08 PROCEDURE — 36573 INSJ PICC RS&I 5 YR+: CPT

## 2024-10-08 PROCEDURE — C1751 CATH, INF, PER/CENT/MIDLINE: HCPCS

## 2024-10-08 PROCEDURE — 83735 ASSAY OF MAGNESIUM: CPT

## 2024-10-08 PROCEDURE — 6360000002 HC RX W HCPCS

## 2024-10-08 RX ORDER — LIDOCAINE HYDROCHLORIDE 20 MG/ML
5 INJECTION, SOLUTION INFILTRATION; PERINEURAL ONCE
Status: COMPLETED | OUTPATIENT
Start: 2024-10-08 | End: 2024-10-08

## 2024-10-08 RX ORDER — SODIUM CHLORIDE 0.9 % (FLUSH) 0.9 %
5-40 SYRINGE (ML) INJECTION EVERY 12 HOURS SCHEDULED
Status: DISCONTINUED | OUTPATIENT
Start: 2024-10-08 | End: 2024-10-16 | Stop reason: HOSPADM

## 2024-10-08 RX ORDER — SODIUM CHLORIDE 0.9 % (FLUSH) 0.9 %
5-40 SYRINGE (ML) INJECTION PRN
Status: DISCONTINUED | OUTPATIENT
Start: 2024-10-08 | End: 2024-10-16 | Stop reason: HOSPADM

## 2024-10-08 RX ORDER — POTASSIUM CHLORIDE 7.45 MG/ML
10 INJECTION INTRAVENOUS
Status: COMPLETED | OUTPATIENT
Start: 2024-10-08 | End: 2024-10-08

## 2024-10-08 RX ORDER — SODIUM CHLORIDE 9 MG/ML
INJECTION, SOLUTION INTRAVENOUS PRN
Status: DISCONTINUED | OUTPATIENT
Start: 2024-10-08 | End: 2024-10-16 | Stop reason: HOSPADM

## 2024-10-08 RX ORDER — LIDOCAINE HYDROCHLORIDE 10 MG/ML
50 INJECTION, SOLUTION EPIDURAL; INFILTRATION; INTRACAUDAL; PERINEURAL ONCE
Status: DISCONTINUED | OUTPATIENT
Start: 2024-10-08 | End: 2024-10-08

## 2024-10-08 RX ORDER — PROCHLORPERAZINE EDISYLATE 5 MG/ML
10 INJECTION INTRAMUSCULAR; INTRAVENOUS EVERY 6 HOURS PRN
Status: DISCONTINUED | OUTPATIENT
Start: 2024-10-08 | End: 2024-10-16 | Stop reason: HOSPADM

## 2024-10-08 RX ADMIN — ENOXAPARIN SODIUM 40 MG: 100 INJECTION SUBCUTANEOUS at 11:22

## 2024-10-08 RX ADMIN — Medication 10 ML: at 21:57

## 2024-10-08 RX ADMIN — POTASSIUM CHLORIDE 10 MEQ: 10 INJECTION, SOLUTION INTRAVENOUS at 12:01

## 2024-10-08 RX ADMIN — PIPERACILLIN AND TAZOBACTAM 3375 MG: 3; .375 INJECTION, POWDER, LYOPHILIZED, FOR SOLUTION INTRAVENOUS at 21:56

## 2024-10-08 RX ADMIN — AMMONIUM LACTATE: 17 LOTION TOPICAL at 21:58

## 2024-10-08 RX ADMIN — POTASSIUM CHLORIDE, DEXTROSE MONOHYDRATE AND SODIUM CHLORIDE: 150; 5; 450 INJECTION, SOLUTION INTRAVENOUS at 03:44

## 2024-10-08 RX ADMIN — Medication 10 ML: at 10:50

## 2024-10-08 RX ADMIN — PIPERACILLIN AND TAZOBACTAM 4500 MG: 4; .5 INJECTION, POWDER, FOR SOLUTION INTRAVENOUS at 04:27

## 2024-10-08 RX ADMIN — PANTOPRAZOLE SODIUM 40 MG: 40 INJECTION, POWDER, FOR SOLUTION INTRAVENOUS at 21:50

## 2024-10-08 RX ADMIN — POTASSIUM CHLORIDE 10 MEQ: 10 INJECTION, SOLUTION INTRAVENOUS at 13:53

## 2024-10-08 RX ADMIN — POTASSIUM CHLORIDE 10 MEQ: 10 INJECTION, SOLUTION INTRAVENOUS at 12:53

## 2024-10-08 RX ADMIN — POTASSIUM CHLORIDE 10 MEQ: 10 INJECTION, SOLUTION INTRAVENOUS at 10:49

## 2024-10-08 RX ADMIN — PIPERACILLIN AND TAZOBACTAM 3375 MG: 3; .375 INJECTION, POWDER, LYOPHILIZED, FOR SOLUTION INTRAVENOUS at 13:19

## 2024-10-08 RX ADMIN — INSULIN LISPRO 2 UNITS: 100 INJECTION, SOLUTION INTRAVENOUS; SUBCUTANEOUS at 21:52

## 2024-10-08 RX ADMIN — INSULIN LISPRO 2 UNITS: 100 INJECTION, SOLUTION INTRAVENOUS; SUBCUTANEOUS at 04:30

## 2024-10-08 RX ADMIN — SODIUM CHLORIDE: 9 INJECTION, SOLUTION INTRAVENOUS at 09:52

## 2024-10-08 RX ADMIN — PROCHLORPERAZINE EDISYLATE 10 MG: 5 INJECTION INTRAMUSCULAR; INTRAVENOUS at 01:19

## 2024-10-08 RX ADMIN — LIDOCAINE HYDROCHLORIDE 5 ML: 20 INJECTION, SOLUTION INFILTRATION; PERINEURAL at 09:51

## 2024-10-08 RX ADMIN — Medication 10 ML: at 21:58

## 2024-10-08 ASSESSMENT — PAIN DESCRIPTION - RADICULAR PAIN: RADICULAR_PAIN: ABSENT

## 2024-10-08 ASSESSMENT — PAIN SCALES - GENERAL: PAINLEVEL_OUTOF10: 0

## 2024-10-08 NOTE — CONSULTS
[facility]                   [address] [place] [address]                              CONSULTATION      PATIENT NAME: BJORN DOUGLAS            : 1945  MED REC NO: [id number]                        ROOM: [id number]  ACCOUNT NO: [id number]                       ADMIT DATE: 10/03/2024  PROVIDER: Karsten Contreras MD    ENDOCRINE CONSULT    CONSULT DATE: 10/07/2024    REFERRING PHYSICIAN:  Alejandro Agrawal MD      REASON FOR CONSULT:  Management of uncontrolled diabetes, recent hypoglycemia.    CHIEF COMPLAINT/HISTORY OF PRESENT ILLNESS:  The patient is an 79-year-old female who was admitted to [facility] because of altered mental status due to hypoglycemia, glucose was 42; known history of diabetes; chronic kidney disease; history of breast cancer; history of radiation therapy; presenting with hypoglycemia, altered mental status; was also found to have cellulitis, left lower leg infection, was following up with wound care.  Initial admitting diagnosis was possible sepsis with hypoglycemia.  The patient does take Lantus 14 units at night, NovoLog 18 units with each meals.  Blood sugars initially were low but starting to improve.  The patient is seen by General Surgery for small bowel obstruction.  The patient is still not eating.  Blood sugars have been improving.  Chemistries were reviewed from today; sodium 139, potassium 3.6, chloride 104, CO2 of 23, BUN 10, creatinine 0.78.  Hemoglobin A1c was 9.4.  The patient was put on Lantus by Endocrinology at 10 units in the morning.  She is also on Humalog coverage; started on IV antibiotics, Zosyn; getting D5 normal saline at 75 mL/hour.  Started on clear liquid diet.  Possible surgery for exploratory laparotomy tomorrow.    PAST MEDICAL HISTORY:  Significant for type 2 diabetes, breast cancer, history of radiation, hyperlipidemia, hypertension.    SURGICAL HISTORY:  Appendectomy, breast lumpectomy, carpal tunnel  MD      D:  10/07/2024 13:17:36     T:  10/07/2024 17:41:36     JASON/IVETTE  Job #:  [id number]     Doc#:  [id number]

## 2024-10-08 NOTE — FLOWSHEET NOTE
Patient arrived to specials via cart. Alert to self only. Unable to provide birthday, current year or understanding of why she is in radiology. Telephone consent obtained by RADHA RN from patient's .     NG to LIS while in specials for PICC. 200cc green fluid total drained while in radiology department. Electronically signed by Sanna Gallagher RN on 10/8/2024 at 10:12 AM

## 2024-10-08 NOTE — PROGRESS NOTES
Physical Therapy Med Surg Daily Treatment Note  Facility/Department: [address] TELEMETRY  Room: [place] [address]       NAME: Catherine Inman  : 1945 (79 y.o.)  MRN: [id number]  CODE STATUS: Full Code    Date of Service: 10/8/2024    Patient Diagnosis(es): Shock [R57.9]  Hypoglycemia [E16.2]  Septicemia (HCC) [A41.9]  THAIS (acute kidney injury) (HCC) [N17.9]  Hypothermia, initial encounter [T68.XXXA]  Hypotension, unspecified hypotension type [I95.9]  Altered mental status, unspecified altered mental status type [R41.82]   Chief Complaint   Patient presents with    Altered Mental Status     EMS was called for AMS that started this AM, blood glucose on their arrival was 42     Patient Active Problem List    Diagnosis Date Noted    Chronic hepatitis, unspecified (HCC) 2023    Abnormal liver enzymes 2022    Elevated LFTs 2022    CORDOVA (nonalcoholic steatohepatitis) 2022    Hypoglycemia associated with type 2 diabetes mellitus (HCC) 10/07/2024    Altered mental status 10/04/2024    Shock 10/03/2024    MRSA (methicillin resistant Staphylococcus aureus) infection 2024    Acute lower UTI 09/10/2024    Peripheral venous insufficiency 04/15/2024    Choking 2023    Pharyngoesophageal dysphagia 2023    Stage 3a chronic kidney disease (HCC) 2022    Type 2 diabetes mellitus with hyperglycemia 2021    Hypertension, essential 2020    Controlled type 2 diabetes mellitus with chronic kidney disease (HCC) 2020    Meningioma, cerebral (HCC) 2020    Malignant neoplasm of female breast (HCC) 2006        Past Medical History:   Diagnosis Date    Breast cancer (HCC)     right (16-17 yrs ago)    Cancer (HCC)     Breast    Diabetes mellitus (HCC)     History of therapeutic radiation     Hyperlipidemia     Hypertension      Past Surgical History:   Procedure Laterality Date    APPENDECTOMY      BREAST BIOPSY Right     malignant (16-17 yrs ago)    BREAST LUMPECTOMY

## 2024-10-08 NOTE — PROGRESS NOTES
GENERAL SURGERY  PROGRESS NOTE    Pt Name: Catherine Inman  MRN: [id number]  Date: 10/8/2024    Subjective  Overnight, pt with emesis and finally got NGT placed early this morning. Lost IV access and had PICC line placed. Afebrile, mild tachycardia and SpO2 91%. Today, denies any abdominal pain, nausea/ vomiting. Not sure if she is passing flatus. Had BM last evening. Working with PT but per family at bedside, not getting OOB.     Vitals  /83   Pulse (!) 101   Temp 98.1 °F (36.7 °C) (Oral)   Resp 18   Ht 1.473 m (4' 10\")   Wt 59.4 kg (130 lb 15.3 oz)   SpO2 (!) 89%   BMI 27.37 kg/m²      Physical Exam  GEN: A&Ox2 (thought it was 2021), NAD, cooperative   HENT: NGT in place to LIWS with dark bilious output  PULM: no increased work of breathing, on RA  CV: regular rate  ABD: Soft, NTND, incision c/d/I with staples, mild ecchymoses at umbilicus, no surrounding erythema, abdominal binder in place (see photo below)  EXT: Warm, dry, ACE wraps in place        Intake/ Output    Intake/Output Summary (Last 24 hours) at 10/8/2024 1047  Last data filed at 10/8/2024 1012  Gross per 24 hour   Intake 2890.02 ml   Output 1750 ml   Net 1140.02 ml     Date 10/08/24 0000 - 10/08/24 2359   Shift 3980-8489 1199-0754 8650-8184 24 Hour Total   INTAKE   I.V.(mL/kg) 2367.5(39.9)   2367.5(39.9)   IV Piggyback(mL/kg) 522.6(8.8)   522.6(8.8)   Shift Total(mL/kg) 2890(48.7)   2890(48.7)   OUTPUT   Urine(mL/kg/hr) 700(1.5)   700   Emesis/NG output(mL/kg)  200(3.4)  200(3.4)   Shift Total(mL/kg) 700(11.8) 200(3.4)  900(15.2)   Weight (kg) 59.4 59.4 59.4 59.4     Labs  Recent Labs     10/06/24  0530 10/07/24  0643 10/08/24  0533   WBC 12.8* 11.1* 11.3*   HGB 11.2* 11.1* 12.5   HCT 33.6* 33.4* 37.1    174 219    139 136   K 3.6 3.6 3.3*    104 97   CO2 25 23 29   BUN 13 10 10   CREATININE 0.90 0.78 0.71   MG 1.8 2.2 2.1   CALCIUM 8.2* 8.0* 8.2*     Assessment/ Plan  79F with PMH of HTN, HLD, right breast cancer

## 2024-10-08 NOTE — PROGRESS NOTES
Call placed to [facility] for speciality mattress that was ordered yesterday and not delivered. Urgent request was placed with [facility]'s customer service.   Electronically signed by Karolyn Odom RN on 10/8/2024 at 3:58 PM

## 2024-10-08 NOTE — PROGRESS NOTES
Narritive Note  0400 Patient very restless throughout night had had multiple episodes of bright green emesis since 8pm. Pt given Zofran and compazine neither has helped relieve symptoms. Pt given ginger ale  also which did not help symptoms. Message sent to surgery to update on Pt condition.    0500  NG placed, pt tolerated well  pt had another episode of vomiting. Surgeon made aware, waiting for xray placement verification to connect suction     0600 NG connected suction at low int suction, dark green fluid noticed

## 2024-10-08 NOTE — CARE COORDINATION
Patient off the floor for picc placement. Plan remains KO at discharge. Pre cert was started 10/7 and is still pending at this time. NG placed last night.      Notified by [facility] admissions that patient's pre cert was approved through 10/10.

## 2024-10-08 NOTE — PROGRESS NOTES
Diabetes Education : Multiple attempts to see patient for consult r/t labile blood sugars, began visit , her daughter Claudette was present , patient's personal care needs took priority. Met with claudette outside of room, she reports her mom is confused, family manages her meds . I discussed with Claudette role of diabetes education and we will try to meet when  is here which is generally early am per Claudette. I gave her survival skills packet and our brochure and informed outpatient diabetes education may be an option .

## 2024-10-08 NOTE — FLOWSHEET NOTE
Pt removed her NG tube.  Pt reports that she didn't want it anymore and that it has been bothering her all day, so she removed it.  Message send to Dr. Donaldson.

## 2024-10-08 NOTE — PROGRESS NOTES
Endocrinology Progress Note    Assessment and Plan:   Assessment-  Type 2 insulin-dependent diabetes  Small bowel obstruction  Hyperglycemia    Plan-  Continue Lantus 10 units mornings  Continue medium dose sliding scale coverage  Monitor glycemic control closely, avoid hypoglycemia    POC Glucose:   Recent Labs     10/07/24  1613 10/07/24  1957 10/07/24  2320 10/08/24  0424 10/08/24  1052   POCGLU 190* 195* 243* 204* 188*     HGBA1C:  Lab Results   Component Value Date    LABA1C 9.4 (H) 10/03/2024    LABA1C 9.0 (H) 08/19/2024    LABA1C 10.6 (H) 04/11/2024     CBC:   Recent Labs     10/07/24  0643 10/08/24  0533   WBC 11.1* 11.3*   HGB 11.1* 12.5    219     CMP:    Lab Results   Component Value Date     10/08/2024    K 3.9 10/08/2024    CL 98 10/08/2024    CO2 27 10/08/2024    BUN 10 10/08/2024    CREATININE 0.82 10/08/2024    GLUCOSE 187 (H) 10/08/2024    CALCIUM 8.3 (L) 10/08/2024    BILITOT 0.3 10/03/2024    ALKPHOS 200 (H) 10/03/2024    AST 55 (H) 10/03/2024    ALT 42 (H) 10/03/2024    LABGLOM 72.0 10/08/2024    GFRAA >60.0 08/24/2022    GLOB 3.9 (H) 10/03/2024       Lab Results   Component Value Date    TSH 1.500 09/12/2024     No results found for: \"TPOABS\"      CC:   Chief Complaint   Patient presents with    Altered Mental Status     EMS was called for AMS that started this AM, blood glucose on their arrival was 42       Subjective:   Interval History: Patient is a 79-year-old type 2 insulin-dependent diabetic female presented to the ER with AMS, diagnosed with E. coli UTI, hypotensive and was admitted to the ICU.  She also had nausea vomiting and abdominal pain, CT of the abdomen showed a small bowel obstruction.  She underwent emergent exploratory lap laparotomy, lysis of adhesions and abdominal wall mass biopsy.  She is currently on the medical floor, n.p.o., NG tube in place.  Glycemic control has improved, insulin dosing adjustments are being made daily.    Review of systems: tommyies

## 2024-10-09 PROBLEM — R91.1 COIN LESION: Status: ACTIVE | Noted: 2024-10-09

## 2024-10-09 PROBLEM — M79.673 PAIN OF FOOT: Status: ACTIVE | Noted: 2024-10-09

## 2024-10-09 PROBLEM — R91.1 LUNG NODULE: Status: ACTIVE | Noted: 2020-09-09

## 2024-10-09 PROBLEM — E78.5 HYPERLIPIDEMIA, UNSPECIFIED: Status: ACTIVE | Noted: 2024-09-13

## 2024-10-09 PROBLEM — R79.89 LOW VITAMIN B12 LEVEL: Status: ACTIVE | Noted: 2022-08-01

## 2024-10-09 PROBLEM — E66.812 CLASS 2 SEVERE OBESITY WITH SERIOUS COMORBIDITY AND BODY MASS INDEX (BMI) OF 35.0 TO 35.9 IN ADULT: Status: ACTIVE | Noted: 2024-04-29

## 2024-10-09 PROBLEM — E66.01 CLASS 2 SEVERE OBESITY WITH SERIOUS COMORBIDITY AND BODY MASS INDEX (BMI) OF 35.0 TO 35.9 IN ADULT: Status: ACTIVE | Noted: 2024-04-29

## 2024-10-09 PROBLEM — S90.30XA CONTUSION OF FOOT: Status: ACTIVE | Noted: 2023-08-14

## 2024-10-09 PROBLEM — Z91.81 HISTORY OF FALLING: Status: ACTIVE | Noted: 2024-09-13

## 2024-10-09 PROBLEM — R60.0 LEG EDEMA: Status: ACTIVE | Noted: 2023-12-20

## 2024-10-09 PROBLEM — R25.1 TREMOR: Status: ACTIVE | Noted: 2023-07-31

## 2024-10-09 PROBLEM — F17.200 SMOKER UNMOTIVATED TO QUIT: Status: ACTIVE | Noted: 2023-12-22

## 2024-10-09 PROBLEM — S22.32XD FRACTURE OF ONE RIB, LEFT SIDE, SUBSEQUENT ENCOUNTER FOR FRACTURE WITH ROUTINE HEALING: Status: ACTIVE | Noted: 2024-09-13

## 2024-10-09 PROBLEM — B38.1: Status: ACTIVE | Noted: 2024-10-09

## 2024-10-09 PROBLEM — N18.9 CHRONIC KIDNEY DISEASE (CKD): Status: ACTIVE | Noted: 2023-12-20

## 2024-10-09 PROBLEM — Z79.899 MEDICATION COURSE CHANGED: Status: ACTIVE | Noted: 2023-12-22

## 2024-10-09 LAB
ANION GAP SERPL CALCULATED.3IONS-SCNC: 11 MEQ/L (ref 9–15)
BUN SERPL-MCNC: 11 MG/DL (ref 8–23)
CALCIUM SERPL-MCNC: 8.3 MG/DL (ref 8.5–9.9)
CHLORIDE SERPL-SCNC: 99 MEQ/L (ref 95–107)
CO2 SERPL-SCNC: 29 MEQ/L (ref 20–31)
CREAT SERPL-MCNC: 0.85 MG/DL (ref 0.5–0.9)
ERYTHROCYTE [DISTWIDTH] IN BLOOD BY AUTOMATED COUNT: 13.7 % (ref 11.5–14.5)
GLUCOSE BLD-MCNC: 109 MG/DL (ref 70–99)
GLUCOSE BLD-MCNC: 135 MG/DL (ref 70–99)
GLUCOSE BLD-MCNC: 152 MG/DL (ref 70–99)
GLUCOSE BLD-MCNC: 206 MG/DL (ref 70–99)
GLUCOSE BLD-MCNC: 206 MG/DL (ref 70–99)
GLUCOSE BLD-MCNC: 208 MG/DL (ref 70–99)
GLUCOSE BLD-MCNC: 208 MG/DL (ref 70–99)
GLUCOSE SERPL-MCNC: 186 MG/DL (ref 70–99)
HCT VFR BLD AUTO: 33.5 % (ref 37–47)
HGB BLD-MCNC: 11.3 G/DL (ref 12–16)
MAGNESIUM SERPL-MCNC: 2.2 MG/DL (ref 1.7–2.4)
MCH RBC QN AUTO: 32 PG (ref 27–31.3)
MCHC RBC AUTO-ENTMCNC: 33.7 % (ref 33–37)
MCV RBC AUTO: 94.9 FL (ref 79.4–94.8)
PERFORMED ON: ABNORMAL
PLATELET # BLD AUTO: 199 K/UL (ref 130–400)
POTASSIUM SERPL-SCNC: 3.3 MEQ/L (ref 3.4–4.9)
RBC # BLD AUTO: 3.53 M/UL (ref 4.2–5.4)
SODIUM SERPL-SCNC: 139 MEQ/L (ref 135–144)
WBC # BLD AUTO: 14.4 K/UL (ref 4.8–10.8)

## 2024-10-09 PROCEDURE — 85027 COMPLETE CBC AUTOMATED: CPT

## 2024-10-09 PROCEDURE — 2060000000 HC ICU INTERMEDIATE R&B

## 2024-10-09 PROCEDURE — 97116 GAIT TRAINING THERAPY: CPT

## 2024-10-09 PROCEDURE — 2580000003 HC RX 258: Performed by: SURGERY

## 2024-10-09 PROCEDURE — 6370000000 HC RX 637 (ALT 250 FOR IP): Performed by: STUDENT IN AN ORGANIZED HEALTH CARE EDUCATION/TRAINING PROGRAM

## 2024-10-09 PROCEDURE — 2580000003 HC RX 258: Performed by: INTERNAL MEDICINE

## 2024-10-09 PROCEDURE — 97535 SELF CARE MNGMENT TRAINING: CPT

## 2024-10-09 PROCEDURE — 2500000003 HC RX 250 WO HCPCS: Performed by: SURGERY

## 2024-10-09 PROCEDURE — 6360000002 HC RX W HCPCS: Performed by: SURGERY

## 2024-10-09 PROCEDURE — 80048 BASIC METABOLIC PNL TOTAL CA: CPT

## 2024-10-09 PROCEDURE — 97168 OT RE-EVAL EST PLAN CARE: CPT

## 2024-10-09 PROCEDURE — 6370000000 HC RX 637 (ALT 250 FOR IP): Performed by: SURGERY

## 2024-10-09 PROCEDURE — 83735 ASSAY OF MAGNESIUM: CPT

## 2024-10-09 RX ADMIN — Medication 10 ML: at 21:08

## 2024-10-09 RX ADMIN — Medication 10 ML: at 08:35

## 2024-10-09 RX ADMIN — PIPERACILLIN AND TAZOBACTAM 3375 MG: 3; .375 INJECTION, POWDER, LYOPHILIZED, FOR SOLUTION INTRAVENOUS at 06:37

## 2024-10-09 RX ADMIN — PIPERACILLIN AND TAZOBACTAM 3375 MG: 3; .375 INJECTION, POWDER, LYOPHILIZED, FOR SOLUTION INTRAVENOUS at 21:09

## 2024-10-09 RX ADMIN — INSULIN LISPRO 2 UNITS: 100 INJECTION, SOLUTION INTRAVENOUS; SUBCUTANEOUS at 17:39

## 2024-10-09 RX ADMIN — POTASSIUM CHLORIDE, DEXTROSE MONOHYDRATE AND SODIUM CHLORIDE: 150; 5; 450 INJECTION, SOLUTION INTRAVENOUS at 11:10

## 2024-10-09 RX ADMIN — PIPERACILLIN AND TAZOBACTAM 3375 MG: 3; .375 INJECTION, POWDER, LYOPHILIZED, FOR SOLUTION INTRAVENOUS at 12:03

## 2024-10-09 RX ADMIN — PANTOPRAZOLE SODIUM 40 MG: 40 INJECTION, POWDER, FOR SOLUTION INTRAVENOUS at 08:35

## 2024-10-09 RX ADMIN — INSULIN LISPRO 2 UNITS: 100 INJECTION, SOLUTION INTRAVENOUS; SUBCUTANEOUS at 12:05

## 2024-10-09 RX ADMIN — Medication 10 ML: at 21:07

## 2024-10-09 RX ADMIN — PRIMIDONE 50 MG: 50 TABLET ORAL at 21:07

## 2024-10-09 RX ADMIN — ENOXAPARIN SODIUM 40 MG: 100 INJECTION SUBCUTANEOUS at 08:35

## 2024-10-09 RX ADMIN — INSULIN GLARGINE 10 UNITS: 100 INJECTION, SOLUTION SUBCUTANEOUS at 08:36

## 2024-10-09 RX ADMIN — ATORVASTATIN CALCIUM 40 MG: 40 TABLET, FILM COATED ORAL at 21:07

## 2024-10-09 RX ADMIN — INSULIN LISPRO 2 UNITS: 100 INJECTION, SOLUTION INTRAVENOUS; SUBCUTANEOUS at 22:17

## 2024-10-09 RX ADMIN — AMMONIUM LACTATE: 17 LOTION TOPICAL at 21:21

## 2024-10-09 RX ADMIN — AMMONIUM LACTATE: 17 LOTION TOPICAL at 08:36

## 2024-10-09 RX ADMIN — INSULIN LISPRO 2 UNITS: 100 INJECTION, SOLUTION INTRAVENOUS; SUBCUTANEOUS at 00:12

## 2024-10-09 ASSESSMENT — PAIN SCALES - WONG BAKER
WONGBAKER_NUMERICALRESPONSE: NO HURT

## 2024-10-09 ASSESSMENT — PAIN SCALES - GENERAL
PAINLEVEL_OUTOF10: 0

## 2024-10-09 NOTE — PROGRESS NOTES
(HCC) 01/25/2022    Type 2 diabetes mellitus with hyperglycemia 06/14/2021    Hypertension, essential 09/09/2020    Controlled type 2 diabetes mellitus with chronic kidney disease (HCC) 09/09/2020    Meningioma, cerebral (HCC) 09/09/2020    Lung nodule 09/09/2020    Malignant neoplasm of female breast (HCC) 06/22/2006        Past Medical History:   Diagnosis Date    Breast cancer (HCC)     right (16-17 yrs ago)    Cancer (HCC)     Breast    Diabetes mellitus (HCC)     History of therapeutic radiation     Hyperlipidemia     Hypertension      Past Surgical History:   Procedure Laterality Date    APPENDECTOMY      BREAST BIOPSY Right     malignant (16-17 yrs ago)    BREAST LUMPECTOMY Right     malignant    CARPAL TUNNEL RELEASE Left     CT NEEDLE BIOPSY LIVER PERCUTANEOUS Right 08/01/2022    Performed by Dr. Machado - diagnostics sent    CT NEEDLE BIOPSY LIVER PERCUTANEOUS  8/1/2022    CT NEEDLE BIOPSY LIVER PERCUTANEOUS [place]20[place] [facility] [place]    HERNIA REPAIR      Umbilical    HYSTERECTOMY (CERVIX STATUS UNKNOWN)      total but left part of one ovary    LAPAROTOMY N/A 10/4/2024    EXPLORATORY LAPAROTOMY lysis of adhesions performed by Noemi Donaldson MD at [facility] [place]    OVARY REMOVAL      left part of one ovary    TONSILLECTOMY         Patient assessed for rehabilitation services?: Yes    Restrictions:  Restrictions/Precautions: Up as Tolerated;Fall Risk;Contact Precautions    SUBJECTIVE:   Subjective: Pt reported fatigue and wanting to stay in bed.  Pt only able to state first name and struggled with birthday: nursing made aware.    Pain  Pain: denies pain pre/post    OBJECTIVE:        Bed mobility  Supine to Sit: Minimal assistance;Moderate assistance  Sit to Supine: Moderate assistance  Scooting: Moderate assistance;Maximal assistance    Transfers  Sit to Stand: Minimal Assistance;Moderate Assistance  Stand to Sit: Minimal Assistance;Moderate Assistance  Bed to Chair: Minimal assistance  Comment: Nsg also provided

## 2024-10-09 NOTE — FLOWSHEET NOTE
Shift summary:    1215 Dr. Wen at bedside. Updated on pt getting up to chair with therapy today and she was incontinent of bowels x2. Physician stated to clamp NG tube for 4 hours and check a residual. If residual is less than 200 ml NG tube can be removed.     1630 NG tube removed. Pt tolerated it well.       Electronically signed by Yessenia Talavera RN on 10/9/2024 at 7:01 PM

## 2024-10-09 NOTE — PROGRESS NOTES
[facility] - ACUTE     NAME: Catherine Inman  : 1945 (79 y.o.)  MRN: [id number]  CODE STATUS: Full Code  Room: [facility]    Date of Service: 10/9/2024    Patient Diagnosis(es): Shock [R57.9]  Hypoglycemia [E16.2]  Septicemia (HCC) [A41.9]  THAIS (acute kidney injury) (HCC) [N17.9]  Hypothermia, initial encounter [T68.XXXA]  Hypotension, unspecified hypotension type [I95.9]  Altered mental status, unspecified altered mental status type [R41.82]   Patient Active Problem List    Diagnosis Date Noted    Chronic hepatitis, unspecified (HCC) 2023    Abnormal liver enzymes 2022    Elevated LFTs 2022    CORDOVA (nonalcoholic steatohepatitis) 2022    Hypoglycemia 10/08/2024    Hypoglycemia associated with type 2 diabetes mellitus (HCC) 10/07/2024    Altered mental status 10/04/2024    Shock 10/03/2024    MRSA (methicillin resistant Staphylococcus aureus) infection 2024    Acute lower UTI 09/10/2024    Peripheral venous insufficiency 04/15/2024    Choking 2023    Pharyngoesophageal dysphagia 2023    Stage 3a chronic kidney disease (HCC) 2022    Type 2 diabetes mellitus with hyperglycemia 2021    Hypertension, essential 2020    Controlled type 2 diabetes mellitus with chronic kidney disease (HCC) 2020    Meningioma, cerebral (HCC) 2020    Malignant neoplasm of female breast (HCC) 2006        Past Medical History:   Diagnosis Date    Breast cancer (HCC)     right (16-17 yrs ago)    Cancer (HCC)     Breast    Diabetes mellitus (HCC)     History of therapeutic radiation     Hyperlipidemia     Hypertension      Past Surgical History:   Procedure Laterality Date    APPENDECTOMY      BREAST BIOPSY Right     malignant (16-17 yrs ago)    BREAST LUMPECTOMY Right     malignant    CARPAL TUNNEL RELEASE Left     CT NEEDLE BIOPSY LIVER PERCUTANEOUS Right 2022    Performed by Dr. Machado - diagnostics sent    CT NEEDLE

## 2024-10-09 NOTE — PROGRESS NOTES
Diabetes Education : Spoke with patient and  her grandtr to arrange teaching with  and daughters if available. He is off on Mondays, they say to call him. Call to Mr Jeff Inman , he agrees to meet 10/14 at 10am if wife still inpatient, notified daughter July as family requested.

## 2024-10-09 NOTE — CARE COORDINATION
Quality round completed with care management team. Plan remain to [facility]. Auth approved till 10/10/2024.   Pending medical clearance at this time.     Electronically signed by FELIPA Schultz on 10/9/2024 at 9:46 AM

## 2024-10-09 NOTE — PROGRESS NOTES
Hospitalist Progress Note      Date of Admission: 10/3/2024  Chief Complaint:    Chief Complaint   Patient presents with    Altered Mental Status     EMS was called for AMS that started this AM, blood glucose on their arrival was 42     Subjective:  No new complaints.  No nausea, vomiting, chest pain, or headache      Medications:    Infusion Medications    sodium chloride Stopped (10/08/24 0953)    dextrose 5% and 0.45% NaCl with KCl 20 mEq 75 mL/hr at 10/08/24 0344    sodium chloride      dextrose       Scheduled Medications    sodium chloride flush  5-40 mL IntraVENous 2 times per day    piperacillin-tazobactam  3,375 mg IntraVENous Q8H    pantoprazole (PROTONIX) 40 mg in sodium chloride (PF) 0.9 % 10 mL injection  40 mg IntraVENous Daily    insulin lispro  0-8 Units SubCUTAneous Q4H    insulin glargine  10 Units SubCUTAneous QAM    enoxaparin  40 mg SubCUTAneous Daily    [Held by provider] amLODIPine  5 mg Oral Daily    atorvastatin  40 mg Oral QHS    primidone  50 mg Oral BID    ammonium lactate   Topical BID    sodium chloride flush  5-40 mL IntraVENous 2 times per day     PRN Meds: prochlorperazine, sodium chloride flush, sodium chloride, phenol, morphine, aluminum & magnesium hydroxide-simethicone, guaiFENesin-codeine, magnesium hydroxide, sodium chloride flush, sodium chloride, acetaminophen **OR** acetaminophen, glucose, dextrose bolus **OR** dextrose bolus, glucagon (rDNA), dextrose    Intake/Output Summary (Last 24 hours) at 10/8/2024 2258  Last data filed at 10/8/2024 1012  Gross per 24 hour   Intake 2890.02 ml   Output 900 ml   Net 1990.02 ml     Exam:  /72   Pulse (!) 103   Temp 98.1 °F (36.7 °C) (Oral)   Resp 18   Ht 1.473 m (4' 10\")   Wt 59.4 kg (130 lb 15.3 oz)   SpO2 91%   BMI 27.37 kg/m²   Head: Normocephalic, atraumatic  Sclera clear  Neck JVD flat  Lungs: normal effort of breathing    Labs:   Recent Labs     10/06/24  0530 10/07/24  0643 10/08/24  0533   WBC 12.8* 11.1* 11.3*   HGB  11.2* 11.1* 12.5   HCT 33.6* 33.4* 37.1    174 219     Recent Labs     10/07/24  0643 10/08/24  0533 10/08/24  1208    136 137   K 3.6 3.3* 3.9    97 98   CO2 23 29 27   BUN 10 10 10   CREATININE 0.78 0.71 0.82   CALCIUM 8.0* 8.2* 8.3*     No results for input(s): \"INR\" in the last 72 hours.  No results for input(s): \"CKTOTAL\", \"TROPONINI\" in the last 72 hours.  Radiology:  XR CHEST ABDOMEN NG PLACEMENT   Final Result   Left-sided PICC line tip in the SVC.  No pneumothorax.         XR ABDOMEN (KUB) (SINGLE AP VIEW)   Final Result   1. NG tube tip in the proximal stomach with the side port near the GE   junction.  This could be advanced slightly for better tube position.   2. Nonspecific bowel distension as noted above.  Moderate amount of fecal   material in the rectum.         XR CHEST ABDOMEN NG PLACEMENT   Final Result   1. NG tube tip in the stomach.   2. Cardiomegaly with mild vascular congestion.         Vascular duplex lower extremity venous bilateral   Final Result   No evidence of DVT in either lower extremity given the slight limitations   described.         XR CHEST ABDOMEN NG PLACEMENT   Final Result   1. NG tube tip in the stomach, however, the side port is noted at the level   of the GE junction. The tube should be repositioned.   2. Nonobstructive bowel gas pattern.         CT ABDOMEN PELVIS W IV CONTRAST Additional Contrast? Radiologist Recommendation   Final Result   1. Small bowel obstruction with transition point in the left central abdomen.   Advanced interloop fluid and inflammation are identified.   2. Moderate cardiomegaly with small to moderate bilateral pleural effusions   and atelectasis.   3. Slightly nodular contour of the liver.   4. Bilateral adrenal gland hyperplasia.   The findings were sent to the Radiology Results Communication Center at 2:00   pm on 10/4/2024 to be communicated to a licensed caregiver.         XR CHEST PORTABLE   Final Result   No acute process.

## 2024-10-09 NOTE — PROGRESS NOTES
GENERAL SURGERY  PROGRESS NOTE    Pt Name: Catherine Inman  MRN: [id number]  Date: 10/9/2024    Subjective  CHEL overnight. Afebrile, VSS, satting well. Pt doing well, reports pain is controlled. No nausea/ vomiting. Flatus, BM. Ambulating.    Vitals  /69   Pulse 79   Temp 98.6 °F (37 °C) (Oral)   Resp 18   Ht 1.473 m (4' 10\")   Wt 74 kg (163 lb 2.3 oz)   SpO2 95%   BMI 34.10 kg/m²      Physical Exam  GEN: A&Ox3, NAD, cooperative   PULM: no increased work of breathing, satting well  CV: regular rate  ABD: Soft, NTND, incision clean, dry and intact  EXT: Warm, dry, no lower extremity edema    Intake/ Output  No intake or output data in the 24 hours ending 10/09/24 1557      Labs  Recent Labs     10/07/24  0643 10/08/24  0533 10/08/24  1208 10/09/24  1006   WBC 11.1* 11.3*  --  14.4*   HGB 11.1* 12.5  --  11.3*   HCT 33.4* 37.1  --  33.5*    219  --  199    136 137 139   K 3.6 3.3* 3.9 3.3*    97 98 99   CO2 23 29 27 29   BUN 10 10 10 11   CREATININE 0.78 0.71 0.82 0.85   MG 2.2 2.1  --  2.2   CALCIUM 8.0* 8.2* 8.3* 8.3*         Assessment/ Plan    79F with PMH of HTN, HLD, right breast cancer (s/p radiation), CORDOVA, CKD3, DM, MRSA cellulitis, and E coli UTI admitted for AMS and found to have SBO on CT AP 10/4. Now POD 5 s/p ex lap, LIZBETH, washout, and abdominal wall mass biopsy. Had BM today      Pain control: prn morphine, ice  Prn antiemetic  Encourage IS x10/hour while awake  NPO, Clamp NGT for 4 hours if residual is less than 200 cc d/c NG tube  Replete K, holding bowel reg.   Abdominal binder  Strict I&Os  h/o DM: accu checks, SSI   Continue zosyn  Encourage ambulation, OOB x3  SCDs, lovenox  PT/OT        Roxana Wen MD   General surgeon  10/9/2024

## 2024-10-10 LAB
GLUCOSE BLD-MCNC: 188 MG/DL (ref 70–99)
GLUCOSE BLD-MCNC: 216 MG/DL (ref 70–99)
GLUCOSE BLD-MCNC: 222 MG/DL (ref 70–99)
GLUCOSE BLD-MCNC: 241 MG/DL (ref 70–99)
GLUCOSE BLD-MCNC: 331 MG/DL (ref 70–99)
MAGNESIUM SERPL-MCNC: 1.9 MG/DL (ref 1.7–2.4)
PERFORMED ON: ABNORMAL

## 2024-10-10 PROCEDURE — 2060000000 HC ICU INTERMEDIATE R&B

## 2024-10-10 PROCEDURE — 6370000000 HC RX 637 (ALT 250 FOR IP): Performed by: INTERNAL MEDICINE

## 2024-10-10 PROCEDURE — 2580000003 HC RX 258: Performed by: SURGERY

## 2024-10-10 PROCEDURE — 2580000003 HC RX 258: Performed by: INTERNAL MEDICINE

## 2024-10-10 PROCEDURE — 6370000000 HC RX 637 (ALT 250 FOR IP): Performed by: SURGERY

## 2024-10-10 PROCEDURE — 6360000002 HC RX W HCPCS: Performed by: SURGERY

## 2024-10-10 PROCEDURE — 99232 SBSQ HOSP IP/OBS MODERATE 35: CPT | Performed by: INTERNAL MEDICINE

## 2024-10-10 PROCEDURE — 83735 ASSAY OF MAGNESIUM: CPT

## 2024-10-10 PROCEDURE — 6370000000 HC RX 637 (ALT 250 FOR IP): Performed by: STUDENT IN AN ORGANIZED HEALTH CARE EDUCATION/TRAINING PROGRAM

## 2024-10-10 PROCEDURE — 97535 SELF CARE MNGMENT TRAINING: CPT

## 2024-10-10 RX ORDER — INSULIN LISPRO 100 [IU]/ML
0-8 INJECTION, SOLUTION INTRAVENOUS; SUBCUTANEOUS
Status: DISCONTINUED | OUTPATIENT
Start: 2024-10-10 | End: 2024-10-15

## 2024-10-10 RX ORDER — INSULIN GLARGINE 100 [IU]/ML
20 INJECTION, SOLUTION SUBCUTANEOUS EVERY MORNING
Status: DISCONTINUED | OUTPATIENT
Start: 2024-10-11 | End: 2024-10-15

## 2024-10-10 RX ORDER — INSULIN LISPRO 100 [IU]/ML
4 INJECTION, SOLUTION INTRAVENOUS; SUBCUTANEOUS
Status: DISCONTINUED | OUTPATIENT
Start: 2024-10-11 | End: 2024-10-15

## 2024-10-10 RX ORDER — LISINOPRIL 20 MG/1
20 TABLET ORAL DAILY
Status: DISCONTINUED | OUTPATIENT
Start: 2024-10-10 | End: 2024-10-16 | Stop reason: HOSPADM

## 2024-10-10 RX ADMIN — ENOXAPARIN SODIUM 40 MG: 100 INJECTION SUBCUTANEOUS at 10:16

## 2024-10-10 RX ADMIN — PIPERACILLIN AND TAZOBACTAM 3375 MG: 3; .375 INJECTION, POWDER, LYOPHILIZED, FOR SOLUTION INTRAVENOUS at 04:39

## 2024-10-10 RX ADMIN — PRIMIDONE 50 MG: 50 TABLET ORAL at 20:20

## 2024-10-10 RX ADMIN — Medication 10 ML: at 20:22

## 2024-10-10 RX ADMIN — AMLODIPINE BESYLATE 5 MG: 5 TABLET ORAL at 10:38

## 2024-10-10 RX ADMIN — INSULIN LISPRO 2 UNITS: 100 INJECTION, SOLUTION INTRAVENOUS; SUBCUTANEOUS at 15:59

## 2024-10-10 RX ADMIN — INSULIN LISPRO 2 UNITS: 100 INJECTION, SOLUTION INTRAVENOUS; SUBCUTANEOUS at 12:23

## 2024-10-10 RX ADMIN — LISINOPRIL 20 MG: 20 TABLET ORAL at 15:59

## 2024-10-10 RX ADMIN — Medication 10 ML: at 10:33

## 2024-10-10 RX ADMIN — PRIMIDONE 50 MG: 50 TABLET ORAL at 10:36

## 2024-10-10 RX ADMIN — PIPERACILLIN AND TAZOBACTAM 3375 MG: 3; .375 INJECTION, POWDER, LYOPHILIZED, FOR SOLUTION INTRAVENOUS at 20:22

## 2024-10-10 RX ADMIN — PIPERACILLIN AND TAZOBACTAM 3375 MG: 3; .375 INJECTION, POWDER, LYOPHILIZED, FOR SOLUTION INTRAVENOUS at 13:21

## 2024-10-10 RX ADMIN — AMMONIUM LACTATE: 17 LOTION TOPICAL at 10:37

## 2024-10-10 RX ADMIN — Medication 10 ML: at 20:20

## 2024-10-10 RX ADMIN — INSULIN LISPRO 2 UNITS: 100 INJECTION, SOLUTION INTRAVENOUS; SUBCUTANEOUS at 04:37

## 2024-10-10 RX ADMIN — PANTOPRAZOLE SODIUM 40 MG: 40 INJECTION, POWDER, FOR SOLUTION INTRAVENOUS at 10:15

## 2024-10-10 RX ADMIN — Medication 10 ML: at 10:34

## 2024-10-10 RX ADMIN — INSULIN LISPRO 8 UNITS: 100 INJECTION, SOLUTION INTRAVENOUS; SUBCUTANEOUS at 20:21

## 2024-10-10 RX ADMIN — ATORVASTATIN CALCIUM 40 MG: 40 TABLET, FILM COATED ORAL at 20:20

## 2024-10-10 RX ADMIN — INSULIN GLARGINE 10 UNITS: 100 INJECTION, SOLUTION SUBCUTANEOUS at 10:23

## 2024-10-10 RX ADMIN — AMMONIUM LACTATE: 17 LOTION TOPICAL at 20:23

## 2024-10-10 NOTE — CARE COORDINATION
Plan remain to [facility]. Auth approved till 10/10/2024 through today.   Will need to restart pre-cert once once more medically stable.     Pending medical clearance at this time.     Electronically signed by FELIPA Schultz on 10/10/2024 at 8:47 AM    Per ayan at [facility], unable to extend the pre-cert. Will need new pre-cert started on 10/11/2024.     Electronically signed by FELIPA Schultz on 10/10/2024 at 1:40 PM

## 2024-10-10 NOTE — PROGRESS NOTES
GENERAL SURGERY  PROGRESS NOTE    Pt Name: Catherine Inman  MRN: [id number]  Date: 10/10/2024    Subjective  CHEL overnight. Afebrile, VSS, satting well. Pt doing well, reports pain is controlled. No nausea/ vomiting. Flatus, BM. Ambulating.    Vitals  BP (!) 167/67   Pulse 67   Temp 98.4 °F (36.9 °C) (Axillary)   Resp 18   Ht 1.473 m (4' 10\")   Wt 74.8 kg (164 lb 14.4 oz)   SpO2 100%   BMI 34.46 kg/m²      Physical Exam  GEN: A&Ox3, NAD, cooperative   PULM: no increased work of breathing, satting well  CV: regular rate  ABD: Soft, NTND, incision clean, dry and intact  EXT: Warm, dry, no lower extremity edema    Intake/ Output    Intake/Output Summary (Last 24 hours) at 10/10/2024 1421  Last data filed at 10/10/2024 1033  Gross per 24 hour   Intake 1561.71 ml   Output 450 ml   Net 1111.71 ml     Date 10/10/24 0000 - 10/10/24 2359   Shift 4005-3098 7675-0288 4604-7475 24 Hour Total   INTAKE   I.V.(mL/kg) 1310.2(17.5) 10(0.1)  1320.2(17.7)   IV Piggyback(mL/kg) 241.5(3.2)   241.5(3.2)   Shift Total(mL/kg) 1551.7(20.7) 10(0.1)  1561.7(20.9)   OUTPUT   Urine(mL/kg/hr) 450(0.8)   450   Shift Total(mL/kg) 450(6)   450(6)   Weight (kg) 74.8 74.8 74.8 74.8       Labs  Recent Labs     10/08/24  0533 10/08/24  1208 10/09/24  1006 10/10/24  0703   WBC 11.3*  --  14.4*  --    HGB 12.5  --  11.3*  --    HCT 37.1  --  33.5*  --      --  199  --     137 139  --    K 3.3* 3.9 3.3*  --    CL 97 98 99  --    CO2 29 27 29  --    BUN 10 10 11  --    CREATININE 0.71 0.82 0.85  --    MG 2.1  --  2.2 1.9   CALCIUM 8.2* 8.3* 8.3*  --          Assessment/ Plan    79F with PMH of HTN, HLD, right breast cancer (s/p radiation), CORDOVA, CKD3, DM, MRSA cellulitis, and E coli UTI admitted for AMS and found to have SBO on CT AP 10/4. Now POD 6 s/p ex lap, LIZBETH, washout, and abdominal wall mass biopsy. Had BM yesterday      Pain control: prn morphine, ice  Prn antiemetic  Encourage IS x10/hour while awake  Start Clear liquid diet

## 2024-10-10 NOTE — PLAN OF CARE
Nutrition Problem #1: Inadequate protein-energy intake  Intervention: Food and/or Nutrient Delivery: Start Oral Diet, Start Parenteral Nutrition  Nutritional

## 2024-10-10 NOTE — PROGRESS NOTES
Hospitalist Progress Note      Date of Admission: 10/3/2024  Chief Complaint:    Chief Complaint   Patient presents with    Altered Mental Status     EMS was called for AMS that started this AM, blood glucose on their arrival was 42     Subjective:  No new complaints.  No nausea, vomiting, chest pain, or headache      Medications:    Infusion Medications    sodium chloride Stopped (10/08/24 0953)    dextrose 5% and 0.45% NaCl with KCl 20 mEq 75 mL/hr at 10/09/24 1110    sodium chloride      dextrose       Scheduled Medications    sodium chloride flush  5-40 mL IntraVENous 2 times per day    piperacillin-tazobactam  3,375 mg IntraVENous Q8H    pantoprazole (PROTONIX) 40 mg in sodium chloride (PF) 0.9 % 10 mL injection  40 mg IntraVENous Daily    insulin lispro  0-8 Units SubCUTAneous Q4H    insulin glargine  10 Units SubCUTAneous QAM    enoxaparin  40 mg SubCUTAneous Daily    [Held by provider] amLODIPine  5 mg Oral Daily    atorvastatin  40 mg Oral QHS    primidone  50 mg Oral BID    ammonium lactate   Topical BID    sodium chloride flush  5-40 mL IntraVENous 2 times per day     PRN Meds: prochlorperazine, sodium chloride flush, sodium chloride, phenol, morphine, aluminum & magnesium hydroxide-simethicone, guaiFENesin-codeine, magnesium hydroxide, sodium chloride flush, sodium chloride, acetaminophen **OR** acetaminophen, glucose, dextrose bolus **OR** dextrose bolus, glucagon (rDNA), dextrose  No intake or output data in the 24 hours ending 10/09/24 3886    Exam:  /77   Pulse 71   Temp 98.2 °F (36.8 °C) (Axillary)   Resp 16   Ht 1.473 m (4' 10\")   Wt 74 kg (163 lb 2.3 oz)   SpO2 93%   BMI 34.10 kg/m²   Head: Normocephalic, atraumatic  Sclera clear  Neck JVD flat  Lungs: normal effort of breathing    Labs:   Recent Labs     10/07/24  0643 10/08/24  0533 10/09/24  1006   WBC 11.1* 11.3* 14.4*   HGB 11.1* 12.5 11.3*   HCT 33.4* 37.1 33.5*    219 199     Recent Labs     10/08/24  0533 10/08/24  1208

## 2024-10-10 NOTE — PROGRESS NOTES
Comprehensive Nutrition Assessment    Type and Reason for Visit:  Initial, NPO/Clear Liquid    Nutrition Recommendations/Plan:   Advance to Carb Control 3 diet as tolerated, when okay with surgeon  PN recommendations available if prolonged NPO status anticipated     Malnutrition Assessment:  Malnutrition Status:  No malnutrition (10/10/24 0741)    Context:  Social/Environmental Circumstances         Nutrition Assessment:    Nutritional status adequate upon admission, now considered to be at risk for malnutrition related to day 6 NPO/CL diet following bowel surgery. Recommend initiation of PN is futher NPO status anticiapted > 5 days, versus progression to po diet, monitor for surgeons plan of care    Nutrition Related Findings:    PMH of HTN, HLD, right breast cancer (s/p radiation), CORDOVA, CKD3, DM, MRSA cellulitis, and E coli UTI admitted for AMS and found to have SBO on CT AP 10/4. Now POD 6 s/p ex lap, LIZBETH, washout, and abdominal wall mass biopsy. Had BM today, NG out 10/9, gluc > 180, IVF = D5.45NS+20 KCL @ 75 ml/hr via PICC ( ~300 kcals) Wound Type: Surgical Incision       Current Nutrition Intake & Therapies:    Average Meal Intake: NPO  Average Supplements Intake: NPO  Diet NPO    Anthropometric Measures:  Height: 147.3 cm (4' 10\")  Ideal Body Weight (IBW): 90 lbs (41 kg)    Admission Body Weight: 74.4 kg (164 lb)  Current Body Weight: 74.4 kg (164 lb), 182.2 % IBW. Weight Source: Bed Scale  Current BMI (kg/m2): 34.3  Usual Body Weight: 76.2 kg (168 lb) (11/2023)  % Weight Change (Calculated): -2.4                    BMI Categories: Obese Class 1 (BMI 30.0-34.9)    Estimated Daily Nutrient Needs:  Energy Requirements Based On: Kcal/kg  Weight Used for Energy Requirements: Current  Energy (kcal/day): 2020-2750 kcals @ 16-18 kcal/kg  Weight Used for Protein Requirements: Ideal  Protein (g/day): 53 gprotein @ 1.3 g/kg IBW  Method Used for Fluid Requirements: 1 ml/kcal  Fluid (ml/day): ~1350    Nutrition

## 2024-10-10 NOTE — PROGRESS NOTES
Physical Therapy Med Surg Daily Treatment Note  Facility/Department: [address] TELEMETRY  Room: [place] [address]       NAME: Catherine Inman  : 1945 (79 y.o.)  MRN: [id number]  CODE STATUS: Full Code    Date of Service: 10/10/2024    Patient Diagnosis(es): Shock [R57.9]  Hypoglycemia [E16.2]  Septicemia (HCC) [A41.9]  THAIS (acute kidney injury) (HCC) [N17.9]  Hypothermia, initial encounter [T68.XXXA]  Hypotension, unspecified hypotension type [I95.9]  Altered mental status, unspecified altered mental status type [R41.82]   Chief Complaint   Patient presents with    Altered Mental Status     EMS was called for AMS that started this AM, blood glucose on their arrival was 42     Patient Active Problem List    Diagnosis Date Noted    Chronic hepatitis, unspecified (HCC) 2023    Abnormal liver enzymes 2022    Elevated LFTs 2022    CORDOVA (nonalcoholic steatohepatitis) 2022    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    [place] lesion 10/09/2024    Pain of foot 10/09/2024    Hypoglycemia 10/08/2024    Hypoglycemia associated with type 2 diabetes mellitus (HCC) 10/07/2024    Altered mental status 10/04/2024    Shock 10/03/2024    Fracture of one rib, left side, subsequent encounter for fracture with routine healing 2024    History of falling 2024    Hyperlipidemia, unspecified 2024    MRSA (methicillin resistant Staphylococcus aureus) infection 2024    Acute lower UTI 09/10/2024    Class 2 severe obesity with serious comorbidity and body mass index (BMI) of 35.0 to 35.9 in adult 2024    Peripheral venous insufficiency 04/15/2024    Medication course changed 2023    Smoker unmotivated to quit 2023    Chronic kidney disease (CKD) 2023    Leg edema 2023    Contusion of foot 2023    Tremor 2023    Choking 2023    Pharyngoesophageal dysphagia 2023    Low vitamin B12 level 2022    Stage 3a chronic kidney disease  tx once transferred to bedside chair.            Activity Tolerance  Activity Tolerance: Patient limited by fatigue;Patient limited by endurance;Patient tolerated treatment well          ASSESSMENT   Assessment: pt needs increased time and effort to complete tasks, fair carryover of cues, fatigues quickly, declines further tx once transferred into bedside chair.     Discharge Recommendations:  Continue to assess pending progress         Goals  Long Term Goals  Long Term Goal 1: Bed mobility with indep  Long Term Goal 2: functional transfers with indep  Long Term Goal 3: amb 50ft with LRAD and supervision  Long Term Goal 4: 1 curb step with AD and supervision  Long Term Goal 5: supervision with HEP to improve LE strength and activity tolerance  Patient Goals   Patient Goals : to go home    PLAN       Safety Devices  Type of Devices: All fall risk precautions in place, Call light within reach, Chair alarm in place, Left in chair, Nurse notified     [facility] (6 CLICK) BASIC MOBILITY  AM-PAC Inpatient Mobility Raw Score : 14      Therapy Time   Individual   Time In 1403   Time Out 1420   Minutes 17      BM/Trsf: 17       Nathanael Talavera PTA, 10/10/24 at 3:33 PM         Definitions for assistance levels  Independent = pt does not require any physical supervision or assistance from another person for activity completion. Device may be needed.  Stand by assistance = pt requires verbal cues or instructions from another person, close to but not touching, to perform the activity  Minimal assistance= pt performs 75% or more of the activity; assistance is required to complete the activity  Moderate assistance= pt performs 50% of the activity; assistance is required to complete the activity  Maximal assistance = pt performs 25% of the activity; assistance is required to complete the activity  Dependent = pt requires total physical assistance to accomplish the task

## 2024-10-10 NOTE — FLOWSHEET NOTE
PM nursing  assessment completed. Previous assessment reviewed and updated.    Pt : awake and resting in bed              Alert and oriented.        Code Status:  fc      Oxygen: r/a  Complaints of:    denies                     Pain: denies  IV:  75ml/hr            patent/ flushed/ capped, no signs of infiltration noted, dressing clean/dry/intact.  TELE: SR                Dressings:  abdominal incision/ binder                         Precautions:    CONTACT/ spec mattress          Falls: 55       Miguelangel: 18  Chart and meds reviewed.           New Labs:     Bed wheels locked and in lowest position. Call light and bedside table within reach.   NOTES: incont stool. Pericare and assist reposition. Tolerating ice chips. No complaints.

## 2024-10-10 NOTE — PROGRESS NOTES
[facility]  Rehabilitation  MUSIC THERAPY      Date:  10/10/2024        Patient Name: Catherine Inman       MRN: [id number]        YOB: 1945 (79 y.o.)       Gender: female          RESTRICTIONS/PRECAUTIONS:  Restrictions/Precautions: Up as Tolerated, Fall Risk, Contact Precautions     Hearing: Within functional limits      TIME OF SESSION: 12:55pm - 1:05pm     SUBJECTIVE:  \"Sure, I love all music\"     OBJECTIVE:        [x] To Improve Mood     [x] To Increase Social Well-Being  [] To Increase Focus   [x] To Increase Emotional Well-Being  [] To Increase Eye Contact    [] To Increase Spiritual Well-Being   [] To Decrease Anxiety   [x] To Increase Relaxation   [] To Decrease Pain    [] To Increase Communication  [] To Increase Movement to Music     MUSIC INTERVENTION PROVIDED:     [x] Live Music on Voice  [] Recorded Music   [x] Live Music on Guitar  [x] Discussion Related to Music   [] Live Music on Q-chord  [x] Discussion Related to Pt Experience   [] Live Music on Percussion      PARTICIPATION LEVEL OF PATIENT:     [x] Active with discussion   [] Passive with discussion   [x] Active with singing    [] Passive with singing   [] Active with instrument playing  [] Passive with instrument playing   [x] Actively listening to music   [] Passively listening to music.     OUTCOMES OBSERVED:      [x] Improved Mood   [x] Increased Social Well-Being  [] Increased Focus   [x] Increased Emotional Well-Being  [] Increased Eye Contact    [] Increased Spiritual Well-Being   [] Decreased Anxiety   [x] Increased Relaxation   [] Decreased Pain    [] Increased Communication   [] Increased Movement to Music     PAIN ASSESSMENT    Before MT:      [x] No     [] Yes   Location:    Rating:  /10    Comment(s):    After MT:         [x] No     [] Yes   Location:     Rating:   /10    Comment(s):     ANXIETY ASSESSMENT    Before MT:      [x] No     [] Yes   Rating:  /10    Comment(s):    After MT:         [x] No      [] Yes   Rating:   /10    Comment(s):       ASSESSMENT/OBSERVATIONS:     Patient's music interests and/or background: all kinds     Patient tolerated today’s treatment session:      [x] Good          [] Fair          [] Poor         Comment(s): Patient found sitting up in her bed finishing up her lunch when [facility] arrived to offer Music Therapy Services. Patient accepted music therapy visit.  [facility] provided live, patient preferred music on guitar and voice.  Patient actively engaged in the music therapy by discussing topics related to their hospital stay, discussing topics related to the music,  singing along at times,  and by actively listening to the music. Patient responded positively to the music therapy as evidence by improved mood, increased relaxation, increased socialization and by making positive comments about the music therapy. Songs used were \"Fay's Song\" by John Denver, \"Let it Be\" by the Beatles and \"[place] Roads\" by John Denver.     PLAN:      [x] Pt interested in having music therapy again.     [x] [facility] will attempt to see pt again another day, if time allows.      [] Pt's planned d/c date is before MT-BC is scheduled on unit again.     [] Pt NOT interested in having music therapy again.            NEHAL Ponce, [facility]    10/10/2024  Electronically signed by Nelia Hendricks on 10/10/2024 at 2:03 PM

## 2024-10-10 NOTE — PLAN OF CARE
Problem: Chronic Conditions and Co-morbidities  Goal: Patient's chronic conditions and co-morbidity symptoms are monitored and maintained or improved  Outcome: Progressing  Flowsheets (Taken 10/9/2024 2100)  Care Plan - Patient's Chronic Conditions and Co-Morbidity Symptoms are Monitored and Maintained or Improved: Monitor and assess patient's chronic conditions and comorbid symptoms for stability, deterioration, or improvement     Problem: Discharge Planning  Goal: Discharge to home or other facility with appropriate resources  Outcome: Progressing  Flowsheets (Taken 10/9/2024 2100)  Discharge to home or other facility with appropriate resources: Identify barriers to discharge with patient and caregiver     Problem: Safety - Adult  Goal: Free from fall injury  Outcome: Progressing     Problem: Pain  Goal: Verbalizes/displays adequate comfort level or baseline comfort level  Outcome: Progressing

## 2024-10-10 NOTE — PROGRESS NOTES
Hospitalist Progress Note      Date of Admission: 10/3/2024  Chief Complaint:    Chief Complaint   Patient presents with    Altered Mental Status     EMS was called for AMS that started this AM, blood glucose on their arrival was 42     Subjective:  No new complaints.  No nausea, vomiting, chest pain, or headache      Medications:    Infusion Medications    sodium chloride Stopped (10/08/24 0953)    sodium chloride      dextrose       Scheduled Medications    insulin lispro  0-8 Units SubCUTAneous 4x Daily AC & HS    lisinopril  20 mg Oral Daily    sodium chloride flush  5-40 mL IntraVENous 2 times per day    piperacillin-tazobactam  3,375 mg IntraVENous Q8H    pantoprazole (PROTONIX) 40 mg in sodium chloride (PF) 0.9 % 10 mL injection  40 mg IntraVENous Daily    insulin glargine  10 Units SubCUTAneous QAM    enoxaparin  40 mg SubCUTAneous Daily    amLODIPine  5 mg Oral Daily    atorvastatin  40 mg Oral QHS    primidone  50 mg Oral BID    ammonium lactate   Topical BID    sodium chloride flush  5-40 mL IntraVENous 2 times per day     PRN Meds: prochlorperazine, sodium chloride flush, sodium chloride, phenol, morphine, aluminum & magnesium hydroxide-simethicone, guaiFENesin-codeine, magnesium hydroxide, sodium chloride flush, sodium chloride, acetaminophen **OR** acetaminophen, glucose, dextrose bolus **OR** dextrose bolus, glucagon (rDNA), dextrose    Intake/Output Summary (Last 24 hours) at 10/10/2024 1648  Last data filed at 10/10/2024 1627  Gross per 24 hour   Intake 3197.9 ml   Output 850 ml   Net 2347.9 ml       Exam:  BP (!) 173/73   Pulse 93   Temp 98.4 °F (36.9 °C) (Oral)   Resp 18   Ht 1.473 m (4' 10\")   Wt 74.8 kg (164 lb 14.4 oz)   SpO2 96%   BMI 34.46 kg/m²   Head: Normocephalic, atraumatic  Sclera clear  Neck JVD flat  Lungs: normal effort of breathing    Labs:   Recent Labs     10/08/24  0533 10/09/24  1006   WBC 11.3* 14.4*   HGB 12.5 11.3*   HCT 37.1 33.5*    199     Recent Labs      evidence of acute intracranial hemorrhage or mass effect.         IR PICC WO SQ PORT/PUMP > 5 YEARS    (Results Pending)     Assessment/Plan:    Sbo, following with gen surg.   NG tube dc'd.  Clear liq diet started by gen surg.  Therefore ivf dc'd    DM: monitor glucose accordion, titrate meds as needed  Encephalopathy sec to sbo.   HTN: monitor BP, adjust meds as needed      RANDY LYN MD ,MD

## 2024-10-10 NOTE — PROGRESS NOTES
0800 Assessment completed, pts /68, Dr Hylton notified. Norvasc given. Pt repositioned for comfort.   1100 Pts diet advanced, tolerating well. Pt repositioned, bed linens, gown and brief changed.   1300 Pt resting comfortably, repositioned on to back, tolerating fluids. No needs at this time. Offered pt to get in chair, declined at this time   1430 PT/OT at bedside. Pt up to chair.   1700 Pt up in chair. Family at bedside.   1800 Spoke with Dr Talavera. Notified Dr Hylton about concerns

## 2024-10-11 LAB
ANION GAP SERPL CALCULATED.3IONS-SCNC: 9 MEQ/L (ref 9–15)
BASOPHILS # BLD: 0 K/UL (ref 0–0.2)
BASOPHILS NFR BLD: 0.3 %
BUN SERPL-MCNC: 5 MG/DL (ref 8–23)
CALCIUM SERPL-MCNC: 7.9 MG/DL (ref 8.5–9.9)
CHLORIDE SERPL-SCNC: 99 MEQ/L (ref 95–107)
CO2 SERPL-SCNC: 29 MEQ/L (ref 20–31)
CREAT SERPL-MCNC: 0.75 MG/DL (ref 0.5–0.9)
EOSINOPHIL # BLD: 0.5 K/UL (ref 0–0.7)
EOSINOPHIL NFR BLD: 5.1 %
ERYTHROCYTE [DISTWIDTH] IN BLOOD BY AUTOMATED COUNT: 13.6 % (ref 11.5–14.5)
GLUCOSE BLD-MCNC: 149 MG/DL (ref 70–99)
GLUCOSE BLD-MCNC: 161 MG/DL (ref 70–99)
GLUCOSE BLD-MCNC: 215 MG/DL (ref 70–99)
GLUCOSE BLD-MCNC: 253 MG/DL (ref 70–99)
GLUCOSE SERPL-MCNC: 133 MG/DL (ref 70–99)
HCT VFR BLD AUTO: 33.2 % (ref 37–47)
HGB BLD-MCNC: 11 G/DL (ref 12–16)
LYMPHOCYTES # BLD: 2.4 K/UL (ref 1–4.8)
LYMPHOCYTES NFR BLD: 25.8 %
MAGNESIUM SERPL-MCNC: 1.9 MG/DL (ref 1.7–2.4)
MCH RBC QN AUTO: 31.1 PG (ref 27–31.3)
MCHC RBC AUTO-ENTMCNC: 33.1 % (ref 33–37)
MCV RBC AUTO: 93.8 FL (ref 79.4–94.8)
MONOCYTES # BLD: 0.9 K/UL (ref 0.2–0.8)
MONOCYTES NFR BLD: 9.7 %
NEUTROPHILS # BLD: 5.4 K/UL (ref 1.4–6.5)
NEUTS SEG NFR BLD: 57.5 %
PERFORMED ON: ABNORMAL
PLATELET # BLD AUTO: 181 K/UL (ref 130–400)
POTASSIUM SERPL-SCNC: 2.9 MEQ/L (ref 3.4–4.9)
RBC # BLD AUTO: 3.54 M/UL (ref 4.2–5.4)
SODIUM SERPL-SCNC: 137 MEQ/L (ref 135–144)
WBC # BLD AUTO: 9.4 K/UL (ref 4.8–10.8)

## 2024-10-11 PROCEDURE — 6370000000 HC RX 637 (ALT 250 FOR IP): Performed by: INTERNAL MEDICINE

## 2024-10-11 PROCEDURE — 6370000000 HC RX 637 (ALT 250 FOR IP): Performed by: SURGERY

## 2024-10-11 PROCEDURE — 2580000003 HC RX 258: Performed by: SURGERY

## 2024-10-11 PROCEDURE — 80048 BASIC METABOLIC PNL TOTAL CA: CPT

## 2024-10-11 PROCEDURE — 2060000000 HC ICU INTERMEDIATE R&B

## 2024-10-11 PROCEDURE — 6360000002 HC RX W HCPCS: Performed by: SURGERY

## 2024-10-11 PROCEDURE — 6370000000 HC RX 637 (ALT 250 FOR IP): Performed by: STUDENT IN AN ORGANIZED HEALTH CARE EDUCATION/TRAINING PROGRAM

## 2024-10-11 PROCEDURE — 2580000003 HC RX 258: Performed by: INTERNAL MEDICINE

## 2024-10-11 PROCEDURE — 83735 ASSAY OF MAGNESIUM: CPT

## 2024-10-11 PROCEDURE — 85025 COMPLETE CBC W/AUTO DIFF WBC: CPT

## 2024-10-11 RX ADMIN — PIPERACILLIN AND TAZOBACTAM 3375 MG: 3; .375 INJECTION, POWDER, LYOPHILIZED, FOR SOLUTION INTRAVENOUS at 13:36

## 2024-10-11 RX ADMIN — PRIMIDONE 50 MG: 50 TABLET ORAL at 21:10

## 2024-10-11 RX ADMIN — AMMONIUM LACTATE: 17 LOTION TOPICAL at 08:37

## 2024-10-11 RX ADMIN — PANTOPRAZOLE SODIUM 40 MG: 40 INJECTION, POWDER, FOR SOLUTION INTRAVENOUS at 08:36

## 2024-10-11 RX ADMIN — PIPERACILLIN AND TAZOBACTAM 3375 MG: 3; .375 INJECTION, POWDER, LYOPHILIZED, FOR SOLUTION INTRAVENOUS at 05:24

## 2024-10-11 RX ADMIN — PRIMIDONE 50 MG: 50 TABLET ORAL at 08:36

## 2024-10-11 RX ADMIN — INSULIN LISPRO 2 UNITS: 100 INJECTION, SOLUTION INTRAVENOUS; SUBCUTANEOUS at 22:08

## 2024-10-11 RX ADMIN — INSULIN LISPRO 4 UNITS: 100 INJECTION, SOLUTION INTRAVENOUS; SUBCUTANEOUS at 17:43

## 2024-10-11 RX ADMIN — POTASSIUM BICARBONATE 40 MEQ: 782 TABLET, EFFERVESCENT ORAL at 12:36

## 2024-10-11 RX ADMIN — Medication 10 ML: at 21:11

## 2024-10-11 RX ADMIN — INSULIN LISPRO 4 UNITS: 100 INJECTION, SOLUTION INTRAVENOUS; SUBCUTANEOUS at 12:35

## 2024-10-11 RX ADMIN — POTASSIUM BICARBONATE 40 MEQ: 782 TABLET, EFFERVESCENT ORAL at 10:23

## 2024-10-11 RX ADMIN — Medication 10 ML: at 12:37

## 2024-10-11 RX ADMIN — ATORVASTATIN CALCIUM 40 MG: 40 TABLET, FILM COATED ORAL at 21:10

## 2024-10-11 RX ADMIN — Medication 10 ML: at 21:12

## 2024-10-11 RX ADMIN — AMMONIUM LACTATE: 17 LOTION TOPICAL at 21:10

## 2024-10-11 RX ADMIN — ENOXAPARIN SODIUM 40 MG: 100 INJECTION SUBCUTANEOUS at 08:36

## 2024-10-11 RX ADMIN — PIPERACILLIN AND TAZOBACTAM 3375 MG: 3; .375 INJECTION, POWDER, LYOPHILIZED, FOR SOLUTION INTRAVENOUS at 21:09

## 2024-10-11 ASSESSMENT — PAIN SCALES - GENERAL: PAINLEVEL_OUTOF10: 0

## 2024-10-11 ASSESSMENT — PAIN SCALES - WONG BAKER: WONGBAKER_NUMERICALRESPONSE: NO HURT

## 2024-10-11 NOTE — CARE COORDINATION
Plan remains [facility] at discharge. Pre cert  yesterday. LSW spoke with admissions and confirmed pre cert will be restarted today.     Notified by [facility] that patient has been approved through 10/13.   Previously Declined (within the last year)

## 2024-10-11 NOTE — PROGRESS NOTES
Physical Therapy Missed Treatment   Facility/Department: [facility] MED SURG W163/W163-01    NAME: Catherine Inman    : 1945 (79 y.o.)  MRN: [id number]    Account: [id number]  Gender: female    Chart reviewed, attempted PT at 1348. Patient unavailable 2° to:         [x] Pt declined, initially open to tx this date with moderate nausea. Cathreine needed cleaned up. Upon return to room Catherine decided she did not wish to participate in tx today due to not feeling well.   [x] Nsg notified          Will attempt PT treatment again at earliest convenience.      Electronically signed by Murali Arana PTA on 10/11/24 at 1:54 PM EDT

## 2024-10-11 NOTE — PROGRESS NOTES
Hospitalist Progress Note      Date of Admission: 10/3/2024  Chief Complaint:    Chief Complaint   Patient presents with    Altered Mental Status     EMS was called for AMS that started this AM, blood glucose on their arrival was 42     Subjective:  No new complaints.  No nausea, vomiting, chest pain, or headache      Medications:    Infusion Medications    sodium chloride Stopped (10/08/24 0953)    sodium chloride      dextrose       Scheduled Medications    insulin lispro  0-8 Units SubCUTAneous 4x Daily AC & HS    lisinopril  20 mg Oral Daily    insulin glargine  20 Units SubCUTAneous QAM    insulin lispro  4 Units SubCUTAneous TID WC    sodium chloride flush  5-40 mL IntraVENous 2 times per day    piperacillin-tazobactam  3,375 mg IntraVENous Q8H    pantoprazole (PROTONIX) 40 mg in sodium chloride (PF) 0.9 % 10 mL injection  40 mg IntraVENous Daily    enoxaparin  40 mg SubCUTAneous Daily    amLODIPine  5 mg Oral Daily    atorvastatin  40 mg Oral QHS    primidone  50 mg Oral BID    ammonium lactate   Topical BID    sodium chloride flush  5-40 mL IntraVENous 2 times per day     PRN Meds: prochlorperazine, sodium chloride flush, sodium chloride, phenol, morphine, aluminum & magnesium hydroxide-simethicone, guaiFENesin-codeine, magnesium hydroxide, sodium chloride flush, sodium chloride, acetaminophen **OR** acetaminophen, glucose, dextrose bolus **OR** dextrose bolus, glucagon (rDNA), dextrose    Intake/Output Summary (Last 24 hours) at 10/11/2024 1757  Last data filed at 10/10/2024 1850  Gross per 24 hour   Intake 472.52 ml   Output --   Net 472.52 ml       Exam:  BP (!) 104/58   Pulse 78   Temp 98.1 °F (36.7 °C)   Resp 18   Ht 1.473 m (4' 10\")   Wt 76.3 kg (168 lb 3.2 oz)   SpO2 95%   BMI 35.15 kg/m²   Head: Normocephalic, atraumatic  Sclera clear  Neck JVD flat  Lungs: normal effort of breathing    Labs:   Recent Labs     10/09/24  1006 10/11/24  0854   WBC 14.4* 9.4   HGB 11.3* 11.0*   HCT 33.5* 33.2*  acute intracranial hemorrhage or mass effect.         IR PICC WO SQ PORT/PUMP > 5 YEARS    (Results Pending)     Assessment/Plan:    Sbo, following with gen surg.   Tolerating diet.  Needs to OOB to chair and ambulate.    DM: monitor glucose accordion, titrate meds as needed  Encephalopathy sec to sbo.   HTN: monitor BP, adjust meds as needed      RANDY LYN MD

## 2024-10-11 NOTE — PROGRESS NOTES
GENERAL SURGERY  PROGRESS NOTE    Pt Name: Catherine Inman  MRN: [id number]  Date: 10/11/2024    Subjective  CHEL overnight. Afebrile, VSS, satting well. Pt doing well, reports pain is controlled. No nausea/ vomiting. Flatus, BM. Ambulating.    Vitals  /65   Pulse 66   Temp 97.5 °F (36.4 °C) (Oral)   Resp 16   Ht 1.473 m (4' 10\")   Wt 76.3 kg (168 lb 3.2 oz)   SpO2 93%   BMI 35.15 kg/m²      Physical Exam  GEN: A&Ox3, NAD, cooperative   PULM: no increased work of breathing, satting well  CV: regular rate  ABD: Soft, NTND, incision clean, dry and intact  EXT: Warm, dry, no lower extremity edema    Intake/ Output    Intake/Output Summary (Last 24 hours) at 10/11/2024 1153  Last data filed at 10/10/2024 1850  Gross per 24 hour   Intake 2108.71 ml   Output 400 ml   Net 1708.71 ml         Labs  Recent Labs     10/08/24  1208 10/09/24  1006 10/10/24  0703 10/11/24  0558 10/11/24  0854   WBC  --  14.4*  --   --  9.4   HGB  --  11.3*  --   --  11.0*   HCT  --  33.5*  --   --  33.2*   PLT  --  199  --   --  181    139  --  137  --    K 3.9 3.3*  --  2.9*  --    CL 98 99  --  99  --    CO2 27 29  --  29  --    BUN 10 11  --  5*  --    CREATININE 0.82 0.85  --  0.75  --    MG  --  2.2 1.9 1.9  --    CALCIUM 8.3* 8.3*  --  7.9*  --          Assessment/ Plan    79F with PMH of HTN, HLD, right breast cancer (s/p radiation), CORDOVA, CKD3, DM, MRSA cellulitis, and E coli UTI admitted for AMS and found to have SBO on CT AP 10/4. Now POD 7 s/p ex lap, LIZBETH, washout, and abdominal wall mass biopsy. Tolerating diet, Having bowel movements.      Pain control: prn morphine, ice  Prn antiemetic  Encourage IS x10/hour while awake  Advance diet to regular diet today  Replete K, holding bowel reg.   Abdominal binder  Strict I&Os  h/o DM: accu checks, SSI   Continue zosyn  Encourage ambulation, OOB x3  SCDs, lovenox  PT/OT      Roxana Wen MD   General surgeon  10/11/2024

## 2024-10-11 NOTE — PROGRESS NOTES
Progress Note  Date:10/10/2024       Room:[facility]/W163-01  Patient Name:Catherine Inman     YOB: 1945     Age:79 y.o.    Chief complaint uncontrolled diabetes    Subjective    Subjective:  Symptoms:  Stable.  She reports malaise and weakness.    Diet:  Poor intake.  No nausea or vomiting.    Activity level: Impaired due to weakness.    Pain:  She reports no pain.       Review of Systems   Constitutional:  Positive for fatigue.   Gastrointestinal:  Negative for nausea and vomiting.   Neurological:  Positive for weakness.     Objective         Vitals Last 24 Hours:  TEMPERATURE:  Temp  Av.4 °F (36.9 °C)  Min: 98.2 °F (36.8 °C)  Max: 98.6 °F (37 °C)  RESPIRATIONS RANGE: Resp  Av.5  Min: 16  Max: 18  PULSE OXIMETRY RANGE: SpO2  Av %  Min: 91 %  Max: 100 %  PULSE RANGE: Pulse  Av.3  Min: 67  Max: 93  BLOOD PRESSURE RANGE: Systolic (24hrs), Av , Min:127 , Max:181   ; Diastolic (24hrs), Av, Min:62, Max:77    I/O (24Hr):    Intake/Output Summary (Last 24 hours) at 10/10/2024 2126  Last data filed at 10/10/2024 1850  Gross per 24 hour   Intake 3670.42 ml   Output 850 ml   Net 2820.42 ml     Objective:  General Appearance:  Ill-appearing.    Vital signs: (most recent): Blood pressure 138/62, pulse 82, temperature 98.6 °F (37 °C), temperature source Axillary, resp. rate 18, height 1.473 m (4' 10\"), weight 74.8 kg (164 lb 14.4 oz), SpO2 91%.  Vital signs are normal.    HEENT: Normal HEENT exam.    Lungs:  Normal effort and normal respiratory rate.    Heart: Normal rate.    Abdomen: Abdomen is soft.    Extremities: Normal range of motion.    Neurological: Patient is alert.    Skin:  No rash.     Labs/Imaging/Diagnostics    Labs:  CBC:  Recent Labs     10/08/24  0533 10/09/24  1006   WBC 11.3* 14.4*   RBC 4.02* 3.53*   HGB 12.5 11.3*   HCT 37.1 33.5*   MCV 92.3 94.9*   RDW 13.3 13.7    199     CHEMISTRIES:  Recent Labs     10/08/24  0533 10/08/24  1208 10/09/24  1006 10/10/24  0703     137 139  --    K 3.3* 3.9 3.3*  --    CL 97 98 99  --    CO2 29 27 29  --    BUN 10 10 11  --    CREATININE 0.71 0.82 0.85  --    GLUCOSE 197* 187* 186*  --    MG 2.1  --  2.2 1.9     PT/INR:No results for input(s): \"PROTIME\", \"INR\" in the last 72 hours.  APTT:No results for input(s): \"APTT\" in the last 72 hours.  LIVER PROFILE:No results for input(s): \"AST\", \"ALT\", \"BILIDIR\", \"BILITOT\", \"ALKPHOS\" in the last 72 hours.    Imaging Last 24 Hours:  No results found.  Assessment//Plan           Hospital Problems             Last Modified POA    * (Principal) Shock 10/3/2024 Yes    Altered mental status 10/4/2024 Yes    Hypoglycemia associated with type 2 diabetes mellitus (HCC) 10/7/2024 Yes    Hypoglycemia 10/8/2024 Yes     Assessment:    Condition: In stable condition.  Unchanged.   (  uncontrolled type 2 diabetes blood sugars are trending higher status post exploratory laparotomy for bowel obstruction    Altered mental status  post hypoglycemia resolved ).     Plan:    (increase dose of Lantus to 20 units in the morning plus Humalog 4 units with each meals hold if glucose less than 150 continue to advance diet continue other treatment as per specialist total time spent 35 minutes ).       Electronically signed by Karsten Contreras MD on 10/10/24 at 9:26 PM EDT

## 2024-10-12 LAB
ANION GAP SERPL CALCULATED.3IONS-SCNC: 15 MEQ/L (ref 9–15)
BUN SERPL-MCNC: 6 MG/DL (ref 8–23)
CALCIUM SERPL-MCNC: 8.1 MG/DL (ref 8.5–9.9)
CHLORIDE SERPL-SCNC: 98 MEQ/L (ref 95–107)
CO2 SERPL-SCNC: 25 MEQ/L (ref 20–31)
CREAT SERPL-MCNC: 0.91 MG/DL (ref 0.5–0.9)
GLUCOSE BLD-MCNC: 171 MG/DL (ref 70–99)
GLUCOSE BLD-MCNC: 181 MG/DL (ref 70–99)
GLUCOSE BLD-MCNC: 191 MG/DL (ref 70–99)
GLUCOSE BLD-MCNC: 270 MG/DL (ref 70–99)
GLUCOSE SERPL-MCNC: 176 MG/DL (ref 70–99)
MAGNESIUM SERPL-MCNC: 1.9 MG/DL (ref 1.7–2.4)
PERFORMED ON: ABNORMAL
POTASSIUM SERPL-SCNC: 3.6 MEQ/L (ref 3.4–4.9)
SODIUM SERPL-SCNC: 138 MEQ/L (ref 135–144)

## 2024-10-12 PROCEDURE — 99024 POSTOP FOLLOW-UP VISIT: CPT | Performed by: COLON & RECTAL SURGERY

## 2024-10-12 PROCEDURE — 80048 BASIC METABOLIC PNL TOTAL CA: CPT

## 2024-10-12 PROCEDURE — 6370000000 HC RX 637 (ALT 250 FOR IP): Performed by: STUDENT IN AN ORGANIZED HEALTH CARE EDUCATION/TRAINING PROGRAM

## 2024-10-12 PROCEDURE — 83735 ASSAY OF MAGNESIUM: CPT

## 2024-10-12 PROCEDURE — 97535 SELF CARE MNGMENT TRAINING: CPT

## 2024-10-12 PROCEDURE — 6370000000 HC RX 637 (ALT 250 FOR IP): Performed by: INTERNAL MEDICINE

## 2024-10-12 PROCEDURE — 6370000000 HC RX 637 (ALT 250 FOR IP): Performed by: SURGERY

## 2024-10-12 PROCEDURE — 6360000002 HC RX W HCPCS: Performed by: SURGERY

## 2024-10-12 PROCEDURE — 2060000000 HC ICU INTERMEDIATE R&B

## 2024-10-12 PROCEDURE — 2580000003 HC RX 258: Performed by: SURGERY

## 2024-10-12 RX ADMIN — INSULIN GLARGINE 20 UNITS: 100 INJECTION, SOLUTION SUBCUTANEOUS at 09:16

## 2024-10-12 RX ADMIN — PRIMIDONE 50 MG: 50 TABLET ORAL at 21:02

## 2024-10-12 RX ADMIN — PANTOPRAZOLE SODIUM 40 MG: 40 INJECTION, POWDER, FOR SOLUTION INTRAVENOUS at 09:16

## 2024-10-12 RX ADMIN — INSULIN LISPRO 4 UNITS: 100 INJECTION, SOLUTION INTRAVENOUS; SUBCUTANEOUS at 21:02

## 2024-10-12 RX ADMIN — PIPERACILLIN AND TAZOBACTAM 3375 MG: 3; .375 INJECTION, POWDER, LYOPHILIZED, FOR SOLUTION INTRAVENOUS at 12:56

## 2024-10-12 RX ADMIN — ATORVASTATIN CALCIUM 40 MG: 40 TABLET, FILM COATED ORAL at 21:02

## 2024-10-12 RX ADMIN — Medication 10 ML: at 21:05

## 2024-10-12 RX ADMIN — PIPERACILLIN AND TAZOBACTAM 3375 MG: 3; .375 INJECTION, POWDER, LYOPHILIZED, FOR SOLUTION INTRAVENOUS at 21:01

## 2024-10-12 RX ADMIN — PIPERACILLIN AND TAZOBACTAM 3375 MG: 3; .375 INJECTION, POWDER, LYOPHILIZED, FOR SOLUTION INTRAVENOUS at 05:20

## 2024-10-12 RX ADMIN — POTASSIUM BICARBONATE 40 MEQ: 782 TABLET, EFFERVESCENT ORAL at 16:40

## 2024-10-12 RX ADMIN — Medication 10 ML: at 09:25

## 2024-10-12 RX ADMIN — PRIMIDONE 50 MG: 50 TABLET ORAL at 09:15

## 2024-10-12 RX ADMIN — ENOXAPARIN SODIUM 40 MG: 100 INJECTION SUBCUTANEOUS at 09:16

## 2024-10-12 RX ADMIN — LISINOPRIL 20 MG: 20 TABLET ORAL at 09:15

## 2024-10-12 RX ADMIN — AMLODIPINE BESYLATE 5 MG: 5 TABLET ORAL at 09:16

## 2024-10-12 RX ADMIN — AMMONIUM LACTATE: 17 LOTION TOPICAL at 21:02

## 2024-10-12 RX ADMIN — INSULIN LISPRO 4 UNITS: 100 INJECTION, SOLUTION INTRAVENOUS; SUBCUTANEOUS at 11:28

## 2024-10-12 ASSESSMENT — PAIN SCALES - GENERAL
PAINLEVEL_OUTOF10: 0

## 2024-10-12 NOTE — PLAN OF CARE
Problem: Chronic Conditions and Co-morbidities  Goal: Patient's chronic conditions and co-morbidity symptoms are monitored and maintained or improved  10/12/2024 1040 by Morenita Marcos RN  Outcome: Progressing  Flowsheets (Taken 10/12/2024 0900)  Care Plan - Patient's Chronic Conditions and Co-Morbidity Symptoms are Monitored and Maintained or Improved:   Monitor and assess patient's chronic conditions and comorbid symptoms for stability, deterioration, or improvement   Collaborate with multidisciplinary team to address chronic and comorbid conditions and prevent exacerbation or deterioration  10/12/2024 0128 by Laila Nelson RN  Outcome: Progressing     Problem: Discharge Planning  Goal: Discharge to home or other facility with appropriate resources  10/12/2024 1040 by Morenita Marcos RN  Outcome: Progressing  Flowsheets (Taken 10/12/2024 0900)  Discharge to home or other facility with appropriate resources:   Identify barriers to discharge with patient and caregiver   Arrange for needed discharge resources and transportation as appropriate   Identify discharge learning needs (meds, wound care, etc)  10/12/2024 0128 by Laila Nelson RN  Outcome: Progressing     Problem: Safety - Adult  Goal: Free from fall injury  10/12/2024 1040 by Morenita Marcos RN  Outcome: Progressing  10/12/2024 0128 by Laila Nelson RN  Outcome: Progressing     Problem: Pain  Goal: Verbalizes/displays adequate comfort level or baseline comfort level  10/12/2024 1040 by Morenita Marcos RN  Outcome: Progressing  Flowsheets (Taken 10/12/2024 0900)  Verbalizes/displays adequate comfort level or baseline comfort level:   Encourage patient to monitor pain and request assistance   Assess pain using appropriate pain scale   Implement non-pharmacological measures as appropriate and evaluate response  10/12/2024 0128 by Laila Nelson RN  Outcome: Progressing     Problem: Skin/Tissue Integrity  Goal: Absence of new skin

## 2024-10-12 NOTE — PROGRESS NOTES
Physical Therapy Med Surg Daily Treatment Note  Facility/Department: [address] TELEMETRY  Room: [place] [address]       NAME: Catherine Inman  : 1945 (79 y.o.)  MRN: [id number]  CODE STATUS: Full Code    Date of Service: 10/12/2024    Patient Diagnosis(es): Shock [R57.9]  Hypoglycemia [E16.2]  Septicemia (HCC) [A41.9]  THAIS (acute kidney injury) (HCC) [N17.9]  Hypothermia, initial encounter [T68.XXXA]  Hypotension, unspecified hypotension type [I95.9]  Altered mental status, unspecified altered mental status type [R41.82]   Chief Complaint   Patient presents with    Altered Mental Status     EMS was called for AMS that started this AM, blood glucose on their arrival was 42     Patient Active Problem List    Diagnosis Date Noted    Chronic hepatitis, unspecified (HCC) 2023    Abnormal liver enzymes 2022    Elevated LFTs 2022    CORDOVA (nonalcoholic steatohepatitis) 2022    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    [place] lesion 10/09/2024    Pain of foot 10/09/2024    Hypoglycemia 10/08/2024    Hypoglycemia associated with type 2 diabetes mellitus (HCC) 10/07/2024    Altered mental status 10/04/2024    Shock 10/03/2024    Fracture of one rib, left side, subsequent encounter for fracture with routine healing 2024    History of falling 2024    Hyperlipidemia, unspecified 2024    MRSA (methicillin resistant Staphylococcus aureus) infection 2024    Acute lower UTI 09/10/2024    Class 2 severe obesity with serious comorbidity and body mass index (BMI) of 35.0 to 35.9 in adult 2024    Peripheral venous insufficiency 04/15/2024    Medication course changed 2023    Smoker unmotivated to quit 2023    Chronic kidney disease (CKD) 2023    Leg edema 2023    Contusion of foot 2023    Tremor 2023    Choking 2023    Pharyngoesophageal dysphagia 2023    Low vitamin B12 level 2022    Stage 3a chronic kidney disease

## 2024-10-12 NOTE — PROGRESS NOTES
Postop day #8 lysis of adhesions by Dr. Donaldson.    Patient taking liquids.  She is hesitant to have any regular food.    On exam her abdomen is nondistended.  She appears in good spirits.  Her dressings are intact.    Patient will continue liquids as desired.  Her diet can be advanced when she feels ready.    No acute surgical issues present.  Daughter present at bedside for discussion.  All questions answered.

## 2024-10-12 NOTE — PROGRESS NOTES
Hospitalist Progress Note      Date of Admission: 10/3/2024  Chief Complaint:    Chief Complaint   Patient presents with    Altered Mental Status     EMS was called for AMS that started this AM, blood glucose on their arrival was 42     Subjective:  No new complaints.  No nausea, vomiting, chest pain, or headache      Medications:    Infusion Medications    sodium chloride Stopped (10/08/24 0953)    sodium chloride      dextrose       Scheduled Medications    insulin lispro  0-8 Units SubCUTAneous 4x Daily AC & HS    lisinopril  20 mg Oral Daily    insulin glargine  20 Units SubCUTAneous QAM    insulin lispro  4 Units SubCUTAneous TID WC    sodium chloride flush  5-40 mL IntraVENous 2 times per day    piperacillin-tazobactam  3,375 mg IntraVENous Q8H    pantoprazole (PROTONIX) 40 mg in sodium chloride (PF) 0.9 % 10 mL injection  40 mg IntraVENous Daily    enoxaparin  40 mg SubCUTAneous Daily    amLODIPine  5 mg Oral Daily    atorvastatin  40 mg Oral QHS    primidone  50 mg Oral BID    ammonium lactate   Topical BID    sodium chloride flush  5-40 mL IntraVENous 2 times per day     PRN Meds: prochlorperazine, sodium chloride flush, sodium chloride, phenol, morphine, aluminum & magnesium hydroxide-simethicone, guaiFENesin-codeine, magnesium hydroxide, sodium chloride flush, sodium chloride, acetaminophen **OR** acetaminophen, glucose, dextrose bolus **OR** dextrose bolus, glucagon (rDNA), dextrose    Intake/Output Summary (Last 24 hours) at 10/12/2024 1700  Last data filed at 10/12/2024 1215  Gross per 24 hour   Intake 832.92 ml   Output 400 ml   Net 432.92 ml       Exam:  BP (!) 84/54   Pulse 64   Temp 98.4 °F (36.9 °C) (Oral)   Resp 16   Ht 1.473 m (4' 10\")   Wt 75.5 kg (166 lb 8 oz)   SpO2 95%   BMI 34.80 kg/m²   Head: Normocephalic, atraumatic  Sclera clear  Neck JVD flat  Lungs: normal effort of breathing    Labs:   Recent Labs     10/11/24  0854   WBC 9.4   HGB 11.0*   HCT 33.2*        Recent Labs

## 2024-10-12 NOTE — PLAN OF CARE
Problem: Chronic Conditions and Co-morbidities  Goal: Patient's chronic conditions and co-morbidity symptoms are monitored and maintained or improved  Outcome: Progressing     Problem: Discharge Planning  Goal: Discharge to home or other facility with appropriate resources  Outcome: Progressing     Problem: Safety - Adult  Goal: Free from fall injury  Outcome: Progressing     Problem: Pain  Goal: Verbalizes/displays adequate comfort level or baseline comfort level  Outcome: Progressing     Problem: Skin/Tissue Integrity  Goal: Absence of new skin breakdown  Description: 1.  Monitor for areas of redness and/or skin breakdown  2.  Assess vascular access sites hourly  3.  Every 4-6 hours minimum:  Change oxygen saturation probe site  4.  Every 4-6 hours:  If on nasal continuous positive airway pressure, respiratory therapy assess nares and determine need for appliance change or resting period.  Outcome: Progressing     Problem: Nutrition Deficit:  Goal: Optimize nutritional status  Outcome: Progressing

## 2024-10-13 LAB
GLUCOSE BLD-MCNC: 151 MG/DL (ref 70–99)
GLUCOSE BLD-MCNC: 173 MG/DL (ref 70–99)
GLUCOSE BLD-MCNC: 178 MG/DL (ref 70–99)
GLUCOSE BLD-MCNC: 255 MG/DL (ref 70–99)
PERFORMED ON: ABNORMAL

## 2024-10-13 PROCEDURE — 6370000000 HC RX 637 (ALT 250 FOR IP): Performed by: SURGERY

## 2024-10-13 PROCEDURE — 2060000000 HC ICU INTERMEDIATE R&B

## 2024-10-13 PROCEDURE — 2580000003 HC RX 258: Performed by: SURGERY

## 2024-10-13 PROCEDURE — 2700000000 HC OXYGEN THERAPY PER DAY

## 2024-10-13 PROCEDURE — 2580000003 HC RX 258: Performed by: INTERNAL MEDICINE

## 2024-10-13 PROCEDURE — 6370000000 HC RX 637 (ALT 250 FOR IP): Performed by: INTERNAL MEDICINE

## 2024-10-13 PROCEDURE — 6360000002 HC RX W HCPCS: Performed by: SURGERY

## 2024-10-13 PROCEDURE — 6370000000 HC RX 637 (ALT 250 FOR IP): Performed by: STUDENT IN AN ORGANIZED HEALTH CARE EDUCATION/TRAINING PROGRAM

## 2024-10-13 RX ADMIN — LISINOPRIL 20 MG: 20 TABLET ORAL at 08:53

## 2024-10-13 RX ADMIN — PANTOPRAZOLE SODIUM 40 MG: 40 INJECTION, POWDER, FOR SOLUTION INTRAVENOUS at 08:54

## 2024-10-13 RX ADMIN — Medication 10 ML: at 12:36

## 2024-10-13 RX ADMIN — ENOXAPARIN SODIUM 40 MG: 100 INJECTION SUBCUTANEOUS at 08:54

## 2024-10-13 RX ADMIN — PIPERACILLIN AND TAZOBACTAM 3375 MG: 3; .375 INJECTION, POWDER, LYOPHILIZED, FOR SOLUTION INTRAVENOUS at 05:11

## 2024-10-13 RX ADMIN — INSULIN LISPRO 4 UNITS: 100 INJECTION, SOLUTION INTRAVENOUS; SUBCUTANEOUS at 16:20

## 2024-10-13 RX ADMIN — INSULIN LISPRO 4 UNITS: 100 INJECTION, SOLUTION INTRAVENOUS; SUBCUTANEOUS at 12:35

## 2024-10-13 RX ADMIN — INSULIN LISPRO 2 UNITS: 100 INJECTION, SOLUTION INTRAVENOUS; SUBCUTANEOUS at 20:34

## 2024-10-13 RX ADMIN — INSULIN GLARGINE 20 UNITS: 100 INJECTION, SOLUTION SUBCUTANEOUS at 08:54

## 2024-10-13 RX ADMIN — Medication 10 ML: at 08:58

## 2024-10-13 RX ADMIN — AMMONIUM LACTATE: 17 LOTION TOPICAL at 20:36

## 2024-10-13 RX ADMIN — INSULIN LISPRO 4 UNITS: 100 INJECTION, SOLUTION INTRAVENOUS; SUBCUTANEOUS at 08:56

## 2024-10-13 RX ADMIN — PIPERACILLIN AND TAZOBACTAM 3375 MG: 3; .375 INJECTION, POWDER, LYOPHILIZED, FOR SOLUTION INTRAVENOUS at 12:40

## 2024-10-13 RX ADMIN — Medication 10 ML: at 20:37

## 2024-10-13 RX ADMIN — PRIMIDONE 50 MG: 50 TABLET ORAL at 20:34

## 2024-10-13 RX ADMIN — PIPERACILLIN AND TAZOBACTAM 3375 MG: 3; .375 INJECTION, POWDER, LYOPHILIZED, FOR SOLUTION INTRAVENOUS at 20:34

## 2024-10-13 RX ADMIN — PRIMIDONE 50 MG: 50 TABLET ORAL at 08:53

## 2024-10-13 RX ADMIN — ATORVASTATIN CALCIUM 40 MG: 40 TABLET, FILM COATED ORAL at 20:34

## 2024-10-13 ASSESSMENT — PAIN SCALES - GENERAL: PAINLEVEL_OUTOF10: 0

## 2024-10-13 NOTE — PLAN OF CARE
Problem: Chronic Conditions and Co-morbidities  Goal: Patient's chronic conditions and co-morbidity symptoms are monitored and maintained or improved  Outcome: Progressing  Flowsheets (Taken 10/13/2024 0850)  Care Plan - Patient's Chronic Conditions and Co-Morbidity Symptoms are Monitored and Maintained or Improved:   Monitor and assess patient's chronic conditions and comorbid symptoms for stability, deterioration, or improvement   Collaborate with multidisciplinary team to address chronic and comorbid conditions and prevent exacerbation or deterioration     Problem: Discharge Planning  Goal: Discharge to home or other facility with appropriate resources  Outcome: Progressing  Flowsheets (Taken 10/13/2024 0850)  Discharge to home or other facility with appropriate resources:   Identify barriers to discharge with patient and caregiver   Arrange for needed discharge resources and transportation as appropriate   Identify discharge learning needs (meds, wound care, etc)     Problem: Safety - Adult  Goal: Free from fall injury  Outcome: Progressing     Problem: Pain  Goal: Verbalizes/displays adequate comfort level or baseline comfort level  Outcome: Progressing     Problem: Skin/Tissue Integrity  Goal: Absence of new skin breakdown  Description: 1.  Monitor for areas of redness and/or skin breakdown  2.  Assess vascular access sites hourly  3.  Every 4-6 hours minimum:  Change oxygen saturation probe site  4.  Every 4-6 hours:  If on nasal continuous positive airway pressure, respiratory therapy assess nares and determine need for appliance change or resting period.  Outcome: Progressing     Problem: Nutrition Deficit:  Goal: Optimize nutritional status  Outcome: Progressing

## 2024-10-13 NOTE — PROGRESS NOTES
Hospitalist Progress Note      Date of Admission: 10/3/2024  Chief Complaint:    Chief Complaint   Patient presents with    Altered Mental Status     EMS was called for AMS that started this AM, blood glucose on their arrival was 42     Subjective:  No new complaints.  No nausea, vomiting, chest pain, or headache      Medications:    Infusion Medications    sodium chloride Stopped (10/08/24 0953)    sodium chloride      dextrose       Scheduled Medications    insulin lispro  0-8 Units SubCUTAneous 4x Daily AC & HS    lisinopril  20 mg Oral Daily    insulin glargine  20 Units SubCUTAneous QAM    insulin lispro  4 Units SubCUTAneous TID WC    sodium chloride flush  5-40 mL IntraVENous 2 times per day    piperacillin-tazobactam  3,375 mg IntraVENous Q8H    pantoprazole (PROTONIX) 40 mg in sodium chloride (PF) 0.9 % 10 mL injection  40 mg IntraVENous Daily    enoxaparin  40 mg SubCUTAneous Daily    amLODIPine  5 mg Oral Daily    atorvastatin  40 mg Oral QHS    primidone  50 mg Oral BID    ammonium lactate   Topical BID    sodium chloride flush  5-40 mL IntraVENous 2 times per day     PRN Meds: prochlorperazine, sodium chloride flush, sodium chloride, phenol, morphine, aluminum & magnesium hydroxide-simethicone, guaiFENesin-codeine, magnesium hydroxide, sodium chloride flush, sodium chloride, acetaminophen **OR** acetaminophen, glucose, dextrose bolus **OR** dextrose bolus, glucagon (rDNA), dextrose    Intake/Output Summary (Last 24 hours) at 10/13/2024 1544  Last data filed at 10/13/2024 1106  Gross per 24 hour   Intake 240 ml   Output --   Net 240 ml       Exam:  BP (!) 137/53   Pulse 78   Temp 98.4 °F (36.9 °C) (Oral)   Resp 17   Ht 1.473 m (4' 10\")   Wt 75.7 kg (166 lb 12.8 oz)   SpO2 95%   BMI 34.86 kg/m²   Head: Normocephalic, atraumatic  Sclera clear  Neck JVD flat  Lungs: normal effort of breathing    Labs:   Recent Labs     10/11/24  0854   WBC 9.4   HGB 11.0*   HCT 33.2*        Recent Labs      10/11/24  0558 10/12/24  1419    138   K 2.9* 3.6   CL 99 98   CO2 29 25   BUN 5* 6*   CREATININE 0.75 0.91*   CALCIUM 7.9* 8.1*     No results for input(s): \"INR\" in the last 72 hours.  No results for input(s): \"CKTOTAL\", \"TROPONINI\" in the last 72 hours.  Radiology:  XR CHEST ABDOMEN NG PLACEMENT   Final Result   Left-sided PICC line tip in the SVC.  No pneumothorax.         XR ABDOMEN (KUB) (SINGLE AP VIEW)   Final Result   1. NG tube tip in the proximal stomach with the side port near the GE   junction.  This could be advanced slightly for better tube position.   2. Nonspecific bowel distension as noted above.  Moderate amount of fecal   material in the rectum.         XR CHEST ABDOMEN NG PLACEMENT   Final Result   1. NG tube tip in the stomach.   2. Cardiomegaly with mild vascular congestion.         Vascular duplex lower extremity venous bilateral   Final Result   No evidence of DVT in either lower extremity given the slight limitations   described.         XR CHEST ABDOMEN NG PLACEMENT   Final Result   1. NG tube tip in the stomach, however, the side port is noted at the level   of the GE junction. The tube should be repositioned.   2. Nonobstructive bowel gas pattern.         CT ABDOMEN PELVIS W IV CONTRAST Additional Contrast? Radiologist Recommendation   Final Result   1. Small bowel obstruction with transition point in the left central abdomen.   Advanced interloop fluid and inflammation are identified.   2. Moderate cardiomegaly with small to moderate bilateral pleural effusions   and atelectasis.   3. Slightly nodular contour of the liver.   4. Bilateral adrenal gland hyperplasia.   The findings were sent to the Radiology Results Communication Center at 2:00   pm on 10/4/2024 to be communicated to a licensed caregiver.         XR CHEST PORTABLE   Final Result   No acute process.      Mild cardiomegaly.         CT Head W/O Contrast   Final Result   No evidence of acute intracranial hemorrhage or

## 2024-10-13 NOTE — CARE COORDINATION
Pre cert for KOV approved through end of day today. If patient is not discharged, pre cert will need to be restarted tomorrow morning.

## 2024-10-14 LAB
GLUCOSE BLD-MCNC: 140 MG/DL (ref 70–99)
GLUCOSE BLD-MCNC: 169 MG/DL (ref 70–99)
GLUCOSE BLD-MCNC: 190 MG/DL (ref 70–99)
GLUCOSE BLD-MCNC: 387 MG/DL (ref 70–99)
PERFORMED ON: ABNORMAL

## 2024-10-14 PROCEDURE — 2580000003 HC RX 258: Performed by: INTERNAL MEDICINE

## 2024-10-14 PROCEDURE — 6370000000 HC RX 637 (ALT 250 FOR IP): Performed by: STUDENT IN AN ORGANIZED HEALTH CARE EDUCATION/TRAINING PROGRAM

## 2024-10-14 PROCEDURE — 2580000003 HC RX 258: Performed by: SURGERY

## 2024-10-14 PROCEDURE — 2060000000 HC ICU INTERMEDIATE R&B

## 2024-10-14 PROCEDURE — 6370000000 HC RX 637 (ALT 250 FOR IP): Performed by: SURGERY

## 2024-10-14 PROCEDURE — 6360000002 HC RX W HCPCS: Performed by: SURGERY

## 2024-10-14 PROCEDURE — G0108 DIAB MANAGE TRN  PER INDIV: HCPCS

## 2024-10-14 PROCEDURE — 6370000000 HC RX 637 (ALT 250 FOR IP): Performed by: INTERNAL MEDICINE

## 2024-10-14 PROCEDURE — 97535 SELF CARE MNGMENT TRAINING: CPT

## 2024-10-14 RX ORDER — PANTOPRAZOLE SODIUM 40 MG/1
40 TABLET, DELAYED RELEASE ORAL
Status: DISCONTINUED | OUTPATIENT
Start: 2024-10-15 | End: 2024-10-16 | Stop reason: HOSPADM

## 2024-10-14 RX ADMIN — INSULIN LISPRO 4 UNITS: 100 INJECTION, SOLUTION INTRAVENOUS; SUBCUTANEOUS at 17:19

## 2024-10-14 RX ADMIN — INSULIN LISPRO 4 UNITS: 100 INJECTION, SOLUTION INTRAVENOUS; SUBCUTANEOUS at 12:55

## 2024-10-14 RX ADMIN — AMMONIUM LACTATE: 17 LOTION TOPICAL at 08:39

## 2024-10-14 RX ADMIN — PANTOPRAZOLE SODIUM 40 MG: 40 INJECTION, POWDER, FOR SOLUTION INTRAVENOUS at 08:31

## 2024-10-14 RX ADMIN — AMMONIUM LACTATE: 17 LOTION TOPICAL at 19:54

## 2024-10-14 RX ADMIN — Medication 10 ML: at 08:38

## 2024-10-14 RX ADMIN — AMMONIUM LACTATE: 17 LOTION TOPICAL at 19:56

## 2024-10-14 RX ADMIN — PRIMIDONE 50 MG: 50 TABLET ORAL at 19:52

## 2024-10-14 RX ADMIN — Medication 10 ML: at 08:37

## 2024-10-14 RX ADMIN — Medication 10 ML: at 19:53

## 2024-10-14 RX ADMIN — ENOXAPARIN SODIUM 40 MG: 100 INJECTION SUBCUTANEOUS at 08:31

## 2024-10-14 RX ADMIN — PIPERACILLIN AND TAZOBACTAM 3375 MG: 3; .375 INJECTION, POWDER, LYOPHILIZED, FOR SOLUTION INTRAVENOUS at 05:49

## 2024-10-14 RX ADMIN — PRIMIDONE 50 MG: 50 TABLET ORAL at 08:33

## 2024-10-14 RX ADMIN — ATORVASTATIN CALCIUM 40 MG: 40 TABLET, FILM COATED ORAL at 19:52

## 2024-10-14 RX ADMIN — INSULIN LISPRO 8 UNITS: 100 INJECTION, SOLUTION INTRAVENOUS; SUBCUTANEOUS at 12:56

## 2024-10-14 NOTE — PLAN OF CARE
Nutrition Problem #1: Inadequate oral intake  Intervention: Food and/or Nutrient Delivery: Modify Current Diet (Add Carb Control 3 to diet order)

## 2024-10-14 NOTE — PROGRESS NOTES
Patient was seen as inpatient today for insulin reviewed due to hypoglycemia.  Catherine SIMON Storm, seen for evaluation and education on Type 2 uncontrolled.     Lab Results   Component Value Date    LABA1C 9.4 (H) 10/03/2024     Lab Results   Component Value Date     10/03/2024       Catherine Inman has had diabetes for many years.    Discussed with Catherine Inman,  Jeff at bedside, and daughter Karolyn on the phone their current diabetes self-care routines prior to this admission. medication compliance: compliant all of the time, home glucose monitoring: is performed sporadically    Survival Skill Topics discussed:  [x] Type 2 Diabetes diagnosis as chronic and progressive  [] Type 1 Diabetes diagnosis      [] Medications - current medications: action, side effects, precautions   Patient's medications: ___Lantus 4 units twice a day, Humalog 18 units each meal    Assessment of current status: [] Needs instruction [x] Needs review   [] Comprehends key points  [] Demonstrates understanding/competence  [] Not covered   [x] Discussed each medication patient is taking, action, side effects, precautions, proper administration. Pt. Taking Humalog and not eating, reinstructed.    [x] Monitoring - [] Patient is currently monitoring - Using meter [] Daily [] weekly [] Monthly   [] before meals [] Bedtime  [] After meals                                        [x] Is not currently monitoring  Assessment of current status: [] Needs instruction [x] Needs review   [] Comprehends key points  [] Demonstrates understanding/competence  [] Not covered  []  Reviewed frequency & targets     [x] Signs and symptoms of hypo/hyperglycemia,  and Rule of 15    Assessment of current status: [] Needs instruction [x] Needs review   [] Comprehends key points  [] Demonstrates understanding/competence  [] Not covered                [x]  Reviewd symptoms of hypo/hyperglycemia and rule of 15  [x] handouts provided.    [] Sick day

## 2024-10-14 NOTE — PROGRESS NOTES
Comprehensive Nutrition Assessment    Type and Reason for Visit:  Reassess    Nutrition Recommendations/Plan:   Add Carb Control 3 to diet order      Malnutrition Assessment:  Malnutrition Status:  No malnutrition (10/10/24 0741)    Context:  Social/Environmental Circumstances     Findings of the 6 clinical characteristics of malnutrition:  Energy Intake:  Mild decrease in energy intake (Comment)  Weight Loss:  No significant weight loss     Body Fat Loss:  No significant body fat loss     Muscle Mass Loss:  No significant muscle mass loss    Fluid Accumulation:  No significant fluid accumulation     Strength:  Not Performed    Nutrition Assessment:    PO diet started 10/11 and tolerating well.  Fair po intake noted, declines nutrition supplements at this time, will add carbohydrate restriction, glucose > 300.    Nutrition Related Findings:    PMH of HTN, HLD, right breast cancer (s/p radiation), CORDOVA, CKD3, DM, MRSA cellulitis, and E coli UTI admitted for AMS and found to have SBO on CT AP 10/4. Now POD 6 s/p ex lap, LIZBETH, washout, and abdominal wall mass biopsy. Had BM 10/13, tolerating diet, glucose > 300 Wound Type: Surgical Incision       Current Nutrition Intake & Therapies:    Average Meal Intake: 26-50%, 51-75%  Average Supplements Intake: None Ordered  ADULT DIET; Regular; 3 carb choices (45 gm/meal)    Anthropometric Measures:  Height: 147.3 cm (4' 10\")  Ideal Body Weight (IBW): 90 lbs (41 kg)    Admission Body Weight: 74.4 kg (164 lb)  Current Body Weight: 75.8 kg (167 lb), 182.2 % IBW. Weight Source: Bed Scale  Current BMI (kg/m2): 34.9  Usual Body Weight: 76.2 kg (168 lb) (11/2023)  % Weight Change (Calculated): -2.4                    BMI Categories: Obese Class 1 (BMI 30.0-34.9)    Estimated Daily Nutrient Needs:  Energy Requirements Based On: Kcal/kg  Weight Used for Energy Requirements: Current  Energy (kcal/day): 3026-4404 kcals @ 16-18 kcal/kg  Weight Used for Protein Requirements:  Ideal  Protein (g/day): 53 gprotein @ 1.3 g/kg IBW  Method Used for Fluid Requirements: 1 ml/kcal  Fluid (ml/day): ~1350    Nutrition Diagnosis:   Inadequate oral intake related to other (comment) (s/p GI surgery for SBO) as evidenced by intake 51-75%, intake 0-25%  Altered nutrition-related lab values related to endocrine dysfuntion as evidenced by lab values    Nutrition Interventions:   Food and/or Nutrient Delivery: Modify Current Diet (Add Carb Control 3 to diet order)  Nutrition Education/Counseling: No recommendation at this time  Coordination of Nutrition Care: Continue to monitor while inpatient       Goals:     Goals: Initiate nutrition support, Initiate PO diet, by next RD assessment       Nutrition Monitoring and Evaluation:      Food/Nutrient Intake Outcomes: Diet Advancement/Tolerance, Food and Nutrient Intake  Physical Signs/Symptoms Outcomes: Biochemical Data, GI Status, Weight    Discharge Planning:    Too soon to determine     Malini Amado RD, LD

## 2024-10-14 NOTE — PROGRESS NOTES
Physical Therapy Med Surg Daily Treatment Note  Facility/Department: [address] TELEMETRY  Room: [place] [address]       NAME: Catherine Inman  : 1945 (79 y.o.)  MRN: [id number]  CODE STATUS: Full Code    Date of Service: 10/14/2024    Patient Diagnosis(es): Shock [R57.9]  Hypoglycemia [E16.2]  Septicemia (HCC) [A41.9]  THAIS (acute kidney injury) (HCC) [N17.9]  Hypothermia, initial encounter [T68.XXXA]  Hypotension, unspecified hypotension type [I95.9]  Altered mental status, unspecified altered mental status type [R41.82]   Chief Complaint   Patient presents with    Altered Mental Status     EMS was called for AMS that started this AM, blood glucose on their arrival was 42     Patient Active Problem List    Diagnosis Date Noted    Chronic hepatitis, unspecified (HCC) 2023    Abnormal liver enzymes 2022    Elevated LFTs 2022    CORDOVA (nonalcoholic steatohepatitis) 2022    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    [place]ouez lesion 10/09/2024    Pain of foot 10/09/2024    Hypoglycemia 10/08/2024    Hypoglycemia associated with type 2 diabetes mellitus (HCC) 10/07/2024    Altered mental status 10/04/2024    Shock 10/03/2024    Fracture of one rib, left side, subsequent encounter for fracture with routine healing 2024    History of falling 2024    Hyperlipidemia, unspecified 2024    MRSA (methicillin resistant Staphylococcus aureus) infection 2024    Acute lower UTI 09/10/2024    Class 2 severe obesity with serious comorbidity and body mass index (BMI) of 35.0 to 35.9 in adult 2024    Peripheral venous insufficiency 04/15/2024    Medication course changed 2023    Smoker unmotivated to quit 2023    Chronic kidney disease (CKD) 2023    Leg edema 2023    Contusion of foot 2023    Tremor 2023    Choking 2023    Pharyngoesophageal dysphagia 2023    Low vitamin B12 level 2022    Stage 3a chronic kidney disease  MOBILITY  AM-PAC Inpatient Mobility Raw Score : 14      Therapy Time   Individual   Time In 1431   Time Out 1444   Minutes 13      Bm/Trsf - 13 mins       Cora Sheffield PTA, 10/14/24 at 3:12 PM         Definitions for assistance levels  Independent = pt does not require any physical supervision or assistance from another person for activity completion. Device may be needed.  Stand by assistance = pt requires verbal cues or instructions from another person, close to but not touching, to perform the activity  Minimal assistance= pt performs 75% or more of the activity; assistance is required to complete the activity  Moderate assistance= pt performs 50% of the activity; assistance is required to complete the activity  Maximal assistance = pt performs 25% of the activity; assistance is required to complete the activity  Dependent = pt requires total physical assistance to accomplish the task

## 2024-10-14 NOTE — PROGRESS NOTES
GENERAL SURGERY  PROGRESS NOTE    Pt Name: Catherine Inman  MRN: [id number]  Date: 10/14/2024    Subjective  CHEL overnight. Afebrile, VSS, satting well. Pt doing well, reports pain is controlled. No nausea/ vomiting. Flatus, BM. Ambulating.    Vitals  /80   Pulse 68   Temp 97.9 °F (36.6 °C) (Oral)   Resp 17   Ht 1.473 m (4' 10\")   Wt 75.9 kg (167 lb 6.4 oz)   SpO2 96%   BMI 34.99 kg/m²      Physical Exam  GEN: A&Ox3, NAD, cooperative   PULM: no increased work of breathing, satting well  CV: regular rate  ABD: Soft, NTND, incision clean, dry and intact  EXT: Warm, dry, no lower extremity edema    Intake/ Output    Intake/Output Summary (Last 24 hours) at 10/14/2024 0918  Last data filed at 10/13/2024 1622  Gross per 24 hour   Intake 360 ml   Output --   Net 360 ml         Labs  Recent Labs     10/12/24  0547 10/12/24  1419   NA  --  138   K  --  3.6   CL  --  98   CO2  --  25   BUN  --  6*   CREATININE  --  0.91*   MG 1.9  --    CALCIUM  --  8.1*         Assessment/ Plan    79F with PMH of HTN, HLD, right breast cancer (s/p radiation), CORDOVA, CKD3, DM, MRSA cellulitis, and E coli UTI admitted for AMS and found to have SBO on CT AP 10/4. Now POD 10 s/p ex lap, LIZBETH, washout, and abdominal wall mass biopsy. Tolerating diet, Having bowel movements.      Pain control: prn morphine, ice  Prn antiemetic  Encourage IS x10/hour while awake  Advance diet as tolerated  Replete K, holding bowel reg.   Abdominal binder  Strict I&Os  h/o DM: accu checks, SSI   Encourage ambulation, OOB x3  SCDs, lovenox  PT/OT  Patient can be discharged from the stand point of surgery.        Roxana Wen MD   General surgeon  10/14/2024

## 2024-10-14 NOTE — PROGRESS NOTES
Physical Therapy Missed Treatment   Facility/Department: [facility] MED SURG W163/W163-01    NAME: Catherine Inman    : 1945 (79 y.o.)  MRN: [id number]    Account: [id number]  Gender: female    Chart reviewed, attempted PT at 13:42. Patient unavailable 2° to:    [x] Pt required total cleanup. Nurse notified. Will return time permitting.    [] Pt declined     [] Nsg notified   [] Other notified    [] Pt.. off floor for test/procedure.     [] Pt. Unavailable       Will attempt PT treatment again at earliest convenience.      Electronically signed by Cora Sheffield PTA on 10/14/24 at 1:53 PM EDT

## 2024-10-14 NOTE — PROGRESS NOTES
Hospitalist Progress Note      Date of Admission: 10/3/2024  Chief Complaint:    Chief Complaint   Patient presents with    Altered Mental Status     EMS was called for AMS that started this AM, blood glucose on their arrival was 42     Subjective:  No new complaints.  No nausea, vomiting, chest pain, or headache. Tolerating diet well. Updated family at bedside and over phone extensively. Family/patient agreeable for dc        Medications:    Infusion Medications    sodium chloride Stopped (10/08/24 0953)    sodium chloride      dextrose       Scheduled Medications    insulin lispro  0-8 Units SubCUTAneous 4x Daily AC & HS    lisinopril  20 mg Oral Daily    insulin glargine  20 Units SubCUTAneous QAM    insulin lispro  4 Units SubCUTAneous TID WC    sodium chloride flush  5-40 mL IntraVENous 2 times per day    pantoprazole (PROTONIX) 40 mg in sodium chloride (PF) 0.9 % 10 mL injection  40 mg IntraVENous Daily    enoxaparin  40 mg SubCUTAneous Daily    amLODIPine  5 mg Oral Daily    atorvastatin  40 mg Oral QHS    primidone  50 mg Oral BID    ammonium lactate   Topical BID    sodium chloride flush  5-40 mL IntraVENous 2 times per day     PRN Meds: prochlorperazine, sodium chloride flush, sodium chloride, phenol, morphine, aluminum & magnesium hydroxide-simethicone, guaiFENesin-codeine, magnesium hydroxide, sodium chloride flush, sodium chloride, acetaminophen **OR** acetaminophen, glucose, dextrose bolus **OR** dextrose bolus, glucagon (rDNA), dextrose    Intake/Output Summary (Last 24 hours) at 10/14/2024 1350  Last data filed at 10/13/2024 1622  Gross per 24 hour   Intake 240 ml   Output --   Net 240 ml       Exam:  /76   Pulse 74   Temp 97.9 °F (36.6 °C) (Oral)   Resp 18   Ht 1.473 m (4' 10\")   Wt 75.9 kg (167 lb 6.4 oz)   SpO2 96%   BMI 34.99 kg/m²   Head: Normocephalic, atraumatic  Sclera clear  Neck JVD flat  Lungs: normal effort of breathing    Labs:   No results for input(s): \"WBC\", \"HGB\", \"HCT\",  \"PLT\" in the last 72 hours.    Recent Labs     10/12/24  1419      K 3.6   CL 98   CO2 25   BUN 6*   CREATININE 0.91*   CALCIUM 8.1*     No results for input(s): \"INR\" in the last 72 hours.  No results for input(s): \"CKTOTAL\", \"TROPONINI\" in the last 72 hours.  Radiology:  XR CHEST ABDOMEN NG PLACEMENT   Final Result   Left-sided PICC line tip in the SVC.  No pneumothorax.         XR ABDOMEN (KUB) (SINGLE AP VIEW)   Final Result   1. NG tube tip in the proximal stomach with the side port near the GE   junction.  This could be advanced slightly for better tube position.   2. Nonspecific bowel distension as noted above.  Moderate amount of fecal   material in the rectum.         XR CHEST ABDOMEN NG PLACEMENT   Final Result   1. NG tube tip in the stomach.   2. Cardiomegaly with mild vascular congestion.         Vascular duplex lower extremity venous bilateral   Final Result   No evidence of DVT in either lower extremity given the slight limitations   described.         XR CHEST ABDOMEN NG PLACEMENT   Final Result   1. NG tube tip in the stomach, however, the side port is noted at the level   of the GE junction. The tube should be repositioned.   2. Nonobstructive bowel gas pattern.         CT ABDOMEN PELVIS W IV CONTRAST Additional Contrast? Radiologist Recommendation   Final Result   1. Small bowel obstruction with transition point in the left central abdomen.   Advanced interloop fluid and inflammation are identified.   2. Moderate cardiomegaly with small to moderate bilateral pleural effusions   and atelectasis.   3. Slightly nodular contour of the liver.   4. Bilateral adrenal gland hyperplasia.   The findings were sent to the Radiology Results Communication Center at 2:00   pm on 10/4/2024 to be communicated to a licensed caregiver.         XR CHEST PORTABLE   Final Result   No acute process.      Mild cardiomegaly.         CT Head W/O Contrast   Final Result   No evidence of acute intracranial hemorrhage

## 2024-10-14 NOTE — CARE COORDINATION
Plan remains KOV at discharge. Pre cert  yesterday. LSW spoke with admissions who confirmed it will be restarted today.

## 2024-10-14 NOTE — PROGRESS NOTES
[facility]  Occupational Therapy        NAME:  Catherine Inman  ROOM: W163/W163-01  :  1945  DATE: 10/14/2024    Attempted to see Catherine Inman at 10:13 AM on this date for:   []  Initial Evaluation   [x]  Treatment       Patient was unable to be seen due to:   [] Off unit for testing/procedure    [] Patient refused, stating \"    [] Therapy on hold due to     [] Nursing deferred due to    [x] Other:  Pt and family members currently being seen for family training. Therapist was requested to come back later. Will attempt again when able.    Electronically signed by ISABELLE Forrest on 10/14/24 at 10:22 AM EDT

## 2024-10-15 LAB
GLUCOSE BLD-MCNC: 102 MG/DL (ref 70–99)
GLUCOSE BLD-MCNC: 193 MG/DL (ref 70–99)
GLUCOSE BLD-MCNC: 199 MG/DL (ref 70–99)
GLUCOSE BLD-MCNC: 278 MG/DL (ref 70–99)
PERFORMED ON: ABNORMAL

## 2024-10-15 PROCEDURE — 97116 GAIT TRAINING THERAPY: CPT

## 2024-10-15 PROCEDURE — 99231 SBSQ HOSP IP/OBS SF/LOW 25: CPT | Performed by: INTERNAL MEDICINE

## 2024-10-15 PROCEDURE — 6360000002 HC RX W HCPCS: Performed by: SURGERY

## 2024-10-15 PROCEDURE — 6370000000 HC RX 637 (ALT 250 FOR IP): Performed by: STUDENT IN AN ORGANIZED HEALTH CARE EDUCATION/TRAINING PROGRAM

## 2024-10-15 PROCEDURE — 6370000000 HC RX 637 (ALT 250 FOR IP): Performed by: SURGERY

## 2024-10-15 PROCEDURE — 97110 THERAPEUTIC EXERCISES: CPT

## 2024-10-15 PROCEDURE — 97535 SELF CARE MNGMENT TRAINING: CPT

## 2024-10-15 PROCEDURE — 99024 POSTOP FOLLOW-UP VISIT: CPT | Performed by: SURGERY

## 2024-10-15 PROCEDURE — 2060000000 HC ICU INTERMEDIATE R&B

## 2024-10-15 PROCEDURE — 6370000000 HC RX 637 (ALT 250 FOR IP): Performed by: INTERNAL MEDICINE

## 2024-10-15 PROCEDURE — 2580000003 HC RX 258: Performed by: SURGERY

## 2024-10-15 RX ORDER — INSULIN LISPRO 100 [IU]/ML
0-4 INJECTION, SOLUTION INTRAVENOUS; SUBCUTANEOUS
Status: DISCONTINUED | OUTPATIENT
Start: 2024-10-16 | End: 2024-10-16 | Stop reason: HOSPADM

## 2024-10-15 RX ORDER — INSULIN GLARGINE 100 [IU]/ML
14 INJECTION, SOLUTION SUBCUTANEOUS EVERY MORNING
Status: DISCONTINUED | OUTPATIENT
Start: 2024-10-16 | End: 2024-10-16 | Stop reason: HOSPADM

## 2024-10-15 RX ORDER — INSULIN LISPRO 100 [IU]/ML
3 INJECTION, SOLUTION INTRAVENOUS; SUBCUTANEOUS
Status: DISCONTINUED | OUTPATIENT
Start: 2024-10-16 | End: 2024-10-16 | Stop reason: HOSPADM

## 2024-10-15 RX ADMIN — INSULIN LISPRO 4 UNITS: 100 INJECTION, SOLUTION INTRAVENOUS; SUBCUTANEOUS at 17:43

## 2024-10-15 RX ADMIN — PANTOPRAZOLE SODIUM 40 MG: 40 TABLET, DELAYED RELEASE ORAL at 05:41

## 2024-10-15 RX ADMIN — INSULIN LISPRO 4 UNITS: 100 INJECTION, SOLUTION INTRAVENOUS; SUBCUTANEOUS at 12:10

## 2024-10-15 RX ADMIN — ATORVASTATIN CALCIUM 40 MG: 40 TABLET, FILM COATED ORAL at 20:03

## 2024-10-15 RX ADMIN — AMMONIUM LACTATE: 17 LOTION TOPICAL at 09:58

## 2024-10-15 RX ADMIN — AMMONIUM LACTATE: 17 LOTION TOPICAL at 19:57

## 2024-10-15 RX ADMIN — PRIMIDONE 50 MG: 50 TABLET ORAL at 09:55

## 2024-10-15 RX ADMIN — Medication 10 ML: at 19:57

## 2024-10-15 RX ADMIN — ENOXAPARIN SODIUM 40 MG: 100 INJECTION SUBCUTANEOUS at 09:54

## 2024-10-15 RX ADMIN — PRIMIDONE 50 MG: 50 TABLET ORAL at 20:03

## 2024-10-15 RX ADMIN — INSULIN GLARGINE 20 UNITS: 100 INJECTION, SOLUTION SUBCUTANEOUS at 09:55

## 2024-10-15 RX ADMIN — Medication 20 ML: at 09:55

## 2024-10-15 RX ADMIN — INSULIN LISPRO 4 UNITS: 100 INJECTION, SOLUTION INTRAVENOUS; SUBCUTANEOUS at 09:55

## 2024-10-15 ASSESSMENT — ENCOUNTER SYMPTOMS: GASTROINTESTINAL NEGATIVE: 1

## 2024-10-15 NOTE — PROGRESS NOTES
Physical Therapy Missed Treatment   Facility/Department: [facility] MED SURG W163/W163-01    NAME: Catherine Inman    : 1945 (79 y.o.)  MRN: [id number]    Account: [id number]  Gender: female    Chart reviewed, attempted PT  Patient unavailable 2° to:    [] Hold per nsg request    [] Pt declined 2*     [] Pt.. off floor for test/procedure.     [x] Pt. Unavailable - pt eating lunch, will return this afternoon       Will attempt PT treatment again at earliest convenience.      Electronically signed by Zoe Salinas PTA on 10/15/24 at 11:30 AM EDT

## 2024-10-15 NOTE — PROGRESS NOTES
[facility] OCCUPATIONAL THERAPY MED SURG TREATMENT NOTE     Date: 10/15/2024  Patient Name: Catherine Inman        MRN: [id number]  Account: [id number]   : 1945  (79 y.o.)  Room: [place] [address]    Chart Review:    Restrictions  Restrictions/Precautions  Restrictions/Precautions: Up as Tolerated, Fall Risk, Contact Precautions     Safety:  Safety Devices  Type of Devices: All fall risk precautions in place;Call light within reach;Bed alarm in place;Chair alarm in place;Left in chair;Nurse notified    Patient's birthday verified: Yes    Subjective:    Subjective: \"I don't know how I'll do but I'll try\"       Pain at start of treatment: No    Pain at end of treatment: No    Location:   Description   Nursing notified: Not Applicable  RN:   Intervention: Repositioned    Objective:    ADL Status:  ADL  Feeding: Setup  Grooming: None  UE Bathing: None  LE Bathing: None  UE Dressing Skilled Clinical Factors: Mod A to change gown  LE Dressing: None  Toileting: Dependent/Total  Toileting Skilled Clinical Factors: Pt initially incontinent of bowel, then had second episode on BSC. Total A for hygiene.  Additional Comments: Assisted pt to change gown and to toilet on BSC. Setup pt lunch in chair for pt to sit up and eat. Encouragement for all aspects, increased processing time  Toilet Transfers  Toilet - Technique: Stand step  Equipment Used: Standard bedside commode  Toilet Transfer: Minimal assistance  Toilet Transfers Comments: Increased effort for transitions, pt stating \"I don't know if I can do it'    YONATHAN Hose donned: No  If no - why  Not ordered    Therapy key for assistance levels -   Independent/Mod I = Pt. is able to perform task with no assistance but may require a device   Stand by assistance = Pt. does not perform task at an independent level but does not need physical assistance, requires verbal cues  Minimal, Moderate, Maximal Assistance = Pt. requires physical assistance (25%, 50%, 75% assist from  regular upper body clothing?: A Little  How much help is needed for taking care of personal grooming?: A Little  How much help for eating meals?: A Little  AM-PAC Inpatient Daily Activity Raw Score: 13  AM-PAC Inpatient ADL T-Scale Score : 32.03  ADL Inpatient CMS 0-100% Score: 63.03  ADL Inpatient CMS G-Code Modifier : CL    Plan:    Continue OT per POC    Patient Education:  Patient Education  Education Given To: Patient  Education Provided: ADL Adaptive Strategies;Transfer Training  Education Method: Verbal  Barriers to Learning: Cognition  Education Outcome: Verbalized understanding    Equipment recommendations:  OT Equipment Recommendations  Other: Continue to assess    Goals/Plan of care addressed during this session:        Patient Goal: Patient goals : \"I want to get better\"    Improve [place] with ADLs    Therapy Time:   Individual Group Co-Treat   Time In 1114       Time Out 1127         Minutes 13         ADL/IADL trainin minutes    Electronically signed by:    TAMELA Singer    10/15/2024, 12:23 PM

## 2024-10-15 NOTE — PROGRESS NOTES
Physical Therapy Med Surg Daily Treatment Note  Facility/Department: [address] TELEMETRY  Room: [place] [address]       NAME: Catherine Inman  : 1945 (79 y.o.)  MRN: [id number]  CODE STATUS: Full Code    Date of Service: 10/15/2024    Patient Diagnosis(es): Shock [R57.9]  Hypoglycemia [E16.2]  Septicemia (HCC) [A41.9]  THAIS (acute kidney injury) (HCC) [N17.9]  Hypothermia, initial encounter [T68.XXXA]  Hypotension, unspecified hypotension type [I95.9]  Altered mental status, unspecified altered mental status type [R41.82]   Chief Complaint   Patient presents with    Altered Mental Status     EMS was called for AMS that started this AM, blood glucose on their arrival was 42     Patient Active Problem List    Diagnosis Date Noted    Chronic hepatitis, unspecified (HCC) 2023    Abnormal liver enzymes 2022    Elevated LFTs 2022    CORDOVA (nonalcoholic steatohepatitis) 2022    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    [place] lesion 10/09/2024    Pain of foot 10/09/2024    Hypoglycemia 10/08/2024    Hypoglycemia associated with type 2 diabetes mellitus (HCC) 10/07/2024    Altered mental status 10/04/2024    Shock 10/03/2024    Fracture of one rib, left side, subsequent encounter for fracture with routine healing 2024    History of falling 2024    Hyperlipidemia, unspecified 2024    MRSA (methicillin resistant Staphylococcus aureus) infection 2024    Acute lower UTI 09/10/2024    Class 2 severe obesity with serious comorbidity and body mass index (BMI) of 35.0 to 35.9 in adult 2024    Peripheral venous insufficiency 04/15/2024    Medication course changed 2023    Smoker unmotivated to quit 2023    Chronic kidney disease (CKD) 2023    Leg edema 2023    Contusion of foot 2023    Tremor 2023    Choking 2023    Pharyngoesophageal dysphagia 2023    Low vitamin B12 level 2022    Stage 3a chronic kidney disease  activity  Moderate assistance= pt performs 50% of the activity; assistance is required to complete the activity  Maximal assistance = pt performs 25% of the activity; assistance is required to complete the activity  Dependent = pt requires total physical assistance to accomplish the task

## 2024-10-15 NOTE — DISCHARGE INSTRUCTIONS
Procedures performed while hospitalized:   IV access  Venapuncture  Anesthesia/ Intubation (for surgery)    Operations performed while hospitalized:   Exploratory laparotomy, lysis of adhesions, washout, and abdominal wall mass biopsy (benign- cicatrix)     Treatment/wound care: Keep incision clean and dry- it is covered with steri strips. These are water proof. They will naturally fall off on their own in time. Do not peel them off. If the edges come up and are bothersome, you can have someone gently trim them if you like. Ok to shower upon discharge. Do not scrub wound vigorously. Pat incision dry after showering.  Do not submerge your incision: No baths, jacuzzi/ hot tubes, or swimming for 3 weeks. BE SURE TO LOOK AT YOUR INCISION DAILY (use a mirror if you need to or have some look at it and take a photo if need be.) IF THERE ARE ANY ISSUES WITH YOUR INCISION(S) SUCH AS DRAINAGE, BULGING, OR REDNESS, PLEASE CALL THE OFFICE AND LET DR LEWIS KNOW RIGHT AWAY.    Pain Control:  Take tylenol or ibuprofen for pain relief.  You should not exceed more than 4000mg of tylenol/acetaminophen from all sources during a 24 hours period.      Activity after Discharge: OK to resume normal activities.  Ambulation is encouraged. Do not participate in strenuous activities (such as heavy lifting in excess of 15 pounds) for 3 weeks.      Follow-Up:  Please call [phone] as soon as possible to schedule a follow up appointment with Dr. Lewis in clinic in 2 weeks.      Future Appointments   Date Time Provider Department Center   11/25/2024  1:30 PM Tin Talavera MD [facility] ECC DEP     Additional Instructions: Resume home medications.  Call the surgical office for fevers, chills, drainage from your incision, or any other concerning symptoms.

## 2024-10-15 NOTE — PROGRESS NOTES
Hospitalist Progress Note      Date of Admission: 10/3/2024  Chief Complaint:    Chief Complaint   Patient presents with    Altered Mental Status     EMS was called for AMS that started this AM, blood glucose on their arrival was 42     Subjective:  No new complaints.  No nausea, vomiting, chest pain, or headache. Tolerating diet well.       Medications:    Infusion Medications    sodium chloride Stopped (10/08/24 0953)    sodium chloride      dextrose       Scheduled Medications    pantoprazole  40 mg Oral QAM AC    insulin lispro  0-8 Units SubCUTAneous 4x Daily AC & HS    lisinopril  20 mg Oral Daily    insulin glargine  20 Units SubCUTAneous QAM    insulin lispro  4 Units SubCUTAneous TID WC    sodium chloride flush  5-40 mL IntraVENous 2 times per day    enoxaparin  40 mg SubCUTAneous Daily    amLODIPine  5 mg Oral Daily    atorvastatin  40 mg Oral QHS    primidone  50 mg Oral BID    ammonium lactate   Topical BID    sodium chloride flush  5-40 mL IntraVENous 2 times per day     PRN Meds: prochlorperazine, sodium chloride flush, sodium chloride, aluminum & magnesium hydroxide-simethicone, guaiFENesin-codeine, magnesium hydroxide, sodium chloride flush, sodium chloride, acetaminophen **OR** acetaminophen, glucose, dextrose bolus **OR** dextrose bolus, glucagon (rDNA), dextrose    Intake/Output Summary (Last 24 hours) at 10/15/2024 1353  Last data filed at 10/14/2024 1821  Gross per 24 hour   Intake 270 ml   Output --   Net 270 ml       Exam:  /73   Pulse 62   Temp 98.2 °F (36.8 °C) (Oral)   Resp 18   Ht 1.473 m (4' 10\")   Wt 76.7 kg (169 lb)   SpO2 95%   BMI 35.32 kg/m²   Head: Normocephalic, atraumatic  Sclera clear  Neck JVD flat  Lungs: normal effort of breathing    Labs:   No results for input(s): \"WBC\", \"HGB\", \"HCT\", \"PLT\" in the last 72 hours.    Recent Labs     10/12/24  1419      K 3.6   CL 98   CO2 25   BUN 6*   CREATININE 0.91*   CALCIUM 8.1*     No results for input(s): \"INR\" in the

## 2024-10-15 NOTE — PROGRESS NOTES
GENERAL SURGERY  PROGRESS NOTE    Pt Name: Catherine Inman  MRN: [id number]  Date: 10/15/2024    Subjective  CHEL overnight. Today, pt doing well. Sitting in a chair and ambulated out into the gastelum for the first time with PT. Tolerating a diet. No abdominal pain. Having bowel function.     Vitals  /77   Pulse 62   Temp 98.2 °F (36.8 °C) (Oral)   Resp 18   Ht 1.473 m (4' 10\")   Wt 76.7 kg (169 lb)   SpO2 95%   BMI 35.32 kg/m²      Physical Exam  GEN: A&O, NAD, cooperative, sitting up in a chair  PULM: no increased work of breathing, on RA  CV: regular rate  ABD: Soft, NTND, incision c/d/I with staples and mild skin irritation from staples, no surrounding erythema  EXT: Warm, dry, ACE wraps in place        Intake/ Output    Intake/Output Summary (Last 24 hours) at 10/15/2024 1745  Last data filed at 10/14/2024 1821  Gross per 24 hour   Intake 240 ml   Output --   Net 240 ml     Assessment/ Plan  79F with PMH of HTN, HLD, right breast cancer (s/p radiation), CORDOVA, CKD3, DM, MRSA cellulitis, and E coli UTI admitted for AMS and found to have SBO on CT AP 10/4. Now POD 11 s/p ex lap, LIZBETH, washout, and abdominal wall mass biopsy (cicatrix). Tolerating a diet with bowel function.     Continue CC diet  Removed incisional staples and placed steri strips  Strict I&Os  h/o DM: accu checks, SSI   Encourage ambulation, OOB x3  SCDs, lovenox  PT/ OT  Ok to discharge from surgical perspective. Needs outpatient follow up in 2 weeks      Noemi Donaldson MD   General surgeon  10/15/2024

## 2024-10-16 VITALS
WEIGHT: 165.79 LBS | HEIGHT: 58 IN | SYSTOLIC BLOOD PRESSURE: 151 MMHG | TEMPERATURE: 98.2 F | HEART RATE: 73 BPM | OXYGEN SATURATION: 98 % | RESPIRATION RATE: 16 BRPM | DIASTOLIC BLOOD PRESSURE: 64 MMHG | BODY MASS INDEX: 34.8 KG/M2

## 2024-10-16 LAB
GLUCOSE BLD-MCNC: 149 MG/DL (ref 70–99)
GLUCOSE BLD-MCNC: 159 MG/DL (ref 70–99)
PERFORMED ON: ABNORMAL
PERFORMED ON: ABNORMAL

## 2024-10-16 PROCEDURE — 97116 GAIT TRAINING THERAPY: CPT

## 2024-10-16 PROCEDURE — 6370000000 HC RX 637 (ALT 250 FOR IP): Performed by: INTERNAL MEDICINE

## 2024-10-16 PROCEDURE — 6360000002 HC RX W HCPCS: Performed by: SURGERY

## 2024-10-16 PROCEDURE — 2580000003 HC RX 258: Performed by: SURGERY

## 2024-10-16 PROCEDURE — 6370000000 HC RX 637 (ALT 250 FOR IP): Performed by: STUDENT IN AN ORGANIZED HEALTH CARE EDUCATION/TRAINING PROGRAM

## 2024-10-16 PROCEDURE — 97535 SELF CARE MNGMENT TRAINING: CPT

## 2024-10-16 PROCEDURE — 2580000003 HC RX 258: Performed by: INTERNAL MEDICINE

## 2024-10-16 RX ORDER — AMMONIUM LACTATE 12 G/100G
LOTION TOPICAL
DISCHARGE
Start: 2024-10-16

## 2024-10-16 RX ORDER — LISINOPRIL 20 MG/1
20 TABLET ORAL DAILY
DISCHARGE
Start: 2024-10-16

## 2024-10-16 RX ORDER — INSULIN LISPRO 100 [IU]/ML
3 INJECTION, SOLUTION INTRAVENOUS; SUBCUTANEOUS
DISCHARGE
Start: 2024-10-16

## 2024-10-16 RX ORDER — PANTOPRAZOLE SODIUM 40 MG/1
40 TABLET, DELAYED RELEASE ORAL
DISCHARGE
Start: 2024-10-17

## 2024-10-16 RX ADMIN — AMMONIUM LACTATE: 17 LOTION TOPICAL at 08:35

## 2024-10-16 RX ADMIN — Medication 10 ML: at 08:38

## 2024-10-16 RX ADMIN — ENOXAPARIN SODIUM 40 MG: 100 INJECTION SUBCUTANEOUS at 08:34

## 2024-10-16 RX ADMIN — Medication 10 ML: at 08:37

## 2024-10-16 RX ADMIN — INSULIN LISPRO 3 UNITS: 100 INJECTION, SOLUTION INTRAVENOUS; SUBCUTANEOUS at 08:32

## 2024-10-16 RX ADMIN — PANTOPRAZOLE SODIUM 40 MG: 40 TABLET, DELAYED RELEASE ORAL at 05:59

## 2024-10-16 RX ADMIN — AMLODIPINE BESYLATE 5 MG: 5 TABLET ORAL at 08:33

## 2024-10-16 RX ADMIN — INSULIN GLARGINE 14 UNITS: 100 INJECTION, SOLUTION SUBCUTANEOUS at 08:34

## 2024-10-16 RX ADMIN — LISINOPRIL 20 MG: 20 TABLET ORAL at 08:34

## 2024-10-16 RX ADMIN — INSULIN LISPRO 3 UNITS: 100 INJECTION, SOLUTION INTRAVENOUS; SUBCUTANEOUS at 11:48

## 2024-10-16 RX ADMIN — PRIMIDONE 50 MG: 50 TABLET ORAL at 08:34

## 2024-10-16 NOTE — PROGRESS NOTES
Patient requires increased time and effort to complete. Lifting assistance required for sit to stand. Patient completes STS x5 from recliner; x 2 from BSC.     Ambulation  Surface: Level tile  Device: Rolling Walker  Assistance: Minimal assistance  Quality of Gait: flexed posture, decreased bilateral step length, inconsistent gait pattern, decreased speed, unsteady during turns,initially demonstrates bilateral knee instability however improves with distance  Gait Deviations: Slow Mita;Decreased step length;Decreased step height  Distance: 30 feet x2    ASSESSMENT   Body Structures, Functions, Activity Limitations Requiring Skilled Therapeutic Intervention: Decreased functional mobility ;Decreased strength;Decreased balance;Increased pain  Assessment: Patient continues to require lifting min A to sit up from bed and stand up from various surface heights. She initially demonstrates unsteady gait however her balance improves with distance.     Discharge Recommendations:  Continue to assess pending progress         Goals  Long Term Goals  Long Term Goal 1: Bed mobility with indep  Long Term Goal 2: functional transfers with indep  Long Term Goal 3: amb 50ft with LRAD and supervision  Long Term Goal 4: 1 curb step with AD and supervision  Long Term Goal 5: supervision with HEP to improve LE strength and activity tolerance  Patient Goals   Patient Goals : to go home    PLAN       Safety Devices  Type of Devices: All fall risk precautions in place, Call light within reach, Bed alarm in place, Chair alarm in place, Left in chair, Nurse notified     [facility] (6 CLICK) BASIC MOBILITY  AM-PAC Inpatient Mobility Raw Score : 17    Therapy Time   Individual   Time In 0945   Time Out 1010   Minutes 25     Timed Code Treatment Minutes: 25 Minutes       Nicole Tee PTA, 10/16/24 at 11:55 AM         Definitions for assistance levels  Independent = pt does not require any physical supervision or assistance from another person

## 2024-10-16 NOTE — PROGRESS NOTES
Progress Note  Date:10/15/2024       [facility]  Patient Name:Catherine Inman     YOB: 1945     Age:79 y.o.    Chief complaint uncontrolled type 2 diabetes    Subjective    Subjective:  Symptoms:  Stable.  She reports weakness.    Diet:  Adequate intake.    Activity level: Returning to normal.    Pain:  She reports no pain.       Review of Systems   Gastrointestinal: Negative.    Neurological:  Positive for weakness.   All other systems reviewed and are negative.    Objective         Vitals Last 24 Hours:  TEMPERATURE:  Temp  Av.5 °F (36.9 °C)  Min: 97.9 °F (36.6 °C)  Max: 99.7 °F (37.6 °C)  RESPIRATIONS RANGE: Resp  Av  Min: 18  Max: 18  PULSE OXIMETRY RANGE: SpO2  Av.6 %  Min: 92 %  Max: 97 %  PULSE RANGE: Pulse  Av.7  Min: 62  Max: 80  BLOOD PRESSURE RANGE: Systolic (24hrs), Av , Min:110 , Max:141   ; Diastolic (24hrs), Av, Min:70, Max:89    I/O (24Hr):    Intake/Output Summary (Last 24 hours) at 10/15/2024 2118  Last data filed at 10/15/2024 2101  Gross per 24 hour   Intake 1440 ml   Output 1100 ml   Net 340 ml     Objective:  General Appearance:  Comfortable.    Vital signs: (most recent): Blood pressure (!) 141/80, pulse 80, temperature 98.4 °F (36.9 °C), temperature source Oral, resp. rate 18, height 1.473 m (4' 10\"), weight 76.7 kg (169 lb), SpO2 97%.  Vital signs are normal.    HEENT: Normal HEENT exam.    Lungs:  Normal effort.    Heart: Normal rate.    Abdomen: Abdomen is soft.    Extremities: Normal range of motion.    Neurological: Patient is alert.    Skin:  No rash.     Labs/Imaging/Diagnostics    Labs:  CBC:No results for input(s): \"WBC\", \"RBC\", \"HGB\", \"HCT\", \"MCV\", \"RDW\", \"PLT\" in the last 72 hours.  CHEMISTRIES:No results for input(s): \"NA\", \"K\", \"CL\", \"CO2\", \"BUN\", \"CREATININE\", \"GLUCOSE\", \"PHOS\", \"MG\" in the last 72 hours.    Invalid input(s): \"CA\"  PT/INR:No results for input(s): \"PROTIME\", \"INR\" in the last 72 hours.  APTT:No results for

## 2024-10-16 NOTE — CARE COORDINATION
Quality round completed with care management team. Plan remain to [facility] pre-cert started on 10/14/2024. .    Pending pre-cert.    Electronically signed by FELIPA Schultz on 10/16/2024 at 9:10 AM    Pre-cert approved per Dina at [facility]. Updated Dr. Agrawal.   Pending medical clearance.     Electronically signed by FELIPA Schultz on 10/16/2024 at 11:34 AM    TRANSPORTATION SET UP THROUGH [facility]YSIC[facility]ANCE.  TIME 1400.   NOTIFY GEORGES BADILLO, RN FACILITY AND PT. PT REPORT SHE WILL NOTIFY .     [id number] COMPLETED.   Electronically signed by FELIPA Schultz on 10/16/2024 at 12:07 PM    Reviewed with pt. Pt agreed with DC plan. Copy left at bedside.     Electronically signed by FELIPA Schultz on 10/16/2024 at 2:12 PM

## 2024-10-16 NOTE — PLAN OF CARE
Problem: Chronic Conditions and Co-morbidities  Goal: Patient's chronic conditions and co-morbidity symptoms are monitored and maintained or improved  Outcome: Progressing     Problem: Discharge Planning  Goal: Discharge to home or other facility with appropriate resources  Outcome: Progressing     Problem: Safety - Adult  Goal: Free from fall injury  Outcome: Progressing     Problem: Discharge Planning  Goal: Discharge to home or other facility with appropriate resources  Outcome: Progressing     Problem: Pain  Goal: Verbalizes/displays adequate comfort level or baseline comfort level  Outcome: Progressing     Problem: Skin/Tissue Integrity  Goal: Absence of new skin breakdown  Description: 1.  Monitor for areas of redness and/or skin breakdown  2.  Assess vascular access sites hourly  3.  Every 4-6 hours minimum:  Change oxygen saturation probe site  4.  Every 4-6 hours:  If on nasal continuous positive airway pressure, respiratory therapy assess nares and determine need for appliance change or resting period.  Outcome: Progressing     Problem: Nutrition Deficit:  Goal: Optimize nutritional status  Outcome: Progressing

## 2024-10-16 NOTE — DISCHARGE SUMMARY
Hospital Medicine Discharge Summary    Catherine Inman  :  1945  MRN:  [id number]    Admit date:  10/3/2024  Discharge date:  10/16/2024    Admitting Physician:  No admitting provider for patient encounter.  Primary Care Physician:  Tin Talavera MD      Chief Complaint   Patient presents with    Altered Mental Status     EMS was called for AMS that started this AM, blood glucose on their arrival was 42     Hospital Course:     79 y.o. female with PMH HTN, IDDM2, CKD3, CORDOVA, severe obesity, breast cancer s/p lumpectomy and RT who presented initially out of concern for encephalopathy, hypoglycemia, and hypotension and was found to have small bowel obstruction. She was admitted to ICU for pressor requirement initially but within 24 hours was transferred to floor. She underwent ex lap/lysis of adhesions 10/4 and was treated with NG to LWS. Patient improved significantly after this procedure and with IV Zosyn. Endocrinology followed and assisted with her insulin regimen. Hypoglycemia on admission felt to be secondary to insulin administration in setting of poor PO intake preceding this admit. She was ultimately discharged to SNF in stable condition. She is to follow up with surgeon in 2w.     Exam on discharge:    BP (!) 151/64   Pulse 73   Temp 98.2 °F (36.8 °C) (Oral)   Resp 16   Ht 1.473 m (4' 10\")   Wt 75.2 kg (165 lb 12.6 oz)   SpO2 98%   BMI 34.65 kg/m²   General appearance: No apparent distress, appears stated age and cooperative.  HEENT: Pupils equal, round, and reactive to light. Conjunctivae/corneas clear.  Neck: Supple, with full range of motion. No jugular venous distention. Trachea midline.  Respiratory:  Normal respiratory effort. Clear to auscultation, bilaterally without Rales/Wheezes/Rhonchi.  Cardiovascular: Regular rate and rhythm with normal S1/S2 without murmurs, rubs or gallops.  Abdomen: Soft, non-tender, non-distended with normal bowel sounds.  Musculoskeletal: No clubbing,  reformatted images are provided for review. Automated exposure control, iterative reconstruction, and/or weight based adjustment of the mA/kV was utilized to reduce the radiation dose to as low as reasonably achievable. COMPARISON: None. HISTORY: ORDERING SYSTEM PROVIDED HISTORY: new onset abdominal pain, vomiting. admitted w/ sepsis TECHNOLOGIST PROVIDED HISTORY: Reason for exam:->new onset abdominal pain, vomiting. admitted w/ sepsis Additional Contrast?->Radiologist Recommendation What reading provider will be dictating this exam?->CRC FINDINGS: Lower Chest: Moderate cardiomegaly with small to moderate bilateral pleural effusions and atelectasis Organs: Slightly nodular contour of the liver.  Mild gallbladder distension with possible tiny stones.  No biliary dilatation.  The pancreas demonstrates no significant abnormality.  The spleen is homogeneous.  Bilateral adrenal gland hyperplasia.  Bilateral renal atrophy with numerous fluid attenuation probable cysts GI/Bowel: Moderate-sized hiatal hernia.  Mildly distended left-sided jejunal non thickened small bowel loops are seen, with fluid distension.  Advanced interloop fluid and inflammation are identified.  There is change in caliber/transition point in the left central abdomen (coronal series 601, image 41) and axial series 2, image 90).  Sigmoid diverticula are seen. Pelvis: No pelvic mass, adenopathy, or fluid collection. Peritoneum/Retroperitoneum: There is no lymphadenopathy.  Portal venous and venous structures are unremarkable.  Arterial calcifications are observed. There is no ascites.  Moderate plaque noted in the SMA Bones/Soft Tissues: Small fat containing umbilical hernia.  Muscular atrophy. Osteopenia of the  with scoliosis and degenerative change.  Injection related change seen in the anterior left abdominal soft tissues     1. Small bowel obstruction with transition point in the left central abdomen. Advanced interloop fluid and inflammation

## 2024-10-18 ENCOUNTER — OFFICE VISIT (OUTPATIENT)
Dept: GERIATRIC MEDICINE | Age: 79
End: 2024-10-18
Payer: MEDICARE

## 2024-10-18 DIAGNOSIS — E78.5 HYPERLIPIDEMIA, UNSPECIFIED HYPERLIPIDEMIA TYPE: ICD-10-CM

## 2024-10-18 DIAGNOSIS — R10.9 ABDOMINAL PAIN, UNSPECIFIED ABDOMINAL LOCATION: Primary | ICD-10-CM

## 2024-10-18 DIAGNOSIS — E11.65 TYPE 2 DIABETES MELLITUS WITH HYPERGLYCEMIA, WITH LONG-TERM CURRENT USE OF INSULIN (HCC): ICD-10-CM

## 2024-10-18 DIAGNOSIS — I10 HYPERTENSION, ESSENTIAL: ICD-10-CM

## 2024-10-18 DIAGNOSIS — I10 HYPERTENSION, UNSPECIFIED TYPE: ICD-10-CM

## 2024-10-18 DIAGNOSIS — N18.31 STAGE 3A CHRONIC KIDNEY DISEASE (HCC): ICD-10-CM

## 2024-10-18 DIAGNOSIS — Z79.4 TYPE 2 DIABETES MELLITUS WITH HYPERGLYCEMIA, WITH LONG-TERM CURRENT USE OF INSULIN (HCC): ICD-10-CM

## 2024-10-18 PROCEDURE — 1123F ACP DISCUSS/DSCN MKR DOCD: CPT | Performed by: INTERNAL MEDICINE

## 2024-10-18 PROCEDURE — 99306 1ST NF CARE HIGH MDM 50: CPT | Performed by: INTERNAL MEDICINE

## 2024-10-18 PROCEDURE — G8484 FLU IMMUNIZE NO ADMIN: HCPCS | Performed by: INTERNAL MEDICINE

## 2024-10-18 PROCEDURE — 3046F HEMOGLOBIN A1C LEVEL >9.0%: CPT | Performed by: INTERNAL MEDICINE

## 2024-10-19 NOTE — PROGRESS NOTES
Physical Therapy  Facility/Department: [facility] MED SURG W163/W163-01  Physical Therapy Discharge      NAME: Catherine Inman    : 1945 (79 y.o.)  MRN: [id number]    Account: [id number]  Gender: female      Patient has been discharged from acute care hospital. DC patient from current PT program.      Electronically signed by Adela Kothari PT on 10/19/24 at 3:52 PM EDT

## 2024-10-20 ENCOUNTER — HOSPITAL ENCOUNTER (EMERGENCY)
Age: 79
Discharge: HOME | End: 2024-10-20
Attending: EMERGENCY MEDICINE
Payer: MEDICARE

## 2024-10-20 ENCOUNTER — OFFICE VISIT (OUTPATIENT)
Dept: GERIATRIC MEDICINE | Age: 79
End: 2024-10-20

## 2024-10-20 ENCOUNTER — APPOINTMENT (OUTPATIENT)
Dept: CT IMAGING | Age: 79
End: 2024-10-20
Payer: MEDICARE

## 2024-10-20 VITALS
SYSTOLIC BLOOD PRESSURE: 118 MMHG | OXYGEN SATURATION: 95 % | HEART RATE: 65 BPM | TEMPERATURE: 97.9 F | HEIGHT: 63 IN | DIASTOLIC BLOOD PRESSURE: 61 MMHG | WEIGHT: 160.4 LBS | BODY MASS INDEX: 28.42 KG/M2 | RESPIRATION RATE: 15 BRPM

## 2024-10-20 DIAGNOSIS — S09.90XA CLOSED HEAD INJURY, INITIAL ENCOUNTER: Primary | ICD-10-CM

## 2024-10-20 DIAGNOSIS — Z79.4 TYPE 2 DIABETES MELLITUS WITH HYPERGLYCEMIA, WITH LONG-TERM CURRENT USE OF INSULIN (HCC): ICD-10-CM

## 2024-10-20 DIAGNOSIS — E11.65 TYPE 2 DIABETES MELLITUS WITH HYPERGLYCEMIA, WITH LONG-TERM CURRENT USE OF INSULIN (HCC): ICD-10-CM

## 2024-10-20 DIAGNOSIS — N18.31 STAGE 3A CHRONIC KIDNEY DISEASE (HCC): ICD-10-CM

## 2024-10-20 DIAGNOSIS — I10 HYPERTENSION, ESSENTIAL: ICD-10-CM

## 2024-10-20 DIAGNOSIS — R10.9 ABDOMINAL PAIN, UNSPECIFIED ABDOMINAL LOCATION: Primary | ICD-10-CM

## 2024-10-20 DIAGNOSIS — I10 HYPERTENSION, UNSPECIFIED TYPE: ICD-10-CM

## 2024-10-20 DIAGNOSIS — E78.5 HYPERLIPIDEMIA, UNSPECIFIED HYPERLIPIDEMIA TYPE: ICD-10-CM

## 2024-10-20 PROCEDURE — 6370000000 HC RX 637 (ALT 250 FOR IP): Performed by: EMERGENCY MEDICINE

## 2024-10-20 PROCEDURE — 99284 EMERGENCY DEPT VISIT MOD MDM: CPT

## 2024-10-20 PROCEDURE — 72125 CT NECK SPINE W/O DYE: CPT

## 2024-10-20 PROCEDURE — 70450 CT HEAD/BRAIN W/O DYE: CPT

## 2024-10-20 RX ORDER — MAGNESIUM HYDROXIDE/ALUMINUM HYDROXICE/SIMETHICONE 120; 1200; 1200 MG/30ML; MG/30ML; MG/30ML
30 SUSPENSION ORAL ONCE
Status: DISCONTINUED | OUTPATIENT
Start: 2024-10-20 | End: 2024-10-21 | Stop reason: HOSPADM

## 2024-10-20 RX ORDER — IBUPROFEN 600 MG/1
600 TABLET, FILM COATED ORAL ONCE
Status: COMPLETED | OUTPATIENT
Start: 2024-10-20 | End: 2024-10-20

## 2024-10-20 RX ADMIN — IBUPROFEN 600 MG: 600 TABLET, FILM COATED ORAL at 03:39

## 2024-10-20 ASSESSMENT — PAIN DESCRIPTION - PAIN TYPE: TYPE: ACUTE PAIN

## 2024-10-20 ASSESSMENT — PAIN - FUNCTIONAL ASSESSMENT: PAIN_FUNCTIONAL_ASSESSMENT: 0-10

## 2024-10-20 ASSESSMENT — PAIN SCALES - GENERAL
PAINLEVEL_OUTOF10: 2
PAINLEVEL_OUTOF10: 2
PAINLEVEL_OUTOF10: 7

## 2024-10-20 ASSESSMENT — PAIN DESCRIPTION - ORIENTATION: ORIENTATION: ANTERIOR

## 2024-10-20 ASSESSMENT — PAIN DESCRIPTION - FREQUENCY: FREQUENCY: CONTINUOUS

## 2024-10-20 ASSESSMENT — PAIN DESCRIPTION - LOCATION: LOCATION: HEAD

## 2024-10-20 NOTE — ED PROVIDER NOTES
agitation and hallucinations.    All other systems reviewed and are negative.      Except as noted above the remainder of the review of systems was reviewed and negative.       PAST MEDICAL HISTORY     Past Medical History:   Diagnosis Date    Breast cancer (HCC)     right (16-17 yrs ago)    Cancer (HCC)     Breast    Diabetes mellitus (HCC)     History of therapeutic radiation     Hyperlipidemia     Hypertension          SURGICALHISTORY       Past Surgical History:   Procedure Laterality Date    APPENDECTOMY      BREAST BIOPSY Right     malignant (16-17 yrs ago)    BREAST LUMPECTOMY Right     malignant    CARPAL TUNNEL RELEASE Left     CT NEEDLE BIOPSY LIVER PERCUTANEOUS Right 08/01/2022    Performed by Dr. Machado - diagnostics sent    CT NEEDLE BIOPSY LIVER PERCUTANEOUS  8/1/2022    CT NEEDLE BIOPSY LIVER PERCUTANEOUS 8/1/2022 [facility] CT SCAN    HERNIA REPAIR      Umbilical    HYSTERECTOMY (CERVIX STATUS UNKNOWN)      total but left part of one ovary    LAPAROTOMY N/A 10/4/2024    EXPLORATORY LAPAROTOMY lysis of adhesions performed by Noemi Donaldson MD at [facility] [place]    OVARY REMOVAL      left part of one ovary    TONSILLECTOMY           CURRENT MEDICATIONS       Previous Medications    ACETAMINOPHEN (TYLENOL) 325 MG TABLET    Take 2 tablets by mouth every 4 hours as needed for Pain    ALCOHOL SWABS (ALCOHOL PADS) 70 % PADS    Use, as directed, three times a day to test blood sugar    ALUMINUM & MAGNESIUM HYDROXIDE-SIMETHICONE (MAALOX) 200-200-20 MG/5ML SUSP SUSPENSION    Take 5 mLs by mouth every 6 hours as needed for Indigestion    AMLODIPINE (NORVASC) 5 MG TABLET    Take 1 tablet by mouth daily    AMMONIUM LACTATE (LAC-HYDRIN) 12 % LOTION    Apply topically as needed.    ATORVASTATIN (LIPITOR) 40 MG TABLET    TAKE 1 TABLET BY MOUTH EVERY DAY    BISACODYL (DULCOLAX) 10 MG SUPPOSITORY    Place 1 suppository rectally daily as needed for Constipation    BLOOD GLUCOSE CALIBRATION (OT ULTRA/FASTTK CNTRL SOLN) SOLN

## 2024-10-20 NOTE — ED NOTES
Patient report from Casandra RN, patient resting with eyes closed, family at the bedside, monitor in use, respirations even and unlabored, vitals with in normal limits. Call light with in reach. Waiting for p/u to return to [facility] tentative 1000 this morning.

## 2024-10-21 ENCOUNTER — OFFICE VISIT (OUTPATIENT)
Dept: GERIATRIC MEDICINE | Age: 79
End: 2024-10-21

## 2024-10-21 DIAGNOSIS — E11.65 TYPE 2 DIABETES MELLITUS WITH HYPERGLYCEMIA, WITH LONG-TERM CURRENT USE OF INSULIN (HCC): ICD-10-CM

## 2024-10-21 DIAGNOSIS — R10.9 ABDOMINAL PAIN, UNSPECIFIED ABDOMINAL LOCATION: Primary | ICD-10-CM

## 2024-10-21 DIAGNOSIS — I10 HYPERTENSION, UNSPECIFIED TYPE: ICD-10-CM

## 2024-10-21 DIAGNOSIS — Z79.4 TYPE 2 DIABETES MELLITUS WITH HYPERGLYCEMIA, WITH LONG-TERM CURRENT USE OF INSULIN (HCC): ICD-10-CM

## 2024-10-21 DIAGNOSIS — I10 HYPERTENSION, ESSENTIAL: ICD-10-CM

## 2024-10-21 DIAGNOSIS — N18.31 STAGE 3A CHRONIC KIDNEY DISEASE (HCC): ICD-10-CM

## 2024-10-21 DIAGNOSIS — E78.5 HYPERLIPIDEMIA, UNSPECIFIED HYPERLIPIDEMIA TYPE: ICD-10-CM

## 2024-10-22 ENCOUNTER — OFFICE VISIT (OUTPATIENT)
Dept: FAMILY MEDICINE CLINIC | Age: 79
End: 2024-10-22
Payer: MEDICARE

## 2024-10-22 VITALS
BODY MASS INDEX: 28.44 KG/M2 | TEMPERATURE: 97.5 F | WEIGHT: 160.5 LBS | DIASTOLIC BLOOD PRESSURE: 60 MMHG | SYSTOLIC BLOOD PRESSURE: 110 MMHG | OXYGEN SATURATION: 97 % | HEART RATE: 77 BPM | RESPIRATION RATE: 11 BRPM | HEIGHT: 63 IN

## 2024-10-22 DIAGNOSIS — D18.00 HEMANGIOMA, UNSPECIFIED SITE: Primary | ICD-10-CM

## 2024-10-22 DIAGNOSIS — M79.89 LEFT LEG SWELLING: ICD-10-CM

## 2024-10-22 PROCEDURE — 99214 OFFICE O/P EST MOD 30 MIN: CPT | Performed by: STUDENT IN AN ORGANIZED HEALTH CARE EDUCATION/TRAINING PROGRAM

## 2024-10-22 PROCEDURE — 3078F DIAST BP <80 MM HG: CPT | Performed by: STUDENT IN AN ORGANIZED HEALTH CARE EDUCATION/TRAINING PROGRAM

## 2024-10-22 PROCEDURE — G8427 DOCREV CUR MEDS BY ELIG CLIN: HCPCS | Performed by: STUDENT IN AN ORGANIZED HEALTH CARE EDUCATION/TRAINING PROGRAM

## 2024-10-22 PROCEDURE — 1111F DSCHRG MED/CURRENT MED MERGE: CPT | Performed by: STUDENT IN AN ORGANIZED HEALTH CARE EDUCATION/TRAINING PROGRAM

## 2024-10-22 PROCEDURE — G8417 CALC BMI ABV UP PARAM F/U: HCPCS | Performed by: STUDENT IN AN ORGANIZED HEALTH CARE EDUCATION/TRAINING PROGRAM

## 2024-10-22 PROCEDURE — 1036F TOBACCO NON-USER: CPT | Performed by: STUDENT IN AN ORGANIZED HEALTH CARE EDUCATION/TRAINING PROGRAM

## 2024-10-22 PROCEDURE — 1123F ACP DISCUSS/DSCN MKR DOCD: CPT | Performed by: STUDENT IN AN ORGANIZED HEALTH CARE EDUCATION/TRAINING PROGRAM

## 2024-10-22 PROCEDURE — G8400 PT W/DXA NO RESULTS DOC: HCPCS | Performed by: STUDENT IN AN ORGANIZED HEALTH CARE EDUCATION/TRAINING PROGRAM

## 2024-10-22 PROCEDURE — 3074F SYST BP LT 130 MM HG: CPT | Performed by: STUDENT IN AN ORGANIZED HEALTH CARE EDUCATION/TRAINING PROGRAM

## 2024-10-22 PROCEDURE — G8484 FLU IMMUNIZE NO ADMIN: HCPCS | Performed by: STUDENT IN AN ORGANIZED HEALTH CARE EDUCATION/TRAINING PROGRAM

## 2024-10-22 PROCEDURE — 1090F PRES/ABSN URINE INCON ASSESS: CPT | Performed by: STUDENT IN AN ORGANIZED HEALTH CARE EDUCATION/TRAINING PROGRAM

## 2024-10-22 NOTE — PROGRESS NOTES
transcribed using voice recognition software. Every effort was made to ensure accuracy; however, inadvertent computerized transcription errors may be present.

## 2024-10-22 NOTE — PROGRESS NOTES
Subjective  Catherine Inman, 79 y.o. female presents today with:  Chief Complaint   Patient presents with    Follow-up     Patient present today for follow up for post closed head injury on 10/20/24. She said that she is feeling fine with no pain.     She is here for ER follow-up of head injury.  Patient was walking to the bathroom and fell and hit her head 2 days ago.  She is at a nursing home right now.  She is not on any blood thinners.  CT head done and showed a left parietal scalp hematoma along with an unchanged frontal meningioma.  CT spine unremarkable.  The patient was then discharged.    Today she reports that she doing well, denies any head pain. Denies SOB or leg pain.    Review of Systems   Neurological:  Negative for headaches.       Past Medical History:   Diagnosis Date    Breast cancer (HCC)     right (16-17 yrs ago)    Cancer (HCC)     Breast    Diabetes mellitus (HCC)     History of therapeutic radiation     Hyperlipidemia     Hypertension      Past Surgical History:   Procedure Laterality Date    APPENDECTOMY      BREAST BIOPSY Right     malignant (16-17 yrs ago)    BREAST LUMPECTOMY Right     malignant    CARPAL TUNNEL RELEASE Left     CT NEEDLE BIOPSY LIVER PERCUTANEOUS Right 08/01/2022    Performed by Dr. Machado - diagnostics sent    CT NEEDLE BIOPSY LIVER PERCUTANEOUS  8/1/2022    CT NEEDLE BIOPSY LIVER PERCUTANEOUS 8/1/2022 [facility] [place] [place]AN    HERNIA REPAIR      Umbilical    HYSTERECTOMY (CERVIX STATUS UNKNOWN)      total but left part of one ovary    LAPAROTOMY N/A 10/4/2024    EXPLORATORY LAPAROTOMY lysis of adhesions performed by Noemi Donaldson MD at [facility] [place]    OVARY REMOVAL      left part of one ovary    TONSILLECTOMY       Current Outpatient Medications   Medication Sig Dispense Refill    ammonium lactate (LAC-HYDRIN) 12 % lotion Apply topically as needed. (Patient taking differently: Apply topically Indications: Dryness Confined to a Specific area of the Body Apply topically as

## 2024-10-23 ENCOUNTER — OFFICE VISIT (OUTPATIENT)
Dept: GERIATRIC MEDICINE | Age: 79
End: 2024-10-23

## 2024-10-23 ENCOUNTER — TELEMEDICINE (OUTPATIENT)
Dept: FAMILY MEDICINE CLINIC | Age: 79
End: 2024-10-23

## 2024-10-23 DIAGNOSIS — R41.89 MODERATE COGNITIVE IMPAIRMENT: Primary | ICD-10-CM

## 2024-10-23 DIAGNOSIS — N18.31 STAGE 3A CHRONIC KIDNEY DISEASE (HCC): ICD-10-CM

## 2024-10-23 DIAGNOSIS — E11.65 TYPE 2 DIABETES MELLITUS WITH HYPERGLYCEMIA, WITH LONG-TERM CURRENT USE OF INSULIN (HCC): ICD-10-CM

## 2024-10-23 DIAGNOSIS — Z79.4 TYPE 2 DIABETES MELLITUS WITH HYPERGLYCEMIA, WITH LONG-TERM CURRENT USE OF INSULIN (HCC): ICD-10-CM

## 2024-10-23 DIAGNOSIS — N18.31 TYPE 2 DIABETES MELLITUS WITH STAGE 3A CHRONIC KIDNEY DISEASE, WITH LONG-TERM CURRENT USE OF INSULIN (HCC): ICD-10-CM

## 2024-10-23 DIAGNOSIS — I10 HYPERTENSION, UNSPECIFIED TYPE: ICD-10-CM

## 2024-10-23 DIAGNOSIS — E11.22 TYPE 2 DIABETES MELLITUS WITH STAGE 3A CHRONIC KIDNEY DISEASE, WITH LONG-TERM CURRENT USE OF INSULIN (HCC): ICD-10-CM

## 2024-10-23 DIAGNOSIS — R10.9 ABDOMINAL PAIN, UNSPECIFIED ABDOMINAL LOCATION: Primary | ICD-10-CM

## 2024-10-23 DIAGNOSIS — E78.5 HYPERLIPIDEMIA, UNSPECIFIED HYPERLIPIDEMIA TYPE: ICD-10-CM

## 2024-10-23 DIAGNOSIS — Z79.4 TYPE 2 DIABETES MELLITUS WITH STAGE 3A CHRONIC KIDNEY DISEASE, WITH LONG-TERM CURRENT USE OF INSULIN (HCC): ICD-10-CM

## 2024-10-23 NOTE — PROGRESS NOTES
10/23/2024    Catherine Inman (:  1945) is a 79 y.o. female     79-year-old diabetic female with associated neuropathy hypertensive hyperlipidemic female with hx of a resting tremor presents for cognitive function assessment.      Cognitive function assessment: The patient scored 17 out of 30 on administered Mini-Mental exam        Type 2 diabetes: The patient's blood sugar is presently exceeds 300.          At present she denies polyuria,  Polydipsia, constitutional, sinus, visual, cardiopulmonary, urologic, additional gastrointestinal, immunologic/hematologic, musculoskeletal, neurologic,dermatologic, or additional psychiatric complaints.        Patient Active Problem List   Diagnosis    Hypertension, essential    Malignant neoplasm of female breast (HCC)    Controlled type 2 diabetes mellitus with chronic kidney disease (HCC)    Meningioma, cerebral (HCC)    Type 2 diabetes mellitus with hyperglycemia    Stage 3a chronic kidney disease (HCC)    CORDOVA (nonalcoholic steatohepatitis)    Elevated LFTs    Abnormal liver enzymes    Chronic hepatitis, unspecified (HCC)    Choking    Pharyngoesophageal dysphagia    Peripheral venous insufficiency    Acute lower UTI    MRSA (methicillin resistant Staphylococcus aureus) infection    Shock    Altered mental status    Hypoglycemia associated with type 2 diabetes mellitus (HCC)    Hypoglycemia    Chronic kidney disease (CKD)    Chronic pulmonary coccidioidomycosis (HCC)    Class 2 severe obesity with serious comorbidity and body mass index (BMI) of 35.0 to 35.9 in adult    [place] lesion    Contusion of foot    Fracture of one rib, left side, subsequent encounter for fracture with routine healing    History of falling    Hyperlipidemia, unspecified    Leg edema    Low vitamin B12 level    Lung nodule    Medication course changed    Pain of foot    Smoker unmotivated to quit    Tremor       Review of Systems   Constitutional: Negative for chills, diaphoresis,

## 2024-10-24 ENCOUNTER — OFFICE VISIT (OUTPATIENT)
Dept: GERIATRIC MEDICINE | Age: 79
End: 2024-10-24
Payer: MEDICARE

## 2024-10-24 DIAGNOSIS — E78.5 HYPERLIPIDEMIA, UNSPECIFIED HYPERLIPIDEMIA TYPE: ICD-10-CM

## 2024-10-24 DIAGNOSIS — N39.0 URINARY TRACT INFECTION WITHOUT HEMATURIA, SITE UNSPECIFIED: ICD-10-CM

## 2024-10-24 DIAGNOSIS — I10 HYPERTENSION, ESSENTIAL: ICD-10-CM

## 2024-10-24 DIAGNOSIS — I10 HYPERTENSION, UNSPECIFIED TYPE: ICD-10-CM

## 2024-10-24 DIAGNOSIS — R10.9 ABDOMINAL PAIN, UNSPECIFIED ABDOMINAL LOCATION: Primary | ICD-10-CM

## 2024-10-24 DIAGNOSIS — N18.31 STAGE 3A CHRONIC KIDNEY DISEASE (HCC): ICD-10-CM

## 2024-10-24 DIAGNOSIS — E11.65 TYPE 2 DIABETES MELLITUS WITH HYPERGLYCEMIA, WITH LONG-TERM CURRENT USE OF INSULIN (HCC): ICD-10-CM

## 2024-10-24 DIAGNOSIS — Z79.4 TYPE 2 DIABETES MELLITUS WITH HYPERGLYCEMIA, WITH LONG-TERM CURRENT USE OF INSULIN (HCC): ICD-10-CM

## 2024-10-24 PROCEDURE — 3046F HEMOGLOBIN A1C LEVEL >9.0%: CPT | Performed by: INTERNAL MEDICINE

## 2024-10-24 PROCEDURE — 1123F ACP DISCUSS/DSCN MKR DOCD: CPT | Performed by: INTERNAL MEDICINE

## 2024-10-24 PROCEDURE — G8484 FLU IMMUNIZE NO ADMIN: HCPCS | Performed by: INTERNAL MEDICINE

## 2024-10-24 PROCEDURE — 99309 SBSQ NF CARE MODERATE MDM 30: CPT | Performed by: INTERNAL MEDICINE

## 2024-10-24 NOTE — PROGRESS NOTES
SNF PROGRESS NOTE      Cc- SBO       Patient is a Catherine Inman 79 y.o. female who is being seen at [facility] as an admit post hospital stay for a SBO. She briefly was in the ICU and required pressors. She underwent a Exploratory Lap on 10/4 with lysis of adhesions.     Patient is sitting up in the bed. She went out this morning early due to a fall. She has returned and is sleeping. She states that she is ok.         Past Medical History:   Diagnosis Date    Breast cancer (HCC)     right (16-17 yrs ago)    Cancer (HCC)     Breast    Diabetes mellitus (HCC)     History of therapeutic radiation     Hyperlipidemia     Hypertension      Patient has no known allergies.    VS reviewed    Gen- Alert and oriented x 2   Heart- RRR no murmur no LE edema   Lungs- CTA b/l no resp distress RA oxygen   Abd- bs x 4, obese          Assessment and Plan    SBO s/p Exploratory Lap with lysis of adhesions   PT OT   F/u gen surgery pm 11/4  DM  Glargine   HLD  Statin   HTN  Propanolol   Norvasc   Lisinopril   CKD stage 3       Significant Events     10/20- patient sent to ER for fall.       FARIDA Wang DO     Electronically signed by: Aura Freeman DO on 10/20/2024

## 2024-10-25 ENCOUNTER — OFFICE VISIT (OUTPATIENT)
Dept: GERIATRIC MEDICINE | Age: 79
End: 2024-10-25

## 2024-10-25 DIAGNOSIS — R10.9 ABDOMINAL PAIN, UNSPECIFIED ABDOMINAL LOCATION: Primary | ICD-10-CM

## 2024-10-25 DIAGNOSIS — F03.90 DEMENTIA WITHOUT BEHAVIORAL DISTURBANCE (HCC): ICD-10-CM

## 2024-10-25 DIAGNOSIS — E78.5 HYPERLIPIDEMIA, UNSPECIFIED HYPERLIPIDEMIA TYPE: ICD-10-CM

## 2024-10-25 DIAGNOSIS — N18.31 STAGE 3A CHRONIC KIDNEY DISEASE (HCC): ICD-10-CM

## 2024-10-25 DIAGNOSIS — I10 HYPERTENSION, UNSPECIFIED TYPE: ICD-10-CM

## 2024-10-25 DIAGNOSIS — I10 HYPERTENSION, ESSENTIAL: ICD-10-CM

## 2024-10-25 DIAGNOSIS — Z79.4 TYPE 2 DIABETES MELLITUS WITH HYPERGLYCEMIA, WITH LONG-TERM CURRENT USE OF INSULIN (HCC): ICD-10-CM

## 2024-10-25 DIAGNOSIS — E11.65 TYPE 2 DIABETES MELLITUS WITH HYPERGLYCEMIA, WITH LONG-TERM CURRENT USE OF INSULIN (HCC): ICD-10-CM

## 2024-10-25 NOTE — PROGRESS NOTES
SNF PROGRESS NOTE      Cc- SBO       Patient is a Catherine Inman 79 y.o. female who is being seen at [facility] as an admit post hospital stay for a SBO. She briefly was in the ICU and required pressors. She underwent a Exploratory Lap on 10/4 with lysis of adhesions.     Patient having some discomfort where she fell. It is controlled. She still feels weaker than baseline.         Past Medical History:   Diagnosis Date    Breast cancer (HCC)     right (16-17 yrs ago)    Cancer (HCC)     Breast    Diabetes mellitus (HCC)     History of therapeutic radiation     Hyperlipidemia     Hypertension      Patient has no known allergies.    VS reviewed      Gen- Alert and oriented x 2   Heart- RRR no murmur no LE edema   Lungs- CTA b/l no resp distress RA oxygen   Abd- bs x 4, obese        Assessment and Plan    SBO s/p Exploratory Lap with lysis of adhesions   PT OT   F/u gen surgery pm 11/4  DM  Glargine   HLD  Statin   HTN  Propanolol   Norvasc   Lisinopril   CKD stage 3         Significant Events      10/20- patient sent to ER for fall.       FARIDA Wang DO     Electronically signed by: Aura Freeman DO on 10/21/2024

## 2024-10-28 ENCOUNTER — OFFICE VISIT (OUTPATIENT)
Dept: GERIATRIC MEDICINE | Age: 79
End: 2024-10-28

## 2024-10-28 DIAGNOSIS — R10.9 ABDOMINAL PAIN, UNSPECIFIED ABDOMINAL LOCATION: Primary | ICD-10-CM

## 2024-10-28 DIAGNOSIS — I10 HYPERTENSION, UNSPECIFIED TYPE: ICD-10-CM

## 2024-10-28 DIAGNOSIS — N18.31 STAGE 3A CHRONIC KIDNEY DISEASE (HCC): ICD-10-CM

## 2024-10-28 DIAGNOSIS — Z79.4 TYPE 2 DIABETES MELLITUS WITH HYPERGLYCEMIA, WITH LONG-TERM CURRENT USE OF INSULIN (HCC): ICD-10-CM

## 2024-10-28 DIAGNOSIS — F03.90 DEMENTIA WITHOUT BEHAVIORAL DISTURBANCE (HCC): ICD-10-CM

## 2024-10-28 DIAGNOSIS — E11.65 TYPE 2 DIABETES MELLITUS WITH HYPERGLYCEMIA, WITH LONG-TERM CURRENT USE OF INSULIN (HCC): ICD-10-CM

## 2024-10-28 DIAGNOSIS — E78.5 HYPERLIPIDEMIA, UNSPECIFIED HYPERLIPIDEMIA TYPE: ICD-10-CM

## 2024-10-28 DIAGNOSIS — I10 HYPERTENSION, ESSENTIAL: ICD-10-CM

## 2024-10-29 ENCOUNTER — OFFICE VISIT (OUTPATIENT)
Dept: GERIATRIC MEDICINE | Age: 79
End: 2024-10-29

## 2024-10-29 DIAGNOSIS — E78.5 HYPERLIPIDEMIA, UNSPECIFIED HYPERLIPIDEMIA TYPE: ICD-10-CM

## 2024-10-29 DIAGNOSIS — N18.31 STAGE 3A CHRONIC KIDNEY DISEASE (HCC): ICD-10-CM

## 2024-10-29 DIAGNOSIS — R10.9 ABDOMINAL PAIN, UNSPECIFIED ABDOMINAL LOCATION: Primary | ICD-10-CM

## 2024-10-29 DIAGNOSIS — E11.65 TYPE 2 DIABETES MELLITUS WITH HYPERGLYCEMIA, WITH LONG-TERM CURRENT USE OF INSULIN (HCC): ICD-10-CM

## 2024-10-29 DIAGNOSIS — I10 HYPERTENSION, ESSENTIAL: ICD-10-CM

## 2024-10-29 DIAGNOSIS — Z79.4 TYPE 2 DIABETES MELLITUS WITH HYPERGLYCEMIA, WITH LONG-TERM CURRENT USE OF INSULIN (HCC): ICD-10-CM

## 2024-10-29 DIAGNOSIS — I10 HYPERTENSION, UNSPECIFIED TYPE: ICD-10-CM

## 2024-10-29 NOTE — PROGRESS NOTES
SNF PROGRESS NOTE      Cc- SBO       Patient is a Catherine Inman 79 y.o. female who is being seen at [facility] as an admit post hospital stay for a SBO. She briefly was in the ICU and required pressors. She underwent a Exploratory Lap on 10/4 with lysis of adhesions.     Patient is fatigued and tired. She is on macrobid. She is tolerating well.         Past Medical History:   Diagnosis Date    Breast cancer (HCC)     right (16-17 yrs ago)    Cancer (HCC)     Breast    Diabetes mellitus (HCC)     History of therapeutic radiation     Hyperlipidemia     Hypertension      Patient has no known allergies.    VS reviewed      Gen- Alert and oriented x 2   Heart- RRR no murmur no LE edema   Lungs- CTA b/l no resp distress RA oxygen   Abd- bs x 4, obese        Assessment and Plan    SBO s/p Exploratory Lap with lysis of adhesions   PT OT   F/u gen surgery pm 11/4  DM  Glargine   HLD  Statin   HTN  Propanolol   Norvasc   Lisinopril   CKD stage 3   UTI   Macrobid 100 mg BID       FARIDA Wang DO     Electronically signed by: Aura Freeman DO on 10/24/2024

## 2024-10-30 ENCOUNTER — OFFICE VISIT (OUTPATIENT)
Dept: GERIATRIC MEDICINE | Age: 79
End: 2024-10-30

## 2024-10-30 DIAGNOSIS — E11.65 TYPE 2 DIABETES MELLITUS WITH HYPERGLYCEMIA, WITH LONG-TERM CURRENT USE OF INSULIN (HCC): ICD-10-CM

## 2024-10-30 DIAGNOSIS — R10.9 ABDOMINAL PAIN, UNSPECIFIED ABDOMINAL LOCATION: Primary | ICD-10-CM

## 2024-10-30 DIAGNOSIS — N18.31 STAGE 3A CHRONIC KIDNEY DISEASE (HCC): ICD-10-CM

## 2024-10-30 DIAGNOSIS — I10 HYPERTENSION, UNSPECIFIED TYPE: ICD-10-CM

## 2024-10-30 DIAGNOSIS — E78.5 HYPERLIPIDEMIA, UNSPECIFIED HYPERLIPIDEMIA TYPE: ICD-10-CM

## 2024-10-30 DIAGNOSIS — F03.90 DEMENTIA WITHOUT BEHAVIORAL DISTURBANCE (HCC): ICD-10-CM

## 2024-10-30 DIAGNOSIS — Z79.4 TYPE 2 DIABETES MELLITUS WITH HYPERGLYCEMIA, WITH LONG-TERM CURRENT USE OF INSULIN (HCC): ICD-10-CM

## 2024-10-31 NOTE — PROGRESS NOTES
SNF PROGRESS NOTE      Cc- SBO       Patient is a Catherine Inman 79 y.o. female who is being seen at [facility] as an admit post hospital stay for a SBO. She briefly was in the ICU and required pressors. She underwent a Exploratory Lap on 10/4 with lysis of adhesions.     Patient had a swollen leg, and US was done and negative. Patient urine was reported and started on macrobid due to fatigue and worsening weakness.         Past Medical History:   Diagnosis Date    Breast cancer (HCC)     right (16-17 yrs ago)    Cancer (HCC)     Breast    Diabetes mellitus (HCC)     History of therapeutic radiation     Hyperlipidemia     Hypertension      Patient has no known allergies.    VS reviewed    Gen- Alert and oriented x 2   Heart- RRR no murmur no LE edema   Lungs- CTA b/l no resp distress RA oxygen   Abd- bs x 4, obese          Assessment and Plan    SBO s/p Exploratory Lap with lysis of adhesions   PT OT   F/u gen surgery pm 11/4  DM  Glargine   HLD  Statin   HTN  Propanolol   Norvasc   Lisinopril   CKD stage 3   Dementia   UTI   Macrobid 100 mg BID       FARIDA Wang DO     Electronically signed by: Aura Freeman DO on 10/25/2024

## 2024-11-01 ENCOUNTER — OFFICE VISIT (OUTPATIENT)
Dept: GERIATRIC MEDICINE | Age: 79
End: 2024-11-01

## 2024-11-01 DIAGNOSIS — R10.9 ABDOMINAL PAIN, UNSPECIFIED ABDOMINAL LOCATION: Primary | ICD-10-CM

## 2024-11-01 DIAGNOSIS — I10 HYPERTENSION, UNSPECIFIED TYPE: ICD-10-CM

## 2024-11-01 DIAGNOSIS — N18.31 STAGE 3A CHRONIC KIDNEY DISEASE (HCC): ICD-10-CM

## 2024-11-01 DIAGNOSIS — E11.65 TYPE 2 DIABETES MELLITUS WITH HYPERGLYCEMIA, WITH LONG-TERM CURRENT USE OF INSULIN (HCC): ICD-10-CM

## 2024-11-01 DIAGNOSIS — E78.5 HYPERLIPIDEMIA, UNSPECIFIED HYPERLIPIDEMIA TYPE: ICD-10-CM

## 2024-11-01 DIAGNOSIS — Z79.4 TYPE 2 DIABETES MELLITUS WITH HYPERGLYCEMIA, WITH LONG-TERM CURRENT USE OF INSULIN (HCC): ICD-10-CM

## 2024-11-02 ENCOUNTER — OFFICE VISIT (OUTPATIENT)
Dept: GERIATRIC MEDICINE | Age: 79
End: 2024-11-02

## 2024-11-02 DIAGNOSIS — E78.5 HYPERLIPIDEMIA, UNSPECIFIED HYPERLIPIDEMIA TYPE: ICD-10-CM

## 2024-11-02 DIAGNOSIS — N18.31 STAGE 3A CHRONIC KIDNEY DISEASE (HCC): ICD-10-CM

## 2024-11-02 DIAGNOSIS — E11.65 TYPE 2 DIABETES MELLITUS WITH HYPERGLYCEMIA, WITH LONG-TERM CURRENT USE OF INSULIN (HCC): ICD-10-CM

## 2024-11-02 DIAGNOSIS — Z79.4 TYPE 2 DIABETES MELLITUS WITH HYPERGLYCEMIA, WITH LONG-TERM CURRENT USE OF INSULIN (HCC): ICD-10-CM

## 2024-11-02 DIAGNOSIS — R10.9 ABDOMINAL PAIN, UNSPECIFIED ABDOMINAL LOCATION: Primary | ICD-10-CM

## 2024-11-02 DIAGNOSIS — I10 HYPERTENSION, UNSPECIFIED TYPE: ICD-10-CM

## 2024-11-02 DIAGNOSIS — R19.7 DIARRHEA, UNSPECIFIED TYPE: ICD-10-CM

## 2024-11-02 DIAGNOSIS — F03.90 DEMENTIA WITHOUT BEHAVIORAL DISTURBANCE (HCC): ICD-10-CM

## 2024-11-04 ENCOUNTER — OFFICE VISIT (OUTPATIENT)
Dept: SURGERY | Age: 79
End: 2024-11-04

## 2024-11-04 ENCOUNTER — OFFICE VISIT (OUTPATIENT)
Dept: GERIATRIC MEDICINE | Age: 79
End: 2024-11-04
Payer: MEDICARE

## 2024-11-04 VITALS
SYSTOLIC BLOOD PRESSURE: 124 MMHG | HEIGHT: 62 IN | TEMPERATURE: 98.5 F | DIASTOLIC BLOOD PRESSURE: 80 MMHG | BODY MASS INDEX: 26.68 KG/M2 | WEIGHT: 145 LBS

## 2024-11-04 DIAGNOSIS — E78.5 HYPERLIPIDEMIA, UNSPECIFIED HYPERLIPIDEMIA TYPE: ICD-10-CM

## 2024-11-04 DIAGNOSIS — Z09 POSTOP CHECK: Primary | ICD-10-CM

## 2024-11-04 DIAGNOSIS — R10.9 ABDOMINAL PAIN, UNSPECIFIED ABDOMINAL LOCATION: Primary | ICD-10-CM

## 2024-11-04 DIAGNOSIS — Z79.4 TYPE 2 DIABETES MELLITUS WITH HYPERGLYCEMIA, WITH LONG-TERM CURRENT USE OF INSULIN (HCC): ICD-10-CM

## 2024-11-04 DIAGNOSIS — F03.90 DEMENTIA WITHOUT BEHAVIORAL DISTURBANCE (HCC): ICD-10-CM

## 2024-11-04 DIAGNOSIS — E11.65 TYPE 2 DIABETES MELLITUS WITH HYPERGLYCEMIA, WITH LONG-TERM CURRENT USE OF INSULIN (HCC): ICD-10-CM

## 2024-11-04 DIAGNOSIS — I10 HYPERTENSION, UNSPECIFIED TYPE: ICD-10-CM

## 2024-11-04 DIAGNOSIS — N18.31 STAGE 3A CHRONIC KIDNEY DISEASE (HCC): ICD-10-CM

## 2024-11-04 PROCEDURE — G8484 FLU IMMUNIZE NO ADMIN: HCPCS | Performed by: INTERNAL MEDICINE

## 2024-11-04 PROCEDURE — 3046F HEMOGLOBIN A1C LEVEL >9.0%: CPT | Performed by: INTERNAL MEDICINE

## 2024-11-04 PROCEDURE — 1123F ACP DISCUSS/DSCN MKR DOCD: CPT | Performed by: INTERNAL MEDICINE

## 2024-11-04 PROCEDURE — 99308 SBSQ NF CARE LOW MDM 20: CPT | Performed by: INTERNAL MEDICINE

## 2024-11-04 PROCEDURE — 99024 POSTOP FOLLOW-UP VISIT: CPT | Performed by: SURGERY

## 2024-11-04 NOTE — PROGRESS NOTES
GENERAL SURGERY POST OPERATIVE VISIT    Pt Name: Catherine Inman  MRN: [id number]  Date: 11/4/2024       SUBJECTIVE:   Catherine Inman is a 79 y.o. female who is now 1 month s/p exploratory laparotomy, lysis of adhesions, washout for small bowel obstruction.  Upon presentation, pt doing ok. She is recovering in a facility. Tolerating a diet.  reports she sometimes \"grabs at her abdomen\" but patient denies it. Notes one spot near her umbilicus was leaky but not anymore. Denies fever, chills, and night sweats.     OBJECTIVE:   CURRENT VITALS: /80   Temp 98.5 °F (36.9 °C)   Ht 1.575 m (5' 2\")   Wt 65.8 kg (145 lb)   BMI 26.52 kg/m²      GEN: Alert and oriented x3, no acute distress, cooperative   SKIN: Skin color, texture, turgor normal. No rashes or lesions  HEENT: Head is normocephalic, atraumatic. EOMI  NECK: Supple, symmetrical, trachea midline, skin normal  PULM: Chest symmetric, no increased work of breathing or accessory muscle use  CV: Heart regular rate   ABD: Soft, nontender, nondistended, no guarding, midline incision c/d/I with steri strips on upper aspect, no drainage  EXTREMITIES: Warm, dry    PATHOLOGY:   FINAL DIAGNOSIS: ABDOMINAL WALL MASS: FRAGMENT OF FIBROADIPOSE TISSUE WITH SMALL FRAGMENTS OF FOREIGN BODY, MATERIAL AND WITH FOCAL EXTENSIVE FIBROSIS CONSISTENT WITH CICATRIX.     ASSESSMENT AND PLAN:   Catherine Inman is a 79 y.o. female now 1 month s/p exploratory laparotomy, lysis of adhesions, washout for small bowel obstruction.  Recovering well as expected.    Pathology results reviewed with patient  Return to clinic on an as needed basis      Noemi Donaldson MD   General surgeon    Electronically signed by Noemi Donaldson MD

## 2024-11-06 ENCOUNTER — OFFICE VISIT (OUTPATIENT)
Dept: GERIATRIC MEDICINE | Age: 79
End: 2024-11-06
Payer: MEDICARE

## 2024-11-06 DIAGNOSIS — A04.72 C. DIFFICILE COLITIS: Primary | ICD-10-CM

## 2024-11-06 DIAGNOSIS — N18.31 STAGE 3A CHRONIC KIDNEY DISEASE (HCC): ICD-10-CM

## 2024-11-06 DIAGNOSIS — E11.65 TYPE 2 DIABETES MELLITUS WITH HYPERGLYCEMIA, WITH LONG-TERM CURRENT USE OF INSULIN (HCC): ICD-10-CM

## 2024-11-06 DIAGNOSIS — I10 HYPERTENSION, UNSPECIFIED TYPE: ICD-10-CM

## 2024-11-06 DIAGNOSIS — F03.90 DEMENTIA WITHOUT BEHAVIORAL DISTURBANCE (HCC): ICD-10-CM

## 2024-11-06 DIAGNOSIS — Z79.4 TYPE 2 DIABETES MELLITUS WITH HYPERGLYCEMIA, WITH LONG-TERM CURRENT USE OF INSULIN (HCC): ICD-10-CM

## 2024-11-06 DIAGNOSIS — R10.9 ABDOMINAL PAIN, UNSPECIFIED ABDOMINAL LOCATION: ICD-10-CM

## 2024-11-06 DIAGNOSIS — E78.5 HYPERLIPIDEMIA, UNSPECIFIED HYPERLIPIDEMIA TYPE: ICD-10-CM

## 2024-11-06 PROCEDURE — G8484 FLU IMMUNIZE NO ADMIN: HCPCS | Performed by: INTERNAL MEDICINE

## 2024-11-06 PROCEDURE — 3046F HEMOGLOBIN A1C LEVEL >9.0%: CPT | Performed by: INTERNAL MEDICINE

## 2024-11-06 PROCEDURE — 99309 SBSQ NF CARE MODERATE MDM 30: CPT | Performed by: INTERNAL MEDICINE

## 2024-11-06 PROCEDURE — 1123F ACP DISCUSS/DSCN MKR DOCD: CPT | Performed by: INTERNAL MEDICINE

## 2024-11-06 NOTE — PROGRESS NOTES
SNF PROGRESS NOTE      Cc- SBO       Patient is a Catherine Inman 79 y.o. female who is being seen at [facility] as an admit post hospital stay for a SBO. She briefly was in the ICU and required pressors. She underwent a Exploratory Lap on 10/4 with lysis of adhesions.     Patient tested positive for C diff. She was placed on on vanco and in contact precautions.         Past Medical History:   Diagnosis Date    Breast cancer (HCC)     right (16-17 yrs ago)    Cancer (HCC)     Breast    Diabetes mellitus (HCC)     History of therapeutic radiation     Hyperlipidemia     Hypertension      Patient has no known allergies.    VS reviewed    Gen- Alert and oriented x 2   Heart- RRR no murmur no LE edema   Lungs- CTA b/l no resp distress RA oxygen   Abd- bs x 4, obese          Assessment and Plan    SBO s/p Exploratory Lap with lysis of adhesions   PT OT   F/u gen surgery on 11/4  DM  Glargine -- dose increased   HLD  Statin   HTN  Propanolol   Norvasc   Lisinopril   CKD stage 3   C diff   Started on vanco 250 mg QID x 14 days   Dementia   Psych to see   UTI   Macrobid 100 mg BID       FARIDA Wang DO     Electronically signed by: Aura Freeman DO on 11/6/2024

## 2024-11-07 ENCOUNTER — OFFICE VISIT (OUTPATIENT)
Dept: GERIATRIC MEDICINE | Age: 79
End: 2024-11-07

## 2024-11-07 DIAGNOSIS — I10 HYPERTENSION, UNSPECIFIED TYPE: ICD-10-CM

## 2024-11-07 DIAGNOSIS — F03.90 DEMENTIA WITHOUT BEHAVIORAL DISTURBANCE (HCC): ICD-10-CM

## 2024-11-07 DIAGNOSIS — Z79.4 TYPE 2 DIABETES MELLITUS WITH HYPERGLYCEMIA, WITH LONG-TERM CURRENT USE OF INSULIN (HCC): ICD-10-CM

## 2024-11-07 DIAGNOSIS — N18.31 STAGE 3A CHRONIC KIDNEY DISEASE (HCC): ICD-10-CM

## 2024-11-07 DIAGNOSIS — E11.65 TYPE 2 DIABETES MELLITUS WITH HYPERGLYCEMIA, WITH LONG-TERM CURRENT USE OF INSULIN (HCC): ICD-10-CM

## 2024-11-07 DIAGNOSIS — R10.9 ABDOMINAL PAIN, UNSPECIFIED ABDOMINAL LOCATION: ICD-10-CM

## 2024-11-07 DIAGNOSIS — A04.72 C. DIFFICILE COLITIS: Primary | ICD-10-CM

## 2024-11-07 DIAGNOSIS — E78.5 HYPERLIPIDEMIA, UNSPECIFIED HYPERLIPIDEMIA TYPE: ICD-10-CM

## 2024-11-09 ENCOUNTER — OFFICE VISIT (OUTPATIENT)
Dept: GERIATRIC MEDICINE | Age: 79
End: 2024-11-09
Payer: MEDICARE

## 2024-11-09 DIAGNOSIS — R10.9 ABDOMINAL PAIN, UNSPECIFIED ABDOMINAL LOCATION: ICD-10-CM

## 2024-11-09 DIAGNOSIS — N18.31 STAGE 3A CHRONIC KIDNEY DISEASE (HCC): ICD-10-CM

## 2024-11-09 DIAGNOSIS — Z79.4 TYPE 2 DIABETES MELLITUS WITH HYPERGLYCEMIA, WITH LONG-TERM CURRENT USE OF INSULIN (HCC): ICD-10-CM

## 2024-11-09 DIAGNOSIS — A04.72 C. DIFFICILE COLITIS: Primary | ICD-10-CM

## 2024-11-09 DIAGNOSIS — E78.5 HYPERLIPIDEMIA, UNSPECIFIED HYPERLIPIDEMIA TYPE: ICD-10-CM

## 2024-11-09 DIAGNOSIS — E11.65 TYPE 2 DIABETES MELLITUS WITH HYPERGLYCEMIA, WITH LONG-TERM CURRENT USE OF INSULIN (HCC): ICD-10-CM

## 2024-11-09 DIAGNOSIS — I10 HYPERTENSION, UNSPECIFIED TYPE: ICD-10-CM

## 2024-11-09 PROCEDURE — 1123F ACP DISCUSS/DSCN MKR DOCD: CPT | Performed by: INTERNAL MEDICINE

## 2024-11-09 PROCEDURE — 3046F HEMOGLOBIN A1C LEVEL >9.0%: CPT | Performed by: INTERNAL MEDICINE

## 2024-11-09 PROCEDURE — 99308 SBSQ NF CARE LOW MDM 20: CPT | Performed by: INTERNAL MEDICINE

## 2024-11-09 PROCEDURE — G8484 FLU IMMUNIZE NO ADMIN: HCPCS | Performed by: INTERNAL MEDICINE

## 2024-11-11 ENCOUNTER — OFFICE VISIT (OUTPATIENT)
Dept: GERIATRIC MEDICINE | Age: 79
End: 2024-11-11

## 2024-11-11 DIAGNOSIS — E11.65 TYPE 2 DIABETES MELLITUS WITH HYPERGLYCEMIA, WITH LONG-TERM CURRENT USE OF INSULIN (HCC): ICD-10-CM

## 2024-11-11 DIAGNOSIS — I10 HYPERTENSION, UNSPECIFIED TYPE: ICD-10-CM

## 2024-11-11 DIAGNOSIS — F03.90 DEMENTIA WITHOUT BEHAVIORAL DISTURBANCE (HCC): ICD-10-CM

## 2024-11-11 DIAGNOSIS — A04.72 C. DIFFICILE COLITIS: Primary | ICD-10-CM

## 2024-11-11 DIAGNOSIS — R10.9 ABDOMINAL PAIN, UNSPECIFIED ABDOMINAL LOCATION: ICD-10-CM

## 2024-11-11 DIAGNOSIS — E78.5 HYPERLIPIDEMIA, UNSPECIFIED HYPERLIPIDEMIA TYPE: ICD-10-CM

## 2024-11-11 DIAGNOSIS — N18.31 STAGE 3A CHRONIC KIDNEY DISEASE (HCC): ICD-10-CM

## 2024-11-11 DIAGNOSIS — Z79.4 TYPE 2 DIABETES MELLITUS WITH HYPERGLYCEMIA, WITH LONG-TERM CURRENT USE OF INSULIN (HCC): ICD-10-CM

## 2024-11-13 ENCOUNTER — OFFICE VISIT (OUTPATIENT)
Dept: GERIATRIC MEDICINE | Age: 79
End: 2024-11-13

## 2024-11-13 DIAGNOSIS — R10.9 ABDOMINAL PAIN, UNSPECIFIED ABDOMINAL LOCATION: ICD-10-CM

## 2024-11-13 DIAGNOSIS — A04.72 C. DIFFICILE COLITIS: Primary | ICD-10-CM

## 2024-11-13 DIAGNOSIS — Z79.4 TYPE 2 DIABETES MELLITUS WITH HYPERGLYCEMIA, WITH LONG-TERM CURRENT USE OF INSULIN (HCC): ICD-10-CM

## 2024-11-13 DIAGNOSIS — E78.5 HYPERLIPIDEMIA, UNSPECIFIED HYPERLIPIDEMIA TYPE: ICD-10-CM

## 2024-11-13 DIAGNOSIS — N18.31 STAGE 3A CHRONIC KIDNEY DISEASE (HCC): ICD-10-CM

## 2024-11-13 DIAGNOSIS — I10 HYPERTENSION, UNSPECIFIED TYPE: ICD-10-CM

## 2024-11-13 DIAGNOSIS — F03.90 DEMENTIA WITHOUT BEHAVIORAL DISTURBANCE (HCC): ICD-10-CM

## 2024-11-13 DIAGNOSIS — E11.65 TYPE 2 DIABETES MELLITUS WITH HYPERGLYCEMIA, WITH LONG-TERM CURRENT USE OF INSULIN (HCC): ICD-10-CM

## 2024-11-13 NOTE — PROGRESS NOTES
SNF PROGRESS NOTE      Cc- SBO       Patient is a Catherine Inman 79 y.o. female who is being seen at [facility] as an admit post hospital stay for a SBO. She briefly was in the ICU and required pressors. She underwent a Exploratory Lap on 10/4 with lysis of adhesions.     Patient is sitting up in bed sleeping but wakes up when I enter. The patient has general surgery appt today.         Past Medical History:   Diagnosis Date    Breast cancer (HCC)     right (16-17 yrs ago)    Cancer (HCC)     Breast    Diabetes mellitus (HCC)     History of therapeutic radiation     Hyperlipidemia     Hypertension      Patient has no known allergies.    VS reviewed    Gen- Alert and oriented x 2   Heart- RRR no murmur no LE edema   Lungs- CTA b/l no resp distress RA oxygen   Abd- bs x 4, obese        Assessment and Plan    SBO s/p Exploratory Lap with lysis of adhesions   PT OT   F/u gen surgery today   DM  Glargine   HLD  Statin   HTN  Propanolol   Norvasc   Lisinopril   CKD stage 3   Diarrhea   C diff sample PENDING   Dementia   Psych   UTI   Macrobid 100 mg BID       Aura Freeman DO, FACNAWAF     Electronically signed by: Aura Freeman DO on 11/4/2024

## 2024-11-14 NOTE — PROGRESS NOTES
SNF PROGRESS NOTE      Cc- SBO       Patient is a Catherine Inman 79 y.o. female who is being seen at [facility] as an admit post hospital stay for a SBO. She briefly was in the ICU and required pressors. She underwent a Exploratory Lap on 10/4 with lysis of adhesions.     Patient is tolerating abx well. She is eating fair. She is sitting up in bed.         Past Medical History:   Diagnosis Date    Breast cancer (HCC)     right (16-17 yrs ago)    Cancer (HCC)     Breast    Diabetes mellitus (HCC)     History of therapeutic radiation     Hyperlipidemia     Hypertension      Patient has no known allergies.    VS reviewed      Gen- Alert and oriented x 2   Heart- RRR no murmur no LE edema   Lungs- CTA b/l no resp distress RA oxygen   Abd- bs x 4, obese        Assessment and Plan    SBO s/p Exploratory Lap with lysis of adhesions   PT OT   F/u gen surgery as needed   DM  Glargine   HLD  Statin   HTN  Propanolol   Norvasc   Lisinopril   CKD stage 3   C diff   Started on vanco 250 mg QID x 14 days   Dementia   Psych to see   UTI   Macrobid 100 mg BID       Aura Freeman DO, FACOI     Electronically signed by: Aura Freeman DO on 11/7/2024

## 2024-11-15 ENCOUNTER — OFFICE VISIT (OUTPATIENT)
Dept: GERIATRIC MEDICINE | Age: 79
End: 2024-11-15

## 2024-11-15 DIAGNOSIS — E11.65 TYPE 2 DIABETES MELLITUS WITH HYPERGLYCEMIA, WITH LONG-TERM CURRENT USE OF INSULIN (HCC): ICD-10-CM

## 2024-11-15 DIAGNOSIS — N18.31 STAGE 3A CHRONIC KIDNEY DISEASE (HCC): ICD-10-CM

## 2024-11-15 DIAGNOSIS — A04.72 C. DIFFICILE COLITIS: Primary | ICD-10-CM

## 2024-11-15 DIAGNOSIS — R10.9 ABDOMINAL PAIN, UNSPECIFIED ABDOMINAL LOCATION: ICD-10-CM

## 2024-11-15 DIAGNOSIS — I10 HYPERTENSION, UNSPECIFIED TYPE: ICD-10-CM

## 2024-11-15 DIAGNOSIS — Z79.4 TYPE 2 DIABETES MELLITUS WITH HYPERGLYCEMIA, WITH LONG-TERM CURRENT USE OF INSULIN (HCC): ICD-10-CM

## 2024-11-15 DIAGNOSIS — F03.90 DEMENTIA WITHOUT BEHAVIORAL DISTURBANCE (HCC): ICD-10-CM

## 2024-11-15 DIAGNOSIS — E78.5 HYPERLIPIDEMIA, UNSPECIFIED HYPERLIPIDEMIA TYPE: ICD-10-CM

## 2024-11-18 ENCOUNTER — OFFICE VISIT (OUTPATIENT)
Dept: GERIATRIC MEDICINE | Age: 79
End: 2024-11-18

## 2024-11-18 DIAGNOSIS — A04.72 C. DIFFICILE COLITIS: Primary | ICD-10-CM

## 2024-11-18 DIAGNOSIS — Z79.4 TYPE 2 DIABETES MELLITUS WITH HYPERGLYCEMIA, WITH LONG-TERM CURRENT USE OF INSULIN (HCC): ICD-10-CM

## 2024-11-18 DIAGNOSIS — I10 HYPERTENSION, UNSPECIFIED TYPE: ICD-10-CM

## 2024-11-18 DIAGNOSIS — E78.5 HYPERLIPIDEMIA, UNSPECIFIED HYPERLIPIDEMIA TYPE: ICD-10-CM

## 2024-11-18 DIAGNOSIS — R10.9 ABDOMINAL PAIN, UNSPECIFIED ABDOMINAL LOCATION: ICD-10-CM

## 2024-11-18 DIAGNOSIS — E11.65 TYPE 2 DIABETES MELLITUS WITH HYPERGLYCEMIA, WITH LONG-TERM CURRENT USE OF INSULIN (HCC): ICD-10-CM

## 2024-11-18 DIAGNOSIS — N18.31 STAGE 3A CHRONIC KIDNEY DISEASE (HCC): ICD-10-CM

## 2024-11-19 NOTE — PROGRESS NOTES
SNF PROGRESS NOTE      Cc- SBO       Patient is a Catherine Inman 79 y.o. female who is being seen at [facility] as an admit post hospital stay for a SBO. She briefly was in the ICU and required pressors. She underwent a Exploratory Lap on 10/4 with lysis of adhesions.     Patient diarrhea improved slightly. The patient is eating fair. She is laying in bed and in isolation.         Past Medical History:   Diagnosis Date    Breast cancer (HCC)     right (16-17 yrs ago)    Cancer (HCC)     Breast    Diabetes mellitus (HCC)     History of therapeutic radiation     Hyperlipidemia     Hypertension      Patient has no known allergies.    VS reviewed    Gen- Alert and oriented x 2   Heart- RRR no murmur no LE edema   Lungs- CTA b/l no resp distress RA oxygen   Abd- bs x 4, obese          Assessment and Plan    SBO s/p Exploratory Lap with lysis of adhesions   PT OT   F/u gen surgery as needed   DM  Glargine   HLD  Statin   HTN  Propanolol   Norvasc   Lisinopril   CKD stage 3   C diff   Started on vanco 250 mg QID stop date 11/20  Dementia   Psych following   Neuro 12/19  UTI   Macrobid 100 mg BID       Aura Freeman DO FACNAWAF     Electronically signed by: Aura Freeman DO on 11/9/2024

## 2024-11-20 NOTE — PROGRESS NOTES
SNF PROGRESS NOTE      Cc- SBO       Patient is a Catherine Inman 79 y.o. female who is being seen at [facility] as an admit post hospital stay for a SBO. She briefly was in the ICU and required pressors. She underwent a Exploratory Lap on 10/4 with lysis of adhesions.     Patient tolerating abx.  She is a little confused. The patient remains eating fair. Continues with some diarrhea, although improved.         Past Medical History:   Diagnosis Date    Breast cancer (HCC)     right (16-17 yrs ago)    Cancer (HCC)     Breast    Diabetes mellitus (HCC)     History of therapeutic radiation     Hyperlipidemia     Hypertension      Patient has no known allergies.    VS reviewed    Gen- Alert and oriented x 2   Heart- RRR no murmur no LE edema   Lungs- CTA b/l no resp distress RA oxygen   Abd- bs x 4, obese             Assessment and Plan    SBO s/p Exploratory Lap with lysis of adhesions   PT OT   F/u gen surgery as needed   DM  Glargine   HLD  Statin   HTN  Propanolol   Norvasc   Lisinopril   CKD stage 3   C diff   Started on vanco 250 mg QID stop date 11/20  Dementia   Psych following   Neuro 12/19  UTI   Macrobid 100 mg BID       Aura Freeman DO, FACOI     Electronically signed by: Aura Freeman DO on 11/11/2024

## 2024-11-21 NOTE — PROGRESS NOTES
SNF PROGRESS NOTE      Cc- SBO       Patient is a Catherine Inman 79 y.o. female who is being seen at [facility] as an admit post hospital stay for a SBO. She briefly was in the ICU and required pressors. She underwent a Exploratory Lap on 10/4 with lysis of adhesions.     Patient is tolerating abx. She is on RA. Her diarrhea has imrpoved. She is eating fair.         Past Medical History:   Diagnosis Date    Breast cancer (HCC)     right (16-17 yrs ago)    Cancer (HCC)     Breast    Diabetes mellitus (HCC)     History of therapeutic radiation     Hyperlipidemia     Hypertension      Patient has no known allergies.    VS reviewed    Gen- Alert and oriented x 2   Heart- RRR no murmur no LE edema   Lungs- CTA b/l no resp distress RA oxygen   Abd- bs x 4, obese        Assessment and Plan    SBO s/p Exploratory Lap with lysis of adhesions   PT OT   F/u gen surgery as needed   DM  Glargine   HLD  Statin   HTN  Propanolol   Norvasc   Lisinopril   CKD stage 3   C diff   Started on vanco 250 mg QID stop date 11/20  Dementia   Psych following   Neuro 12/19  UTI   S/p macrobid       Aura Freeman DO FACOI     Electronically signed by: Aura Freeman DO on 11/13/2024

## 2024-11-26 ENCOUNTER — CARE COORDINATION (OUTPATIENT)
Dept: CARE COORDINATION | Age: 79
End: 2024-11-26

## 2024-11-30 NOTE — PROGRESS NOTES
SNF PROGRESS NOTE      Cc- SBO       Patient is a Catherine Inman 79 y.o. female who is being seen at [facility] as an admit post hospital stay for a SBO. She briefly was in the ICU and required pressors. She underwent a Exploratory Lap on 10/4 with lysis of adhesions.     Patient with weight loss, on supplement. She is laying in bed.  No SOB          Past Medical History:   Diagnosis Date    Breast cancer (HCC)     right (16-17 yrs ago)    Cancer (HCC)     Breast    Diabetes mellitus (HCC)     History of therapeutic radiation     Hyperlipidemia     Hypertension      Patient has no known allergies.    VS reviewed    Gen- Alert and oriented x 2   Heart- RRR no murmur no LE edema   Lungs- CTA b/l no resp distress RA oxygen   Abd- bs x 4, obese          Assessment and Plan    SBO s/p Exploratory Lap with lysis of adhesions   PT OT   F/u gen surgery as needed   DM  Glargine   HLD  Statin   HTN  Propanolol   Norvasc   Lisinopril   CKD stage 3   C diff   Started on vanco 250 mg QID stop date 11/20  Dementia   Psych following   Neuro 12/19  UTI   S/p macrobid       Aura Freeman DO, FACNAWAF     Electronically signed by: Aura Freeman DO on 11/18/2024

## 2024-12-02 ENCOUNTER — OFFICE VISIT (OUTPATIENT)
Dept: GERIATRIC MEDICINE | Age: 79
End: 2024-12-02

## 2024-12-05 LAB
ANION GAP SERPL CALCULATED.3IONS-SCNC: 12 MEQ/L (ref 9–15)
BUN SERPL-MCNC: 11 MG/DL (ref 8–23)
CALCIUM SERPL-MCNC: 8.6 MG/DL (ref 8.5–9.9)
CHLORIDE SERPL-SCNC: 98 MEQ/L (ref 95–107)
CO2 SERPL-SCNC: 21 MEQ/L (ref 20–31)
CREAT SERPL-MCNC: 1 MG/DL (ref 0.5–0.9)
ERYTHROCYTE [DISTWIDTH] IN BLOOD BY AUTOMATED COUNT: 13.1 % (ref 11.5–14.5)
GLUCOSE SERPL-MCNC: 69 MG/DL (ref 70–99)
HCT VFR BLD AUTO: 40.1 % (ref 37–47)
HGB BLD-MCNC: 12.6 G/DL (ref 12–16)
MCH RBC QN AUTO: 30.2 PG (ref 27–31.3)
MCHC RBC AUTO-ENTMCNC: 31.4 % (ref 33–37)
MCV RBC AUTO: 96.2 FL (ref 79.4–94.8)
PLATELET # BLD AUTO: 259 K/UL (ref 130–400)
POTASSIUM SERPL-SCNC: 4.1 MEQ/L (ref 3.4–4.9)
RBC # BLD AUTO: 4.17 M/UL (ref 4.2–5.4)
SODIUM SERPL-SCNC: 131 MEQ/L (ref 135–144)
WBC # BLD AUTO: 14.8 K/UL (ref 4.8–10.8)

## 2024-12-11 ENCOUNTER — TELEPHONE (OUTPATIENT)
Dept: OTHER | Facility: CLINIC | Age: 79
End: 2024-12-11

## 2024-12-11 NOTE — TELEPHONE ENCOUNTER
Patient was called as part of Population Health outreach to follow up on an outstanding DEXA order. If the patient calls back, please remind them to schedule their DEXA and provide scheduling number if possible.    Outcome of call: Other (enter comment) phone number no longer in service and no other number on file

## 2024-12-19 ENCOUNTER — OFFICE VISIT (OUTPATIENT)
Dept: NEUROLOGY | Age: 79
End: 2024-12-19
Payer: MEDICARE

## 2024-12-19 VITALS
SYSTOLIC BLOOD PRESSURE: 126 MMHG | DIASTOLIC BLOOD PRESSURE: 84 MMHG | WEIGHT: 144 LBS | BODY MASS INDEX: 26.34 KG/M2 | HEART RATE: 98 BPM

## 2024-12-19 DIAGNOSIS — R25.1 TREMOR: ICD-10-CM

## 2024-12-19 DIAGNOSIS — F03.C0 SEVERE DEMENTIA WITHOUT BEHAVIORAL DISTURBANCE, PSYCHOTIC DISTURBANCE, MOOD DISTURBANCE, OR ANXIETY, UNSPECIFIED DEMENTIA TYPE (HCC): ICD-10-CM

## 2024-12-19 DIAGNOSIS — D32.9 MENINGIOMA (HCC): ICD-10-CM

## 2024-12-19 DIAGNOSIS — Z09 HOSPITAL DISCHARGE FOLLOW-UP: Primary | ICD-10-CM

## 2024-12-19 PROCEDURE — G8484 FLU IMMUNIZE NO ADMIN: HCPCS | Performed by: NURSE PRACTITIONER

## 2024-12-19 PROCEDURE — G8400 PT W/DXA NO RESULTS DOC: HCPCS | Performed by: NURSE PRACTITIONER

## 2024-12-19 PROCEDURE — 99214 OFFICE O/P EST MOD 30 MIN: CPT | Performed by: NURSE PRACTITIONER

## 2024-12-19 PROCEDURE — 3079F DIAST BP 80-89 MM HG: CPT | Performed by: NURSE PRACTITIONER

## 2024-12-19 PROCEDURE — G8417 CALC BMI ABV UP PARAM F/U: HCPCS | Performed by: NURSE PRACTITIONER

## 2024-12-19 PROCEDURE — 1090F PRES/ABSN URINE INCON ASSESS: CPT | Performed by: NURSE PRACTITIONER

## 2024-12-19 PROCEDURE — 1160F RVW MEDS BY RX/DR IN RCRD: CPT | Performed by: NURSE PRACTITIONER

## 2024-12-19 PROCEDURE — G8427 DOCREV CUR MEDS BY ELIG CLIN: HCPCS | Performed by: NURSE PRACTITIONER

## 2024-12-19 PROCEDURE — 1123F ACP DISCUSS/DSCN MKR DOCD: CPT | Performed by: NURSE PRACTITIONER

## 2024-12-19 PROCEDURE — 1036F TOBACCO NON-USER: CPT | Performed by: NURSE PRACTITIONER

## 2024-12-19 PROCEDURE — 1159F MED LIST DOCD IN RCRD: CPT | Performed by: NURSE PRACTITIONER

## 2024-12-19 PROCEDURE — 3074F SYST BP LT 130 MM HG: CPT | Performed by: NURSE PRACTITIONER

## 2024-12-19 RX ORDER — PRIMIDONE 50 MG/1
TABLET ORAL
DISCHARGE
Start: 2024-12-19

## 2024-12-19 ASSESSMENT — ENCOUNTER SYMPTOMS
COLOR CHANGE: 0
SHORTNESS OF BREATH: 0
WHEEZING: 0
COUGH: 0
TROUBLE SWALLOWING: 0
NAUSEA: 0
VOMITING: 0
CHEST TIGHTNESS: 0

## 2024-12-19 NOTE — PROGRESS NOTES
Subjective:      Patient ID: Catherine Inman is a 79 y.o. female who presents today for:  Chief Complaint   Patient presents with    Follow-Up from Hospital     Pt reports no falls since her hospital stay in September. Daughter would like to discuss her CT head results. Daughter dose state that she has noticed a decline in her mothers memory.        HPI  Pt seen and examined in the office for hospital follow up. Pt was admitted to [facility] from 9/9/2024 to 9/13/2024 for left leg MRSA cellulitis, E. coli UTI, hypertensive urgency, cognitive impairment. Hospital records reviewed.     We were consulted during this admission due to concern for dementia which at that time had been undiagnosed.  Patient with noted short-term memory loss.  Diagnosis of dementia cannot be made in the setting of ongoing infection and metabolic encephalopathy and outpatient follow-up was recommended after treatment complete.  Patient did undergo EEG that was normal.  Head CT of the head done on 9/9/2024 negative for acute findings.  There is noted stable right frontal meningioma measuring 7 x 10 mm.  B12 and folate and thyroid studies normal.  Patient has since been placed in long-term care on a memory care unit.  Presents today accompanied by her  and daughter.  They report that overall she has been doing well.  Has significant short-term memory loss.  Has had progressive decline over the last year or so.  No behavioral disturbances.  Taking p.o. well.  Does have noted ongoing tremor of the bilateral upper extremities for which she is on primidone 50 mg twice daily.  No masked facies.  No decreased blink.  No rigidity on exam.  No decrement with finger tap testing.  Tremor is worse with drinking and eating.  No gaze palsy.  No dysphagia.  Past Medical History:   Diagnosis Date    Breast cancer (HCC)     right (16-17 yrs ago)    Cancer (HCC)     Breast    Diabetes mellitus (HCC)     History of therapeutic radiation

## 2025-01-03 ENCOUNTER — OFFICE VISIT (OUTPATIENT)
Dept: GERIATRIC MEDICINE | Age: 80
End: 2025-01-03
Payer: MEDICARE

## 2025-01-03 DIAGNOSIS — E11.65 TYPE 2 DIABETES MELLITUS WITH HYPERGLYCEMIA, WITH LONG-TERM CURRENT USE OF INSULIN (HCC): ICD-10-CM

## 2025-01-03 DIAGNOSIS — F03.90 DEMENTIA WITHOUT BEHAVIORAL DISTURBANCE (HCC): Primary | ICD-10-CM

## 2025-01-03 DIAGNOSIS — N18.31 STAGE 3A CHRONIC KIDNEY DISEASE (HCC): ICD-10-CM

## 2025-01-03 DIAGNOSIS — E78.5 HYPERLIPIDEMIA, UNSPECIFIED HYPERLIPIDEMIA TYPE: ICD-10-CM

## 2025-01-03 DIAGNOSIS — I10 HYPERTENSION, UNSPECIFIED TYPE: ICD-10-CM

## 2025-01-03 DIAGNOSIS — Z79.4 TYPE 2 DIABETES MELLITUS WITH HYPERGLYCEMIA, WITH LONG-TERM CURRENT USE OF INSULIN (HCC): ICD-10-CM

## 2025-01-03 PROCEDURE — 99308 SBSQ NF CARE LOW MDM 20: CPT | Performed by: INTERNAL MEDICINE

## 2025-01-03 PROCEDURE — 1123F ACP DISCUSS/DSCN MKR DOCD: CPT | Performed by: INTERNAL MEDICINE

## 2025-01-28 NOTE — PROGRESS NOTES
SNF PROGRESS NOTE      Cc-dementia      Patient is a Catehrine Inman 79 y.o. female who is being seen at [facility] as an admit post hospital stay for a SBO. She briefly was in the ICU and required pressors. She underwent a Exploratory Lap on 10/4 with lysis of adhesions.  She is being seen for her 30-day examination for dementia    Over the last 30 days, patient was treated for C. difficile colitis.  Otherwise there have been no significant events or issues.        Past Medical History:   Diagnosis Date    Breast cancer (HCC)     right (16-17 yrs ago)    Cancer (HCC)     Breast    Diabetes mellitus (HCC)     History of therapeutic radiation     Hyperlipidemia     Hypertension      Patient has no known allergies.    VS reviewed    Gen- Alert and oriented x 2   Heart- RRR no murmur no LE edema   Lungs- CTA b/l no resp distress RA oxygen   Abd- bs x 4, obese        Assessment and Plan      Dementia   Follow with neuro in 6 months   SBO s/p Exploratory Lap with lysis of adhesions   PT OT   F/u gen surgery as needed   DM  Glargine   HLD  Statin   HTN  Propanolol   Norvasc   Lisinopril   CKD stage 3   Tremor   On primidone   C diff   S/p Po Vanco       Aura Freeman DO, FACOI     Electronically signed by: Aura Freeman DO on 1/3/2025

## 2025-02-03 ENCOUNTER — OFFICE VISIT (OUTPATIENT)
Dept: GERIATRIC MEDICINE | Age: 80
End: 2025-02-03
Payer: MEDICARE

## 2025-02-03 DIAGNOSIS — E78.5 HYPERLIPIDEMIA, UNSPECIFIED HYPERLIPIDEMIA TYPE: ICD-10-CM

## 2025-02-03 DIAGNOSIS — Z79.4 TYPE 2 DIABETES MELLITUS WITH HYPERGLYCEMIA, WITH LONG-TERM CURRENT USE OF INSULIN (HCC): ICD-10-CM

## 2025-02-03 DIAGNOSIS — F03.90 DEMENTIA WITHOUT BEHAVIORAL DISTURBANCE (HCC): Primary | ICD-10-CM

## 2025-02-03 DIAGNOSIS — E11.65 TYPE 2 DIABETES MELLITUS WITH HYPERGLYCEMIA, WITH LONG-TERM CURRENT USE OF INSULIN (HCC): ICD-10-CM

## 2025-02-03 DIAGNOSIS — N18.31 STAGE 3A CHRONIC KIDNEY DISEASE (HCC): ICD-10-CM

## 2025-02-03 DIAGNOSIS — I10 HYPERTENSION, UNSPECIFIED TYPE: ICD-10-CM

## 2025-02-03 PROCEDURE — 1123F ACP DISCUSS/DSCN MKR DOCD: CPT | Performed by: INTERNAL MEDICINE

## 2025-02-03 PROCEDURE — 99308 SBSQ NF CARE LOW MDM 20: CPT | Performed by: INTERNAL MEDICINE

## 2025-02-08 RX ORDER — BUSPIRONE HYDROCHLORIDE 7.5 MG/1
7.5 TABLET ORAL 2 TIMES DAILY PRN
Qty: 14 TABLET | Refills: 0 | Status: SHIPPED | OUTPATIENT
Start: 2025-02-08 | End: 2025-02-15

## 2025-02-18 NOTE — PROGRESS NOTES
SNF PROGRESS NOTE      Cc- dementia       Patient is a Catherine Inman 80 y.o. female who is being seen at [facility] as an admit post hospital stay for a SBO. She briefly was in the ICU and required pressors. She underwent a Exploratory Lap on 10/4 with lysis of adhesions.  She is being seen for her 30-day examination for dementia     Over the last 30 days, patient was seen by podiatry.  Patient did have several episodes with behaviors with a negative urine.         Past Medical History:   Diagnosis Date    Breast cancer (HCC)     right (16-17 yrs ago)    Cancer (HCC)     Breast    Diabetes mellitus (HCC)     History of therapeutic radiation     Hyperlipidemia     Hypertension      Patient has no known allergies.    VS reviewed     Gen- Alert and oriented x 2   Heart- RRR no murmur no LE edema   Lungs- CTA b/l no resp distress RA oxygen   Abd- bs x 4, obese            Assessment and Plan    Dementia with behaviors   Follow with neuro in 6 months   Psych consult   Hydroxyzine PRN   SBO s/p Exploratory Lap with lysis of adhesions   PT OT   F/u gen surgery as needed   DM  Glargine   HLD  Statin   HTN  Propanolol   Norvasc   Lisinopril   CKD stage 3   Tremor   On primidone         FARIDA Wang DO     Electronically signed by: Aura Freeman DO on 2/3/2025

## 2025-03-03 ENCOUNTER — OFFICE VISIT (OUTPATIENT)
Dept: GERIATRIC MEDICINE | Age: 80
End: 2025-03-03

## 2025-03-03 DIAGNOSIS — F03.90 DEMENTIA WITHOUT BEHAVIORAL DISTURBANCE (HCC): Primary | ICD-10-CM

## 2025-03-03 DIAGNOSIS — Z79.4 TYPE 2 DIABETES MELLITUS WITH HYPERGLYCEMIA, WITH LONG-TERM CURRENT USE OF INSULIN (HCC): ICD-10-CM

## 2025-03-03 DIAGNOSIS — E11.65 TYPE 2 DIABETES MELLITUS WITH HYPERGLYCEMIA, WITH LONG-TERM CURRENT USE OF INSULIN (HCC): ICD-10-CM

## 2025-03-03 DIAGNOSIS — N18.31 STAGE 3A CHRONIC KIDNEY DISEASE (HCC): ICD-10-CM

## 2025-03-03 DIAGNOSIS — E78.5 HYPERLIPIDEMIA, UNSPECIFIED HYPERLIPIDEMIA TYPE: ICD-10-CM

## 2025-03-03 DIAGNOSIS — I10 HYPERTENSION, UNSPECIFIED TYPE: ICD-10-CM

## 2025-03-05 NOTE — PROGRESS NOTES
SNF PROGRESS NOTE      Cc- dementia       Patient is a Catherine Inman 80 y.o. female who is being seen at [facility] as an admit post hospital stay for a SBO. She briefly was in the ICU and required pressors. She underwent a Exploratory Lap on 10/4 with lysis of adhesions. She is being seen for her 30-day examination for dementia     Over the last 30 days, the patient was seen by audiology.  Psych was consulted and patient was placed on hydroxyzine twice daily.  Patient was also having foot pain and gabapentin was started.        Past Medical History:   Diagnosis Date    Breast cancer (HCC)     right (16-17 yrs ago)    Cancer (HCC)     Breast    Diabetes mellitus (HCC)     History of therapeutic radiation     Hyperlipidemia     Hypertension      Patient has no known allergies.    VS reviewed    Gen- Alert and oriented x 2   Heart- RRR no murmur no LE edema   Lungs- CTA b/l no resp distress RA oxygen   Abd- bs x 4, obese          Assessment and Plan      Dementia with behaviors   Follow with neuro  Psych consult   Hydroxyzine   SBO s/p Exploratory Lap with lysis of adhesions   PT OT   F/u gen surgery as needed   DM  Glargine   HLD  Statin   HTN  Propanolol   Norvasc   Lisinopril   Lasix  CKD stage 3   Tremor   On primidone          Aura Freeman DO FACNAWAF     Electronically signed by: Aura Freeman DO on 3/3/2025

## 2025-03-12 DIAGNOSIS — Z48.89 ENCOUNTER FOR POSTOPERATIVE WOUND CARE: Primary | ICD-10-CM

## 2025-03-12 RX ORDER — OXYCODONE AND ACETAMINOPHEN 5; 325 MG/1; MG/1
1 TABLET ORAL EVERY MORNING
Qty: 30 TABLET | Refills: 0 | Status: SHIPPED | OUTPATIENT
Start: 2025-03-12 | End: 2025-04-11

## 2025-03-17 LAB
INFLUENZA A BY PCR: NEGATIVE
INFLUENZA B BY PCR: NEGATIVE
REASON FOR REJECTION: NORMAL
REJECTED TEST: NORMAL

## 2025-03-20 ENCOUNTER — OFFICE VISIT (OUTPATIENT)
Age: 80
End: 2025-03-20
Payer: MEDICARE

## 2025-03-20 VITALS
RESPIRATION RATE: 15 BRPM | TEMPERATURE: 97.6 F | BODY MASS INDEX: 26.5 KG/M2 | WEIGHT: 144 LBS | HEART RATE: 71 BPM | SYSTOLIC BLOOD PRESSURE: 119 MMHG | DIASTOLIC BLOOD PRESSURE: 70 MMHG | HEIGHT: 62 IN | OXYGEN SATURATION: 97 %

## 2025-03-20 DIAGNOSIS — J34.89 NASAL DRAINAGE: ICD-10-CM

## 2025-03-20 DIAGNOSIS — R94.128 ABNORMAL TYMPANOGRAM: ICD-10-CM

## 2025-03-20 DIAGNOSIS — H61.23 BILATERAL IMPACTED CERUMEN: Primary | ICD-10-CM

## 2025-03-20 PROCEDURE — 3074F SYST BP LT 130 MM HG: CPT | Performed by: STUDENT IN AN ORGANIZED HEALTH CARE EDUCATION/TRAINING PROGRAM

## 2025-03-20 PROCEDURE — 1160F RVW MEDS BY RX/DR IN RCRD: CPT | Performed by: STUDENT IN AN ORGANIZED HEALTH CARE EDUCATION/TRAINING PROGRAM

## 2025-03-20 PROCEDURE — 1159F MED LIST DOCD IN RCRD: CPT | Performed by: STUDENT IN AN ORGANIZED HEALTH CARE EDUCATION/TRAINING PROGRAM

## 2025-03-20 PROCEDURE — 99203 OFFICE O/P NEW LOW 30 MIN: CPT | Performed by: STUDENT IN AN ORGANIZED HEALTH CARE EDUCATION/TRAINING PROGRAM

## 2025-03-20 PROCEDURE — G8427 DOCREV CUR MEDS BY ELIG CLIN: HCPCS | Performed by: STUDENT IN AN ORGANIZED HEALTH CARE EDUCATION/TRAINING PROGRAM

## 2025-03-20 PROCEDURE — G8400 PT W/DXA NO RESULTS DOC: HCPCS | Performed by: STUDENT IN AN ORGANIZED HEALTH CARE EDUCATION/TRAINING PROGRAM

## 2025-03-20 PROCEDURE — 1123F ACP DISCUSS/DSCN MKR DOCD: CPT | Performed by: STUDENT IN AN ORGANIZED HEALTH CARE EDUCATION/TRAINING PROGRAM

## 2025-03-20 PROCEDURE — 1090F PRES/ABSN URINE INCON ASSESS: CPT | Performed by: STUDENT IN AN ORGANIZED HEALTH CARE EDUCATION/TRAINING PROGRAM

## 2025-03-20 PROCEDURE — 3078F DIAST BP <80 MM HG: CPT | Performed by: STUDENT IN AN ORGANIZED HEALTH CARE EDUCATION/TRAINING PROGRAM

## 2025-03-20 PROCEDURE — G8417 CALC BMI ABV UP PARAM F/U: HCPCS | Performed by: STUDENT IN AN ORGANIZED HEALTH CARE EDUCATION/TRAINING PROGRAM

## 2025-03-20 PROCEDURE — 69210 REMOVE IMPACTED EAR WAX UNI: CPT | Performed by: STUDENT IN AN ORGANIZED HEALTH CARE EDUCATION/TRAINING PROGRAM

## 2025-03-20 PROCEDURE — 1036F TOBACCO NON-USER: CPT | Performed by: STUDENT IN AN ORGANIZED HEALTH CARE EDUCATION/TRAINING PROGRAM

## 2025-03-20 NOTE — PROGRESS NOTES
history in detail and updated the computerized patient record.      Susu Garcia MD

## 2025-04-01 ENCOUNTER — OFFICE VISIT (OUTPATIENT)
Dept: GERIATRIC MEDICINE | Age: 80
End: 2025-04-01

## 2025-04-01 DIAGNOSIS — N18.31 TYPE 2 DIABETES MELLITUS WITH STAGE 3A CHRONIC KIDNEY DISEASE, WITH LONG-TERM CURRENT USE OF INSULIN (HCC): ICD-10-CM

## 2025-04-01 DIAGNOSIS — N18.31 STAGE 3A CHRONIC KIDNEY DISEASE (HCC): ICD-10-CM

## 2025-04-01 DIAGNOSIS — K73.9 CHRONIC HEPATITIS, UNSPECIFIED (HCC): ICD-10-CM

## 2025-04-01 DIAGNOSIS — E11.22 TYPE 2 DIABETES MELLITUS WITH STAGE 3A CHRONIC KIDNEY DISEASE, WITH LONG-TERM CURRENT USE OF INSULIN (HCC): ICD-10-CM

## 2025-04-01 DIAGNOSIS — I10 HYPERTENSION, UNSPECIFIED TYPE: ICD-10-CM

## 2025-04-01 DIAGNOSIS — F03.C0 SEVERE DEMENTIA WITHOUT BEHAVIORAL DISTURBANCE, PSYCHOTIC DISTURBANCE, MOOD DISTURBANCE, OR ANXIETY, UNSPECIFIED DEMENTIA TYPE (HCC): Primary | ICD-10-CM

## 2025-04-01 DIAGNOSIS — D32.9 MENINGIOMA (HCC): ICD-10-CM

## 2025-04-01 DIAGNOSIS — Z79.4 TYPE 2 DIABETES MELLITUS WITH STAGE 3A CHRONIC KIDNEY DISEASE, WITH LONG-TERM CURRENT USE OF INSULIN (HCC): ICD-10-CM

## 2025-04-01 DIAGNOSIS — E78.5 HYPERLIPIDEMIA, UNSPECIFIED HYPERLIPIDEMIA TYPE: ICD-10-CM

## 2025-04-03 PROBLEM — F03.C0 SEVERE DEMENTIA WITHOUT BEHAVIORAL DISTURBANCE, PSYCHOTIC DISTURBANCE, MOOD DISTURBANCE, OR ANXIETY, UNSPECIFIED DEMENTIA TYPE (HCC): Status: ACTIVE | Noted: 2025-04-03

## 2025-04-03 PROBLEM — R57.9 SHOCK (HCC): Status: RESOLVED | Noted: 2024-10-03 | Resolved: 2025-04-03

## 2025-04-03 NOTE — PROGRESS NOTES
SNF PROGRESS NOTE      Cc- dementia       Patient is a Catherine Inman 80 y.o. female who is being seen at [facility] as an admit post hospital stay for a SBO. She briefly was in the ICU and required pressors. She underwent a Exploratory Lap on 10/4 with lysis of adhesions. She is being seen for her 30-day examination for dementia     Over the last 30 days, the patient is being seen by wound care.  She has had several issues of behaviors.  There have been no significant weight changes.  She was seen by podiatry.        Past Medical History:   Diagnosis Date    Breast cancer (HCC)     right (16-17 yrs ago)    Cancer (HCC)     Breast    Diabetes mellitus (HCC)     History of therapeutic radiation     Hyperlipidemia     Hypertension      Patient has no known allergies.    VS reviewed      Gen- Alert and oriented x 2   Heart- RRR no murmur no LE edema   Lungs- CTA b/l no resp distress RA oxygen   Abd- bs x 4, obese        Assessment and Plan      Dementia with behaviors   Follow with neuro  Psych consult   Hydroxyzine   DM  Glargine   HLD  Statin   HTN  Propanolol   Norvasc   Lisinopril   Lasix  CKD stage 3   Tremor   On primidone        FARIDA Wang DO     Electronically signed by: Aura Freeman DO on 4/1/2025

## 2025-05-01 ENCOUNTER — OFFICE VISIT (OUTPATIENT)
Dept: GERIATRIC MEDICINE | Age: 80
End: 2025-05-01

## 2025-05-01 DIAGNOSIS — N18.31 TYPE 2 DIABETES MELLITUS WITH STAGE 3A CHRONIC KIDNEY DISEASE, WITH LONG-TERM CURRENT USE OF INSULIN (HCC): ICD-10-CM

## 2025-05-01 DIAGNOSIS — N18.31 STAGE 3A CHRONIC KIDNEY DISEASE (HCC): ICD-10-CM

## 2025-05-01 DIAGNOSIS — D32.9 MENINGIOMA (HCC): ICD-10-CM

## 2025-05-01 DIAGNOSIS — E11.22 TYPE 2 DIABETES MELLITUS WITH STAGE 3A CHRONIC KIDNEY DISEASE, WITH LONG-TERM CURRENT USE OF INSULIN (HCC): ICD-10-CM

## 2025-05-01 DIAGNOSIS — Z79.4 TYPE 2 DIABETES MELLITUS WITH STAGE 3A CHRONIC KIDNEY DISEASE, WITH LONG-TERM CURRENT USE OF INSULIN (HCC): ICD-10-CM

## 2025-05-01 DIAGNOSIS — I10 HYPERTENSION, UNSPECIFIED TYPE: ICD-10-CM

## 2025-05-01 DIAGNOSIS — E78.5 HYPERLIPIDEMIA, UNSPECIFIED HYPERLIPIDEMIA TYPE: ICD-10-CM

## 2025-05-01 DIAGNOSIS — F03.C0 SEVERE DEMENTIA WITHOUT BEHAVIORAL DISTURBANCE, PSYCHOTIC DISTURBANCE, MOOD DISTURBANCE, OR ANXIETY, UNSPECIFIED DEMENTIA TYPE (HCC): Primary | ICD-10-CM

## 2025-05-01 DIAGNOSIS — K73.9 CHRONIC HEPATITIS, UNSPECIFIED (HCC): ICD-10-CM

## 2025-05-09 DIAGNOSIS — Z48.89 ENCOUNTER FOR POSTOPERATIVE WOUND CARE: ICD-10-CM

## 2025-05-09 RX ORDER — OXYCODONE AND ACETAMINOPHEN 5; 325 MG/1; MG/1
1 TABLET ORAL EVERY MORNING
Qty: 30 TABLET | Refills: 0 | Status: CANCELLED | OUTPATIENT
Start: 2025-05-09 | End: 2025-06-08

## 2025-05-12 DIAGNOSIS — Z48.89 ENCOUNTER FOR POSTOPERATIVE WOUND CARE: ICD-10-CM

## 2025-05-13 RX ORDER — OXYCODONE AND ACETAMINOPHEN 5; 325 MG/1; MG/1
1 TABLET ORAL EVERY MORNING
OUTPATIENT
Start: 2025-05-13

## 2025-05-21 NOTE — PROGRESS NOTES
SNF PROGRESS NOTE      Cc- dementia       Patient is a Catherine Inman 80 y.o. female who is being seen at [facility] as an admit post hospital stay for a SBO. She briefly was in the ICU and required pressors. She underwent a Exploratory Lap on 10/4 with lysis of adhesions. She is being seen for her 30-day examination for dementia     Over the last 30 days, patient has no had any significant events. Her weight has slightly increased.         Past Medical History:   Diagnosis Date    Breast cancer (HCC)     right (16-17 yrs ago)    Cancer (HCC)     Breast    Diabetes mellitus (HCC)     History of therapeutic radiation     Hyperlipidemia     Hypertension      Patient has no known allergies.    VS reviewed    Gen- Alert and oriented x 2   Heart- RRR no murmur no LE edema   Lungs- CTA b/l no resp distress RA oxygen   Abd- bs x 4, obese          Assessment and Plan    Dementia with behaviors   Follow with neuro  Psych consult   Hydroxyzine   DM-- BS elevated--  Lantus 20 units -- will increase to BID   HLD  Statin   HTN-- BP stable   Propanolol   Norvasc 5 mg daily   Lisinopril   Lasix  CKD stage 3   Tremor   On primidone       FARIDA Wang DO     Electronically signed by: Aura Freeman DO on 5/1/2025

## 2025-05-23 DIAGNOSIS — Z48.89 ENCOUNTER FOR POSTOPERATIVE WOUND CARE: ICD-10-CM

## 2025-05-24 RX ORDER — OXYCODONE AND ACETAMINOPHEN 5; 325 MG/1; MG/1
1 TABLET ORAL EVERY MORNING
Qty: 30 TABLET | Refills: 0 | Status: SHIPPED | OUTPATIENT
Start: 2025-05-24 | End: 2025-06-23

## 2025-06-02 ENCOUNTER — OFFICE VISIT (OUTPATIENT)
Dept: GERIATRIC MEDICINE | Age: 80
End: 2025-06-02

## 2025-06-02 DIAGNOSIS — E78.5 HYPERLIPIDEMIA, UNSPECIFIED HYPERLIPIDEMIA TYPE: ICD-10-CM

## 2025-06-02 DIAGNOSIS — N18.31 STAGE 3A CHRONIC KIDNEY DISEASE (HCC): ICD-10-CM

## 2025-06-02 DIAGNOSIS — Z79.4 TYPE 2 DIABETES MELLITUS WITH STAGE 3A CHRONIC KIDNEY DISEASE, WITH LONG-TERM CURRENT USE OF INSULIN (HCC): ICD-10-CM

## 2025-06-02 DIAGNOSIS — N18.31 TYPE 2 DIABETES MELLITUS WITH STAGE 3A CHRONIC KIDNEY DISEASE, WITH LONG-TERM CURRENT USE OF INSULIN (HCC): ICD-10-CM

## 2025-06-02 DIAGNOSIS — D32.9 MENINGIOMA (HCC): ICD-10-CM

## 2025-06-02 DIAGNOSIS — K73.9 CHRONIC HEPATITIS, UNSPECIFIED (HCC): ICD-10-CM

## 2025-06-02 DIAGNOSIS — I10 HYPERTENSION, UNSPECIFIED TYPE: ICD-10-CM

## 2025-06-02 DIAGNOSIS — F03.C0 SEVERE DEMENTIA WITHOUT BEHAVIORAL DISTURBANCE, PSYCHOTIC DISTURBANCE, MOOD DISTURBANCE, OR ANXIETY, UNSPECIFIED DEMENTIA TYPE (HCC): Primary | ICD-10-CM

## 2025-06-02 DIAGNOSIS — E11.22 TYPE 2 DIABETES MELLITUS WITH STAGE 3A CHRONIC KIDNEY DISEASE, WITH LONG-TERM CURRENT USE OF INSULIN (HCC): ICD-10-CM

## 2025-06-05 NOTE — PROGRESS NOTES
SNF PROGRESS NOTE      Cc- dementia       Patient is a Catherine Inman 80 y.o. female who is being seen at [facility] as an admit post hospital stay for a SBO. She briefly was in the ICU and required pressors. She underwent a Exploratory Lap on 10/4 with lysis of adhesions. She is being seen for her 30-day examination for dementia     Over the last 30 days, patient continues to have wound care and her appetite is good. She was seen by podiatry. She was started on lexapro by psych.         Past Medical History:   Diagnosis Date    Breast cancer (HCC)     right (16-17 yrs ago)    Cancer (HCC)     Breast    Diabetes mellitus (HCC)     History of therapeutic radiation     Hyperlipidemia     Hypertension      Patient has no known allergies.    VS reviewed     Gen- Alert and oriented x 2   Heart- RRR no murmur no LE edema   Lungs- CTA b/l no resp distress RA oxygen   Abd- bs x 4, obese          Assessment and Plan    Dementia with behaviors / depression   Follow with neuro  Psych consult   Hydroxyzine   Lexapro 5 mg daily   DM-- BS elevated--  Lantus 20 units BID-- will increase to 30 BID   HLD  Statin   HTN-- BP stable   Propanolol   Norvasc 5 mg daily   Lisinopril   Lasix  CKD stage 3   Tremor   On primidone       FARIDA Wang DO     Electronically signed by: Aura Freeman DO on 6/2/2025

## 2025-06-19 ENCOUNTER — OFFICE VISIT (OUTPATIENT)
Age: 80
End: 2025-06-19
Payer: MEDICARE

## 2025-06-19 VITALS — SYSTOLIC BLOOD PRESSURE: 118 MMHG | DIASTOLIC BLOOD PRESSURE: 78 MMHG | WEIGHT: 157 LBS | BODY MASS INDEX: 28.72 KG/M2

## 2025-06-19 DIAGNOSIS — F02.C0 SEVERE LATE ONSET ALZHEIMER'S DEMENTIA WITHOUT BEHAVIORAL DISTURBANCE, PSYCHOTIC DISTURBANCE, MOOD DISTURBANCE, OR ANXIETY (HCC): Primary | ICD-10-CM

## 2025-06-19 DIAGNOSIS — D32.9 MENINGIOMA (HCC): ICD-10-CM

## 2025-06-19 DIAGNOSIS — R27.0 ATAXIA: ICD-10-CM

## 2025-06-19 DIAGNOSIS — G30.1 SEVERE LATE ONSET ALZHEIMER'S DEMENTIA WITHOUT BEHAVIORAL DISTURBANCE, PSYCHOTIC DISTURBANCE, MOOD DISTURBANCE, OR ANXIETY (HCC): Primary | ICD-10-CM

## 2025-06-19 DIAGNOSIS — R25.1 TREMOR: ICD-10-CM

## 2025-06-19 PROCEDURE — G8417 CALC BMI ABV UP PARAM F/U: HCPCS | Performed by: PSYCHIATRY & NEUROLOGY

## 2025-06-19 PROCEDURE — 1090F PRES/ABSN URINE INCON ASSESS: CPT | Performed by: PSYCHIATRY & NEUROLOGY

## 2025-06-19 PROCEDURE — 1036F TOBACCO NON-USER: CPT | Performed by: PSYCHIATRY & NEUROLOGY

## 2025-06-19 PROCEDURE — G8427 DOCREV CUR MEDS BY ELIG CLIN: HCPCS | Performed by: PSYCHIATRY & NEUROLOGY

## 2025-06-19 PROCEDURE — 1126F AMNT PAIN NOTED NONE PRSNT: CPT | Performed by: PSYCHIATRY & NEUROLOGY

## 2025-06-19 PROCEDURE — 99214 OFFICE O/P EST MOD 30 MIN: CPT | Performed by: PSYCHIATRY & NEUROLOGY

## 2025-06-19 PROCEDURE — 3078F DIAST BP <80 MM HG: CPT | Performed by: PSYCHIATRY & NEUROLOGY

## 2025-06-19 PROCEDURE — G8400 PT W/DXA NO RESULTS DOC: HCPCS | Performed by: PSYCHIATRY & NEUROLOGY

## 2025-06-19 PROCEDURE — 3074F SYST BP LT 130 MM HG: CPT | Performed by: PSYCHIATRY & NEUROLOGY

## 2025-06-19 PROCEDURE — 1123F ACP DISCUSS/DSCN MKR DOCD: CPT | Performed by: PSYCHIATRY & NEUROLOGY

## 2025-06-19 PROCEDURE — 1159F MED LIST DOCD IN RCRD: CPT | Performed by: PSYCHIATRY & NEUROLOGY

## 2025-06-19 RX ORDER — HYDROXYZINE PAMOATE 25 MG/1
25 CAPSULE ORAL 2 TIMES DAILY
COMMUNITY
Start: 2025-06-08

## 2025-06-19 RX ORDER — CITALOPRAM HYDROBROMIDE 10 MG/1
10 TABLET ORAL DAILY
COMMUNITY
Start: 2025-06-12

## 2025-06-19 RX ORDER — GABAPENTIN 100 MG/1
100 CAPSULE ORAL 2 TIMES DAILY
COMMUNITY
Start: 2025-06-08

## 2025-06-19 RX ORDER — SODIUM POLYSTYRENE SULFONATE 4.1 MEQ/G
POWDER, FOR SUSPENSION ORAL; RECTAL
COMMUNITY
Start: 2025-04-23

## 2025-06-19 RX ORDER — INSULIN LISPRO 100 [IU]/ML
INJECTION, SOLUTION INTRAVENOUS; SUBCUTANEOUS
COMMUNITY
Start: 2025-06-15 | End: 2025-06-19

## 2025-06-19 RX ORDER — INSULIN GLARGINE 100 [IU]/ML
30 INJECTION, SOLUTION SUBCUTANEOUS 2 TIMES DAILY
COMMUNITY
Start: 2025-06-15

## 2025-06-19 RX ORDER — ESCITALOPRAM OXALATE 5 MG/1
TABLET ORAL
COMMUNITY
Start: 2025-06-08 | End: 2025-06-19

## 2025-06-19 NOTE — PROGRESS NOTES
Mental Status: She is alert and oriented to person, place, and time.      Cranial Nerves: No cranial nerve deficit.      Sensory: No sensory deficit.      Motor: No abnormal muscle tone.      Coordination: Coordination normal.      Deep Tendon Reflexes: Reflexes are normal and symmetric. Babinski sign absent on the right side. Babinski sign absent on the left side.      Comments: Metabolic parameters are essential tremors.  She has felt quite sometime.  Patient was treated with Mysoline with some response.  Patient is having some minor rest tremors as well.  She is areflexic in the lower extremity.   Psychiatric:         Mood and Affect: Mood normal.     We did attempt a walker.    CT CERVICAL SPINE WO CONTRAST  Result Date: 10/20/2024  EXAMINATION: CT OF THE CERVICAL SPINE WITHOUT CONTRAST 10/20/2024 4:05 am TECHNIQUE: CT of the cervical spine was performed without the administration of intravenous contrast. Multiplanar reformatted images are provided for review. Automated exposure control, iterative reconstruction, and/or weight based adjustment of the mA/kV was utilized to reduce the radiation dose to as low as reasonably achievable. COMPARISON: 09/09/2024 HISTORY: ORDERING SYSTEM PROVIDED HISTORY: fall TECHNOLOGIST PROVIDED HISTORY: Reason for exam:->fall Decision Support Exception - unselect if not a suspected or confirmed emergency medical condition->Emergency Medical Condition (MA) What reading provider will be dictating this exam?->CRC FINDINGS: BONES/ALIGNMENT: There is no acute fracture or traumatic malalignment. DEGENERATIVE CHANGES: Mild, diffuse multilevel degenerative changes. SOFT TISSUES: There is no prevertebral soft tissue swelling.     No acute abnormality of the cervical spine.     CT HEAD WO CONTRAST  Result Date: 10/20/2024  EXAMINATION: CT OF THE HEAD WITHOUT CONTRAST  10/20/2024 4:05 am TECHNIQUE: CT of the head was performed without the administration of intravenous contrast. Automated

## 2025-06-23 ENCOUNTER — TELEPHONE (OUTPATIENT)
Age: 80
End: 2025-06-23

## 2025-06-23 DIAGNOSIS — Z48.89 ENCOUNTER FOR POSTOPERATIVE WOUND CARE: ICD-10-CM

## 2025-06-23 RX ORDER — OXYCODONE AND ACETAMINOPHEN 5; 325 MG/1; MG/1
1 TABLET ORAL EVERY MORNING
Qty: 30 TABLET | Refills: 0 | Status: SHIPPED | OUTPATIENT
Start: 2025-06-23 | End: 2025-07-23

## 2025-06-23 NOTE — TELEPHONE ENCOUNTER
Daughter called, stated the DENNIS scan is 6 hours long, she is in a nursing home and daughter states this is too long of a test for her due to her condition.  She wanted to know if this test is absolutely necessary to determine if she has PD.  Please advise

## 2025-06-24 NOTE — TELEPHONE ENCOUNTER
Daughter informed of providers response. She would not like to get the test done right now, but would like to know iif they considered it would it change anything. Pt is already taking medications for her tremors. Please advise.

## 2025-06-25 NOTE — TELEPHONE ENCOUNTER
Patient can be treated for clinical presentation as felt necessary by Dr. Bahena without DaTscan.  Her response to treatment, should he choose to pursue that, can give us an idea of the diagnosis.

## 2025-07-23 DIAGNOSIS — Z48.89 ENCOUNTER FOR POSTOPERATIVE WOUND CARE: ICD-10-CM

## 2025-07-23 RX ORDER — OXYCODONE AND ACETAMINOPHEN 5; 325 MG/1; MG/1
1 TABLET ORAL EVERY MORNING
Qty: 30 TABLET | Refills: 0 | Status: SHIPPED | OUTPATIENT
Start: 2025-07-23 | End: 2025-08-22

## 2025-08-01 ENCOUNTER — OFFICE VISIT (OUTPATIENT)
Dept: GERIATRIC MEDICINE | Age: 80
End: 2025-08-01
Payer: MEDICARE

## 2025-08-01 DIAGNOSIS — N18.31 STAGE 3A CHRONIC KIDNEY DISEASE (HCC): ICD-10-CM

## 2025-08-01 DIAGNOSIS — F03.C0 SEVERE DEMENTIA WITHOUT BEHAVIORAL DISTURBANCE, PSYCHOTIC DISTURBANCE, MOOD DISTURBANCE, OR ANXIETY, UNSPECIFIED DEMENTIA TYPE (HCC): Primary | ICD-10-CM

## 2025-08-01 DIAGNOSIS — N18.31 TYPE 2 DIABETES MELLITUS WITH STAGE 3A CHRONIC KIDNEY DISEASE, WITH LONG-TERM CURRENT USE OF INSULIN (HCC): ICD-10-CM

## 2025-08-01 DIAGNOSIS — Z79.4 TYPE 2 DIABETES MELLITUS WITH STAGE 3A CHRONIC KIDNEY DISEASE, WITH LONG-TERM CURRENT USE OF INSULIN (HCC): ICD-10-CM

## 2025-08-01 DIAGNOSIS — D32.9 MENINGIOMA (HCC): ICD-10-CM

## 2025-08-01 DIAGNOSIS — I10 HYPERTENSION, UNSPECIFIED TYPE: ICD-10-CM

## 2025-08-01 DIAGNOSIS — E78.5 HYPERLIPIDEMIA, UNSPECIFIED HYPERLIPIDEMIA TYPE: ICD-10-CM

## 2025-08-01 DIAGNOSIS — K73.9 CHRONIC HEPATITIS, UNSPECIFIED (HCC): ICD-10-CM

## 2025-08-01 DIAGNOSIS — E11.22 TYPE 2 DIABETES MELLITUS WITH STAGE 3A CHRONIC KIDNEY DISEASE, WITH LONG-TERM CURRENT USE OF INSULIN (HCC): ICD-10-CM

## 2025-08-01 PROCEDURE — 1123F ACP DISCUSS/DSCN MKR DOCD: CPT | Performed by: INTERNAL MEDICINE

## 2025-08-01 PROCEDURE — 99309 SBSQ NF CARE MODERATE MDM 30: CPT | Performed by: INTERNAL MEDICINE

## 2025-08-21 DIAGNOSIS — Z48.89 ENCOUNTER FOR POSTOPERATIVE WOUND CARE: ICD-10-CM

## 2025-08-21 RX ORDER — OXYCODONE AND ACETAMINOPHEN 5; 325 MG/1; MG/1
1 TABLET ORAL EVERY MORNING
Qty: 30 TABLET | Refills: 0 | Status: SHIPPED | OUTPATIENT
Start: 2025-08-21 | End: 2025-09-20

## 2025-09-02 ENCOUNTER — OFFICE VISIT (OUTPATIENT)
Dept: GERIATRIC MEDICINE | Age: 80
End: 2025-09-02

## 2025-09-02 DIAGNOSIS — Z79.4 TYPE 2 DIABETES MELLITUS WITH STAGE 3A CHRONIC KIDNEY DISEASE, WITH LONG-TERM CURRENT USE OF INSULIN (HCC): ICD-10-CM

## 2025-09-02 DIAGNOSIS — N18.31 STAGE 3A CHRONIC KIDNEY DISEASE (HCC): ICD-10-CM

## 2025-09-02 DIAGNOSIS — D32.9 MENINGIOMA (HCC): ICD-10-CM

## 2025-09-02 DIAGNOSIS — N18.31 TYPE 2 DIABETES MELLITUS WITH STAGE 3A CHRONIC KIDNEY DISEASE, WITH LONG-TERM CURRENT USE OF INSULIN (HCC): ICD-10-CM

## 2025-09-02 DIAGNOSIS — F03.C0 SEVERE DEMENTIA WITHOUT BEHAVIORAL DISTURBANCE, PSYCHOTIC DISTURBANCE, MOOD DISTURBANCE, OR ANXIETY, UNSPECIFIED DEMENTIA TYPE (HCC): Primary | ICD-10-CM

## 2025-09-02 DIAGNOSIS — I10 HYPERTENSION, UNSPECIFIED TYPE: ICD-10-CM

## 2025-09-02 DIAGNOSIS — E11.22 TYPE 2 DIABETES MELLITUS WITH STAGE 3A CHRONIC KIDNEY DISEASE, WITH LONG-TERM CURRENT USE OF INSULIN (HCC): ICD-10-CM

## 2025-09-02 DIAGNOSIS — E78.5 HYPERLIPIDEMIA, UNSPECIFIED HYPERLIPIDEMIA TYPE: ICD-10-CM

## 2025-09-02 DIAGNOSIS — K73.9 CHRONIC HEPATITIS, UNSPECIFIED (HCC): ICD-10-CM

## (undated) DEVICE — SUTURE VICRYL SZ 2-0 L36IN ABSRB UD L36MM CT-1 1/2 CIR J945H

## (undated) DEVICE — DRAPE,UTILITY,TAPE,15X26,STERILE: Brand: MEDLINE

## (undated) DEVICE — GOWN,SIRUS,POLYRNF,BRTHSLV,LG,30/CS: Brand: MEDLINE

## (undated) DEVICE — LABEL MED MINI W/ MARKER

## (undated) DEVICE — COUNTER NDL 40 COUNT HLD 70 FOAM BLK ADH W/ MAG

## (undated) DEVICE — COVER,MAYO STAND,STERILE: Brand: MEDLINE

## (undated) DEVICE — 450 ML BOTTLE OF 0.05% CHLORHEXIDINE GLUCONATE IN 99.95% STERILE WATER FOR IRRIGATION, USP AND APPLICATOR.: Brand: IRRISEPT ANTIMICROBIAL WOUND LAVAGE

## (undated) DEVICE — COVER,TABLE,44X90,STERILE: Brand: MEDLINE

## (undated) DEVICE — SEALER ENDOSCP NANO COAT OPN DIV CRV L JAW LIGASURE IMPACT

## (undated) DEVICE — SPONGE,LAP,18"X18",DLX,XR,ST,5/PK,40/PK: Brand: MEDLINE

## (undated) DEVICE — TUBING, SUCTION, 9/32" X 12', STRAIGHT: Brand: MEDLINE INDUSTRIES, INC.

## (undated) DEVICE — BINDER ABD 2XL H12XL60 75IN UNISX STD E 4 PNL DISPOSABLE

## (undated) DEVICE — GLOVE SURG SZ 6 L12IN FNGR THK79MIL GRN LTX FREE

## (undated) DEVICE — COVER LT HNDL BLU PLAS

## (undated) DEVICE — GLOVE SURG SZ 55 THK91MIL ORANGE  LTX FREE SYN POLYISOPRENE

## (undated) DEVICE — LIQUIBAND RAPID ADHESIVE 36/CS 0.8ML: Brand: MEDLINE

## (undated) DEVICE — NEPTUNE E-SEP SMOKE EVACUATION PENCIL, COATED, 70MM BLADE, PUSH BUTTON SWITCH: Brand: NEPTUNE E-SEP

## (undated) DEVICE — GENERAL MAJOR: Brand: MEDLINE INDUSTRIES, INC.